# Patient Record
Sex: FEMALE | Race: WHITE | Employment: UNEMPLOYED | URBAN - METROPOLITAN AREA
[De-identification: names, ages, dates, MRNs, and addresses within clinical notes are randomized per-mention and may not be internally consistent; named-entity substitution may affect disease eponyms.]

---

## 2017-01-16 RX ORDER — LISINOPRIL 5 MG/1
5 TABLET ORAL DAILY
Qty: 30 TABLET | Refills: 5 | Status: SHIPPED | OUTPATIENT
Start: 2017-01-16 | End: 2017-09-07

## 2017-01-16 NOTE — TELEPHONE ENCOUNTER
Current Outpatient Prescriptions:  lisinopril 5 MG Oral Tab Take 1 tablet (5 mg total) by mouth daily.  Disp: 30 tablet Rfl: 5     Refill

## 2017-01-25 NOTE — TELEPHONE ENCOUNTER
Patient contacted and Dr. Bart Ovalle message relayed to do lab work. Instructed to fast 6 hours for lab work.

## 2017-09-07 ENCOUNTER — HOSPITAL ENCOUNTER (OUTPATIENT)
Age: 32
Discharge: HOME | End: 2017-09-07
Attending: FAMILY MEDICINE
Payer: COMMERCIAL

## 2017-09-07 VITALS
HEIGHT: 66 IN | RESPIRATION RATE: 16 BRPM | BODY MASS INDEX: 24.91 KG/M2 | WEIGHT: 155 LBS | TEMPERATURE: 98 F | OXYGEN SATURATION: 100 % | HEART RATE: 88 BPM | DIASTOLIC BLOOD PRESSURE: 90 MMHG | SYSTOLIC BLOOD PRESSURE: 130 MMHG

## 2017-09-07 DIAGNOSIS — S16.1XXA STRAIN OF MUSCLE, FASCIA AND TENDON AT NECK LEVEL, INITIAL ENCOUNTER: Primary | ICD-10-CM

## 2017-09-07 PROCEDURE — 99214 OFFICE O/P EST MOD 30 MIN: CPT

## 2017-09-07 PROCEDURE — 99213 OFFICE O/P EST LOW 20 MIN: CPT

## 2017-09-07 RX ORDER — CYCLOBENZAPRINE HCL 10 MG
10 TABLET ORAL 3 TIMES DAILY PRN
Qty: 20 TABLET | Refills: 0 | Status: SHIPPED | OUTPATIENT
Start: 2017-09-07 | End: 2017-09-28

## 2017-09-07 NOTE — ED INITIAL ASSESSMENT (HPI)
3 days of left sided neck pain radiating down left arm. Worse pain with movement of head or raising arm. + distal CMS. No trauma.

## 2017-09-07 NOTE — ED PROVIDER NOTES
Patient Seen in: [address]    History   Patient presents with:  Neck Pain    Stated Complaint: Lt neck/shoulder pain    HPI    Patient here with a 3 day history of left neck upper back pain  Moderate pain  Radiates to le Conjunctivae are normal. Pupils are equal, round, and reactive to light. Neck: Normal range of motion. Neck supple. No JVD present. No tracheal deviation present. No thyromegaly present.    Left upper trapezius and sternocleidomastoid spasm and tenderness DO  [facility]  [phone]    In 1 week  If symptoms are not better      Medications Prescribed:  Current Discharge Medication List    START taking these medications    Cyclobenzaprine HCl 10 MG Oral Tab  Take 1 tablet (10 mg total)

## 2018-03-22 RX ORDER — LISINOPRIL 5 MG/1
TABLET ORAL
Qty: 30 TABLET | Refills: 0 | OUTPATIENT
Start: 2018-03-22

## 2018-04-30 DIAGNOSIS — Q60.0 SOLITARY KIDNEY, CONGENITAL: Primary | ICD-10-CM

## 2018-04-30 NOTE — TELEPHONE ENCOUNTER
Contacted pt because lisinopril is no longer on her med list and her LOV with [facility] was 7/12/16. She states that to be honest, she hasn't been consistent with taking lisinopril, has had periods that she's stopped it and then started again.  Advised her to kirill

## 2018-05-01 RX ORDER — LISINOPRIL 5 MG/1
5 TABLET ORAL DAILY
Qty: 30 TABLET | Refills: 0 | Status: SHIPPED | OUTPATIENT
Start: 2018-05-01 | End: 2018-05-25

## 2018-05-02 NOTE — TELEPHONE ENCOUNTER
Lab orders entered. She has appt with [facility] on 5/4/18. Contacted her about labs to do as ordered by [facility]. She's not sure if she'll have time to do them prior to appt but she will try to do them on Friday, if not will do after her appt.

## 2018-05-04 ENCOUNTER — OFFICE VISIT (OUTPATIENT)
Dept: NEPHROLOGY | Facility: CLINIC | Age: 33
End: 2018-05-04

## 2018-05-04 VITALS
SYSTOLIC BLOOD PRESSURE: 102 MMHG | BODY MASS INDEX: 25.9 KG/M2 | HEIGHT: 66 IN | WEIGHT: 161.19 LBS | HEART RATE: 90 BPM | DIASTOLIC BLOOD PRESSURE: 69 MMHG

## 2018-05-04 DIAGNOSIS — Q60.0 SOLITARY KIDNEY, CONGENITAL: Primary | ICD-10-CM

## 2018-05-04 PROCEDURE — 99214 OFFICE O/P EST MOD 30 MIN: CPT | Performed by: INTERNAL MEDICINE

## 2018-05-04 PROCEDURE — 99212 OFFICE O/P EST SF 10 MIN: CPT | Performed by: INTERNAL MEDICINE

## 2018-05-04 NOTE — PROGRESS NOTES
05/04/18        Patient: Asael Tse   YOB: 1985   Date of Visit: 5/4/2018       Dear  Dr. Alia Echavarria, DO,      Thank you for referring Asael Tse to my practice. Please find my assessment and plan below.       As you know she is a

## 2018-05-25 ENCOUNTER — LAB ENCOUNTER (OUTPATIENT)
Dept: LAB | Facility: HOSPITAL | Age: 33
End: 2018-05-25
Attending: INTERNAL MEDICINE
Payer: COMMERCIAL

## 2018-05-25 DIAGNOSIS — Q60.0 SOLITARY KIDNEY, CONGENITAL: ICD-10-CM

## 2018-05-25 PROCEDURE — 81001 URINALYSIS AUTO W/SCOPE: CPT

## 2018-05-25 PROCEDURE — 82043 UR ALBUMIN QUANTITATIVE: CPT

## 2018-05-25 PROCEDURE — 36415 COLL VENOUS BLD VENIPUNCTURE: CPT

## 2018-05-25 PROCEDURE — 80069 RENAL FUNCTION PANEL: CPT

## 2018-05-25 PROCEDURE — 82570 ASSAY OF URINE CREATININE: CPT

## 2018-05-25 PROCEDURE — 85025 COMPLETE CBC W/AUTO DIFF WBC: CPT

## 2018-05-25 RX ORDER — LISINOPRIL 5 MG/1
TABLET ORAL
Qty: 30 TABLET | Refills: 5 | Status: SHIPPED | OUTPATIENT
Start: 2018-05-25 | End: 2018-11-15

## 2018-08-01 ENCOUNTER — CHARTING TRANS (OUTPATIENT)
Dept: OTHER | Age: 33
End: 2018-08-01

## 2018-08-04 ENCOUNTER — CHARTING TRANS (OUTPATIENT)
Dept: OTHER | Age: 33
End: 2018-08-04

## 2018-11-15 RX ORDER — LISINOPRIL 5 MG/1
TABLET ORAL
Qty: 90 TABLET | Refills: 1 | Status: SHIPPED | OUTPATIENT
Start: 2018-11-15 | End: 2019-09-09

## 2018-12-31 ENCOUNTER — HOSPITAL ENCOUNTER (OUTPATIENT)
Age: 33
Discharge: HOME | End: 2018-12-31
Attending: EMERGENCY MEDICINE
Payer: COMMERCIAL

## 2018-12-31 VITALS
RESPIRATION RATE: 18 BRPM | BODY MASS INDEX: 25.71 KG/M2 | WEIGHT: 160 LBS | HEIGHT: 66 IN | DIASTOLIC BLOOD PRESSURE: 92 MMHG | OXYGEN SATURATION: 98 % | TEMPERATURE: 98 F | SYSTOLIC BLOOD PRESSURE: 134 MMHG | HEART RATE: 112 BPM

## 2018-12-31 DIAGNOSIS — J02.9 VIRAL PHARYNGITIS: Primary | ICD-10-CM

## 2018-12-31 LAB — S PYO AG THROAT QL: NEGATIVE

## 2018-12-31 PROCEDURE — 87430 STREP A AG IA: CPT

## 2018-12-31 PROCEDURE — 99213 OFFICE O/P EST LOW 20 MIN: CPT

## 2018-12-31 PROCEDURE — 99212 OFFICE O/P EST SF 10 MIN: CPT

## 2018-12-31 NOTE — ED PROVIDER NOTES
Patient Seen in: [facility] [place]    History     Stated Complaint: SORE THROAT    HPI    Patient states for the past 2 days she has been having sore throat associated with chills diarrhea and generalized weakness.   The patient extremities without impairment            icc  Course     Labs Reviewed   EM POCT RAPID STREP - Normal                MDM   Discussed with patient symptoms likely viral in etiology and antibiotics not beneficial.  Did not think further testing was indicat

## 2019-05-07 ENCOUNTER — HOSPITAL ENCOUNTER (OUTPATIENT)
Age: 34
Discharge: HOME | End: 2019-05-07
Payer: COMMERCIAL

## 2019-05-07 VITALS
OXYGEN SATURATION: 99 % | DIASTOLIC BLOOD PRESSURE: 75 MMHG | BODY MASS INDEX: 25.39 KG/M2 | HEART RATE: 104 BPM | RESPIRATION RATE: 18 BRPM | TEMPERATURE: 98 F | SYSTOLIC BLOOD PRESSURE: 117 MMHG | WEIGHT: 158 LBS | HEIGHT: 66 IN

## 2019-05-07 DIAGNOSIS — R19.7 DIARRHEA, UNSPECIFIED TYPE: Primary | ICD-10-CM

## 2019-05-07 PROCEDURE — 99214 OFFICE O/P EST MOD 30 MIN: CPT

## 2019-05-07 PROCEDURE — 96360 HYDRATION IV INFUSION INIT: CPT

## 2019-05-07 PROCEDURE — 85025 COMPLETE CBC W/AUTO DIFF WBC: CPT | Performed by: NURSE PRACTITIONER

## 2019-05-07 PROCEDURE — 87507 IADNA-DNA/RNA PROBE TQ 12-25: CPT | Performed by: NURSE PRACTITIONER

## 2019-05-07 PROCEDURE — 80047 BASIC METABLC PNL IONIZED CA: CPT

## 2019-05-07 RX ORDER — SODIUM CHLORIDE 9 MG/ML
INJECTION, SOLUTION INTRAVENOUS ONCE
Status: COMPLETED | OUTPATIENT
Start: 2019-05-07 | End: 2019-05-07

## 2019-05-07 NOTE — ED PROVIDER NOTES
Patient presents with:  Diarrhea      HPI:     Juan Cabrales is a 26year old female with a history of solitary kidney and appendectomy presents with a chief complaint of diarrhea X3 days. Other than an appendectomy no other history of GI diagnoses or surgeries. any other concerns she should go to the ER. Patient verbalized plan of care and states understanding.        Orders Placed This Encounter      POCT CBC Once      Place PIV Once      iStat (Chem 8)      0.9%  NaCl infusion      Labs performed this visit:  N

## 2019-05-07 NOTE — ED INITIAL ASSESSMENT (HPI)
REPORTS LOOSE STOOL X 3 DAYS. STATES SHE IS HAVING A BOWEL MOVEMENT TWICE PER HOUR. DENIES EMESIS BUT REPORTS INTERMITTENT NAUSEA. DENIES BLOOD IN STOOL. DENIES ANY RECENT ANTIBIOTIC USE. DENIES GI HISTORY. PROVIDER AT BEDSIDE.

## 2019-05-23 ENCOUNTER — HOSPITAL ENCOUNTER (OUTPATIENT)
Dept: ULTRASOUND IMAGING | Facility: HOSPITAL | Age: 34
Discharge: HOME | End: 2019-05-23
Attending: FAMILY MEDICINE
Payer: COMMERCIAL

## 2019-05-23 ENCOUNTER — HOSPITAL ENCOUNTER (OUTPATIENT)
Dept: MAMMOGRAPHY | Facility: HOSPITAL | Age: 34
Discharge: HOME | End: 2019-05-23
Attending: FAMILY MEDICINE
Payer: COMMERCIAL

## 2019-05-23 DIAGNOSIS — N63.10 BILATERAL BREAST LUMP: ICD-10-CM

## 2019-05-23 DIAGNOSIS — N63.20 BILATERAL BREAST LUMP: ICD-10-CM

## 2019-05-23 PROCEDURE — 77066 DX MAMMO INCL CAD BI: CPT | Performed by: FAMILY MEDICINE

## 2019-05-23 PROCEDURE — 76642 ULTRASOUND BREAST LIMITED: CPT | Performed by: FAMILY MEDICINE

## 2019-05-23 PROCEDURE — 77062 BREAST TOMOSYNTHESIS BI: CPT | Performed by: FAMILY MEDICINE

## 2019-09-09 DIAGNOSIS — Q60.0 SOLITARY KIDNEY, CONGENITAL: Primary | ICD-10-CM

## 2019-09-09 RX ORDER — LISINOPRIL 5 MG/1
5 TABLET ORAL DAILY
Qty: 90 TABLET | Refills: 0 | Status: SHIPPED | OUTPATIENT
Start: 2019-09-09 | End: 2020-04-02

## 2019-09-09 NOTE — TELEPHONE ENCOUNTER
LOV 5/4/18. RTC in 1 yr (5/2019) No f/u scheduled. Refill pended and routed to Dr. Silvio Madison to approve.

## 2019-09-09 NOTE — TELEPHONE ENCOUNTER
Do CBC, renal panel, urinalysis, urine for microalbumin and see Take medications as directed  Follow up with Primary Physician  Increase water

## 2019-09-11 NOTE — TELEPHONE ENCOUNTER
Patient contacted. Advised of lab orders entered in [address] [place] [address] Rd. Instructed to fast 6 hours for lab work.  Follow up appointment booked for Tuesday 9/24/19 at 4:40 pm.

## 2019-10-14 ENCOUNTER — LAB ENCOUNTER (OUTPATIENT)
Dept: LAB | Facility: HOSPITAL | Age: 34
End: 2019-10-14
Attending: INTERNAL MEDICINE
Payer: COMMERCIAL

## 2019-10-14 DIAGNOSIS — Q60.0 SOLITARY KIDNEY, CONGENITAL: ICD-10-CM

## 2019-10-14 PROCEDURE — 36415 COLL VENOUS BLD VENIPUNCTURE: CPT

## 2019-10-14 PROCEDURE — 85025 COMPLETE CBC W/AUTO DIFF WBC: CPT

## 2019-10-14 PROCEDURE — 80069 RENAL FUNCTION PANEL: CPT

## 2019-10-14 PROCEDURE — 82570 ASSAY OF URINE CREATININE: CPT

## 2019-10-14 PROCEDURE — 81001 URINALYSIS AUTO W/SCOPE: CPT

## 2019-10-14 PROCEDURE — 82043 UR ALBUMIN QUANTITATIVE: CPT

## 2019-10-21 ENCOUNTER — OFFICE VISIT (OUTPATIENT)
Dept: NEPHROLOGY | Facility: CLINIC | Age: 34
End: 2019-10-21
Payer: COMMERCIAL

## 2019-10-21 VITALS
SYSTOLIC BLOOD PRESSURE: 95 MMHG | HEIGHT: 66 IN | DIASTOLIC BLOOD PRESSURE: 53 MMHG | BODY MASS INDEX: 25.07 KG/M2 | WEIGHT: 156 LBS | HEART RATE: 89 BPM

## 2019-10-21 DIAGNOSIS — Q60.0 SOLITARY KIDNEY, CONGENITAL: Primary | ICD-10-CM

## 2019-10-21 PROCEDURE — 99214 OFFICE O/P EST MOD 30 MIN: CPT | Performed by: INTERNAL MEDICINE

## 2019-10-22 NOTE — PROGRESS NOTES
10/21/19        Patient: Trini Tanner   YOB: 1985   Date of Visit: 10/21/2019       Dear  Dr. Alvarez Herrmann, DO,      Thank you for referring Trini Tanner to my practice. Please find my assessment and plan below.       As you know she is Adelaide Burch

## 2020-02-01 ENCOUNTER — HOSPITAL ENCOUNTER (OUTPATIENT)
Age: 35
Discharge: HOME | End: 2020-02-01
Payer: COMMERCIAL

## 2020-02-01 VITALS
BODY MASS INDEX: 25 KG/M2 | HEART RATE: 92 BPM | OXYGEN SATURATION: 99 % | WEIGHT: 156 LBS | DIASTOLIC BLOOD PRESSURE: 82 MMHG | RESPIRATION RATE: 18 BRPM | TEMPERATURE: 98 F | SYSTOLIC BLOOD PRESSURE: 124 MMHG

## 2020-02-01 DIAGNOSIS — R05.9 COUGH: Primary | ICD-10-CM

## 2020-02-01 PROCEDURE — 99214 OFFICE O/P EST MOD 30 MIN: CPT

## 2020-02-01 PROCEDURE — 99213 OFFICE O/P EST LOW 20 MIN: CPT

## 2020-02-01 RX ORDER — METHYLPREDNISOLONE 4 MG/1
TABLET ORAL
Qty: 1 PACKAGE | Refills: 0 | Status: SHIPPED | OUTPATIENT
Start: 2020-02-01 | End: 2020-09-14

## 2020-02-01 RX ORDER — BENZONATATE 100 MG/1
100 CAPSULE ORAL 3 TIMES DAILY PRN
Qty: 30 CAPSULE | Refills: 0 | Status: SHIPPED | OUTPATIENT
Start: 2020-02-01 | End: 2020-02-10

## 2020-02-01 RX ORDER — ALBUTEROL SULFATE 90 UG/1
2 AEROSOL, METERED RESPIRATORY (INHALATION) EVERY 4 HOURS PRN
Qty: 1 INHALER | Refills: 0 | Status: SHIPPED | OUTPATIENT
Start: 2020-02-01 | End: 2020-03-02

## 2020-02-01 NOTE — ED INITIAL ASSESSMENT (HPI)
REPORTS 2 WEEK HX OF URI SYMPTOMS. STATES SHE COMPLETED A COURSE OF AMOXICILLIN BUT THE COUGH CONTINUES TO LINGER. STATES SHE DOES FEEL SOMEWHAT BETTER THAN SHE DID INITIALLY. PROVIDER AT BEDSIDE.

## 2020-02-01 NOTE — ED PROVIDER NOTES
Patient presents with:  Cough/URI      HPI:     Iliana Levin is a 37year old female with with a history of solitary kidney presents with a chief complaint of cough. Pt reports that she was treated for strep throat this ago.   States she completed marco at this time. This is most likely secondary to viral etiology. Will place on Medrol Dosepak, Tessalon Perles as well as a butyryl inhaler. She states if she does not have improvement she will return.   Patient verbalized plan of care and states [place]

## 2020-02-10 ENCOUNTER — OFFICE VISIT (OUTPATIENT)
Dept: FAMILY MEDICINE CLINIC | Facility: CLINIC | Age: 35
End: 2020-02-10
Payer: COMMERCIAL

## 2020-02-10 VITALS
WEIGHT: 155 LBS | DIASTOLIC BLOOD PRESSURE: 80 MMHG | BODY MASS INDEX: 24.91 KG/M2 | TEMPERATURE: 98 F | HEART RATE: 88 BPM | OXYGEN SATURATION: 99 % | SYSTOLIC BLOOD PRESSURE: 120 MMHG | HEIGHT: 66 IN

## 2020-02-10 DIAGNOSIS — R05.3 PERSISTENT COUGH: Primary | ICD-10-CM

## 2020-02-10 PROCEDURE — 99202 OFFICE O/P NEW SF 15 MIN: CPT | Performed by: FAMILY MEDICINE

## 2020-02-10 NOTE — PROGRESS NOTES
Patient presents with:  Cough: 3weeks      HPI:   Pablito Thornton is a 37year old female who presents for a cough. Initially had sore throat and fever about 3 weeks ago while at [facility] in [place]. Tmax 102 at the time.    Saw a doc in [address] Dosepack: take as directed (Patient not taking: Reported on 2/10/2020 ) 1 Package 0   • Multiple Vitamins-Minerals (MULTIVITAMIN ADULT OR) Take 1 tablet by mouth.      • Acetaminophen (TYLENOL OR)         Past Medical History:   Diagnosis Date   • Solitary URI.   -Pt is on lisinopril, but does have other allergy sx. Will continue for now. -Will start daily flonase & zyretec/claritin/allegra.    -May continue albuterol prn for bronchospasms as well.   -Pt to update in 2 weeks if no improvement, or sooner prn

## 2020-04-02 RX ORDER — LISINOPRIL 5 MG/1
TABLET ORAL
Qty: 90 TABLET | Refills: 1 | Status: SHIPPED | OUTPATIENT
Start: 2020-04-02 | End: 2020-11-27

## 2020-08-19 ENCOUNTER — HOSPITAL ENCOUNTER (OUTPATIENT)
Age: 35
Discharge: HOME | End: 2020-08-19
Payer: COMMERCIAL

## 2020-08-19 VITALS
SYSTOLIC BLOOD PRESSURE: 124 MMHG | RESPIRATION RATE: 18 BRPM | DIASTOLIC BLOOD PRESSURE: 86 MMHG | HEART RATE: 89 BPM | TEMPERATURE: 98 F | OXYGEN SATURATION: 99 %

## 2020-08-19 DIAGNOSIS — M79.601 MUSCULOSKELETAL ARM PAIN, RIGHT: Primary | ICD-10-CM

## 2020-08-19 PROCEDURE — 99214 OFFICE O/P EST MOD 30 MIN: CPT

## 2020-08-19 PROCEDURE — 99213 OFFICE O/P EST LOW 20 MIN: CPT

## 2020-08-19 RX ORDER — METHYLPREDNISOLONE 4 MG/1
TABLET ORAL
Qty: 1 PACKAGE | Refills: 0 | Status: SHIPPED | OUTPATIENT
Start: 2020-08-19 | End: 2020-09-14

## 2020-08-19 NOTE — ED PROVIDER NOTES
[facility] [address]      History   Patient presents with:  Arm Pain    Stated Complaint: right arm pain    HPI    This is a 33-year-old female presenting with right arm pain.   Patient states for about 3 weeks she Vitals signs and nursing note reviewed. Constitutional:       Appearance: Normal appearance. HENT:      Head: Normocephalic.       Right Ear: Tympanic membrane normal.      Left Ear: Tympanic membrane normal.      Nose: Nose normal.      Mouth/Throat: 53-year-old female nontoxic-appearing presenting with intermittent 3-week pain in the right arm. There is tenderness to palpation over the biceps region and near the upper forearm near the elbow.   There is no trauma noted there is no swelling no erythema

## 2020-08-19 NOTE — ED INITIAL ASSESSMENT (HPI)
REPORTS 3 WEEK HX OF RIGHT ARM PAIN. STATES PAIN STARTS IN THE ELBOW AND RADIATES TO HER RIGHT UPPER ARM. ALSO REPORTS FEELING OF NUMBNESS TO THE AREA STARTING TODAY. DENIES INJURY/TRAUMA. STATES PAIN IS NORMALLY WORSE AT NIGHT WHEN SHE TRIES TO SLEEP.

## 2020-08-28 ENCOUNTER — HOSPITAL ENCOUNTER (OUTPATIENT)
Age: 35
Discharge: HOME | End: 2020-08-28
Attending: EMERGENCY MEDICINE
Payer: COMMERCIAL

## 2020-08-28 VITALS
DIASTOLIC BLOOD PRESSURE: 83 MMHG | TEMPERATURE: 97 F | RESPIRATION RATE: 20 BRPM | OXYGEN SATURATION: 99 % | HEART RATE: 97 BPM | SYSTOLIC BLOOD PRESSURE: 130 MMHG

## 2020-08-28 DIAGNOSIS — R19.7 DIARRHEA, UNSPECIFIED TYPE: ICD-10-CM

## 2020-08-28 DIAGNOSIS — R11.0 NAUSEA: Primary | ICD-10-CM

## 2020-08-28 LAB
#MXD IC: 0.9 X10ˆ3/UL (ref 0.1–1)
B-HCG UR QL: NEGATIVE
BILIRUB UR QL STRIP: NEGATIVE
CLARITY UR: CLEAR
COLOR UR: YELLOW
CREAT BLD-MCNC: 0.8 MG/DL (ref 0.55–1.02)
GLUCOSE BLD-MCNC: 98 MG/DL (ref 70–99)
GLUCOSE UR STRIP-MCNC: NEGATIVE MG/DL
HCT VFR BLD AUTO: 36.7 % (ref 35–48)
HGB BLD-MCNC: 12.4 G/DL (ref 12–16)
ISTAT BUN: 10 MG/DL (ref 7–18)
ISTAT CHLORIDE: 107 MMOL/L (ref 98–112)
ISTAT HEMATOCRIT: 41 %
ISTAT IONIZED CALCIUM FOR CHEM 8: 1.19 MMOL/L (ref 1.12–1.32)
ISTAT POTASSIUM: 3.6 MMOL/L (ref 3.6–5.1)
ISTAT SODIUM: 141 MMOL/L (ref 136–145)
ISTAT TCO2: 21 MMOL/L (ref 21–32)
KETONES UR STRIP-MCNC: NEGATIVE MG/DL
LEUKOCYTE ESTERASE UR QL STRIP: NEGATIVE
LYMPHOCYTES # BLD AUTO: 1.2 X10ˆ3/UL (ref 1–4)
LYMPHOCYTES NFR BLD AUTO: 17.3 %
MCH RBC QN AUTO: 28.8 PG (ref 26–34)
MCHC RBC AUTO-ENTMCNC: 33.8 G/DL (ref 31–37)
MCV RBC AUTO: 85.2 FL (ref 80–100)
MIXED CELL %: 13.6 %
NEUTROPHILS # BLD AUTO: 4.6 X10ˆ3/UL (ref 1.5–7.7)
NEUTROPHILS NFR BLD AUTO: 69.1 %
NITRITE UR QL STRIP: NEGATIVE
PH UR STRIP: 5 [PH]
PLATELET # BLD AUTO: 280 X10ˆ3/UL (ref 150–450)
PROT UR STRIP-MCNC: 30 MG/DL
RBC # BLD AUTO: 4.31 X10ˆ6/UL (ref 3.8–5.3)
SP GR UR STRIP: >=1.03
UROBILINOGEN UR STRIP-ACNC: <2 MG/DL
WBC # BLD AUTO: 6.7 X10ˆ3/UL (ref 4–11)

## 2020-08-28 PROCEDURE — 99214 OFFICE O/P EST MOD 30 MIN: CPT

## 2020-08-28 PROCEDURE — 81002 URINALYSIS NONAUTO W/O SCOPE: CPT

## 2020-08-28 PROCEDURE — 96361 HYDRATE IV INFUSION ADD-ON: CPT

## 2020-08-28 PROCEDURE — 85025 COMPLETE CBC W/AUTO DIFF WBC: CPT | Performed by: EMERGENCY MEDICINE

## 2020-08-28 PROCEDURE — 96374 THER/PROPH/DIAG INJ IV PUSH: CPT

## 2020-08-28 PROCEDURE — 80047 BASIC METABLC PNL IONIZED CA: CPT

## 2020-08-28 PROCEDURE — 81025 URINE PREGNANCY TEST: CPT

## 2020-08-28 RX ORDER — SODIUM CHLORIDE 9 MG/ML
1000 INJECTION, SOLUTION INTRAVENOUS ONCE
Status: COMPLETED | OUTPATIENT
Start: 2020-08-28 | End: 2020-08-28

## 2020-08-28 RX ORDER — ONDANSETRON 4 MG/1
4 TABLET, ORALLY DISINTEGRATING ORAL EVERY 6 HOURS PRN
Qty: 10 TABLET | Refills: 0 | Status: SHIPPED | OUTPATIENT
Start: 2020-08-28 | End: 2020-09-04

## 2020-08-28 RX ORDER — ONDANSETRON 2 MG/ML
4 INJECTION INTRAMUSCULAR; INTRAVENOUS ONCE
Status: COMPLETED | OUTPATIENT
Start: 2020-08-28 | End: 2020-08-28

## 2020-08-28 NOTE — ED PROVIDER NOTES
Patient Seen in: [facility]      History   Patient presents with:  Nausea/Vomiting/Diarrhea    Stated Complaint: nauseous    HPI    One week of symptoms. Started with mild nausea and then diarrhea, about two episodes/day. Pulmonary effort is normal. No respiratory distress. Abdominal:      General: Abdomen is flat. There is no distension. Palpations: Abdomen is soft. Tenderness: There is tenderness (mild across the lower abdomen).    Skin:     General: Skin is wa

## 2020-08-29 LAB — SARS-COV-2 RNA RESP QL NAA+PROBE: NOT DETECTED

## 2020-09-04 LAB — CYTOLOGY CVX/VAG DOC THIN PREP: NORMAL

## 2020-09-14 ENCOUNTER — OFFICE VISIT (OUTPATIENT)
Dept: FAMILY MEDICINE CLINIC | Facility: CLINIC | Age: 35
End: 2020-09-14
Payer: COMMERCIAL

## 2020-09-14 ENCOUNTER — HOSPITAL ENCOUNTER (OUTPATIENT)
Dept: GENERAL RADIOLOGY | Age: 35
Discharge: HOME | End: 2020-09-14
Attending: FAMILY MEDICINE
Payer: COMMERCIAL

## 2020-09-14 VITALS
OXYGEN SATURATION: 99 % | HEART RATE: 94 BPM | HEIGHT: 66 IN | TEMPERATURE: 98 F | BODY MASS INDEX: 25.71 KG/M2 | DIASTOLIC BLOOD PRESSURE: 74 MMHG | WEIGHT: 160 LBS | SYSTOLIC BLOOD PRESSURE: 116 MMHG

## 2020-09-14 DIAGNOSIS — R10.32 LEFT LOWER QUADRANT ABDOMINAL PAIN: ICD-10-CM

## 2020-09-14 DIAGNOSIS — M77.11 LATERAL EPICONDYLITIS OF RIGHT ELBOW: ICD-10-CM

## 2020-09-14 DIAGNOSIS — N39.0 URINARY TRACT INFECTION WITHOUT HEMATURIA, SITE UNSPECIFIED: Primary | ICD-10-CM

## 2020-09-14 DIAGNOSIS — A09 DIARRHEA OF INFECTIOUS ORIGIN: ICD-10-CM

## 2020-09-14 PROBLEM — I10 ESSENTIAL HYPERTENSION: Status: ACTIVE | Noted: 2018-05-29

## 2020-09-14 LAB
BILIRUB UR QL: NEGATIVE
CLARITY UR: CLEAR
COLOR UR: YELLOW
CONTROL LINE PRESENT WITH A CLEAR BACKGROUND (YES/NO): YES YES/NO
GLUCOSE UR-MCNC: NEGATIVE MG/DL
HGB UR QL STRIP.AUTO: NEGATIVE
KETONES UR-MCNC: NEGATIVE MG/DL
LEUKOCYTE ESTERASE UR QL STRIP.AUTO: NEGATIVE
NITRITE UR QL STRIP.AUTO: NEGATIVE
PH UR: 5 [PH] (ref 5–8)
PREGNANCY TEST, URINE: NEGATIVE
PROT UR-MCNC: NEGATIVE MG/DL
SP GR UR STRIP: 1.02 (ref 1–1.03)
UROBILINOGEN UR STRIP-ACNC: <2

## 2020-09-14 PROCEDURE — 3078F DIAST BP <80 MM HG: CPT | Performed by: FAMILY MEDICINE

## 2020-09-14 PROCEDURE — 99214 OFFICE O/P EST MOD 30 MIN: CPT | Performed by: FAMILY MEDICINE

## 2020-09-14 PROCEDURE — 3074F SYST BP LT 130 MM HG: CPT | Performed by: FAMILY MEDICINE

## 2020-09-14 PROCEDURE — 81003 URINALYSIS AUTO W/O SCOPE: CPT | Performed by: FAMILY MEDICINE

## 2020-09-14 PROCEDURE — 3008F BODY MASS INDEX DOCD: CPT | Performed by: FAMILY MEDICINE

## 2020-09-14 PROCEDURE — 81025 URINE PREGNANCY TEST: CPT | Performed by: FAMILY MEDICINE

## 2020-09-14 PROCEDURE — 73080 X-RAY EXAM OF ELBOW: CPT | Performed by: FAMILY MEDICINE

## 2020-09-14 RX ORDER — AMOXICILLIN AND CLAVULANATE POTASSIUM 562.5; 437.5; 62.5 MG/1; MG/1; MG/1
1 TABLET, FILM COATED, EXTENDED RELEASE ORAL 2 TIMES DAILY
Qty: 20 TABLET | Refills: 0 | Status: SHIPPED | OUTPATIENT
Start: 2020-09-14 | End: 2021-07-27 | Stop reason: ALTCHOICE

## 2020-09-14 NOTE — PROGRESS NOTES
HPI:    Patient ID: Steven Ibanez is a 38year old female who presents for Right elbow pain for past few months. She is a mom of 2 children 3 yo and 2 yo. She had no known injury. It hurts more with lifting and turning knobs.   No hx of similar pa on file    Lifestyle      Physical activity:        Days per week: Not on file        Minutes per session: Not on file      Stress: Not on file    Relationships      Social connections:        Talks on phone: Not on file        Gets together: Not on file polyuria, polydypsia  Skin: Negative for: rash, blister,          /74   Pulse 94   Temp 98 °F (36.7 °C) (Oral)   Ht 66\"   Wt 160 lb (72.6 kg)   LMP 08/17/2020   SpO2 99%   BMI 25.82 kg/m²     PHYSICAL EXAM:   Physical Exam  Vital signs stable, afebr identified, these errors have been corrected.  While every attempt is made to correct errors during dictation, discrepancies may still exist.

## 2020-09-16 ENCOUNTER — TELEPHONE (OUTPATIENT)
Dept: FAMILY MEDICINE CLINIC | Facility: CLINIC | Age: 35
End: 2020-09-16

## 2020-09-16 NOTE — TELEPHONE ENCOUNTER
Xray of elbow normal  Because of chronicityty of pain will send to PT  Pt requests [facility] in [address] would not pull up [place] location, so will fax to [address] [place] [address] location.

## 2020-10-07 ENCOUNTER — TELEPHONE (OUTPATIENT)
Dept: FAMILY MEDICINE CLINIC | Facility: CLINIC | Age: 35
End: 2020-10-07

## 2020-10-07 NOTE — TELEPHONE ENCOUNTER
Called pt and stated that mom just found MMR vaccine information/adminstration date. Informed pt to ask new job if blood test can be done to verify immunity and if needed. Pt verbalized understanding.

## 2020-10-07 NOTE — TELEPHONE ENCOUNTER
Patient is starting a new job and is needing to know if she has had a mmr vaccine she states she has seen Dr Sonia Roberts but not when she was little and doen't know what to do  Please call back

## 2020-11-23 DIAGNOSIS — Q60.0 SOLITARY KIDNEY, CONGENITAL: Primary | ICD-10-CM

## 2020-11-23 NOTE — TELEPHONE ENCOUNTER
Lisinopril 5 mg rx request. Please review and sign off if appropriate. Thank you. Last office visit: 10/21/2019  Return to clinic: 1 year. No future appt scheduled.   Last refill: 4/2/2020

## 2020-11-27 RX ORDER — LISINOPRIL 5 MG/1
5 TABLET ORAL DAILY
Qty: 90 TABLET | Refills: 0 | Status: SHIPPED | OUTPATIENT
Start: 2020-11-27 | End: 2021-06-15

## 2021-06-15 ENCOUNTER — PATIENT MESSAGE (OUTPATIENT)
Dept: NEPHROLOGY | Facility: CLINIC | Age: 36
End: 2021-06-15

## 2021-06-15 RX ORDER — LISINOPRIL 5 MG/1
5 TABLET ORAL DAILY
Qty: 30 TABLET | Refills: 0 | Status: SHIPPED | OUTPATIENT
Start: 2021-06-15 | End: 2021-07-27

## 2021-06-15 NOTE — TELEPHONE ENCOUNTER
Last seen 10/21/19. Return to clinic in 1 year No follow up scheduled. Refill(s) pended and routed to Dr. Maryann Turpin.

## 2021-06-15 NOTE — TELEPHONE ENCOUNTER
From: Sandi Tariq  To: Panfilo Anguiano MD  Sent: 6/15/2021 2:03 PM CDT  Subject: Visit Follow-up Question    Hello! I scheduled my routine appointment as I am seen every 2 years for my 1 kidney.  Am I required to get blood drawn for my labs before m

## 2021-06-15 NOTE — TELEPHONE ENCOUNTER
•  lisinopril 5 MG Oral Tab, Take 1 tablet (5 mg total) by mouth daily. , Disp: 90 tablet, Rfl: 0  •

## 2021-07-14 ENCOUNTER — LAB ENCOUNTER (OUTPATIENT)
Dept: LAB | Facility: HOSPITAL | Age: 36
End: 2021-07-14
Attending: INTERNAL MEDICINE
Payer: COMMERCIAL

## 2021-07-14 DIAGNOSIS — Q60.0 SOLITARY KIDNEY, CONGENITAL: ICD-10-CM

## 2021-07-14 LAB
ALBUMIN SERPL-MCNC: 3.8 G/DL (ref 3.4–5)
ANION GAP SERPL CALC-SCNC: 5 MMOL/L (ref 0–18)
BASOPHILS # BLD AUTO: 0.06 X10(3) UL (ref 0–0.2)
BASOPHILS NFR BLD AUTO: 0.8 %
BILIRUB UR QL: NEGATIVE
BUN BLD-MCNC: 14 MG/DL (ref 7–18)
BUN/CREAT SERPL: 16.9 (ref 10–20)
CALCIUM BLD-MCNC: 9 MG/DL (ref 8.5–10.1)
CHLORIDE SERPL-SCNC: 109 MMOL/L (ref 98–112)
CLARITY UR: CLEAR
CO2 SERPL-SCNC: 24 MMOL/L (ref 21–32)
COLOR UR: YELLOW
CREAT BLD-MCNC: 0.83 MG/DL
CREAT UR-SCNC: 166 MG/DL
DEPRECATED RDW RBC AUTO: 39.3 FL (ref 35.1–46.3)
EOSINOPHIL # BLD AUTO: 0.21 X10(3) UL (ref 0–0.7)
EOSINOPHIL NFR BLD AUTO: 2.7 %
ERYTHROCYTE [DISTWIDTH] IN BLOOD BY AUTOMATED COUNT: 12 % (ref 11–15)
GLUCOSE BLD-MCNC: 92 MG/DL (ref 70–99)
GLUCOSE UR-MCNC: NEGATIVE MG/DL
HCT VFR BLD AUTO: 36 %
HGB BLD-MCNC: 11.7 G/DL
HGB UR QL STRIP.AUTO: NEGATIVE
IMM GRANULOCYTES # BLD AUTO: 0.03 X10(3) UL (ref 0–1)
IMM GRANULOCYTES NFR BLD: 0.4 %
KETONES UR-MCNC: NEGATIVE MG/DL
LEUKOCYTE ESTERASE UR QL STRIP.AUTO: NEGATIVE
LYMPHOCYTES # BLD AUTO: 1.8 X10(3) UL (ref 1–4)
LYMPHOCYTES NFR BLD AUTO: 22.9 %
MCH RBC QN AUTO: 28.8 PG (ref 26–34)
MCHC RBC AUTO-ENTMCNC: 32.5 G/DL (ref 31–37)
MCV RBC AUTO: 88.7 FL
MICROALBUMIN UR-MCNC: 6.17 MG/DL
MICROALBUMIN/CREAT 24H UR-RTO: 37.2 UG/MG (ref ?–30)
MONOCYTES # BLD AUTO: 0.66 X10(3) UL (ref 0.1–1)
MONOCYTES NFR BLD AUTO: 8.4 %
NEUTROPHILS # BLD AUTO: 5.11 X10 (3) UL (ref 1.5–7.7)
NEUTROPHILS # BLD AUTO: 5.11 X10(3) UL (ref 1.5–7.7)
NEUTROPHILS NFR BLD AUTO: 64.8 %
NITRITE UR QL STRIP.AUTO: NEGATIVE
OSMOLALITY SERPL CALC.SUM OF ELEC: 286 MOSM/KG (ref 275–295)
PH UR: 5 [PH] (ref 5–8)
PHOSPHATE SERPL-MCNC: 3 MG/DL (ref 2.5–4.9)
PLATELET # BLD AUTO: 305 10(3)UL (ref 150–450)
POTASSIUM SERPL-SCNC: 3.8 MMOL/L (ref 3.5–5.1)
PROT UR-MCNC: NEGATIVE MG/DL
RBC # BLD AUTO: 4.06 X10(6)UL
SODIUM SERPL-SCNC: 138 MMOL/L (ref 136–145)
SP GR UR STRIP: 1.02 (ref 1–1.03)
UROBILINOGEN UR STRIP-ACNC: <2
WBC # BLD AUTO: 7.9 X10(3) UL (ref 4–11)

## 2021-07-14 PROCEDURE — 80069 RENAL FUNCTION PANEL: CPT

## 2021-07-14 PROCEDURE — 82043 UR ALBUMIN QUANTITATIVE: CPT

## 2021-07-14 PROCEDURE — 81003 URINALYSIS AUTO W/O SCOPE: CPT

## 2021-07-14 PROCEDURE — 36415 COLL VENOUS BLD VENIPUNCTURE: CPT

## 2021-07-14 PROCEDURE — 85025 COMPLETE CBC W/AUTO DIFF WBC: CPT

## 2021-07-14 PROCEDURE — 82570 ASSAY OF URINE CREATININE: CPT

## 2021-07-27 ENCOUNTER — OFFICE VISIT (OUTPATIENT)
Dept: NEPHROLOGY | Facility: CLINIC | Age: 36
End: 2021-07-27
Payer: COMMERCIAL

## 2021-07-27 VITALS
WEIGHT: 164 LBS | HEIGHT: 66 IN | DIASTOLIC BLOOD PRESSURE: 69 MMHG | SYSTOLIC BLOOD PRESSURE: 106 MMHG | HEART RATE: 80 BPM | BODY MASS INDEX: 26.36 KG/M2

## 2021-07-27 DIAGNOSIS — Q60.0 SOLITARY KIDNEY, CONGENITAL: Primary | ICD-10-CM

## 2021-07-27 PROCEDURE — 99214 OFFICE O/P EST MOD 30 MIN: CPT | Performed by: INTERNAL MEDICINE

## 2021-07-27 PROCEDURE — 3074F SYST BP LT 130 MM HG: CPT | Performed by: INTERNAL MEDICINE

## 2021-07-27 PROCEDURE — 3078F DIAST BP <80 MM HG: CPT | Performed by: INTERNAL MEDICINE

## 2021-07-27 PROCEDURE — 3008F BODY MASS INDEX DOCD: CPT | Performed by: INTERNAL MEDICINE

## 2021-07-27 RX ORDER — LISINOPRIL 5 MG/1
5 TABLET ORAL DAILY
Qty: 90 TABLET | Refills: 3 | Status: SHIPPED | OUTPATIENT
Start: 2021-07-27

## 2021-07-27 NOTE — PROGRESS NOTES
07/27/21        Patient: Inez Bang   YOB: 1985   Date of Visit: 7/27/2021       Dear  Dr. Homer Beverly,      Thank you for referring Inez Bang to my practice. Please find my assessment and plan below.       As you know she is [facility] [place] [id number]    Document electronically generated by:  Jarad Akbar

## 2021-08-29 ENCOUNTER — HOSPITAL ENCOUNTER (OUTPATIENT)
Age: 36
Discharge: HOME | End: 2021-08-29
Payer: COMMERCIAL

## 2021-08-29 VITALS
HEIGHT: 66 IN | SYSTOLIC BLOOD PRESSURE: 124 MMHG | BODY MASS INDEX: 25.71 KG/M2 | RESPIRATION RATE: 18 BRPM | TEMPERATURE: 98 F | OXYGEN SATURATION: 100 % | WEIGHT: 160 LBS | DIASTOLIC BLOOD PRESSURE: 72 MMHG | HEART RATE: 90 BPM

## 2021-08-29 DIAGNOSIS — J02.9 SORE THROAT: Primary | ICD-10-CM

## 2021-08-29 DIAGNOSIS — Z20.822 ENCOUNTER FOR SCREENING LABORATORY TESTING FOR COVID-19 VIRUS: ICD-10-CM

## 2021-08-29 DIAGNOSIS — R09.89 RUNNY NOSE: ICD-10-CM

## 2021-08-29 LAB — S PYO AG THROAT QL: NEGATIVE

## 2021-08-29 PROCEDURE — 87880 STREP A ASSAY W/OPTIC: CPT | Performed by: NURSE PRACTITIONER

## 2021-08-29 PROCEDURE — 99213 OFFICE O/P EST LOW 20 MIN: CPT | Performed by: NURSE PRACTITIONER

## 2021-08-29 PROCEDURE — U0002 COVID-19 LAB TEST NON-CDC: HCPCS | Performed by: NURSE PRACTITIONER

## 2021-08-29 NOTE — ED INITIAL ASSESSMENT (HPI)
Pt here requesting covid test reports sore throat since last night. Denies any fevers or other complains.

## 2021-08-29 NOTE — ED PROVIDER NOTES
Patient Seen in: [facility]      History   Patient presents with:  Covid-19 Test: Entered by patient    Stated Complaint: Covid-19 Test    HPI/Subjective:   HPI    This is a 67-year-old female presenting for runny nose and sore throat.   Kimberly Conjunctiva/sclera: Conjunctivae normal.   Cardiovascular:      Rate and Rhythm: Normal rate. Heart sounds: Normal heart sounds. Pulmonary:      Effort: Pulmonary effort is normal.      Breath sounds: Normal breath sounds.    Musculoskeletal: List

## 2021-08-30 LAB — SARS-COV-2 RNA RESP QL NAA+PROBE: NOT DETECTED

## 2021-09-03 ENCOUNTER — HOSPITAL ENCOUNTER (OUTPATIENT)
Age: 36
Discharge: HOME | End: 2021-09-03
Payer: COMMERCIAL

## 2021-09-03 ENCOUNTER — APPOINTMENT (OUTPATIENT)
Dept: GENERAL RADIOLOGY | Age: 36
End: 2021-09-03
Attending: NURSE PRACTITIONER
Payer: COMMERCIAL

## 2021-09-03 VITALS
SYSTOLIC BLOOD PRESSURE: 132 MMHG | TEMPERATURE: 97 F | HEART RATE: 88 BPM | DIASTOLIC BLOOD PRESSURE: 89 MMHG | OXYGEN SATURATION: 98 % | RESPIRATION RATE: 18 BRPM | BODY MASS INDEX: 25.71 KG/M2 | HEIGHT: 66 IN | WEIGHT: 160 LBS

## 2021-09-03 DIAGNOSIS — R05.9 COUGH: Primary | ICD-10-CM

## 2021-09-03 PROCEDURE — 94640 AIRWAY INHALATION TREATMENT: CPT | Performed by: NURSE PRACTITIONER

## 2021-09-03 PROCEDURE — 71046 X-RAY EXAM CHEST 2 VIEWS: CPT | Performed by: NURSE PRACTITIONER

## 2021-09-03 PROCEDURE — 99214 OFFICE O/P EST MOD 30 MIN: CPT | Performed by: NURSE PRACTITIONER

## 2021-09-03 RX ORDER — ALBUTEROL SULFATE 90 UG/1
8 AEROSOL, METERED RESPIRATORY (INHALATION) ONCE
Status: COMPLETED | OUTPATIENT
Start: 2021-09-03 | End: 2021-09-03

## 2021-09-03 RX ORDER — CODEINE PHOSPHATE AND GUAIFENESIN 10; 100 MG/5ML; MG/5ML
5 SOLUTION ORAL EVERY 6 HOURS PRN
Qty: 118 ML | Refills: 0 | Status: SHIPPED | OUTPATIENT
Start: 2021-09-03 | End: 2021-10-13

## 2021-09-03 RX ORDER — METHYLPREDNISOLONE 4 MG/1
TABLET ORAL
Qty: 1 EACH | Refills: 0 | Status: SHIPPED | OUTPATIENT
Start: 2021-09-03 | End: 2021-10-13

## 2021-09-03 RX ORDER — BENZONATATE 100 MG/1
100 CAPSULE ORAL 3 TIMES DAILY PRN
Qty: 30 CAPSULE | Refills: 0 | Status: SHIPPED | OUTPATIENT
Start: 2021-09-03 | End: 2021-10-03

## 2021-09-03 RX ORDER — AZITHROMYCIN 250 MG/1
TABLET, FILM COATED ORAL
Qty: 5 TABLET | Refills: 0 | Status: SHIPPED | OUTPATIENT
Start: 2021-09-03 | End: 2021-09-08

## 2021-09-03 NOTE — ED PROVIDER NOTES
Patient Seen in: [facility]      History   Patient presents with:  Testing    Stated Complaint: Chest pressure     HPI/Subjective:   HPI    This is a well-appearing 18-year-old female who presents with cough and congestion.   Patient was seen awake. She is not in acute distress. Appearance: Normal appearance. She is not ill-appearing, toxic-appearing or diaphoretic. HENT:      Head: Normocephalic and atraumatic.       Right Ear: Tympanic membrane, ear canal and external ear normal.      Patricia Overall coughing. XR CHEST PA + LAT CHEST (CPT=71046)    Result Date: 9/3/2021  CONCLUSION: Normal examination.      Dictated by (CST): Jag Grimm MD on 9/03/2021 at 9:26 AM     Finalized by (CST): Jag Grimm MD on 9/03/2021 at 9:27 AM              MDM questions answered. No acute distress and cleared for home.     Discussed Case/results with other healthcare provider:   Reason:   Disposition and Plan     Clinical Impression:  Cough  (primary encounter diagnosis)     Disposition:  Discharge  9/3/2021  9:3

## 2021-09-03 NOTE — ED INITIAL ASSESSMENT (HPI)
Per pt was seen here on Sunday for cough and congestion, symptoms have no improved. Pt denies any fevers or chills.

## 2021-10-13 ENCOUNTER — OFFICE VISIT (OUTPATIENT)
Dept: FAMILY MEDICINE CLINIC | Facility: CLINIC | Age: 36
End: 2021-10-13
Payer: COMMERCIAL

## 2021-10-13 VITALS
HEART RATE: 94 BPM | BODY MASS INDEX: 26.52 KG/M2 | SYSTOLIC BLOOD PRESSURE: 112 MMHG | OXYGEN SATURATION: 98 % | DIASTOLIC BLOOD PRESSURE: 72 MMHG | HEIGHT: 66 IN | WEIGHT: 165 LBS

## 2021-10-13 DIAGNOSIS — Z13.220 SCREENING FOR HYPERLIPIDEMIA: ICD-10-CM

## 2021-10-13 DIAGNOSIS — R19.7 DIARRHEA, UNSPECIFIED TYPE: ICD-10-CM

## 2021-10-13 DIAGNOSIS — Z13.1 DIABETES MELLITUS SCREENING: ICD-10-CM

## 2021-10-13 DIAGNOSIS — Z00.00 PHYSICAL EXAM, ANNUAL: Primary | ICD-10-CM

## 2021-10-13 PROCEDURE — 99395 PREV VISIT EST AGE 18-39: CPT | Performed by: FAMILY MEDICINE

## 2021-10-13 PROCEDURE — 3078F DIAST BP <80 MM HG: CPT | Performed by: FAMILY MEDICINE

## 2021-10-13 PROCEDURE — 3008F BODY MASS INDEX DOCD: CPT | Performed by: FAMILY MEDICINE

## 2021-10-13 PROCEDURE — 3074F SYST BP LT 130 MM HG: CPT | Performed by: FAMILY MEDICINE

## 2021-10-13 NOTE — PROGRESS NOTES
HPI:   Asael Tse is a 43year old female who presents for a complete physical exam.     Needs insurance physical paperwork completed. Has been on lisinopril 5mg for a few years. Sees nephrology Dr. Royce Escudero. Sees Dr. Marylou Burgos.      Has bee Past Surgical History:   Procedure Laterality Date   • APPENDECTOMY  2007   • TREAT ECTOPIC PREG,RMV TUBE/OVARY Left 2014   • TREAT ECTOPIC PREG,RMV TUBE/OVARY Left 2015      Family History   Problem Relation Age of Onset   • Cancer Paternal Grandmother type  Orders Placed This Encounter      Comp Metabolic Panel (14)      TSH W Reflex To Free T4      Lipid Panel    -Labs ordered per insurance physical form, and will add TSH for diarrhea. -Diarrhea may be related to menstrual periods. F/u labs.  [facility]

## 2021-10-15 ENCOUNTER — LABORATORY ENCOUNTER (OUTPATIENT)
Dept: LAB | Facility: REFERENCE LAB | Age: 36
End: 2021-10-15
Attending: FAMILY MEDICINE
Payer: COMMERCIAL

## 2021-10-15 PROCEDURE — 80053 COMPREHEN METABOLIC PANEL: CPT | Performed by: FAMILY MEDICINE

## 2021-10-15 PROCEDURE — 80061 LIPID PANEL: CPT | Performed by: FAMILY MEDICINE

## 2021-10-15 PROCEDURE — 36415 COLL VENOUS BLD VENIPUNCTURE: CPT | Performed by: FAMILY MEDICINE

## 2021-10-15 PROCEDURE — 84443 ASSAY THYROID STIM HORMONE: CPT | Performed by: FAMILY MEDICINE

## 2022-07-26 ENCOUNTER — OFFICE VISIT (OUTPATIENT)
Dept: FAMILY MEDICINE CLINIC | Facility: CLINIC | Age: 37
End: 2022-07-26
Payer: COMMERCIAL

## 2022-07-26 VITALS
SYSTOLIC BLOOD PRESSURE: 112 MMHG | BODY MASS INDEX: 25.78 KG/M2 | WEIGHT: 160.38 LBS | HEART RATE: 71 BPM | OXYGEN SATURATION: 98 % | HEIGHT: 66 IN | DIASTOLIC BLOOD PRESSURE: 70 MMHG

## 2022-07-26 DIAGNOSIS — Z00.00 PHYSICAL EXAM, ANNUAL: Primary | ICD-10-CM

## 2022-07-26 DIAGNOSIS — M79.662 PAIN IN LEFT LOWER LEG: ICD-10-CM

## 2022-07-26 PROCEDURE — 3078F DIAST BP <80 MM HG: CPT | Performed by: FAMILY MEDICINE

## 2022-07-26 PROCEDURE — 3074F SYST BP LT 130 MM HG: CPT | Performed by: FAMILY MEDICINE

## 2022-07-26 PROCEDURE — 99395 PREV VISIT EST AGE 18-39: CPT | Performed by: FAMILY MEDICINE

## 2022-07-26 PROCEDURE — 3008F BODY MASS INDEX DOCD: CPT | Performed by: FAMILY MEDICINE

## 2022-08-03 ENCOUNTER — LAB ENCOUNTER (OUTPATIENT)
Dept: LAB | Age: 37
End: 2022-08-03
Attending: FAMILY MEDICINE
Payer: COMMERCIAL

## 2022-08-03 LAB
ANION GAP SERPL CALC-SCNC: 6 MMOL/L (ref 0–18)
BUN BLD-MCNC: 11 MG/DL (ref 7–18)
BUN/CREAT SERPL: 11.8 (ref 10–20)
CALCIUM BLD-MCNC: 8.8 MG/DL (ref 8.5–10.1)
CHLORIDE SERPL-SCNC: 110 MMOL/L (ref 98–112)
CHOLEST SERPL-MCNC: 155 MG/DL (ref ?–200)
CO2 SERPL-SCNC: 24 MMOL/L (ref 21–32)
CREAT BLD-MCNC: 0.93 MG/DL
FASTING PATIENT LIPID ANSWER: YES
FASTING STATUS PATIENT QL REPORTED: YES
GFR SERPLBLD BASED ON 1.73 SQ M-ARVRAT: 81 ML/MIN/1.73M2 (ref 60–?)
GLUCOSE BLD-MCNC: 96 MG/DL (ref 70–99)
HDLC SERPL-MCNC: 61 MG/DL (ref 40–59)
LDLC SERPL CALC-MCNC: 83 MG/DL (ref ?–100)
NONHDLC SERPL-MCNC: 94 MG/DL (ref ?–130)
OSMOLALITY SERPL CALC.SUM OF ELEC: 289 MOSM/KG (ref 275–295)
POTASSIUM SERPL-SCNC: 4.1 MMOL/L (ref 3.5–5.1)
SODIUM SERPL-SCNC: 140 MMOL/L (ref 136–145)
TRIGL SERPL-MCNC: 55 MG/DL (ref 30–149)
VLDLC SERPL CALC-MCNC: 9 MG/DL (ref 0–30)

## 2022-08-03 PROCEDURE — 36415 COLL VENOUS BLD VENIPUNCTURE: CPT | Performed by: FAMILY MEDICINE

## 2022-08-03 PROCEDURE — 80061 LIPID PANEL: CPT | Performed by: FAMILY MEDICINE

## 2022-08-03 PROCEDURE — 80048 BASIC METABOLIC PNL TOTAL CA: CPT | Performed by: FAMILY MEDICINE

## 2022-10-01 DIAGNOSIS — Q60.0 SOLITARY KIDNEY, CONGENITAL: Primary | ICD-10-CM

## 2022-10-04 RX ORDER — LISINOPRIL 5 MG/1
5 TABLET ORAL DAILY
Qty: 30 TABLET | Refills: 0 | Status: SHIPPED | OUTPATIENT
Start: 2022-10-04

## 2022-10-29 ENCOUNTER — HOSPITAL ENCOUNTER (OUTPATIENT)
Age: 37
Discharge: HOME | End: 2022-10-29
Payer: COMMERCIAL

## 2022-11-02 RX ORDER — LISINOPRIL 5 MG/1
5 TABLET ORAL DAILY
COMMUNITY

## 2022-11-11 ENCOUNTER — OFFICE VISIT (OUTPATIENT)
Dept: OBGYN | Age: 37
End: 2022-11-11

## 2022-11-11 VITALS
HEIGHT: 66 IN | SYSTOLIC BLOOD PRESSURE: 126 MMHG | WEIGHT: 156 LBS | DIASTOLIC BLOOD PRESSURE: 90 MMHG | HEART RATE: 80 BPM | TEMPERATURE: 98.4 F | BODY MASS INDEX: 25.07 KG/M2

## 2022-11-11 DIAGNOSIS — N64.4 BREAST PAIN: Primary | ICD-10-CM

## 2022-11-11 PROCEDURE — 99213 OFFICE O/P EST LOW 20 MIN: CPT | Performed by: OBSTETRICS & GYNECOLOGY

## 2022-11-11 ASSESSMENT — PATIENT HEALTH QUESTIONNAIRE - PHQ9
CLINICAL INTERPRETATION OF PHQ2 SCORE: NO FURTHER SCREENING NEEDED
1. LITTLE INTEREST OR PLEASURE IN DOING THINGS: NOT AT ALL
CLINICAL INTERPRETATION OF PHQ2 SCORE: NO FURTHER SCREENING NEEDED
SUM OF ALL RESPONSES TO PHQ9 QUESTIONS 1 AND 2: 0
2. FEELING DOWN, DEPRESSED OR HOPELESS: NOT AT ALL
SUM OF ALL RESPONSES TO PHQ9 QUESTIONS 1 AND 2: 0
SUM OF ALL RESPONSES TO PHQ9 QUESTIONS 1 AND 2: 0
1. LITTLE INTEREST OR PLEASURE IN DOING THINGS: NOT AT ALL
SUM OF ALL RESPONSES TO PHQ9 QUESTIONS 1 AND 2: 0
2. FEELING DOWN, DEPRESSED OR HOPELESS: NOT AT ALL

## 2022-11-23 ENCOUNTER — LAB ENCOUNTER (OUTPATIENT)
Dept: LAB | Age: 37
End: 2022-11-23
Attending: FAMILY MEDICINE
Payer: COMMERCIAL

## 2022-11-23 DIAGNOSIS — Q60.0 SOLITARY KIDNEY, CONGENITAL: ICD-10-CM

## 2022-11-23 LAB
ALBUMIN SERPL-MCNC: 4 G/DL (ref 3.4–5)
ANION GAP SERPL CALC-SCNC: 8 MMOL/L (ref 0–18)
BASOPHILS # BLD AUTO: 0.06 X10(3) UL (ref 0–0.2)
BASOPHILS NFR BLD AUTO: 0.8 %
BILIRUB UR QL: NEGATIVE
BUN BLD-MCNC: 21 MG/DL (ref 7–18)
BUN/CREAT SERPL: 20.4 (ref 10–20)
CALCIUM BLD-MCNC: 9.4 MG/DL (ref 8.5–10.1)
CHLORIDE SERPL-SCNC: 109 MMOL/L (ref 98–112)
CLARITY UR: CLEAR
CO2 SERPL-SCNC: 23 MMOL/L (ref 21–32)
COLOR UR: YELLOW
CREAT BLD-MCNC: 1.03 MG/DL
DEPRECATED RDW RBC AUTO: 39.9 FL (ref 35.1–46.3)
EOSINOPHIL # BLD AUTO: 0.29 X10(3) UL (ref 0–0.7)
EOSINOPHIL NFR BLD AUTO: 4 %
ERYTHROCYTE [DISTWIDTH] IN BLOOD BY AUTOMATED COUNT: 11.9 % (ref 11–15)
GFR SERPLBLD BASED ON 1.73 SQ M-ARVRAT: 71 ML/MIN/1.73M2 (ref 60–?)
GLUCOSE BLD-MCNC: 88 MG/DL (ref 70–99)
GLUCOSE UR-MCNC: NEGATIVE MG/DL
HCT VFR BLD AUTO: 40 %
HGB BLD-MCNC: 12.7 G/DL
HGB UR QL STRIP.AUTO: NEGATIVE
IMM GRANULOCYTES # BLD AUTO: 0.02 X10(3) UL (ref 0–1)
IMM GRANULOCYTES NFR BLD: 0.3 %
KETONES UR-MCNC: NEGATIVE MG/DL
LEUKOCYTE ESTERASE UR QL STRIP.AUTO: NEGATIVE
LYMPHOCYTES # BLD AUTO: 1.57 X10(3) UL (ref 1–4)
LYMPHOCYTES NFR BLD AUTO: 21.8 %
MCH RBC QN AUTO: 28.9 PG (ref 26–34)
MCHC RBC AUTO-ENTMCNC: 31.8 G/DL (ref 31–37)
MCV RBC AUTO: 91.1 FL
MONOCYTES # BLD AUTO: 0.55 X10(3) UL (ref 0.1–1)
MONOCYTES NFR BLD AUTO: 7.6 %
NEUTROPHILS # BLD AUTO: 4.71 X10 (3) UL (ref 1.5–7.7)
NEUTROPHILS # BLD AUTO: 4.71 X10(3) UL (ref 1.5–7.7)
NEUTROPHILS NFR BLD AUTO: 65.5 %
NITRITE UR QL STRIP.AUTO: NEGATIVE
OSMOLALITY SERPL CALC.SUM OF ELEC: 292 MOSM/KG (ref 275–295)
PH UR: 5.5 [PH] (ref 5–8)
PHOSPHATE SERPL-MCNC: 3.1 MG/DL (ref 2.5–4.9)
PLATELET # BLD AUTO: 330 10(3)UL (ref 150–450)
POTASSIUM SERPL-SCNC: 4 MMOL/L (ref 3.5–5.1)
RBC # BLD AUTO: 4.39 X10(6)UL
SODIUM SERPL-SCNC: 140 MMOL/L (ref 136–145)
SP GR UR STRIP: 1.02 (ref 1–1.03)
UROBILINOGEN UR STRIP-ACNC: 0.2
WBC # BLD AUTO: 7.2 X10(3) UL (ref 4–11)

## 2022-11-23 PROCEDURE — 85025 COMPLETE CBC W/AUTO DIFF WBC: CPT

## 2022-11-23 PROCEDURE — 36415 COLL VENOUS BLD VENIPUNCTURE: CPT

## 2022-11-23 PROCEDURE — 81001 URINALYSIS AUTO W/SCOPE: CPT

## 2022-11-23 PROCEDURE — 81015 MICROSCOPIC EXAM OF URINE: CPT

## 2022-11-23 PROCEDURE — 80069 RENAL FUNCTION PANEL: CPT

## 2022-11-25 RX ORDER — LISINOPRIL 5 MG/1
5 TABLET ORAL DAILY
Qty: 30 TABLET | Refills: 0 | Status: SHIPPED | OUTPATIENT
Start: 2022-11-25

## 2022-12-14 ENCOUNTER — HOSPITAL ENCOUNTER (OUTPATIENT)
Age: 37
Discharge: HOME | End: 2022-12-14
Payer: COMMERCIAL

## 2022-12-14 VITALS
RESPIRATION RATE: 18 BRPM | HEART RATE: 77 BPM | SYSTOLIC BLOOD PRESSURE: 111 MMHG | TEMPERATURE: 98 F | OXYGEN SATURATION: 99 % | DIASTOLIC BLOOD PRESSURE: 79 MMHG

## 2022-12-14 DIAGNOSIS — J06.9 VIRAL URI WITH COUGH: Primary | ICD-10-CM

## 2022-12-14 LAB
POCT INFLUENZA A: NEGATIVE
POCT INFLUENZA B: NEGATIVE
S PYO AG THROAT QL: NEGATIVE
SARS-COV-2 RNA RESP QL NAA+PROBE: NOT DETECTED

## 2022-12-14 PROCEDURE — 87880 STREP A ASSAY W/OPTIC: CPT | Performed by: NURSE PRACTITIONER

## 2022-12-14 PROCEDURE — U0002 COVID-19 LAB TEST NON-CDC: HCPCS | Performed by: NURSE PRACTITIONER

## 2022-12-14 PROCEDURE — 87502 INFLUENZA DNA AMP PROBE: CPT | Performed by: NURSE PRACTITIONER

## 2022-12-14 PROCEDURE — 99213 OFFICE O/P EST LOW 20 MIN: CPT | Performed by: NURSE PRACTITIONER

## 2022-12-14 NOTE — ED INITIAL ASSESSMENT (HPI)
Pt came in due to cough, congestion, sinus presre and runny nose for the past week. Pt has easy non labored respirations.

## 2022-12-20 ENCOUNTER — OFFICE VISIT (OUTPATIENT)
Dept: NEPHROLOGY | Facility: CLINIC | Age: 37
End: 2022-12-20
Payer: COMMERCIAL

## 2022-12-20 VITALS — WEIGHT: 157 LBS | BODY MASS INDEX: 25 KG/M2 | DIASTOLIC BLOOD PRESSURE: 74 MMHG | SYSTOLIC BLOOD PRESSURE: 122 MMHG

## 2022-12-20 DIAGNOSIS — Q60.0 SOLITARY KIDNEY, CONGENITAL: Primary | ICD-10-CM

## 2022-12-20 PROCEDURE — 3078F DIAST BP <80 MM HG: CPT | Performed by: INTERNAL MEDICINE

## 2022-12-20 PROCEDURE — 99214 OFFICE O/P EST MOD 30 MIN: CPT | Performed by: INTERNAL MEDICINE

## 2022-12-20 PROCEDURE — 3074F SYST BP LT 130 MM HG: CPT | Performed by: INTERNAL MEDICINE

## 2022-12-20 RX ORDER — LISINOPRIL 5 MG/1
5 TABLET ORAL DAILY
Qty: 30 TABLET | Refills: 5 | Status: SHIPPED | OUTPATIENT
Start: 2022-12-20

## 2022-12-21 NOTE — PATIENT INSTRUCTIONS
Make sure you are drinking plenty of fluids. Repeat your kidney blood and urine test in a month or 2 to make sure things remain stable. Continue lisinopril.

## 2022-12-21 NOTE — PROGRESS NOTES
12/20/22        Patient: Sara Mccarty   YOB: 1985   Date of Visit: 12/20/2022       Dear  Dr. Kianna Lloyd,      Thank you for referring Sara Mccarty to my practice. Please find my assessment and plan below. 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 Last seen in July 2021. Overall the patient states has been doing well without any chest pain, shortness of breath, GI or urinary tract symptoms. No edema. On physical exam her blood pressure is 122/74 and she weighed 157 pounds. Her neck was supple without JVD. Lungs were clear. Heart revealed a regular rate and rhythm without gallops, murmurs or rubs. Abdomen was soft, flat, nontender without organomegaly, masses or bruits. Extremities revealed no edema. I reviewed her most recent labs done on November 23, 2022. Her creatinines which have always been less than 1 was now borderline high at 1.03 with an estimated GFR of 71 cc/min. Urinalysis showed 30 mg/dL protein but otherwise was unremarkable. I therefore informed the patient that there has been a slight bump in her creatinine. Reinforced importance of maintaining adequate hydration. Avoid nonsteroidals. Continue low-dose lisinopril for its renal protective effects. We will have her repeat a renal panel and urine for microalbumin in 1 to 2 months to make sure that these numbers remain stable. Otherwise we will see yearly or as needed. Thank you again for allowing me to participate in the care of your patient. If you have any questions please feel free to call.         Sincerely,   Hilary Traylor MD   [address]  [address] [facility] [address]  [address] [facility] [address] [address]    Document electronically generated by:  Hilary Traylor MD

## 2023-01-17 ENCOUNTER — OFFICE VISIT (OUTPATIENT)
Facility: CLINIC | Age: 38
End: 2023-01-17
Payer: COMMERCIAL

## 2023-01-17 VITALS
SYSTOLIC BLOOD PRESSURE: 112 MMHG | BODY MASS INDEX: 25.39 KG/M2 | HEART RATE: 86 BPM | DIASTOLIC BLOOD PRESSURE: 64 MMHG | OXYGEN SATURATION: 100 % | WEIGHT: 158 LBS | HEIGHT: 66 IN

## 2023-01-17 DIAGNOSIS — M25.552 LEFT HIP PAIN: ICD-10-CM

## 2023-01-17 DIAGNOSIS — K21.9 GASTROESOPHAGEAL REFLUX DISEASE, UNSPECIFIED WHETHER ESOPHAGITIS PRESENT: Primary | ICD-10-CM

## 2023-01-17 PROCEDURE — 3008F BODY MASS INDEX DOCD: CPT | Performed by: FAMILY MEDICINE

## 2023-01-17 PROCEDURE — 3074F SYST BP LT 130 MM HG: CPT | Performed by: FAMILY MEDICINE

## 2023-01-17 PROCEDURE — 3078F DIAST BP <80 MM HG: CPT | Performed by: FAMILY MEDICINE

## 2023-01-17 PROCEDURE — 99214 OFFICE O/P EST MOD 30 MIN: CPT | Performed by: FAMILY MEDICINE

## 2023-01-23 PROBLEM — Q60.0 UNILATERAL AGENESIS OF KIDNEY: Status: ACTIVE | Noted: 2023-01-23

## 2023-01-23 PROBLEM — Q50.01: Status: ACTIVE | Noted: 2023-01-23

## 2023-01-23 PROBLEM — Z87.59 HISTORY OF ECTOPIC PREGNANCY: Status: ACTIVE | Noted: 2023-01-23

## 2023-01-26 ENCOUNTER — APPOINTMENT (OUTPATIENT)
Dept: OBGYN | Age: 38
End: 2023-01-26

## 2023-03-10 ENCOUNTER — OFFICE VISIT (OUTPATIENT)
Dept: OBGYN | Age: 38
End: 2023-03-10

## 2023-03-10 VITALS
HEART RATE: 85 BPM | DIASTOLIC BLOOD PRESSURE: 87 MMHG | BODY MASS INDEX: 24.09 KG/M2 | HEIGHT: 66 IN | WEIGHT: 149.91 LBS | SYSTOLIC BLOOD PRESSURE: 126 MMHG | TEMPERATURE: 98.4 F

## 2023-03-10 DIAGNOSIS — Z11.51 SCREENING FOR HPV (HUMAN PAPILLOMAVIRUS): ICD-10-CM

## 2023-03-10 DIAGNOSIS — Z01.419 ROUTINE GYNECOLOGICAL EXAMINATION: Primary | ICD-10-CM

## 2023-03-10 PROCEDURE — 88175 CYTOPATH C/V AUTO FLUID REDO: CPT | Performed by: INTERNAL MEDICINE

## 2023-03-10 PROCEDURE — 87624 HPV HI-RISK TYP POOLED RSLT: CPT | Performed by: INTERNAL MEDICINE

## 2023-03-10 PROCEDURE — 99395 PREV VISIT EST AGE 18-39: CPT | Performed by: OBSTETRICS & GYNECOLOGY

## 2023-03-10 ASSESSMENT — PATIENT HEALTH QUESTIONNAIRE - PHQ9
SUM OF ALL RESPONSES TO PHQ9 QUESTIONS 1 AND 2: 0
2. FEELING DOWN, DEPRESSED OR HOPELESS: NOT AT ALL
1. LITTLE INTEREST OR PLEASURE IN DOING THINGS: NOT AT ALL
CLINICAL INTERPRETATION OF PHQ2 SCORE: NO FURTHER SCREENING NEEDED
SUM OF ALL RESPONSES TO PHQ9 QUESTIONS 1 AND 2: 0

## 2023-03-10 ASSESSMENT — ENCOUNTER SYMPTOMS
GASTROINTESTINAL NEGATIVE: 1
ALLERGIC/IMMUNOLOGIC NEGATIVE: 1
CONSTITUTIONAL NEGATIVE: 1

## 2023-03-16 LAB
CASE RPRT: NORMAL
CLINICAL INFO: NORMAL
CYTOLOGY CVX/VAG STUDY: NORMAL
HPV16+18+45 E6+E7MRNA CVX NAA+PROBE: NEGATIVE
Lab: NORMAL
PAP EDUCATIONAL NOTE: NORMAL
SPECIMEN ADEQUACY: NORMAL

## 2023-03-24 ENCOUNTER — LAB ENCOUNTER (OUTPATIENT)
Dept: LAB | Age: 38
End: 2023-03-24
Attending: FAMILY MEDICINE
Payer: COMMERCIAL

## 2023-03-24 DIAGNOSIS — Q60.0 SOLITARY KIDNEY, CONGENITAL: ICD-10-CM

## 2023-03-24 LAB
ALBUMIN SERPL-MCNC: 3.9 G/DL (ref 3.4–5)
ANION GAP SERPL CALC-SCNC: 7 MMOL/L (ref 0–18)
BUN BLD-MCNC: 17 MG/DL (ref 7–18)
BUN/CREAT SERPL: 16.8 (ref 10–20)
CALCIUM BLD-MCNC: 9.1 MG/DL (ref 8.5–10.1)
CHLORIDE SERPL-SCNC: 107 MMOL/L (ref 98–112)
CO2 SERPL-SCNC: 23 MMOL/L (ref 21–32)
CREAT BLD-MCNC: 1.01 MG/DL
CREAT UR-SCNC: 196 MG/DL
GFR SERPLBLD BASED ON 1.73 SQ M-ARVRAT: 73 ML/MIN/1.73M2 (ref 60–?)
GLUCOSE BLD-MCNC: 101 MG/DL (ref 70–99)
MICROALBUMIN UR-MCNC: 3.86 MG/DL
MICROALBUMIN/CREAT 24H UR-RTO: 19.7 UG/MG (ref ?–30)
OSMOLALITY SERPL CALC.SUM OF ELEC: 286 MOSM/KG (ref 275–295)
PHOSPHATE SERPL-MCNC: 3.4 MG/DL (ref 2.5–4.9)
POTASSIUM SERPL-SCNC: 4.2 MMOL/L (ref 3.5–5.1)
SODIUM SERPL-SCNC: 137 MMOL/L (ref 136–145)

## 2023-03-24 PROCEDURE — 82570 ASSAY OF URINE CREATININE: CPT

## 2023-03-24 PROCEDURE — 80069 RENAL FUNCTION PANEL: CPT

## 2023-03-24 PROCEDURE — 36415 COLL VENOUS BLD VENIPUNCTURE: CPT

## 2023-03-24 PROCEDURE — 82043 UR ALBUMIN QUANTITATIVE: CPT

## 2023-06-06 ENCOUNTER — TELEPHONE (OUTPATIENT)
Dept: NEPHROLOGY | Facility: CLINIC | Age: 38
End: 2023-06-06

## 2023-06-06 RX ORDER — LISINOPRIL 5 MG/1
5 TABLET ORAL DAILY
Qty: 30 TABLET | Refills: 5 | Status: SHIPPED | OUTPATIENT
Start: 2023-06-06

## 2023-10-02 ENCOUNTER — NURSE TRIAGE (OUTPATIENT)
Facility: CLINIC | Age: 38
End: 2023-10-02

## 2023-10-02 DIAGNOSIS — N63.10 MASS OF RIGHT BREAST, UNSPECIFIED QUADRANT: Primary | ICD-10-CM

## 2023-10-02 NOTE — TELEPHONE ENCOUNTER
Patient notified, verbalized understanding.      Future Appointments   Date Time Provider Kostas Gonzales   10/4/2023  3:30 PM [facility] Brito   11/9/2023  2:30 PM DO JOELLE Fish [address] [facility]

## 2023-10-02 NOTE — TELEPHONE ENCOUNTER
Action Requested: Summary for Provider     []  Critical Lab, Recommendations Needed  [x] Need Additional Advice  []   FYI    [x]   Need Orders  [] Need Medications Sent to Pharmacy  []  Other     SUMMARY: For the last 2 weeks patient stated that her right breast feels like there is a mass there. Denies it being a lump since states it feels almost like the whole breast. Tender to the touch. No redness. No fevers. States that does have dense breast tissue. No other symptoms. No appointments available today and declined appointment offered tomorrow. Wanted to get an order for a diagnostic mammogram. Please advise. Order pended.     Reason for call: Breast Problem (Right breast mass )  Onset: Sep 18, 2023    Reason for Disposition   Breast lump    Protocols used: Breast Symptoms-A-[place]

## 2023-10-02 NOTE — TELEPHONE ENCOUNTER
I recommend evaluation to determine if mammogram and/or ultrasound is preferred. She may schedule with myself, Dr. Saranya Hernandez, or any of the [facility] providers. Okay to use SDA if scheduling with me. Thank you.

## 2023-10-04 ENCOUNTER — OFFICE VISIT (OUTPATIENT)
Dept: FAMILY MEDICINE CLINIC | Facility: CLINIC | Age: 38
End: 2023-10-04

## 2023-10-04 ENCOUNTER — TELEPHONE (OUTPATIENT)
Dept: FAMILY MEDICINE CLINIC | Facility: CLINIC | Age: 38
End: 2023-10-04

## 2023-10-04 VITALS
WEIGHT: 156 LBS | HEART RATE: 100 BPM | HEIGHT: 66 IN | DIASTOLIC BLOOD PRESSURE: 66 MMHG | TEMPERATURE: 98 F | OXYGEN SATURATION: 98 % | SYSTOLIC BLOOD PRESSURE: 116 MMHG | BODY MASS INDEX: 25.07 KG/M2

## 2023-10-04 DIAGNOSIS — Z12.31 ENCOUNTER FOR SCREENING MAMMOGRAM FOR MALIGNANT NEOPLASM OF BREAST: ICD-10-CM

## 2023-10-04 DIAGNOSIS — N63.10 MASS OF RIGHT BREAST, UNSPECIFIED QUADRANT: Primary | ICD-10-CM

## 2023-10-04 PROCEDURE — 3078F DIAST BP <80 MM HG: CPT

## 2023-10-04 PROCEDURE — 99214 OFFICE O/P EST MOD 30 MIN: CPT

## 2023-10-04 PROCEDURE — 3008F BODY MASS INDEX DOCD: CPT

## 2023-10-04 PROCEDURE — 3074F SYST BP LT 130 MM HG: CPT

## 2023-10-04 NOTE — TELEPHONE ENCOUNTER
Can we please call Dr. Jalen Son (surgeon) office to get patient scheduled as soon as possible for drainage of abscess to the right breast. Referral has been placed.  Thank you - AJ Winters

## 2023-10-05 ENCOUNTER — OFFICE VISIT (OUTPATIENT)
Dept: SURGERY | Facility: CLINIC | Age: 38
End: 2023-10-05

## 2023-10-05 VITALS — HEIGHT: 66 IN | WEIGHT: 156 LBS | BODY MASS INDEX: 25.07 KG/M2

## 2023-10-05 DIAGNOSIS — N63.10 MASS OF RIGHT BREAST, UNSPECIFIED QUADRANT: Primary | ICD-10-CM

## 2023-10-05 DIAGNOSIS — R92.8 ABNORMAL MAMMOGRAM OF BOTH BREASTS: ICD-10-CM

## 2023-10-05 PROCEDURE — 99244 OFF/OP CNSLTJ NEW/EST MOD 40: CPT | Performed by: SURGERY

## 2023-10-05 PROCEDURE — 3008F BODY MASS INDEX DOCD: CPT | Performed by: SURGERY

## 2023-10-05 NOTE — TELEPHONE ENCOUNTER
Upon chart review it appears that patient called Dr. Joshua Parisi office back to confirm appointment for today. Left message for patient to call back if she needs any further assistance.

## 2023-10-05 NOTE — H&P
Chief complaint: Patient presents with:  Breast Lump: Referred by CR[facility] Gomez for  painful, large R BR mass      HPI: Loni Arnold   presents for consultation related to a lateral right breast mass which she first noticed approximately 2 weeks ago. She saw her primary provider who referred her to the surgical clinic. She has not had any fevers or chills, there is no redness overlying the mass, there is no skin tenderness, she has not been treated with antibiotics. She has a previous mammogram performed in 2019 which demonstrated multiple breast cysts but no suspicious findings. At that time she had a complaint of a right lateral breast mass. Previous History of Breast Biopsy: No  Personal History of Breast Cancer: No  FH Breast, Ovarian, Colon Cancer: No history of breast cancer in the family, there is a history of gastric cancer and pancreatic cancer in an uncle. Past medical history:   Past Medical History:   Diagnosis Date    Hypertension     Solitary kidney, congenital        Past surgical history:   Past Surgical History:   Procedure Laterality Date    APPENDECTOMY  2007    TREAT ECTOPIC PREG,RMV TUBE/OVARY Left 2014    TREAT ECTOPIC PREG,RMV TUBE/OVARY Left 2015       Allergies: No Known Allergies    Medications:   Current Outpatient Medications   Medication Sig Dispense Refill    lisinopril 5 MG Oral Tab Take 1 tablet (5 mg total) by mouth daily.  30 tablet 5       Social history:   Social History    Socioeconomic History      Marital status:     Tobacco Use      Smoking status: Never      Smokeless tobacco: Never    Vaping Use      Vaping Use: Never used    Substance and Sexual Activity      Alcohol use: Yes        Comment: 2 -3 drinks per week      Drug use: No       Family history:  Family History   Problem Relation Age of Onset    Cancer Paternal Grandmother         Pancreatic    Cancer Other         Pancreatic    Other (Parkinson's Disease) Father     Cancer Maternal Grandfather Stomach vs Colon        Review of Systems:   GENERAL: feels generally well  SKIN: no ulcerated or worrisome skin lesions  EYES:denies blurred vision or double vision  HEENT: denies new nasal congestion, sinus pain or ST  LUNGS: denies shortness of breath with exertion  CARDIOVASCULAR: denies chest pain on exertion  GI: no hematemesis, no BRBPR, no worsening heartburn  : no dysuria, no blood in urine, no difficulty urinating  MUSCULOSKELETAL: no new musculoskeletal complaints  NEURO: no persistent, recurrent  headaches  PSYCHE:no depression or anxiety  HEMATOLOGIC: no hx of blood dyscrasia  ENDOCRINE: no new endocrine problems  ALL/ASTHMA: no new hx of severe allergy or asthma  BACK: normal, no spinal deformity, no CVA tenderness    Physical examination:  Constitutional: appears well hydrated alert and responsive no acute distress noted  Head/Face: normocephalic, atraumatic. Eyes: Anicteric, PERRL  Nose/Mouth/Throat: nose and throat are clear,  no discharge, mucous membranes are moist, no oral lesions are noted  Neck/Thyroid: neck is supple without adenopathy, no thyromegaly, no JVD, no carotid bruits. Respiratory: normal to inspection, lungs are clear to auscultation bilaterally, normal respiratory effort, no wheezing. Breast: Normal appearance, no contour changes, no worrisome asymmetry, no skin changes. Nipple normal appearance bilaterally, no inversion, no retraction, no discharge. Palpation normal, right breast mass, lateral slightly tender, no other densities, no other tenderness. Lymph node exam normal in the bilateral axillary, cervical, and supraclavicular regions. Cardiovascular: regular rate. Abdomen: soft, non-tender, non-distended, no organomegaly noted, no masses, no hernias, no ascites. Extremities: no edema, cyanosis, or clubbing. No deformity. Musculoskeletal: normal appearance, no deformities, normal function. Back wnl, no CVA tenderness.   Neurological: exam appropriate for age reflexes and motor skills appropriate for age      Assessment and plan:  Diagnoses and all orders for this visit:    Mass of right breast, unspecified quadrant    Abnormal mammogram of both breasts       Obtain bilateral diagnostic mammogram and ultrasound, then return to clinic.   The patient will notify me if she develops fever or chills or any redness of the breast, or any other changes in her clinical exam.    Lee Ann Jin MD  10/5/2023  1:58 PM

## 2023-10-06 ENCOUNTER — HOSPITAL ENCOUNTER (OUTPATIENT)
Dept: ULTRASOUND IMAGING | Facility: HOSPITAL | Age: 38
Discharge: HOME | End: 2023-10-06
Attending: SURGERY
Payer: COMMERCIAL

## 2023-10-06 ENCOUNTER — HOSPITAL ENCOUNTER (OUTPATIENT)
Dept: MAMMOGRAPHY | Facility: HOSPITAL | Age: 38
Discharge: HOME | End: 2023-10-06
Attending: SURGERY
Payer: COMMERCIAL

## 2023-10-06 DIAGNOSIS — R92.8 ABNORMAL MAMMOGRAM OF BOTH BREASTS: ICD-10-CM

## 2023-10-06 DIAGNOSIS — N63.10 MASS OF RIGHT BREAST, UNSPECIFIED QUADRANT: ICD-10-CM

## 2023-10-06 PROCEDURE — 77062 BREAST TOMOSYNTHESIS BI: CPT | Performed by: SURGERY

## 2023-10-06 PROCEDURE — 77066 DX MAMMO INCL CAD BI: CPT | Performed by: SURGERY

## 2023-10-06 PROCEDURE — 76642 ULTRASOUND BREAST LIMITED: CPT | Performed by: SURGERY

## 2023-10-06 NOTE — DISCHARGE INSTRUCTIONS
The Doctor (Radiologist) who performed your procedure was: Meryl Tello MD    Place an ice pack over the biopsy site on top of your bra or on top of the ACE wrap (never apply ice directly over skin) for 10-15 minutes of every hour until bedtime for your comfort and to decrease bleeding. Keep your sports bra or the ACE wrap (stereotactic and MRI biopsy) in place for 24 hours after your biopsy. This compression decreases bleeding and breast movement for your comfort. Wear a supportive bra for the next couple of days for comfort (sports bra for sleep). Continue to wear, preferably, a sports bra or good supportive bra for 1 week and take off only to shower. No baths or showers during the first 24 hours after biopsy. After this time you may take a shower. It's okay if the strips get wet but do not soak them. NO saunas, hot tubs or swimming until steri-strips fall off (approx. 5 days). This prevents infection and allows time for them to completely close and heal.  DO NOT remove the steri-strips. They will fall off in 5 days. If any type of irritation (redness, itching or blisters) develops in the area around the steri-strips, remove them gently. If the steri-strips do not fall off after 5 days, gently remove them. Keep the area clean and dry. It is normal to have mild discomfort and bruising at the biopsy site. You may take Tylenol as needed for discomfort, as long as you have no allergies to Tylenol. Do not take aspirin, motrin, ibuprofen or any medication containing NSAID (non-steroidal anti-inflammatory drug) product for 48 hours. DO NOT participate in strenuous activity (aerobics, heavy lifting, housework, gardening, etc.) 48 hours after your biopsy to prevent bleeding. You will receive results in 2-3 business days. If you are having an MRI breast biopsy or an Ultrasound guided breast biopsy, you will be billed for the biopsy and unilateral mammogram separately.   If you have any questions about the procedure or your results, please contact the Breast Care Coordinator Nurse at [phone]. Notify your ordering physician or primary physician for increased bleeding, pain or fever over 100. Or contact a Radiology Nurse at [phone] between 8am-4pm (after 4pm, your call will be directed to the [facility] Emergency Room).

## 2023-10-09 ENCOUNTER — HOSPITAL ENCOUNTER (OUTPATIENT)
Dept: ULTRASOUND IMAGING | Facility: HOSPITAL | Age: 38
Discharge: HOME | End: 2023-10-09
Attending: SURGERY
Payer: COMMERCIAL

## 2023-10-09 ENCOUNTER — HOSPITAL ENCOUNTER (OUTPATIENT)
Dept: MAMMOGRAPHY | Facility: HOSPITAL | Age: 38
Discharge: HOME | End: 2023-10-09
Attending: SURGERY
Payer: COMMERCIAL

## 2023-10-09 DIAGNOSIS — R59.9 ENLARGED LYMPH NODE: ICD-10-CM

## 2023-10-09 DIAGNOSIS — N63.10 BREAST MASS, RIGHT: ICD-10-CM

## 2023-10-09 PROCEDURE — 76942 ECHO GUIDE FOR BIOPSY: CPT | Performed by: SURGERY

## 2023-10-09 PROCEDURE — 19083 BX BREAST 1ST LESION US IMAG: CPT | Performed by: SURGERY

## 2023-10-09 PROCEDURE — 38505 NEEDLE BIOPSY LYMPH NODES: CPT | Performed by: SURGERY

## 2023-10-09 PROCEDURE — 88360 TUMOR IMMUNOHISTOCHEM/MANUAL: CPT | Performed by: SURGERY

## 2023-10-09 PROCEDURE — 88305 TISSUE EXAM BY PATHOLOGIST: CPT | Performed by: SURGERY

## 2023-10-09 PROCEDURE — 88342 IMHCHEM/IMCYTCHM 1ST ANTB: CPT | Performed by: SURGERY

## 2023-10-09 PROCEDURE — 77065 DX MAMMO INCL CAD UNI: CPT | Performed by: SURGERY

## 2023-10-10 ENCOUNTER — TELEPHONE (OUTPATIENT)
Facility: CLINIC | Age: 38
End: 2023-10-10

## 2023-10-10 DIAGNOSIS — C50.911 BREAST CARCINOMA, FEMALE, RIGHT (HCC): Primary | ICD-10-CM

## 2023-10-10 DIAGNOSIS — C50.919 INVASIVE LOBULAR CARCINOMA OF BREAST IN FEMALE (HCC): Primary | ICD-10-CM

## 2023-10-10 NOTE — TELEPHONE ENCOUNTER
Patient calling, confirmed name and . She is crying because the results of her breast biopsy have posted to [employer] prior to being contacted by a provider. She asks that Dr. Rogelio Skelton be made aware of the results and that she would like his advice.

## 2023-10-10 NOTE — TELEPHONE ENCOUNTER
Please let her know I am seeing patients until 7pm but will call her as soon as I finish in clinic. Thank you.

## 2023-10-11 ENCOUNTER — TELEPHONE (OUTPATIENT)
Dept: HEMATOLOGY/ONCOLOGY | Facility: HOSPITAL | Age: 38
End: 2023-10-11

## 2023-10-11 NOTE — TELEPHONE ENCOUNTER
Patient is s/p right breast biopsy, performed 10/9/23 at [facility].  Pathology results are as follows:    A. Right breast, 10 o'clock, 9 cm from nipple; ultrasound-guided biopsy:   Invasive lobular carcinoma, intermediate grade (SBR score 6/9: tubular differentiation 3/3, nuclear pleomorphism 2/3, mitotic activity 1/3). Longest continuous segment of tumor measures 0.9 cm in greatest dimension. B. Right axillary lymph node; ultrasound-guided biopsy:   Lymph node tissue with metastatic carcinoma. Largest metastatic deposit measures 0.4 cm in greatest dimension. Tumor Markers by Immunostaining (Polymer based detection method) using the ASCO/CAP scoring criteria, performed at [facility] on formalin-fixed, paraffin-embedded tissue (Block A1):      Estrogen receptor (Leica, monoclonal, clone 6 F11) status:  70% (Positive, intermediate intensity)  Progesterone receptor (Leica, monoclonal, clone 16) status: 95% (Positive, strong intensity)  HER-2/bell (Leica, monoclonal, clone c-erbB) status: 1+ (Negative)  Ki-67 (Leica, monoclonal, clone MM1) status:  7% (Favorable)    Patient has spoken with Dr. Suze Potter and Dr. Humera Oliveros regarding the above results. She has been referred by her providers to Dr. Srinivasa Lancaster, genetic counseling, as well as breast MRI. Phoned patient and introduced myself as Breast RN Navigator. Shared with patient my role to provide support and education, assist with care coordination as well connect her to resources as she may need them. Patient is currently at her work place. She is a . Patient shares she is in a positive work environment and her supervisor is aware of need to attend appointments. Offered patient appointment with Dr. Srinivasa Lancaster for tomorrow, 10/12/23 at 7:30am.  Patient is agreeable. Discussed scheduling with genetic counselor as well as breast MRI. Shared with patient that I will be facilitating those appointments as well.   Provided patient with my contact information. Patient aware of my follow up.

## 2023-10-12 ENCOUNTER — OFFICE VISIT (OUTPATIENT)
Dept: HEMATOLOGY/ONCOLOGY | Facility: HOSPITAL | Age: 38
End: 2023-10-12
Attending: INTERNAL MEDICINE
Payer: COMMERCIAL

## 2023-10-12 ENCOUNTER — NURSE NAVIGATOR ENCOUNTER (OUTPATIENT)
Dept: HEMATOLOGY/ONCOLOGY | Facility: HOSPITAL | Age: 38
End: 2023-10-12

## 2023-10-12 VITALS
SYSTOLIC BLOOD PRESSURE: 117 MMHG | HEART RATE: 91 BPM | HEIGHT: 66 IN | DIASTOLIC BLOOD PRESSURE: 67 MMHG | WEIGHT: 155 LBS | RESPIRATION RATE: 16 BRPM | BODY MASS INDEX: 24.91 KG/M2 | TEMPERATURE: 98 F | OXYGEN SATURATION: 100 %

## 2023-10-12 DIAGNOSIS — C50.911: ICD-10-CM

## 2023-10-12 DIAGNOSIS — Z17.0 MALIGNANT NEOPLASM OF UPPER-OUTER QUADRANT OF RIGHT BREAST IN FEMALE, ESTROGEN RECEPTOR POSITIVE: Primary | ICD-10-CM

## 2023-10-12 DIAGNOSIS — C50.411 MALIGNANT NEOPLASM OF UPPER-OUTER QUADRANT OF RIGHT BREAST IN FEMALE, ESTROGEN RECEPTOR POSITIVE: Primary | ICD-10-CM

## 2023-10-12 DIAGNOSIS — C77.3 BREAST CANCER METASTASIZED TO AXILLARY LYMPH NODE, RIGHT (HCC): ICD-10-CM

## 2023-10-12 DIAGNOSIS — C50.911 BREAST CANCER METASTASIZED TO AXILLARY LYMPH NODE, RIGHT (HCC): ICD-10-CM

## 2023-10-12 DIAGNOSIS — R53.83 OTHER FATIGUE: ICD-10-CM

## 2023-10-12 PROCEDURE — 99245 OFF/OP CONSLTJ NEW/EST HI 55: CPT | Performed by: INTERNAL MEDICINE

## 2023-10-12 NOTE — PROGRESS NOTES
Patient presents to the [facility] for consultation with Dr. Zan Snellen. Patient is accompanied by her spouse for support. Introduced myself as Breast RN Navigator. Shared my role to provide support and education, assist with care coordination, as well as connect her to supportive resources as she may need them. Dr. Zan Snellen has placed orders for CT and Bone Scan to complete staging. Educated patient as to CT scan, and what to expect. Discussed PO and IV contrast.  Encouraged hydration prior to exam.  Educated patient as to Bone Scan. Discussed isotope injection. Shared with patient need for prior authorization. Will initiate this today and follow up with the patient for exam scheduling. Educated patient as to breast MRI, and what to expect. This included IV contrast, magnet safety, positioning, as well as enclosed space. Exam is currently scheduled for Monday 10/16/23. Authorization is currently pending. Patient aware of my follow up with updates as they are received. Patient scheduled for genetic counseling and testing on Monday 10/16/23. Will have labs ordered by Dr. Zan Snellen drawn at that time as well. Patient works full time as a . Answered questions concerning infection prevention. Encouraged hand hygiene. Patient has not yet had flu or COVID vaccine for this fall. Encouraged patient to have those completed. Provided patient with Breast Cancer Treatment Handbook and educated as to how to use this resource. Provided patient with [address] and educated as to anticipated clinical course of care. Discussed supportive resources with [facility]. Patient and spouse have 2 children age 3 and 10, and have interest in resources for communicating information to them. Discussed [facility], and services offered. Flyer provided. Patient aware of my follow up to discuss appointment scheduling and prior authorization status.

## 2023-10-16 ENCOUNTER — GENETICS ENCOUNTER (OUTPATIENT)
Dept: GENETICS | Facility: HOSPITAL | Age: 38
End: 2023-10-16
Attending: INTERNAL MEDICINE
Payer: COMMERCIAL

## 2023-10-16 ENCOUNTER — HOSPITAL ENCOUNTER (OUTPATIENT)
Dept: MRI IMAGING | Facility: HOSPITAL | Age: 38
Discharge: HOME | End: 2023-10-16
Attending: SURGERY
Payer: COMMERCIAL

## 2023-10-16 ENCOUNTER — NURSE ONLY (OUTPATIENT)
Dept: HEMATOLOGY/ONCOLOGY | Facility: HOSPITAL | Age: 38
End: 2023-10-16
Attending: INTERNAL MEDICINE
Payer: COMMERCIAL

## 2023-10-16 DIAGNOSIS — Z17.0 MALIGNANT NEOPLASM OF UPPER-OUTER QUADRANT OF RIGHT BREAST IN FEMALE, ESTROGEN RECEPTOR POSITIVE: ICD-10-CM

## 2023-10-16 DIAGNOSIS — C50.911 BREAST CANCER METASTASIZED TO AXILLARY LYMPH NODE, RIGHT (HCC): ICD-10-CM

## 2023-10-16 DIAGNOSIS — C50.411 MALIGNANT NEOPLASM OF UPPER-OUTER QUADRANT OF RIGHT BREAST IN FEMALE, ESTROGEN RECEPTOR POSITIVE: ICD-10-CM

## 2023-10-16 DIAGNOSIS — C50.911: ICD-10-CM

## 2023-10-16 DIAGNOSIS — C77.3 BREAST CANCER METASTASIZED TO AXILLARY LYMPH NODE, RIGHT (HCC): ICD-10-CM

## 2023-10-16 DIAGNOSIS — R16.0 LIVER MASS: Primary | ICD-10-CM

## 2023-10-16 DIAGNOSIS — Z80.0 FAMILY HISTORY OF PANCREATIC CANCER: ICD-10-CM

## 2023-10-16 DIAGNOSIS — R53.83 OTHER FATIGUE: ICD-10-CM

## 2023-10-16 DIAGNOSIS — C50.411 MALIGNANT NEOPLASM OF UPPER-OUTER QUADRANT OF RIGHT BREAST IN FEMALE, ESTROGEN RECEPTOR POSITIVE: Primary | ICD-10-CM

## 2023-10-16 DIAGNOSIS — Z17.0 MALIGNANT NEOPLASM OF UPPER-OUTER QUADRANT OF RIGHT BREAST IN FEMALE, ESTROGEN RECEPTOR POSITIVE: Primary | ICD-10-CM

## 2023-10-16 DIAGNOSIS — Z80.42 FAMILY HISTORY OF MALIGNANT NEOPLASM OF PROSTATE: ICD-10-CM

## 2023-10-16 LAB
BASOPHILS # BLD AUTO: 0.03 X10(3) UL (ref 0–0.2)
BASOPHILS NFR BLD AUTO: 0.3 %
DEPRECATED HBV CORE AB SER IA-ACNC: 76.5 NG/ML
DEPRECATED RDW RBC AUTO: 38.2 FL (ref 35.1–46.3)
EOSINOPHIL # BLD AUTO: 0.04 X10(3) UL (ref 0–0.7)
EOSINOPHIL NFR BLD AUTO: 0.3 %
ERYTHROCYTE [DISTWIDTH] IN BLOOD BY AUTOMATED COUNT: 11.9 % (ref 11–15)
HCT VFR BLD AUTO: 36.5 %
HGB BLD-MCNC: 12.1 G/DL
IMM GRANULOCYTES # BLD AUTO: 0.03 X10(3) UL (ref 0–1)
IMM GRANULOCYTES NFR BLD: 0.3 %
IRON SATN MFR SERPL: 25 %
IRON SERPL-MCNC: 86 UG/DL
LYMPHOCYTES # BLD AUTO: 1.58 X10(3) UL (ref 1–4)
LYMPHOCYTES NFR BLD AUTO: 13.4 %
MCH RBC QN AUTO: 29 PG (ref 26–34)
MCHC RBC AUTO-ENTMCNC: 33.2 G/DL (ref 31–37)
MCV RBC AUTO: 87.5 FL
MONOCYTES # BLD AUTO: 0.58 X10(3) UL (ref 0.1–1)
MONOCYTES NFR BLD AUTO: 4.9 %
NEUTROPHILS # BLD AUTO: 9.54 X10 (3) UL (ref 1.5–7.7)
NEUTROPHILS # BLD AUTO: 9.54 X10(3) UL (ref 1.5–7.7)
NEUTROPHILS NFR BLD AUTO: 80.8 %
PLATELET # BLD AUTO: 356 10(3)UL (ref 150–450)
RBC # BLD AUTO: 4.17 X10(6)UL
TIBC SERPL-MCNC: 340 UG/DL (ref 240–450)
TRANSFERRIN SERPL-MCNC: 228 MG/DL (ref 200–360)
WBC # BLD AUTO: 11.8 X10(3) UL (ref 4–11)

## 2023-10-16 PROCEDURE — 82728 ASSAY OF FERRITIN: CPT

## 2023-10-16 PROCEDURE — 85025 COMPLETE CBC W/AUTO DIFF WBC: CPT

## 2023-10-16 PROCEDURE — 36415 COLL VENOUS BLD VENIPUNCTURE: CPT

## 2023-10-16 PROCEDURE — A9575 INJ GADOTERATE MEGLUMI 0.1ML: HCPCS | Performed by: SURGERY

## 2023-10-16 PROCEDURE — 84466 ASSAY OF TRANSFERRIN: CPT

## 2023-10-16 PROCEDURE — 83540 ASSAY OF IRON: CPT

## 2023-10-16 PROCEDURE — 77049 MRI BREAST C-+ W/CAD BI: CPT | Performed by: INTERNAL MEDICINE

## 2023-10-16 PROCEDURE — 96040 HC GENETIC COUNSELING EA 30 MIN: CPT | Performed by: GENETIC COUNSELOR, MS

## 2023-10-16 RX ORDER — GADOTERATE MEGLUMINE 376.9 MG/ML
15 INJECTION INTRAVENOUS
Status: COMPLETED | OUTPATIENT
Start: 2023-10-16 | End: 2023-10-16

## 2023-10-16 RX ADMIN — GADOTERATE MEGLUMINE 15 ML: 376.9 INJECTION INTRAVENOUS at 12:16:00

## 2023-10-17 ENCOUNTER — HOSPITAL ENCOUNTER (OUTPATIENT)
Dept: NUCLEAR MEDICINE | Facility: HOSPITAL | Age: 38
Discharge: HOME | End: 2023-10-17
Attending: INTERNAL MEDICINE
Payer: COMMERCIAL

## 2023-10-17 ENCOUNTER — HOSPITAL ENCOUNTER (OUTPATIENT)
Dept: CT IMAGING | Facility: HOSPITAL | Age: 38
Discharge: HOME | End: 2023-10-17
Attending: INTERNAL MEDICINE
Payer: COMMERCIAL

## 2023-10-17 ENCOUNTER — TELEPHONE (OUTPATIENT)
Dept: HEMATOLOGY/ONCOLOGY | Facility: HOSPITAL | Age: 38
End: 2023-10-17

## 2023-10-17 DIAGNOSIS — C50.411 MALIGNANT NEOPLASM OF UPPER-OUTER QUADRANT OF RIGHT BREAST IN FEMALE, ESTROGEN RECEPTOR POSITIVE: ICD-10-CM

## 2023-10-17 DIAGNOSIS — Z17.0 MALIGNANT NEOPLASM OF UPPER-OUTER QUADRANT OF RIGHT BREAST IN FEMALE, ESTROGEN RECEPTOR POSITIVE: ICD-10-CM

## 2023-10-17 LAB
CREAT BLD-MCNC: 1 MG/DL
EGFRCR SERPLBLD CKD-EPI 2021: 73 ML/MIN/1.73M2 (ref 60–?)

## 2023-10-17 PROCEDURE — 78306 BONE IMAGING WHOLE BODY: CPT | Performed by: INTERNAL MEDICINE

## 2023-10-17 PROCEDURE — 71260 CT THORAX DX C+: CPT | Performed by: INTERNAL MEDICINE

## 2023-10-17 PROCEDURE — 74177 CT ABD & PELVIS W/CONTRAST: CPT | Performed by: INTERNAL MEDICINE

## 2023-10-17 PROCEDURE — 82565 ASSAY OF CREATININE: CPT

## 2023-10-17 RX ORDER — IOHEXOL 350 MG/ML
80 INJECTION, SOLUTION INTRAVENOUS
Status: COMPLETED | OUTPATIENT
Start: 2023-10-17 | End: 2023-10-17

## 2023-10-17 RX ADMIN — IOHEXOL 80 ML: 350 INJECTION, SOLUTION INTRAVENOUS at 08:45:00

## 2023-10-17 NOTE — TELEPHONE ENCOUNTER
Phoned patient to discuss care coordination. Patient has read MyChart message from Dr. Ruma Woods regarding breast MRI findings. Reinforced to patient recommendations to proceed with US of the liver. Shared with patient feedback from Dr. Ariella Raymundo regarding the results of the CT scan. Plan to proceed with US liver for further characterization of findings in the liver on CT and MRI. US scheduled for 10/19/23 at 10am.  Instructed patient to remain NPO for 8 hours prior. Discussed with patient office follow up with Dr. Ariella Raymundo to review all results and discuss her recommendations regarding plan of care. Shared with patient need to schedule office follow up with Dr. Ruma Woods.   Follow up with Dr. Ariella Raymundo scheduled for Friday 10/20/23 at 12pm.

## 2023-10-19 ENCOUNTER — PATIENT MESSAGE (OUTPATIENT)
Facility: CLINIC | Age: 38
End: 2023-10-19

## 2023-10-19 ENCOUNTER — HOSPITAL ENCOUNTER (OUTPATIENT)
Dept: ULTRASOUND IMAGING | Facility: HOSPITAL | Age: 38
Discharge: HOME | End: 2023-10-19
Attending: SURGERY
Payer: COMMERCIAL

## 2023-10-19 DIAGNOSIS — Z17.0 MALIGNANT NEOPLASM OF UPPER-OUTER QUADRANT OF RIGHT BREAST IN FEMALE, ESTROGEN RECEPTOR POSITIVE: Primary | ICD-10-CM

## 2023-10-19 DIAGNOSIS — C50.411 MALIGNANT NEOPLASM OF UPPER-OUTER QUADRANT OF RIGHT BREAST IN FEMALE, ESTROGEN RECEPTOR POSITIVE: Primary | ICD-10-CM

## 2023-10-19 DIAGNOSIS — R93.5 ABNORMAL US (ULTRASOUND) OF ABDOMEN: ICD-10-CM

## 2023-10-19 DIAGNOSIS — R93.5 ABNORMAL CT OF THE ABDOMEN: ICD-10-CM

## 2023-10-19 DIAGNOSIS — R16.0 LIVER MASS: ICD-10-CM

## 2023-10-19 DIAGNOSIS — Z00.00 WELL ADULT EXAM: Primary | ICD-10-CM

## 2023-10-19 PROCEDURE — 76705 ECHO EXAM OF ABDOMEN: CPT | Performed by: SURGERY

## 2023-10-19 NOTE — TELEPHONE ENCOUNTER
From: Naomie Grullon  To: Darin Jennifer  Sent: 10/19/2023 8:54 AM CDT  Subject: Blood Work    Hello,  I was wondering if Dr. Rogelio Skelton could fill an order for my bloodwork? I was unable to get in until 11/9 for a physical and full disclosure this is for insurance purposes that I do every year at my physical. I attached the form below if this is possible. Thanks so much!   Mar Valdes  [phone]

## 2023-10-20 ENCOUNTER — OFFICE VISIT (OUTPATIENT)
Dept: HEMATOLOGY/ONCOLOGY | Facility: HOSPITAL | Age: 38
End: 2023-10-20
Attending: INTERNAL MEDICINE
Payer: COMMERCIAL

## 2023-10-20 VITALS
DIASTOLIC BLOOD PRESSURE: 81 MMHG | SYSTOLIC BLOOD PRESSURE: 115 MMHG | TEMPERATURE: 99 F | WEIGHT: 154.19 LBS | OXYGEN SATURATION: 100 % | RESPIRATION RATE: 16 BRPM | HEIGHT: 66 IN | BODY MASS INDEX: 24.78 KG/M2 | HEART RATE: 78 BPM

## 2023-10-20 DIAGNOSIS — T45.1X5A ALOPECIA DUE TO CYTOTOXIC DRUG: ICD-10-CM

## 2023-10-20 DIAGNOSIS — C50.411 MALIGNANT NEOPLASM OF UPPER-OUTER QUADRANT OF RIGHT BREAST IN FEMALE, ESTROGEN RECEPTOR POSITIVE: Primary | ICD-10-CM

## 2023-10-20 DIAGNOSIS — L65.8 ALOPECIA DUE TO CYTOTOXIC DRUG: ICD-10-CM

## 2023-10-20 DIAGNOSIS — Z17.0 MALIGNANT NEOPLASM OF UPPER-OUTER QUADRANT OF RIGHT BREAST IN FEMALE, ESTROGEN RECEPTOR POSITIVE: Primary | ICD-10-CM

## 2023-10-20 PROCEDURE — 99215 OFFICE O/P EST HI 40 MIN: CPT | Performed by: INTERNAL MEDICINE

## 2023-10-20 NOTE — TELEPHONE ENCOUNTER
Clinical staff: please print out form in media for patient's physical on 11/9/2023. Please advise on lab orders.

## 2023-10-23 ENCOUNTER — LAB ENCOUNTER (OUTPATIENT)
Dept: LAB | Age: 38
End: 2023-10-23
Attending: FAMILY MEDICINE

## 2023-10-23 ENCOUNTER — TELEPHONE (OUTPATIENT)
Dept: GENETICS | Facility: HOSPITAL | Age: 38
End: 2023-10-23

## 2023-10-23 ENCOUNTER — TELEPHONE (OUTPATIENT)
Dept: HEMATOLOGY/ONCOLOGY | Facility: HOSPITAL | Age: 38
End: 2023-10-23

## 2023-10-23 DIAGNOSIS — Z79.899 ENCOUNTER FOR MONITORING CARDIOTOXIC DRUG THERAPY: ICD-10-CM

## 2023-10-23 DIAGNOSIS — Z00.00 WELL ADULT EXAM: ICD-10-CM

## 2023-10-23 DIAGNOSIS — Z17.0 MALIGNANT NEOPLASM OF UPPER-OUTER QUADRANT OF RIGHT BREAST IN FEMALE, ESTROGEN RECEPTOR POSITIVE: Primary | ICD-10-CM

## 2023-10-23 DIAGNOSIS — Z51.81 ENCOUNTER FOR MONITORING CARDIOTOXIC DRUG THERAPY: ICD-10-CM

## 2023-10-23 DIAGNOSIS — C50.411 MALIGNANT NEOPLASM OF UPPER-OUTER QUADRANT OF RIGHT BREAST IN FEMALE, ESTROGEN RECEPTOR POSITIVE: Primary | ICD-10-CM

## 2023-10-23 PROBLEM — Z45.2 ENCOUNTER FOR CENTRAL LINE CARE: Status: ACTIVE | Noted: 2023-10-23

## 2023-10-23 LAB
ANION GAP SERPL CALC-SCNC: 4 MMOL/L (ref 0–18)
BUN BLD-MCNC: 13 MG/DL (ref 7–18)
BUN/CREAT SERPL: 12.9 (ref 10–20)
CALCIUM BLD-MCNC: 8.8 MG/DL (ref 8.5–10.1)
CHLORIDE SERPL-SCNC: 111 MMOL/L (ref 98–112)
CHOLEST SERPL-MCNC: 140 MG/DL (ref ?–200)
CO2 SERPL-SCNC: 24 MMOL/L (ref 21–32)
CREAT BLD-MCNC: 1.01 MG/DL
EGFRCR SERPLBLD CKD-EPI 2021: 72 ML/MIN/1.73M2 (ref 60–?)
FASTING PATIENT LIPID ANSWER: YES
FASTING STATUS PATIENT QL REPORTED: YES
GLUCOSE BLD-MCNC: 94 MG/DL (ref 70–99)
HDLC SERPL-MCNC: 61 MG/DL (ref 40–59)
LDLC SERPL CALC-MCNC: 68 MG/DL (ref ?–100)
NONHDLC SERPL-MCNC: 79 MG/DL (ref ?–130)
OSMOLALITY SERPL CALC.SUM OF ELEC: 288 MOSM/KG (ref 275–295)
POTASSIUM SERPL-SCNC: 4.4 MMOL/L (ref 3.5–5.1)
SODIUM SERPL-SCNC: 139 MMOL/L (ref 136–145)
TRIGL SERPL-MCNC: 50 MG/DL (ref 30–149)
VLDLC SERPL CALC-MCNC: 8 MG/DL (ref 0–30)

## 2023-10-23 PROCEDURE — 80048 BASIC METABOLIC PNL TOTAL CA: CPT | Performed by: FAMILY MEDICINE

## 2023-10-23 PROCEDURE — 80061 LIPID PANEL: CPT | Performed by: FAMILY MEDICINE

## 2023-10-23 RX ORDER — HEPARIN SODIUM (PORCINE) LOCK FLUSH IV SOLN 100 UNIT/ML 100 UNIT/ML
5 SOLUTION INTRAVENOUS ONCE
OUTPATIENT
Start: 2023-10-23

## 2023-10-23 RX ORDER — SODIUM CHLORIDE 9 MG/ML
10 INJECTION INTRAVENOUS ONCE
OUTPATIENT
Start: 2023-10-23

## 2023-10-23 NOTE — TELEPHONE ENCOUNTER
Shared genetic testing results for 9 gene breast-focused STAT panel with [facility] ([facility] Breast Cancer STAT Panel with HELLEN and CHEK2). 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 Reminded her that no clinical change in management with a VUS. Released results to her through lab's portal and will mail her a copy once reflex results are available. All her questions were answered to the best of my ability and she was appreciative of the call.

## 2023-10-23 NOTE — TELEPHONE ENCOUNTER
Phoned patient for continued care coordination. Patient is schedule for PET tomorrow. She denies questions concerning exam.  Shared with patient she will be contacted by IR for scheduling of port placement. Shared with patient she will need a pre chemo MUGA as well as education prior to starting treatment. Patient aware of my continued follow up for coordination of these appointments.

## 2023-10-24 ENCOUNTER — HOSPITAL ENCOUNTER (OUTPATIENT)
Dept: NUCLEAR MEDICINE | Facility: HOSPITAL | Age: 38
Discharge: HOME | End: 2023-10-24
Attending: INTERNAL MEDICINE

## 2023-10-24 DIAGNOSIS — R93.5 ABNORMAL CT OF THE ABDOMEN: ICD-10-CM

## 2023-10-24 DIAGNOSIS — C50.411 MALIGNANT NEOPLASM OF UPPER-OUTER QUADRANT OF RIGHT BREAST IN FEMALE, ESTROGEN RECEPTOR POSITIVE: ICD-10-CM

## 2023-10-24 DIAGNOSIS — R93.5 ABNORMAL US (ULTRASOUND) OF ABDOMEN: ICD-10-CM

## 2023-10-24 DIAGNOSIS — Z17.0 MALIGNANT NEOPLASM OF UPPER-OUTER QUADRANT OF RIGHT BREAST IN FEMALE, ESTROGEN RECEPTOR POSITIVE: ICD-10-CM

## 2023-10-24 LAB — GLUCOSE BLDC GLUCOMTR-MCNC: 90 MG/DL (ref 70–99)

## 2023-10-24 PROCEDURE — 78815 PET IMAGE W/CT SKULL-THIGH: CPT | Performed by: INTERNAL MEDICINE

## 2023-10-24 PROCEDURE — 82962 GLUCOSE BLOOD TEST: CPT

## 2023-10-26 ENCOUNTER — OFFICE VISIT (OUTPATIENT)
Dept: HEMATOLOGY/ONCOLOGY | Facility: HOSPITAL | Age: 38
End: 2023-10-26
Attending: INTERNAL MEDICINE

## 2023-10-26 ENCOUNTER — HOSPITAL ENCOUNTER (OUTPATIENT)
Dept: NUCLEAR MEDICINE | Facility: HOSPITAL | Age: 38
Discharge: HOME | End: 2023-10-26
Attending: INTERNAL MEDICINE

## 2023-10-26 DIAGNOSIS — Z51.81 ENCOUNTER FOR MONITORING CARDIOTOXIC DRUG THERAPY: ICD-10-CM

## 2023-10-26 DIAGNOSIS — Z79.899 ENCOUNTER FOR MONITORING CARDIOTOXIC DRUG THERAPY: ICD-10-CM

## 2023-10-26 DIAGNOSIS — Z17.0 MALIGNANT NEOPLASM OF UPPER-OUTER QUADRANT OF RIGHT BREAST IN FEMALE, ESTROGEN RECEPTOR POSITIVE: ICD-10-CM

## 2023-10-26 DIAGNOSIS — Z17.0 MALIGNANT NEOPLASM OF UPPER-OUTER QUADRANT OF RIGHT BREAST IN FEMALE, ESTROGEN RECEPTOR POSITIVE: Primary | ICD-10-CM

## 2023-10-26 DIAGNOSIS — C50.411 MALIGNANT NEOPLASM OF UPPER-OUTER QUADRANT OF RIGHT BREAST IN FEMALE, ESTROGEN RECEPTOR POSITIVE: Primary | ICD-10-CM

## 2023-10-26 DIAGNOSIS — C50.411 MALIGNANT NEOPLASM OF UPPER-OUTER QUADRANT OF RIGHT BREAST IN FEMALE, ESTROGEN RECEPTOR POSITIVE: ICD-10-CM

## 2023-10-26 PROCEDURE — 78472 GATED HEART PLANAR SINGLE: CPT | Performed by: INTERNAL MEDICINE

## 2023-10-26 PROCEDURE — 99215 OFFICE O/P EST HI 40 MIN: CPT | Performed by: NURSE PRACTITIONER

## 2023-10-26 PROCEDURE — 99417 PROLNG OP E/M EACH 15 MIN: CPT | Performed by: NURSE PRACTITIONER

## 2023-10-29 ENCOUNTER — PATIENT MESSAGE (OUTPATIENT)
Dept: NEPHROLOGY | Facility: CLINIC | Age: 38
End: 2023-10-29

## 2023-10-30 NOTE — TELEPHONE ENCOUNTER
From: Vernell Bridges  To: Jeromemarkel Sagrario  Sent: 10/29/2023 3:47 PM CDT  Subject: Recent Breast Cancer Diagnosis /Solitary Kidney    Hello,  I wanted to check in with Dr. Radha Gay as I was recently diagnosed with breast cancer. I will be receiving my treatments at [facility], all my information is in [address] [place] [address] [address].  But I know some of the chemo drugs will be tough on my solitary kidney and wanted to see if there was anything else I needed to do to protect my kidney, etc.    Thank you so much,  Lissy Warner  [phone]

## 2023-10-30 NOTE — TELEPHONE ENCOUNTER
Stay well-hydrated. Avoid nonsteroidals. The big thing is to see whether or not she has any significant side effects such as nausea, vomiting or diarrhea. If she does she needs to let her oncologist know.

## 2023-10-31 ENCOUNTER — TELEPHONE (OUTPATIENT)
Dept: HEMATOLOGY/ONCOLOGY | Facility: HOSPITAL | Age: 38
End: 2023-10-31

## 2023-10-31 NOTE — TELEPHONE ENCOUNTER
Phoned patient to provide updates regarding start date for her chemotherapy. Shared with patient the authorization for her treatment plan is still pending. Let patient know as soon as there are updates the office will contact her. Patient acknowledged.

## 2023-11-01 ENCOUNTER — HOSPITAL ENCOUNTER (OUTPATIENT)
Dept: INTERVENTIONAL RADIOLOGY/VASCULAR | Facility: HOSPITAL | Age: 38
Discharge: HOME | End: 2023-11-01
Attending: INTERNAL MEDICINE | Admitting: RADIOLOGY
Payer: COMMERCIAL

## 2023-11-01 VITALS
DIASTOLIC BLOOD PRESSURE: 66 MMHG | WEIGHT: 154 LBS | HEIGHT: 66 IN | RESPIRATION RATE: 18 BRPM | HEART RATE: 77 BPM | BODY MASS INDEX: 24.75 KG/M2 | OXYGEN SATURATION: 91 % | TEMPERATURE: 97 F | SYSTOLIC BLOOD PRESSURE: 101 MMHG

## 2023-11-01 DIAGNOSIS — C50.411 MALIGNANT NEOPLASM OF UPPER-OUTER QUADRANT OF RIGHT BREAST IN FEMALE, ESTROGEN RECEPTOR POSITIVE: ICD-10-CM

## 2023-11-01 DIAGNOSIS — Z17.0 MALIGNANT NEOPLASM OF UPPER-OUTER QUADRANT OF RIGHT BREAST IN FEMALE, ESTROGEN RECEPTOR POSITIVE: ICD-10-CM

## 2023-11-01 LAB — B-HCG UR QL: NEGATIVE

## 2023-11-01 PROCEDURE — 77001 FLUOROGUIDE FOR VEIN DEVICE: CPT

## 2023-11-01 PROCEDURE — 76937 US GUIDE VASCULAR ACCESS: CPT

## 2023-11-01 PROCEDURE — 36561 INSERT TUNNELED CV CATH: CPT

## 2023-11-01 PROCEDURE — 81025 URINE PREGNANCY TEST: CPT

## 2023-11-01 PROCEDURE — 0JH60WZ INSERTION OF TOTALLY IMPLANTABLE VASCULAR ACCESS DEVICE INTO CHEST SUBCUTANEOUS TISSUE AND FASCIA, OPEN APPROACH: ICD-10-PCS | Performed by: STUDENT IN AN ORGANIZED HEALTH CARE EDUCATION/TRAINING PROGRAM

## 2023-11-01 PROCEDURE — 99153 MOD SED SAME PHYS/QHP EA: CPT

## 2023-11-01 PROCEDURE — 02HV33Z INSERTION OF INFUSION DEVICE INTO SUPERIOR VENA CAVA, PERCUTANEOUS APPROACH: ICD-10-PCS | Performed by: STUDENT IN AN ORGANIZED HEALTH CARE EDUCATION/TRAINING PROGRAM

## 2023-11-01 PROCEDURE — 99152 MOD SED SAME PHYS/QHP 5/>YRS: CPT

## 2023-11-01 RX ORDER — HEPARIN SODIUM (PORCINE) LOCK FLUSH IV SOLN 100 UNIT/ML 100 UNIT/ML
1.5 SOLUTION INTRAVENOUS ONCE
Status: CANCELLED | OUTPATIENT
Start: 2023-11-01 | End: 2023-11-01

## 2023-11-01 RX ORDER — HEPARIN SODIUM (PORCINE) LOCK FLUSH IV SOLN 100 UNIT/ML 100 UNIT/ML
SOLUTION INTRAVENOUS
Status: COMPLETED
Start: 2023-11-01 | End: 2023-11-01

## 2023-11-01 RX ORDER — CEFAZOLIN SODIUM/WATER 2 G/20 ML
SYRINGE (ML) INTRAVENOUS
Status: COMPLETED
Start: 2023-11-01 | End: 2023-11-01

## 2023-11-01 RX ORDER — LIDOCAINE HYDROCHLORIDE 20 MG/ML
INJECTION, SOLUTION INFILTRATION; PERINEURAL
Status: COMPLETED
Start: 2023-11-01 | End: 2023-11-01

## 2023-11-01 RX ORDER — SODIUM CHLORIDE 9 MG/ML
INJECTION, SOLUTION INTRAVENOUS CONTINUOUS
Status: DISCONTINUED | OUTPATIENT
Start: 2023-11-01 | End: 2023-11-01

## 2023-11-01 RX ORDER — MIDAZOLAM HYDROCHLORIDE 1 MG/ML
INJECTION INTRAMUSCULAR; INTRAVENOUS
Status: COMPLETED
Start: 2023-11-01 | End: 2023-11-01

## 2023-11-01 NOTE — IVS NOTE
Tolerated procedure well. Tolerating PO juice. DermaBond dressing intact leftneck and chest area. Discharge and follow-upinstructions given. Discharged home per W/C VSS  Co's pain at incision area left neck. Will take Tylenol on way home. Crackers given.

## 2023-11-01 NOTE — PROCEDURES
Interventional Radiology  Brief Post-Procedure Note    Procedure(s): port placement    Indication: right breast cancer, need for chemotherapy    (s): Tram    Anesthesia: Sedation    Findings:  6 Uzbek port placed via LEFT internal jugular vein, terminating at SVC/RA junction    Blood loss: <5 mL      Complications: None    Plan: ready for use    Please refer to full dictation under the \"Imaging\" tab in Epic.     Braden Villarreal MD  11/1/2023  Interventional Radiology  [facility]

## 2023-11-01 NOTE — INTERVAL H&P NOTE
The above referenced H&P was reviewed by Kenia Yoo MD on 11/1/2023, the patient was examined and no significant changes have occurred in the patient's condition since the H&P was performed. Risks, benefits, alternative treatments and consequences of no treatment were discussed. We will proceed with procedure as planned.       Kenia Yoo MD  11/1/2023  8:00 AM

## 2023-11-01 NOTE — DISCHARGE INSTRUCTIONS
Pr-14  4.2  [phone]      Patient Name: Gagan Bai     Procedure:  Chest Port Insertion     Site Care: Dermabond (skin glue) has been applied to your incision. Do not scrub the area. Allow the Dermabond to flake off on its own. Activity/Diet  No heavy lifting or strenuous activity for 1 week  Drink plenty of fluids, unless you have otherwise been told to restrict your fluid intake. Do not drink alcohol for 24 hours. Do not drive,  operate heavy machinery, make important decisions or sign legal documents today. Do not submerge in bath tubs or pools in 1 week. Medications: Take acetaminophen if needed for pain. Do not exceed 4000mg of acetaminophen in a 24-hour period. , Do not take blood thinners, aspirin, or Plavix for 24 hours. and Make no changes to your existing medications. Contact Interventional Radiology at [phone] if you have severe/unrelieved pain, fever, chills, dizziness/lightheadedness, or drainage/bleeding from your incision site.

## 2023-11-02 DIAGNOSIS — Z45.2 ENCOUNTER FOR CENTRAL LINE CARE: ICD-10-CM

## 2023-11-02 DIAGNOSIS — C50.411 MALIGNANT NEOPLASM OF UPPER-OUTER QUADRANT OF RIGHT BREAST IN FEMALE, ESTROGEN RECEPTOR POSITIVE: Primary | ICD-10-CM

## 2023-11-02 DIAGNOSIS — Z17.0 MALIGNANT NEOPLASM OF UPPER-OUTER QUADRANT OF RIGHT BREAST IN FEMALE, ESTROGEN RECEPTOR POSITIVE: Primary | ICD-10-CM

## 2023-11-02 RX ORDER — DOXORUBICIN HYDROCHLORIDE 2 MG/ML
60 INJECTION, SOLUTION INTRAVENOUS ONCE
Status: CANCELLED | OUTPATIENT
Start: 2023-11-03

## 2023-11-02 RX ORDER — PROCHLORPERAZINE MALEATE 10 MG
10 TABLET ORAL EVERY 8 HOURS PRN
Qty: 30 TABLET | Refills: 3 | Status: SHIPPED | OUTPATIENT
Start: 2023-11-02

## 2023-11-02 RX ORDER — ONDANSETRON HYDROCHLORIDE 8 MG/1
8 TABLET, FILM COATED ORAL EVERY 8 HOURS PRN
Qty: 30 TABLET | Refills: 3 | Status: SHIPPED | OUTPATIENT
Start: 2023-11-02

## 2023-11-03 ENCOUNTER — NURSE ONLY (OUTPATIENT)
Dept: HEMATOLOGY/ONCOLOGY | Facility: HOSPITAL | Age: 38
End: 2023-11-03
Attending: INTERNAL MEDICINE
Payer: COMMERCIAL

## 2023-11-03 VITALS
DIASTOLIC BLOOD PRESSURE: 77 MMHG | TEMPERATURE: 98 F | RESPIRATION RATE: 16 BRPM | WEIGHT: 154.19 LBS | BODY MASS INDEX: 25 KG/M2 | HEART RATE: 91 BPM | OXYGEN SATURATION: 98 % | SYSTOLIC BLOOD PRESSURE: 136 MMHG

## 2023-11-03 DIAGNOSIS — C50.411 MALIGNANT NEOPLASM OF UPPER-OUTER QUADRANT OF RIGHT BREAST IN FEMALE, ESTROGEN RECEPTOR POSITIVE: Primary | ICD-10-CM

## 2023-11-03 DIAGNOSIS — Z45.2 ENCOUNTER FOR CENTRAL LINE CARE: ICD-10-CM

## 2023-11-03 DIAGNOSIS — Z17.0 MALIGNANT NEOPLASM OF UPPER-OUTER QUADRANT OF RIGHT BREAST IN FEMALE, ESTROGEN RECEPTOR POSITIVE: Primary | ICD-10-CM

## 2023-11-03 PROCEDURE — 96411 CHEMO IV PUSH ADDL DRUG: CPT

## 2023-11-03 PROCEDURE — 96372 THER/PROPH/DIAG INJ SC/IM: CPT

## 2023-11-03 PROCEDURE — 96375 TX/PRO/DX INJ NEW DRUG ADDON: CPT

## 2023-11-03 PROCEDURE — 96413 CHEMO IV INFUSION 1 HR: CPT

## 2023-11-03 PROCEDURE — 96367 TX/PROPH/DG ADDL SEQ IV INF: CPT

## 2023-11-03 RX ORDER — HEPARIN SODIUM (PORCINE) LOCK FLUSH IV SOLN 100 UNIT/ML 100 UNIT/ML
5 SOLUTION INTRAVENOUS ONCE
OUTPATIENT
Start: 2023-11-03

## 2023-11-03 RX ORDER — DOXORUBICIN HYDROCHLORIDE 2 MG/ML
60 INJECTION, SOLUTION INTRAVENOUS ONCE
Status: COMPLETED | OUTPATIENT
Start: 2023-11-03 | End: 2023-11-03

## 2023-11-03 RX ORDER — HEPARIN SODIUM (PORCINE) LOCK FLUSH IV SOLN 100 UNIT/ML 100 UNIT/ML
5 SOLUTION INTRAVENOUS ONCE
Status: COMPLETED | OUTPATIENT
Start: 2023-11-03 | End: 2023-11-03

## 2023-11-03 RX ORDER — SODIUM CHLORIDE 9 MG/ML
10 INJECTION INTRAVENOUS ONCE
OUTPATIENT
Start: 2023-11-03

## 2023-11-03 RX ORDER — HEPARIN SODIUM (PORCINE) LOCK FLUSH IV SOLN 100 UNIT/ML 100 UNIT/ML
SOLUTION INTRAVENOUS
Status: COMPLETED
Start: 2023-11-03 | End: 2023-11-03

## 2023-11-03 RX ADMIN — DOXORUBICIN HYDROCHLORIDE 108 MG: 2 INJECTION, SOLUTION INTRAVENOUS at 10:45:00

## 2023-11-03 RX ADMIN — HEPARIN SODIUM (PORCINE) LOCK FLUSH IV SOLN 100 UNIT/ML 500 UNITS: 100 SOLUTION INTRAVENOUS at 11:53:00

## 2023-11-03 NOTE — PROGRESS NOTES
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 Arrives Ambulating independently, accompanied by Self and Spouse     Patient was evaluated today by [facility] on 10/26 with labs    Oral medications included in this regimen:  no    Patient confirms comprehension of cancer treatment schedule:  yes    Pregnancy screening:  Denies possibility of pregnancy    Modifications in dose or schedule:  No    Medications appearance and physical integrity checked by RN: yes. Chemotherapy IV pump settings verified by 2 RNs:  Yes.   Frequency of blood return and site check throughout administration: Prior to administration, Prior to each drug, Every 2-3 ml IVP, and At completion of therapy     Infusion/treatment outcome:  patient tolerated treatment without incident    Education Record    Learner:  Patient and Spouse  Barriers / Limitations:  None  Method:  Discussion and Printed material  Education / instructions given:  plan of care, treatment schedule, antiemetic regimen, home chemotherapy safety, possible side effects with chemotherapy   Outcome:  Shows understanding    Discharged Home, Ambulating independently, accompanied by:Self and Spouse    Patient/family verbalized understanding of future appointments: by printed AVS

## 2023-11-06 ENCOUNTER — TELEPHONE (OUTPATIENT)
Dept: HEMATOLOGY/ONCOLOGY | Facility: HOSPITAL | Age: 38
End: 2023-11-06

## 2023-11-06 NOTE — TELEPHONE ENCOUNTER
Date of Treatment: 11/3/23                                Type of Chemo: AC    Comments: Pt reports feeling well on Saturday and most of Sunday with minimal nausea. Sunday night experienced \"flu-like symptoms\" with nausea and hot flashes. Took oral antiemetics and it helped a little. Reports doing ok with symptoms today/    Recommendations: Take claritin or a similar allergy med the day before chemo, day of, and day after to help with flu-like symptoms most likely from pegfilgrastim injection. Alternate antiemetics every 4 hours until nausea under control. Call office with further questions or any issues. Pt verbalized understanding.

## 2023-11-08 ENCOUNTER — HOSPITAL ENCOUNTER (INPATIENT)
Facility: HOSPITAL | Age: 38
LOS: 3 days | Discharge: HOME | End: 2023-11-12
Attending: EMERGENCY MEDICINE | Admitting: INTERNAL MEDICINE
Payer: COMMERCIAL

## 2023-11-08 ENCOUNTER — TELEPHONE (OUTPATIENT)
Dept: GENETICS | Facility: HOSPITAL | Age: 38
End: 2023-11-08

## 2023-11-08 DIAGNOSIS — R50.81 NEUTROPENIC FEVER: Primary | ICD-10-CM

## 2023-11-08 DIAGNOSIS — D70.9 NEUTROPENIC FEVER: Primary | ICD-10-CM

## 2023-11-08 NOTE — TELEPHONE ENCOUNTER
SUNITA Banks with final genetic testing results. 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 Released results to her through lab's portal and will mail her a copy of results for her records as well. Encouraged her to contact me with any questions.

## 2023-11-09 ENCOUNTER — TELEPHONE (OUTPATIENT)
Dept: HEMATOLOGY/ONCOLOGY | Facility: HOSPITAL | Age: 38
End: 2023-11-09

## 2023-11-09 ENCOUNTER — APPOINTMENT (OUTPATIENT)
Dept: GENERAL RADIOLOGY | Facility: HOSPITAL | Age: 38
End: 2023-11-09
Attending: INTERNAL MEDICINE
Payer: COMMERCIAL

## 2023-11-09 PROBLEM — R50.81 NEUTROPENIC FEVER: Status: ACTIVE | Noted: 2023-11-09

## 2023-11-09 PROBLEM — D64.81 ANEMIA ASSOCIATED WITH CHEMOTHERAPY: Status: ACTIVE | Noted: 2023-11-09

## 2023-11-09 PROBLEM — T45.1X5A ANEMIA ASSOCIATED WITH CHEMOTHERAPY: Status: ACTIVE | Noted: 2023-11-09

## 2023-11-09 PROBLEM — D70.9 NEUTROPENIC FEVER: Status: ACTIVE | Noted: 2023-11-09

## 2023-11-09 PROBLEM — D70.9 NEUTROPENIC FEVER  (HCC): Status: ACTIVE | Noted: 2023-11-09

## 2023-11-09 PROBLEM — D69.6 THROMBOCYTOPENIA: Status: ACTIVE | Noted: 2023-11-09

## 2023-11-09 PROBLEM — D70.9 NEUTROPENIC FEVER (HCC): Status: ACTIVE | Noted: 2023-11-09

## 2023-11-09 PROBLEM — R50.81 NEUTROPENIC FEVER (HCC): Status: ACTIVE | Noted: 2023-11-09

## 2023-11-09 PROBLEM — D69.6 THROMBOCYTOPENIA (HCC): Status: ACTIVE | Noted: 2023-11-09

## 2023-11-09 PROBLEM — R50.81 NEUTROPENIC FEVER  (HCC): Status: ACTIVE | Noted: 2023-11-09

## 2023-11-09 LAB
ANION GAP SERPL CALC-SCNC: 5 MMOL/L (ref 0–18)
BASOPHILS # BLD AUTO: 0.03 X10(3) UL (ref 0–0.2)
BASOPHILS NFR BLD AUTO: 2.1 %
BILIRUB UR QL: NEGATIVE
BUN BLD-MCNC: 18 MG/DL (ref 9–23)
BUN/CREAT SERPL: 20.9 (ref 10–20)
C DIFF TOX B STL QL: NEGATIVE
CALCIUM BLD-MCNC: 9.4 MG/DL (ref 8.7–10.4)
CHLORIDE SERPL-SCNC: 106 MMOL/L (ref 98–112)
CLARITY UR: CLEAR
CO2 SERPL-SCNC: 26 MMOL/L (ref 21–32)
COLOR UR: COLORLESS
CREAT BLD-MCNC: 0.86 MG/DL
DEPRECATED RDW RBC AUTO: 38.1 FL (ref 35.1–46.3)
EGFRCR SERPLBLD CKD-EPI 2021: 88 ML/MIN/1.73M2 (ref 60–?)
EOSINOPHIL # BLD AUTO: 0.29 X10(3) UL (ref 0–0.7)
EOSINOPHIL NFR BLD AUTO: 20.7 %
ERYTHROCYTE [DISTWIDTH] IN BLOOD BY AUTOMATED COUNT: 11.8 % (ref 11–15)
FLUAV + FLUBV RNA SPEC NAA+PROBE: NEGATIVE
FLUAV + FLUBV RNA SPEC NAA+PROBE: NEGATIVE
GLUCOSE BLD-MCNC: 102 MG/DL (ref 70–99)
GLUCOSE UR-MCNC: NORMAL MG/DL
HCT VFR BLD AUTO: 31.7 %
HGB BLD-MCNC: 10.3 G/DL
HGB UR QL STRIP.AUTO: NEGATIVE
IMM GRANULOCYTES # BLD AUTO: 0.01 X10(3) UL (ref 0–1)
IMM GRANULOCYTES NFR BLD: 0.7 %
KETONES UR-MCNC: NEGATIVE MG/DL
LACTATE SERPL-SCNC: 0.9 MMOL/L (ref 0.5–2)
LEUKOCYTE ESTERASE UR QL STRIP.AUTO: NEGATIVE
LYMPHOCYTES # BLD AUTO: 0.83 X10(3) UL (ref 1–4)
LYMPHOCYTES NFR BLD AUTO: 59.3 %
MCH RBC QN AUTO: 28.9 PG (ref 26–34)
MCHC RBC AUTO-ENTMCNC: 32.5 G/DL (ref 31–37)
MCV RBC AUTO: 88.8 FL
MONOCYTES # BLD AUTO: 0.06 X10(3) UL (ref 0.1–1)
MONOCYTES NFR BLD AUTO: 4.3 %
MRSA DNA SPEC QL NAA+PROBE: NEGATIVE
NEUTROPHILS # BLD AUTO: 0.18 X10 (3) UL (ref 1.5–7.7)
NEUTROPHILS # BLD AUTO: 0.18 X10(3) UL (ref 1.5–7.7)
NEUTROPHILS NFR BLD AUTO: 12.9 %
NITRITE UR QL STRIP.AUTO: NEGATIVE
OSMOLALITY SERPL CALC.SUM OF ELEC: 286 MOSM/KG (ref 275–295)
PH UR: 6 [PH] (ref 5–8)
PLATELET # BLD AUTO: 141 10(3)UL (ref 150–450)
POTASSIUM SERPL-SCNC: 4.3 MMOL/L (ref 3.5–5.1)
PROT UR-MCNC: NEGATIVE MG/DL
RBC # BLD AUTO: 3.57 X10(6)UL
RSV RNA SPEC NAA+PROBE: NEGATIVE
SARS-COV-2 RNA RESP QL NAA+PROBE: NOT DETECTED
SODIUM SERPL-SCNC: 137 MMOL/L (ref 136–145)
SP GR UR STRIP: 1.01 (ref 1–1.03)
UROBILINOGEN UR STRIP-ACNC: NORMAL
WBC # BLD AUTO: 1.4 X10(3) UL (ref 4–11)

## 2023-11-09 PROCEDURE — 71045 X-RAY EXAM CHEST 1 VIEW: CPT | Performed by: INTERNAL MEDICINE

## 2023-11-09 PROCEDURE — 99223 1ST HOSP IP/OBS HIGH 75: CPT | Performed by: INTERNAL MEDICINE

## 2023-11-09 RX ORDER — PROCHLORPERAZINE EDISYLATE 5 MG/ML
5 INJECTION INTRAMUSCULAR; INTRAVENOUS EVERY 8 HOURS PRN
Status: DISCONTINUED | OUTPATIENT
Start: 2023-11-09 | End: 2023-11-12

## 2023-11-09 RX ORDER — SENNOSIDES 8.6 MG
17.2 TABLET ORAL NIGHTLY PRN
Status: DISCONTINUED | OUTPATIENT
Start: 2023-11-09 | End: 2023-11-12

## 2023-11-09 RX ORDER — XYLITOL/YERBA SANTA
AEROSOL, SPRAY WITH PUMP (ML) MUCOUS MEMBRANE AS NEEDED
Status: DISCONTINUED | OUTPATIENT
Start: 2023-11-09 | End: 2023-11-12

## 2023-11-09 RX ORDER — ONDANSETRON 2 MG/ML
4 INJECTION INTRAMUSCULAR; INTRAVENOUS EVERY 6 HOURS PRN
Status: DISCONTINUED | OUTPATIENT
Start: 2023-11-09 | End: 2023-11-12

## 2023-11-09 RX ORDER — BISACODYL 10 MG
10 SUPPOSITORY, RECTAL RECTAL
Status: DISCONTINUED | OUTPATIENT
Start: 2023-11-09 | End: 2023-11-12

## 2023-11-09 RX ORDER — ENOXAPARIN SODIUM 100 MG/ML
40 INJECTION SUBCUTANEOUS DAILY
Status: DISCONTINUED | OUTPATIENT
Start: 2023-11-09 | End: 2023-11-12

## 2023-11-09 RX ORDER — SODIUM CHLORIDE 9 MG/ML
INJECTION, SOLUTION INTRAVENOUS CONTINUOUS
Status: DISCONTINUED | OUTPATIENT
Start: 2023-11-09 | End: 2023-11-12

## 2023-11-09 RX ORDER — LISINOPRIL 5 MG/1
5 TABLET ORAL DAILY
Status: DISCONTINUED | OUTPATIENT
Start: 2023-11-09 | End: 2023-11-12

## 2023-11-09 RX ORDER — POLYETHYLENE GLYCOL 3350 17 G/17G
17 POWDER, FOR SOLUTION ORAL DAILY PRN
Status: DISCONTINUED | OUTPATIENT
Start: 2023-11-09 | End: 2023-11-12

## 2023-11-09 RX ORDER — ENEMA 19; 7 G/133ML; G/133ML
1 ENEMA RECTAL ONCE AS NEEDED
Status: DISCONTINUED | OUTPATIENT
Start: 2023-11-09 | End: 2023-11-12

## 2023-11-09 RX ORDER — ACETAMINOPHEN 500 MG
500 TABLET ORAL EVERY 4 HOURS PRN
Status: DISCONTINUED | OUTPATIENT
Start: 2023-11-09 | End: 2023-11-12

## 2023-11-09 NOTE — ED INITIAL ASSESSMENT (HPI)
Pt with recent Hx of  breast CA  and  Port a cath placement to ED with onset of chills and pain to post op site. Pt sent to ED to r/o post op infection.

## 2023-11-09 NOTE — ED QUICK NOTES
Orders for admission, patient is aware of plan and ready to go upstairs. Any questions, please call ED CHILANGO Banks  at extension [phone]. Type of COVID test sent:covid/flu/rsv  COVID Suspicion level: Low    Titratable drug(s) infusing:  Rate:    LOC at time of transport:x4    Other pertinent information: Breast CA on chemo left chest port a cath inserted one week ago with increased pain and tenderness to insertion site. Pt reports chills/shaking today. No documented fever in ED. Last chemo 5 days ago neutropenic on labs today. Port accessed briefly only to obtain one set of blood cultures. Second set of cultures obtained from PIV. Pt very pleasant, awake, and alert.

## 2023-11-10 LAB
ALBUMIN SERPL-MCNC: 3.7 G/DL (ref 3.2–4.8)
ALBUMIN/GLOB SERPL: 1.6 {RATIO} (ref 1–2)
ALP LIVER SERPL-CCNC: 50 U/L
ALT SERPL-CCNC: <7 U/L
ANION GAP SERPL CALC-SCNC: 4 MMOL/L (ref 0–18)
AST SERPL-CCNC: <8 U/L (ref ?–34)
BASOPHILS # BLD AUTO: 0.03 X10(3) UL (ref 0–0.2)
BASOPHILS NFR BLD AUTO: 2.9 %
BILIRUB SERPL-MCNC: 0.5 MG/DL (ref 0.3–1.2)
BUN BLD-MCNC: 9 MG/DL (ref 9–23)
BUN/CREAT SERPL: 11.4 (ref 10–20)
CALCIUM BLD-MCNC: 9.1 MG/DL (ref 8.7–10.4)
CHLORIDE SERPL-SCNC: 108 MMOL/L (ref 98–112)
CO2 SERPL-SCNC: 26 MMOL/L (ref 21–32)
CREAT BLD-MCNC: 0.79 MG/DL
DEPRECATED RDW RBC AUTO: 38.5 FL (ref 35.1–46.3)
EGFRCR SERPLBLD CKD-EPI 2021: 97 ML/MIN/1.73M2 (ref 60–?)
EOSINOPHIL # BLD AUTO: 0.23 X10(3) UL (ref 0–0.7)
EOSINOPHIL NFR BLD AUTO: 21.9 %
ERYTHROCYTE [DISTWIDTH] IN BLOOD BY AUTOMATED COUNT: 11.5 % (ref 11–15)
GLOBULIN PLAS-MCNC: 2.3 G/DL (ref 2.8–4.4)
GLUCOSE BLD-MCNC: 96 MG/DL (ref 70–99)
HCT VFR BLD AUTO: 29.9 %
HGB BLD-MCNC: 9.5 G/DL
IMM GRANULOCYTES # BLD AUTO: 0 X10(3) UL (ref 0–1)
IMM GRANULOCYTES NFR BLD: 0 %
LYMPHOCYTES # BLD AUTO: 0.63 X10(3) UL (ref 1–4)
LYMPHOCYTES NFR BLD AUTO: 60 %
MAGNESIUM SERPL-MCNC: 1.7 MG/DL (ref 1.6–2.6)
MCH RBC QN AUTO: 29.1 PG (ref 26–34)
MCHC RBC AUTO-ENTMCNC: 31.8 G/DL (ref 31–37)
MCV RBC AUTO: 91.4 FL
MONOCYTES # BLD AUTO: 0.08 X10(3) UL (ref 0.1–1)
MONOCYTES NFR BLD AUTO: 7.6 %
NEUTROPHILS # BLD AUTO: 0.08 X10 (3) UL (ref 1.5–7.7)
NEUTROPHILS # BLD AUTO: 0.08 X10(3) UL (ref 1.5–7.7)
NEUTROPHILS NFR BLD AUTO: 7.6 %
OSMOLALITY SERPL CALC.SUM OF ELEC: 285 MOSM/KG (ref 275–295)
PLATELET # BLD AUTO: 120 10(3)UL (ref 150–450)
POTASSIUM SERPL-SCNC: 5.1 MMOL/L (ref 3.5–5.1)
PROCALCITONIN SERPL-MCNC: 0.12 NG/ML (ref ?–0.16)
PROT SERPL-MCNC: 6 G/DL (ref 5.7–8.2)
RBC # BLD AUTO: 3.27 X10(6)UL
SODIUM SERPL-SCNC: 138 MMOL/L (ref 136–145)
WBC # BLD AUTO: 1.1 X10(3) UL (ref 4–11)

## 2023-11-10 PROCEDURE — 99233 SBSQ HOSP IP/OBS HIGH 50: CPT | Performed by: INTERNAL MEDICINE

## 2023-11-10 PROCEDURE — 99233 SBSQ HOSP IP/OBS HIGH 50: CPT | Performed by: HOSPITALIST

## 2023-11-10 NOTE — CDS QUERY
Clinical Significance - Hematology Results  CLINICAL DOCUMENTATION CLARIFICATION FORM  Dear Doctor Kristal Belle:  Clinical information (provided below) includes documentation of hematology results that are less than the normal range. PLEASE (X) ALL DIAGNOSES THAT APPLY. SELECTION BY PROVIDER ONLY  ( x ) Antineoplastic Chemotherapy Induced Pancytopenia  (  ) Other Drug-Induced Pancytopenia  (  ) Other Pancytopenia  (  ) Other (please specify):     Clinical Indicators:     Decreased WBC:     11/8 WBC 1.4  Decreased RBC/ Decreased H/H   11/8 RBC 3.57/ Hct 31.7/ Hgb 10.3  Decreased PLT   11/8   H&P- Pancytopenia, likely chemo induced    Risk Factors:   Right breast cancer on chemo    Treatments:  Oncology consulted  Monitor labs- daily labs              If you have any questions, please contact Clinical :Luz Maria Briggs RN CDS  at [email or web address]  Thank You!   THIS FORM IS A PERMANENT PART OF THE MEDICAL RECORD

## 2023-11-10 NOTE — PROGRESS NOTES
Spiritual Care Visit Note    Visited With: (P) Patient and family together    Spiritual Care Taxonomy:    Intended Effects: (P) Demonstrate caring and concern    Methods: (P) Offer support    Interventions: (P) Active listening; Ask guided questions; Acknowledge current situation    Narrative  Writer visited with patient and  bedside. Patient and  shared about recent diagnosis and how they're coping. [facility]     [facility] [phone]

## 2023-11-10 NOTE — PLAN OF CARE
Patient alert and oriented x 4. Patient denied the need for pain medication. Dressing is gauze and tape over port. Up with SBA. Voiding freely. Patients diet is general. Lovenox for VTE prophylaxis. Refusing SCDs and Teds. Vital signs monitored. Cough and deep breathe. Bed in lowest position, call light within reach, and non skid socks in place for fall precautions. All needs within reach.  at bedside. Rounding done by nursing staff. Plan for discharge is pending clearance. Problem: Patient Centered Care  Goal: Patient preferences are identified and integrated in the patient's plan of care  Description: Interventions:  - What would you like us to know as we care for you?  From home with    - Provide timely, complete, and accurate information to patient/family  - Incorporate patient and family knowledge, values, beliefs, and cultural backgrounds into the planning and delivery of care  - Encourage patient/family to participate in care and decision-making at the level they choose  - Honor patient and family perspectives and choices  Outcome: Progressing     Problem: Patient/Family Goals  Goal: Patient/Family Long Term Goal  Description: Patient's Long Term Goal: To be discharged    Interventions:  - Get medical clearance   - See additional Care Plan goals for specific interventions  Outcome: Progressing  Goal: Patient/Family Short Term Goal  Description: Patient's Short Term Goal: Manage pain    Interventions:   - Monitor and assess pain   - See additional Care Plan goals for specific interventions  Outcome: Progressing     Problem: PAIN - ADULT  Goal: Verbalizes/displays adequate comfort level or patient's stated pain goal  Description: INTERVENTIONS:  - Encourage pt to monitor pain and request assistance  - Assess pain using appropriate pain scale  - Administer analgesics based on type and severity of pain and evaluate response  - Implement non-pharmacological measures as appropriate and evaluate response  - Consider cultural and social influences on pain and pain management  - Manage/alleviate anxiety  - Utilize distraction and/or relaxation techniques  - Monitor for opioid side effects  - Notify MD/LIP if interventions unsuccessful or patient reports new pain  - Anticipate increased pain with activity and pre-medicate as appropriate  Outcome: Progressing     Problem: RISK FOR INFECTION - ADULT  Goal: Absence of fever/infection during anticipated neutropenic period  Description: INTERVENTIONS  - Monitor WBC  - Administer growth factors as ordered  - Implement neutropenic guidelines  Outcome: Progressing     Problem: SAFETY ADULT - FALL  Goal: Free from fall injury  Description: INTERVENTIONS:  - Assess pt frequently for physical needs  - Identify cognitive and physical deficits and behaviors that affect risk of falls.   - [place] fall precautions as indicated by assessment.  - Educate pt/family on patient safety including physical limitations  - Instruct pt to call for assistance with activity based on assessment  - Modify environment to reduce risk of injury  - Provide assistive devices as appropriate  - Consider OT/PT consult to assist with strengthening/mobility  - Encourage toileting schedule  Outcome: Progressing     Problem: DISCHARGE PLANNING  Goal: Discharge to home or other facility with appropriate resources  Description: INTERVENTIONS:  - Identify barriers to discharge w/pt and caregiver  - Include patient/family/discharge partner in discharge planning  - Arrange for needed discharge resources and transportation as appropriate  - Identify discharge learning needs (meds, wound care, etc)  - Arrange for interpreters to assist at discharge as needed  - Consider post-discharge preferences of patient/family/discharge partner  - Complete POLST form as appropriate  - Assess patient's ability to be responsible for managing their own health  - Refer to Case Management Department for coordinating discharge planning if the patient needs post-hospital services based on physician/LIP order or complex needs related to functional status, cognitive ability or social support system  Outcome: Progressing

## 2023-11-10 NOTE — PLAN OF CARE
Problem: Patient Centered Care  Goal: Patient preferences are identified and integrated in the patient's plan of care  Description: Interventions:  - What would you like us to know as we care for you?  From home with    - Provide timely, complete, and accurate information to patient/family  - Incorporate patient and family knowledge, values, beliefs, and cultural backgrounds into the planning and delivery of care  - Encourage patient/family to participate in care and decision-making at the level they choose  - Honor patient and family perspectives and choices  Outcome: Progressing

## 2023-11-10 NOTE — PLAN OF CARE
Patient alert and oriented x 4. Patient denied pain. Up independently. Voiding freely. Patients diet is general. SCDs and lovenox for VTE prophylaxis. Vital signs monitored. Cough and deep breathe. Bed in lowest position, call light within reach, and non skid socks in place for fall precautions. All needs within reach. [place] at bedside. Rounding done by nursing staff. Plan for discharge is pending medical clearance. Problem: Patient Centered Care  Goal: Patient preferences are identified and integrated in the patient's plan of care  Description: Interventions:  - What would you like us to know as we care for you?  From home with    - Provide timely, complete, and accurate information to patient/family  - Incorporate patient and family knowledge, values, beliefs, and cultural backgrounds into the planning and delivery of care  - Encourage patient/family to participate in care and decision-making at the level they choose  - Honor patient and family perspectives and choices  Outcome: Progressing     Problem: Patient/Family Goals  Goal: Patient/Family Long Term Goal  Description: Patient's Long Term Goal: To be discharged    Interventions:  - Get medical clearance   - See additional Care Plan goals for specific interventions  Outcome: Progressing  Goal: Patient/Family Short Term Goal  Description: Patient's Short Term Goal: Manage pain    Interventions:   - Monitor and assess pain   - See additional Care Plan goals for specific interventions  Outcome: Progressing     Problem: PAIN - ADULT  Goal: Verbalizes/displays adequate comfort level or patient's stated pain goal  Description: INTERVENTIONS:  - Encourage pt to monitor pain and request assistance  - Assess pain using appropriate pain scale  - Administer analgesics based on type and severity of pain and evaluate response  - Implement non-pharmacological measures as appropriate and evaluate response  - Consider cultural and social influences on pain and pain management  - Manage/alleviate anxiety  - Utilize distraction and/or relaxation techniques  - Monitor for opioid side effects  - Notify MD/LIP if interventions unsuccessful or patient reports new pain  - Anticipate increased pain with activity and pre-medicate as appropriate  Outcome: Progressing     Problem: RISK FOR INFECTION - ADULT  Goal: Absence of fever/infection during anticipated neutropenic period  Description: INTERVENTIONS  - Monitor WBC  - Administer growth factors as ordered  - Implement neutropenic guidelines  Outcome: Progressing     Problem: SAFETY ADULT - FALL  Goal: Free from fall injury  Description: INTERVENTIONS:  - Assess pt frequently for physical needs  - Identify cognitive and physical deficits and behaviors that affect risk of falls.   - [place] fall precautions as indicated by assessment.  - Educate pt/family on patient safety including physical limitations  - Instruct pt to call for assistance with activity based on assessment  - Modify environment to reduce risk of injury  - Provide assistive devices as appropriate  - Consider OT/PT consult to assist with strengthening/mobility  - Encourage toileting schedule  Outcome: Progressing     Problem: DISCHARGE PLANNING  Goal: Discharge to home or other facility with appropriate resources  Description: INTERVENTIONS:  - Identify barriers to discharge w/pt and caregiver  - Include patient/family/discharge partner in discharge planning  - Arrange for needed discharge resources and transportation as appropriate  - Identify discharge learning needs (meds, wound care, etc)  - Arrange for interpreters to assist at discharge as needed  - Consider post-discharge preferences of patient/family/discharge partner  - Complete POLST form as appropriate  - Assess patient's ability to be responsible for managing their own health  - Refer to Case Management Department for coordinating discharge planning if the patient needs post-hospital services based on physician/LIP order or complex needs related to functional status, cognitive ability or social support system  Outcome: Progressing

## 2023-11-11 LAB
ANION GAP SERPL CALC-SCNC: 6 MMOL/L (ref 0–18)
BASOPHILS # BLD AUTO: 0.04 X10(3) UL (ref 0–0.2)
BASOPHILS NFR BLD AUTO: 2 %
BUN BLD-MCNC: 10 MG/DL (ref 9–23)
BUN/CREAT SERPL: 12.3 (ref 10–20)
CALCIUM BLD-MCNC: 8.8 MG/DL (ref 8.7–10.4)
CHLORIDE SERPL-SCNC: 107 MMOL/L (ref 98–112)
CO2 SERPL-SCNC: 25 MMOL/L (ref 21–32)
CREAT BLD-MCNC: 0.81 MG/DL
DEPRECATED RDW RBC AUTO: 36.7 FL (ref 35.1–46.3)
EGFRCR SERPLBLD CKD-EPI 2021: 94 ML/MIN/1.73M2 (ref 60–?)
EOSINOPHIL # BLD AUTO: 0.21 X10(3) UL (ref 0–0.7)
EOSINOPHIL NFR BLD AUTO: 10.6 %
ERYTHROCYTE [DISTWIDTH] IN BLOOD BY AUTOMATED COUNT: 11.4 % (ref 11–15)
GLUCOSE BLD-MCNC: 100 MG/DL (ref 70–99)
HCT VFR BLD AUTO: 28 %
HGB BLD-MCNC: 9 G/DL
IMM GRANULOCYTES # BLD AUTO: 0.01 X10(3) UL (ref 0–1)
IMM GRANULOCYTES NFR BLD: 0.5 %
LYMPHOCYTES # BLD AUTO: 0.92 X10(3) UL (ref 1–4)
LYMPHOCYTES NFR BLD AUTO: 46.2 %
MAGNESIUM SERPL-MCNC: 1.7 MG/DL (ref 1.6–2.6)
MCH RBC QN AUTO: 28.6 PG (ref 26–34)
MCHC RBC AUTO-ENTMCNC: 32.1 G/DL (ref 31–37)
MCV RBC AUTO: 88.9 FL
MONOCYTES # BLD AUTO: 0.22 X10(3) UL (ref 0.1–1)
MONOCYTES NFR BLD AUTO: 11.1 %
NEUTROPHILS # BLD AUTO: 0.59 X10 (3) UL (ref 1.5–7.7)
NEUTROPHILS # BLD AUTO: 0.59 X10(3) UL (ref 1.5–7.7)
NEUTROPHILS NFR BLD AUTO: 29.6 %
OSMOLALITY SERPL CALC.SUM OF ELEC: 285 MOSM/KG (ref 275–295)
PHOSPHATE SERPL-MCNC: 3.4 MG/DL (ref 2.4–5.1)
PLATELET # BLD AUTO: 131 10(3)UL (ref 150–450)
POTASSIUM SERPL-SCNC: 4.5 MMOL/L (ref 3.5–5.1)
RBC # BLD AUTO: 3.15 X10(6)UL
SODIUM SERPL-SCNC: 138 MMOL/L (ref 136–145)
VANCOMYCIN TROUGH SERPL-MCNC: 11.5 UG/ML (ref 10–20)
WBC # BLD AUTO: 2 X10(3) UL (ref 4–11)

## 2023-11-11 PROCEDURE — 99239 HOSP IP/OBS DSCHRG MGMT >30: CPT | Performed by: HOSPITALIST

## 2023-11-11 PROCEDURE — 99233 SBSQ HOSP IP/OBS HIGH 50: CPT | Performed by: STUDENT IN AN ORGANIZED HEALTH CARE EDUCATION/TRAINING PROGRAM

## 2023-11-11 RX ORDER — CEFUROXIME AXETIL 500 MG/1
500 TABLET ORAL 2 TIMES DAILY
Qty: 10 TABLET | Refills: 0 | Status: SHIPPED | OUTPATIENT
Start: 2023-11-11 | End: 2023-11-12

## 2023-11-11 RX ORDER — DOXYCYCLINE HYCLATE 100 MG/1
100 CAPSULE ORAL 2 TIMES DAILY
Qty: 10 CAPSULE | Refills: 0 | Status: SHIPPED | OUTPATIENT
Start: 2023-11-11 | End: 2023-11-12

## 2023-11-11 NOTE — DISCHARGE SUMMARY
[facility] - [facility]     Discharge Summary    Lior Varna Patient Status:  Inpatient    1985 MRN [id number]   Location [facility] 4W/SW/SE Attending Asha Martinez MD   [facility] Day # 2 PCP Lorenza Blakely DO     Date of Admission: 2023    Date of Discharge: 2023    Admitting Diagnosis: Neutropenic fever  [D70.9, R50.81]    Discharge Diagnosis:   Patient Active Problem List   Diagnosis    Solitary kidney, congenital    Congenital absence of ovary    Ectopic pregnancy    Essential hypertension    History of ectopic pregnancy    Malignant neoplasm of upper-outer quadrant of right breast in female, estrogen receptor positive     Family history of pancreatic cancer    Family history of malignant neoplasm of prostate    Encounter for central line care    Thrombocytopenia ([address]    Neutropenic fever     Anemia associated with chemotherapy       Reason for Admission:     Neutropenic Fever. Physical Exam:     General: No acute distress. HEENT: Normocephalic, atraumatic. Neck: Supple. Respiratory: Lungs clear to auscultation bilaterally. Normal effort  Cardiovascular: S1, S2 regular. Abdomen: Soft, nontender, nondistended. Neurologic: Alert, oriented. No gross focal deficits. Musculoskeletal: No joints swellings or effusions. Extremities: No edema  Integument: No rashes. Port site without overt erythema. Psychiatric: Cooperative, appropriate    Hospital Course:     Chills/Neutropenia  - Observation  - Cefepime. Vanc until MRSA ruled out  - follow up blood culture, UA, CXR  - Oncology to follow  - Mild tachycardia with neutropenia but patient does not appear septic. Check lactic acid     PORT site tenderness  -Antibiotics as above     Pancytopenia, likely chemo induced  - S/p Pelfigrastim     Diarrhea: Intermittent.   May be chemo related  -Monitor for now     Right breast Ca on chemo  - Oncology following     Hypertension   - Resume home meds    History of Present Illness:     Disposition: Home or Self Care    Discharge Condition: stable        Discharge Medications:   Current Discharge Medication List        START taking these medications    Details   cefuroxime 500 MG Oral Tab Take 1 tablet (500 mg total) by mouth 2 (two) times daily. Qty: 10 tablet, Refills: 0      doxycycline 100 MG Oral Cap Take 1 capsule (100 mg total) by mouth 2 (two) times daily for 5 days. Qty: 10 capsule, Refills: 0           CONTINUE these medications which have NOT CHANGED    Details   prochlorperazine (COMPAZINE) 10 mg tablet Take 1 tablet (10 mg total) by mouth every 8 (eight) hours as needed for Nausea. Qty: 30 tablet, Refills: 3    Associated Diagnoses: Malignant neoplasm of upper-outer quadrant of right breast in female, estrogen receptor positive ; Encounter for central line care      ondansetron (ZOFRAN) 8 MG tablet Take 1 tablet (8 mg total) by mouth every 8 (eight) hours as needed for Nausea. Qty: 30 tablet, Refills: 3    Associated Diagnoses: Malignant neoplasm of upper-outer quadrant of right breast in female, estrogen receptor positive ; Encounter for central line care      lisinopril 5 MG Oral Tab Take 1 tablet (5 mg total) by mouth daily. Qty: 30 tablet, Refills: 5             Total dc time > 30 min    Indiana Hernandez MD      Hospital Discharge Diagnoses:  neutropenic fever    Lace+ Score: 54  59-90 High Risk  29-58 Medium Risk  0-28   Low Risk. TCM Follow-Up Recommendation:  LACE 29-58:  Moderate Risk of readmission after discharge from the hospital. Detail Level: Zone Continue Regimen: BID x 2 more weeks

## 2023-11-11 NOTE — PLAN OF CARE
Patient alert and oriented x 4. Patient denied pain and nausea. Up independently. Voiding freely. PRN sitz bath. Patients diet is general. SCDs for VTE prophylaxis. Pt refusing lovenox shot. Vital signs monitored. Cough and deep breathe. Bed in lowest position, call light within reach, and non skid socks in place for fall precautions. All needs within reach.  at bedside. Rounding done by nursing staff. Plan is to continue with abx. Problem: Patient Centered Care  Goal: Patient preferences are identified and integrated in the patient's plan of care  Description: Interventions:  - What would you like us to know as we care for you?  From home with    - Provide timely, complete, and accurate information to patient/family  - Incorporate patient and family knowledge, values, beliefs, and cultural backgrounds into the planning and delivery of care  - Encourage patient/family to participate in care and decision-making at the level they choose  - Honor patient and family perspectives and choices  Outcome: Progressing     Problem: Patient/Family Goals  Goal: Patient/Family Long Term Goal  Description: Patient's Long Term Goal: To be discharged    Interventions:  - Get medical clearance   - See additional Care Plan goals for specific interventions  Outcome: Progressing  Goal: Patient/Family Short Term Goal  Description: Patient's Short Term Goal: Manage pain    Interventions:   - Monitor and assess pain   - See additional Care Plan goals for specific interventions  Outcome: Progressing     Problem: PAIN - ADULT  Goal: Verbalizes/displays adequate comfort level or patient's stated pain goal  Description: INTERVENTIONS:  - Encourage pt to monitor pain and request assistance  - Assess pain using appropriate pain scale  - Administer analgesics based on type and severity of pain and evaluate response  - Implement non-pharmacological measures as appropriate and evaluate response  - Consider cultural and social influences on pain and pain management  - Manage/alleviate anxiety  - Utilize distraction and/or relaxation techniques  - Monitor for opioid side effects  - Notify MD/LIP if interventions unsuccessful or patient reports new pain  - Anticipate increased pain with activity and pre-medicate as appropriate  Outcome: Progressing     Problem: RISK FOR INFECTION - ADULT  Goal: Absence of fever/infection during anticipated neutropenic period  Description: INTERVENTIONS  - Monitor WBC  - Administer growth factors as ordered  - Implement neutropenic guidelines  Outcome: Progressing     Problem: SAFETY ADULT - FALL  Goal: Free from fall injury  Description: INTERVENTIONS:  - Assess pt frequently for physical needs  - Identify cognitive and physical deficits and behaviors that affect risk of falls.   - [place] fall precautions as indicated by assessment.  - Educate pt/family on patient safety including physical limitations  - Instruct pt to call for assistance with activity based on assessment  - Modify environment to reduce risk of injury  - Provide assistive devices as appropriate  - Consider OT/PT consult to assist with strengthening/mobility  - Encourage toileting schedule  Outcome: Progressing     Problem: DISCHARGE PLANNING  Goal: Discharge to home or other facility with appropriate resources  Description: INTERVENTIONS:  - Identify barriers to discharge w/pt and caregiver  - Include patient/family/discharge partner in discharge planning  - Arrange for needed discharge resources and transportation as appropriate  - Identify discharge learning needs (meds, wound care, etc)  - Arrange for interpreters to assist at discharge as needed  - Consider post-discharge preferences of patient/family/discharge partner  - Complete POLST form as appropriate  - Assess patient's ability to be responsible for managing their own health  - Refer to Case Management Department for coordinating discharge planning if the patient needs post-hospital services based on physician/LIP order or complex needs related to functional status, cognitive ability or social support system  Outcome: Progressing

## 2023-11-11 NOTE — PROGRESS NOTES
Assumed care of patient at 1500. Patient resting comfortably in chair, denies any pain. IV fluids and antibiotics infusing per MD order. Continuing plan of care and frequent rounds in place. Call light within reach.

## 2023-11-12 VITALS
DIASTOLIC BLOOD PRESSURE: 66 MMHG | OXYGEN SATURATION: 100 % | SYSTOLIC BLOOD PRESSURE: 111 MMHG | TEMPERATURE: 98 F | HEART RATE: 86 BPM | RESPIRATION RATE: 18 BRPM | WEIGHT: 153.5 LBS | BODY MASS INDEX: 24.67 KG/M2 | HEIGHT: 66 IN

## 2023-11-12 LAB
ANION GAP SERPL CALC-SCNC: 6 MMOL/L (ref 0–18)
BASOPHILS # BLD AUTO: 0.04 X10(3) UL (ref 0–0.2)
BASOPHILS NFR BLD AUTO: 1.2 %
BUN BLD-MCNC: 9 MG/DL (ref 9–23)
BUN/CREAT SERPL: 11.8 (ref 10–20)
CALCIUM BLD-MCNC: 9.1 MG/DL (ref 8.7–10.4)
CHLORIDE SERPL-SCNC: 110 MMOL/L (ref 98–112)
CO2 SERPL-SCNC: 26 MMOL/L (ref 21–32)
CREAT BLD-MCNC: 0.76 MG/DL
DEPRECATED RDW RBC AUTO: 36.8 FL (ref 35.1–46.3)
EGFRCR SERPLBLD CKD-EPI 2021: 102 ML/MIN/1.73M2 (ref 60–?)
EOSINOPHIL # BLD AUTO: 0.13 X10(3) UL (ref 0–0.7)
EOSINOPHIL NFR BLD AUTO: 4 %
ERYTHROCYTE [DISTWIDTH] IN BLOOD BY AUTOMATED COUNT: 11.3 % (ref 11–15)
GLUCOSE BLD-MCNC: 96 MG/DL (ref 70–99)
HCT VFR BLD AUTO: 28.4 %
HGB BLD-MCNC: 9.3 G/DL
IMM GRANULOCYTES # BLD AUTO: 0.05 X10(3) UL (ref 0–1)
IMM GRANULOCYTES NFR BLD: 1.5 %
LYMPHOCYTES # BLD AUTO: 1 X10(3) UL (ref 1–4)
LYMPHOCYTES NFR BLD AUTO: 30.9 %
MAGNESIUM SERPL-MCNC: 1.6 MG/DL (ref 1.6–2.6)
MCH RBC QN AUTO: 29.2 PG (ref 26–34)
MCHC RBC AUTO-ENTMCNC: 32.7 G/DL (ref 31–37)
MCV RBC AUTO: 89 FL
MONOCYTES # BLD AUTO: 0.34 X10(3) UL (ref 0.1–1)
MONOCYTES NFR BLD AUTO: 10.5 %
NEUTROPHILS # BLD AUTO: 1.68 X10 (3) UL (ref 1.5–7.7)
NEUTROPHILS # BLD AUTO: 1.68 X10(3) UL (ref 1.5–7.7)
NEUTROPHILS NFR BLD AUTO: 51.9 %
OSMOLALITY SERPL CALC.SUM OF ELEC: 293 MOSM/KG (ref 275–295)
PHOSPHATE SERPL-MCNC: 3.3 MG/DL (ref 2.4–5.1)
PLATELET # BLD AUTO: 167 10(3)UL (ref 150–450)
POTASSIUM SERPL-SCNC: 4.4 MMOL/L (ref 3.5–5.1)
RBC # BLD AUTO: 3.19 X10(6)UL
SODIUM SERPL-SCNC: 142 MMOL/L (ref 136–145)
WBC # BLD AUTO: 3.2 X10(3) UL (ref 4–11)

## 2023-11-12 PROCEDURE — 99232 SBSQ HOSP IP/OBS MODERATE 35: CPT | Performed by: STUDENT IN AN ORGANIZED HEALTH CARE EDUCATION/TRAINING PROGRAM

## 2023-11-12 RX ORDER — MAGNESIUM OXIDE 400 MG/1
400 TABLET ORAL ONCE
Status: COMPLETED | OUTPATIENT
Start: 2023-11-12 | End: 2023-11-12

## 2023-11-12 RX ORDER — CEFUROXIME AXETIL 500 MG/1
500 TABLET ORAL 2 TIMES DAILY
Status: SHIPPED | COMMUNITY
Start: 2023-11-12

## 2023-11-12 NOTE — PLAN OF CARE
Pt is A&Ox4. RA. Up independently. Neutropenic precautions. Denies pain. Voiding freely. SCDs for DVT prophylaxis. IV ABX. Port A cath in place, infusing fluids. Labs drawn. Safety plan in place with frequent rounding. Plan for home when medically cleared. Problem: Patient Centered Care  Goal: Patient preferences are identified and integrated in the patient's plan of care  Description: Interventions:  - What would you like us to know as we care for you?  From home with    - Provide timely, complete, and accurate information to patient/family  - Incorporate patient and family knowledge, values, beliefs, and cultural backgrounds into the planning and delivery of care  - Encourage patient/family to participate in care and decision-making at the level they choose  - Honor patient and family perspectives and choices  Outcome: Progressing     Problem: Patient/Family Goals  Goal: Patient/Family Short Term Goal  Description: Patient's Short Term Goal: Manage pain    Interventions:   - Monitor and assess pain   - See additional Care Plan goals for specific interventions  Outcome: Progressing     Problem: PAIN - ADULT  Goal: Verbalizes/displays adequate comfort level or patient's stated pain goal  Description: INTERVENTIONS:  - Encourage pt to monitor pain and request assistance  - Assess pain using appropriate pain scale  - Administer analgesics based on type and severity of pain and evaluate response  - Implement non-pharmacological measures as appropriate and evaluate response  - Consider cultural and social influences on pain and pain management  - Manage/alleviate anxiety  - Utilize distraction and/or relaxation techniques  - Monitor for opioid side effects  - Notify MD/LIP if interventions unsuccessful or patient reports new pain  - Anticipate increased pain with activity and pre-medicate as appropriate  Outcome: Progressing     Problem: RISK FOR INFECTION - ADULT  Goal: Absence of fever/infection during anticipated neutropenic period  Description: INTERVENTIONS  - Monitor WBC  - Administer growth factors as ordered  - Implement neutropenic guidelines  Outcome: Progressing     Problem: SAFETY ADULT - FALL  Goal: Free from fall injury  Description: INTERVENTIONS:  - Assess pt frequently for physical needs  - Identify cognitive and physical deficits and behaviors that affect risk of falls.   - [place] fall precautions as indicated by assessment.  - Educate pt/family on patient safety including physical limitations  - Instruct pt to call for assistance with activity based on assessment  - Modify environment to reduce risk of injury  - Provide assistive devices as appropriate  - Consider OT/PT consult to assist with strengthening/mobility  - Encourage toileting schedule  Outcome: Progressing     Problem: DISCHARGE PLANNING  Goal: Discharge to home or other facility with appropriate resources  Description: INTERVENTIONS:  - Identify barriers to discharge w/pt and caregiver  - Include patient/family/discharge partner in discharge planning  - Arrange for needed discharge resources and transportation as appropriate  - Identify discharge learning needs (meds, wound care, etc)  - Arrange for interpreters to assist at discharge as needed  - Consider post-discharge preferences of patient/family/discharge partner  - Complete POLST form as appropriate  - Assess patient's ability to be responsible for managing their own health  - Refer to Case Management Department for coordinating discharge planning if the patient needs post-hospital services based on physician/LIP order or complex needs related to functional status, cognitive ability or social support system  Outcome: Progressing

## 2023-11-12 NOTE — PLAN OF CARE
Pt is alert and oriented x4. On room air. Vital signs stable. Ambulating independently. Up in the chair. On general diet, tolerating well. Denies pain. WBC improving. Continent of both bowel and bladder. Cleared for a discharge. AVS explained. Pt states understanding. Port deaccessed. No sign and symptoms of an infection. Heparin flush given. Pt to be discharged by oncoming nurse     Problem: Patient Centered Care  Goal: Patient preferences are identified and integrated in the patient's plan of care  Description: Interventions:  - What would you like us to know as we care for you?  From home with    - Provide timely, complete, and accurate information to patient/family  - Incorporate patient and family knowledge, values, beliefs, and cultural backgrounds into the planning and delivery of care  - Encourage patient/family to participate in care and decision-making at the level they choose  - Honor patient and family perspectives and choices  Outcome: Progressing     Problem: Patient/Family Goals  Goal: Patient/Family Long Term Goal  Description: Patient's Long Term Goal: To be discharged    Interventions:  - Get medical clearance   - See additional Care Plan goals for specific interventions  Outcome: Progressing  Goal: Patient/Family Short Term Goal  Description: Patient's Short Term Goal: Manage pain    Interventions:   - Monitor and assess pain   - See additional Care Plan goals for specific interventions  Outcome: Progressing     Problem: PAIN - ADULT  Goal: Verbalizes/displays adequate comfort level or patient's stated pain goal  Description: INTERVENTIONS:  - Encourage pt to monitor pain and request assistance  - Assess pain using appropriate pain scale  - Administer analgesics based on type and severity of pain and evaluate response  - Implement non-pharmacological measures as appropriate and evaluate response  - Consider cultural and social influences on pain and pain management  - Manage/alleviate anxiety  - Utilize distraction and/or relaxation techniques  - Monitor for opioid side effects  - Notify MD/LIP if interventions unsuccessful or patient reports new pain  - Anticipate increased pain with activity and pre-medicate as appropriate  Outcome: Progressing     Problem: RISK FOR INFECTION - ADULT  Goal: Absence of fever/infection during anticipated neutropenic period  Description: INTERVENTIONS  - Monitor WBC  - Administer growth factors as ordered  - Implement neutropenic guidelines  Outcome: Progressing     Problem: SAFETY ADULT - FALL  Goal: Free from fall injury  Description: INTERVENTIONS:  - Assess pt frequently for physical needs  - Identify cognitive and physical deficits and behaviors that affect risk of falls.   - [place] fall precautions as indicated by assessment.  - Educate pt/family on patient safety including physical limitations  - Instruct pt to call for assistance with activity based on assessment  - Modify environment to reduce risk of injury  - Provide assistive devices as appropriate  - Consider OT/PT consult to assist with strengthening/mobility  - Encourage toileting schedule  Outcome: Progressing     Problem: DISCHARGE PLANNING  Goal: Discharge to home or other facility with appropriate resources  Description: INTERVENTIONS:  - Identify barriers to discharge w/pt and caregiver  - Include patient/family/discharge partner in discharge planning  - Arrange for needed discharge resources and transportation as appropriate  - Identify discharge learning needs (meds, wound care, etc)  - Arrange for interpreters to assist at discharge as needed  - Consider post-discharge preferences of patient/family/discharge partner  - Complete POLST form as appropriate  - Assess patient's ability to be responsible for managing their own health  - Refer to Case Management Department for coordinating discharge planning if the patient needs post-hospital services based on physician/LIP order or complex needs related to functional status, cognitive ability or social support system  Outcome: Progressing

## 2023-11-13 ENCOUNTER — PATIENT OUTREACH (OUTPATIENT)
Dept: CASE MANAGEMENT | Age: 38
End: 2023-11-13

## 2023-11-16 ENCOUNTER — OFFICE VISIT (OUTPATIENT)
Dept: HEMATOLOGY/ONCOLOGY | Facility: HOSPITAL | Age: 38
End: 2023-11-16
Attending: INTERNAL MEDICINE
Payer: COMMERCIAL

## 2023-11-16 VITALS
DIASTOLIC BLOOD PRESSURE: 68 MMHG | OXYGEN SATURATION: 100 % | HEART RATE: 79 BPM | HEIGHT: 66 IN | RESPIRATION RATE: 16 BRPM | WEIGHT: 155 LBS | BODY MASS INDEX: 24.91 KG/M2 | TEMPERATURE: 98 F | SYSTOLIC BLOOD PRESSURE: 114 MMHG

## 2023-11-16 DIAGNOSIS — C50.411 MALIGNANT NEOPLASM OF UPPER-OUTER QUADRANT OF RIGHT BREAST IN FEMALE, ESTROGEN RECEPTOR POSITIVE: Primary | ICD-10-CM

## 2023-11-16 DIAGNOSIS — Z09 CHEMOTHERAPY FOLLOW-UP EXAMINATION: ICD-10-CM

## 2023-11-16 DIAGNOSIS — Z17.0 MALIGNANT NEOPLASM OF UPPER-OUTER QUADRANT OF RIGHT BREAST IN FEMALE, ESTROGEN RECEPTOR POSITIVE: Primary | ICD-10-CM

## 2023-11-16 LAB
ALBUMIN SERPL-MCNC: 4.4 G/DL (ref 3.2–4.8)
ALBUMIN/GLOB SERPL: 1.6 {RATIO} (ref 1–2)
ALP LIVER SERPL-CCNC: 66 U/L
ALT SERPL-CCNC: 10 U/L
ANION GAP SERPL CALC-SCNC: 7 MMOL/L (ref 0–18)
AST SERPL-CCNC: 15 U/L (ref ?–34)
BASOPHILS # BLD AUTO: 0.09 X10(3) UL (ref 0–0.2)
BASOPHILS NFR BLD AUTO: 0.8 %
BILIRUB SERPL-MCNC: 0.2 MG/DL (ref 0.3–1.2)
BUN BLD-MCNC: 11 MG/DL (ref 9–23)
BUN/CREAT SERPL: 12.9 (ref 10–20)
CALCIUM BLD-MCNC: 9.6 MG/DL (ref 8.7–10.4)
CHLORIDE SERPL-SCNC: 104 MMOL/L (ref 98–112)
CO2 SERPL-SCNC: 24 MMOL/L (ref 21–32)
CREAT BLD-MCNC: 0.85 MG/DL
DEPRECATED RDW RBC AUTO: 37.6 FL (ref 35.1–46.3)
EGFRCR SERPLBLD CKD-EPI 2021: 89 ML/MIN/1.73M2 (ref 60–?)
EOSINOPHIL # BLD AUTO: 0.14 X10(3) UL (ref 0–0.7)
EOSINOPHIL NFR BLD AUTO: 1.3 %
ERYTHROCYTE [DISTWIDTH] IN BLOOD BY AUTOMATED COUNT: 12 % (ref 11–15)
GLOBULIN PLAS-MCNC: 2.7 G/DL (ref 2.8–4.4)
GLUCOSE BLD-MCNC: 91 MG/DL (ref 70–99)
HCT VFR BLD AUTO: 32.8 %
HGB BLD-MCNC: 10.7 G/DL
IMM GRANULOCYTES # BLD AUTO: 0.32 X10(3) UL (ref 0–1)
IMM GRANULOCYTES NFR BLD: 2.9 %
LYMPHOCYTES # BLD AUTO: 1.47 X10(3) UL (ref 1–4)
LYMPHOCYTES NFR BLD AUTO: 13.4 %
MCH RBC QN AUTO: 29.3 PG (ref 26–34)
MCHC RBC AUTO-ENTMCNC: 32.6 G/DL (ref 31–37)
MCV RBC AUTO: 89.9 FL
MONOCYTES # BLD AUTO: 0.92 X10(3) UL (ref 0.1–1)
MONOCYTES NFR BLD AUTO: 8.4 %
NEUTROPHILS # BLD AUTO: 8.02 X10 (3) UL (ref 1.5–7.7)
NEUTROPHILS # BLD AUTO: 8.02 X10(3) UL (ref 1.5–7.7)
NEUTROPHILS NFR BLD AUTO: 73.2 %
OSMOLALITY SERPL CALC.SUM OF ELEC: 279 MOSM/KG (ref 275–295)
PLATELET # BLD AUTO: 341 10(3)UL (ref 150–450)
POTASSIUM SERPL-SCNC: 4 MMOL/L (ref 3.5–5.1)
PROT SERPL-MCNC: 7.1 G/DL (ref 5.7–8.2)
RBC # BLD AUTO: 3.65 X10(6)UL
SODIUM SERPL-SCNC: 135 MMOL/L (ref 136–145)
WBC # BLD AUTO: 11 X10(3) UL (ref 4–11)

## 2023-11-16 PROCEDURE — 99215 OFFICE O/P EST HI 40 MIN: CPT | Performed by: NURSE PRACTITIONER

## 2023-11-16 PROCEDURE — 36415 COLL VENOUS BLD VENIPUNCTURE: CPT

## 2023-11-16 PROCEDURE — 80053 COMPREHEN METABOLIC PANEL: CPT

## 2023-11-16 PROCEDURE — 85025 COMPLETE CBC W/AUTO DIFF WBC: CPT

## 2023-11-16 RX ORDER — DOXORUBICIN HYDROCHLORIDE 2 MG/ML
60 INJECTION, SOLUTION INTRAVENOUS ONCE
Status: CANCELLED | OUTPATIENT
Start: 2023-11-17

## 2023-11-17 ENCOUNTER — OFFICE VISIT (OUTPATIENT)
Dept: HEMATOLOGY/ONCOLOGY | Facility: HOSPITAL | Age: 38
End: 2023-11-17
Attending: INTERNAL MEDICINE
Payer: COMMERCIAL

## 2023-11-17 VITALS
OXYGEN SATURATION: 100 % | HEART RATE: 96 BPM | WEIGHT: 155.81 LBS | RESPIRATION RATE: 16 BRPM | BODY MASS INDEX: 25 KG/M2 | SYSTOLIC BLOOD PRESSURE: 131 MMHG | TEMPERATURE: 99 F | DIASTOLIC BLOOD PRESSURE: 70 MMHG

## 2023-11-17 DIAGNOSIS — Z45.2 ENCOUNTER FOR CENTRAL LINE CARE: ICD-10-CM

## 2023-11-17 DIAGNOSIS — C50.411 MALIGNANT NEOPLASM OF UPPER-OUTER QUADRANT OF RIGHT BREAST IN FEMALE, ESTROGEN RECEPTOR POSITIVE: Primary | ICD-10-CM

## 2023-11-17 DIAGNOSIS — Z17.0 MALIGNANT NEOPLASM OF UPPER-OUTER QUADRANT OF RIGHT BREAST IN FEMALE, ESTROGEN RECEPTOR POSITIVE: Primary | ICD-10-CM

## 2023-11-17 PROCEDURE — 96411 CHEMO IV PUSH ADDL DRUG: CPT

## 2023-11-17 PROCEDURE — 96367 TX/PROPH/DG ADDL SEQ IV INF: CPT

## 2023-11-17 PROCEDURE — 96375 TX/PRO/DX INJ NEW DRUG ADDON: CPT

## 2023-11-17 PROCEDURE — 96413 CHEMO IV INFUSION 1 HR: CPT

## 2023-11-17 PROCEDURE — 96372 THER/PROPH/DIAG INJ SC/IM: CPT

## 2023-11-17 RX ORDER — DOXORUBICIN HYDROCHLORIDE 2 MG/ML
60 INJECTION, SOLUTION INTRAVENOUS ONCE
Status: COMPLETED | OUTPATIENT
Start: 2023-11-17 | End: 2023-11-17

## 2023-11-17 RX ORDER — HEPARIN SODIUM (PORCINE) LOCK FLUSH IV SOLN 100 UNIT/ML 100 UNIT/ML
SOLUTION INTRAVENOUS
Status: DISPENSED
Start: 2023-11-17 | End: 2023-11-17

## 2023-11-17 RX ADMIN — DOXORUBICIN HYDROCHLORIDE 108 MG: 2 INJECTION, SOLUTION INTRAVENOUS at 10:20:00

## 2023-11-17 NOTE — PROGRESS NOTES
Pt here for C2D1 Drug(s)AC - Doxorubicin/Cytoxan Fulphila. Arrives Ambulating independently, accompanied by Self and Spouse     Patient was evaluated today by  MD 11/16  Oral medications included in this regimen:  no    Patient confirms comprehension of cancer treatment schedule:  yes    Pregnancy screening:  Denies possibility of pregnancy    Modifications in dose or schedule:  No    Medications appearance and physical integrity checked by RN: yes. Chemotherapy IV pump settings verified by 2 RNs:  Yes.   Frequency of blood return and site check throughout administration: Prior to administration, Prior to each drug, Every 2-3 ml IVP, and At completion of therapy     Infusion/treatment outcome:  patient tolerated treatment without incident    Education Record    Learner:  Patient and Spouse  Barriers / Limitations:  None  Method:  Discussion and Printed material  Education / instructions given:  plan of care, treatment schedule, antiemetic regimen, home chemotherapy safety, possible side effects with chemotherapy   Outcome:  Shows understanding    Discharged Home, Ambulating independently, accompanied by:Self and Spouse    Patient/family verbalized understanding of future appointments: by printed AVS

## 2023-11-21 RX ORDER — LISINOPRIL 5 MG/1
5 TABLET ORAL DAILY
Qty: 30 TABLET | Refills: 0 | Status: SHIPPED | OUTPATIENT
Start: 2023-11-21

## 2023-11-21 NOTE — TELEPHONE ENCOUNTER
LOV-  12/20/22    RTC- 1 year    F/U- 12/19/23    Rx sent per protocol when MD is out of the office.

## 2023-11-28 ENCOUNTER — OFFICE VISIT (OUTPATIENT)
Facility: CLINIC | Age: 38
End: 2023-11-28
Payer: COMMERCIAL

## 2023-11-28 VITALS
BODY MASS INDEX: 25.07 KG/M2 | HEIGHT: 66 IN | HEART RATE: 104 BPM | WEIGHT: 156 LBS | OXYGEN SATURATION: 98 % | SYSTOLIC BLOOD PRESSURE: 112 MMHG | DIASTOLIC BLOOD PRESSURE: 70 MMHG

## 2023-11-28 DIAGNOSIS — Z00.00 PHYSICAL EXAM, ANNUAL: Primary | ICD-10-CM

## 2023-11-28 DIAGNOSIS — Z17.0 MALIGNANT NEOPLASM OF UPPER-OUTER QUADRANT OF RIGHT BREAST IN FEMALE, ESTROGEN RECEPTOR POSITIVE: ICD-10-CM

## 2023-11-28 DIAGNOSIS — J02.9 SORE THROAT: ICD-10-CM

## 2023-11-28 DIAGNOSIS — C50.411 MALIGNANT NEOPLASM OF UPPER-OUTER QUADRANT OF RIGHT BREAST IN FEMALE, ESTROGEN RECEPTOR POSITIVE: ICD-10-CM

## 2023-11-28 DIAGNOSIS — D84.9 IMMUNOCOMPROMISED (HCC): ICD-10-CM

## 2023-11-28 PROCEDURE — 99396 PREV VISIT EST AGE 40-64: CPT | Performed by: FAMILY MEDICINE

## 2023-11-28 PROCEDURE — 3078F DIAST BP <80 MM HG: CPT | Performed by: FAMILY MEDICINE

## 2023-11-28 PROCEDURE — 3074F SYST BP LT 130 MM HG: CPT | Performed by: FAMILY MEDICINE

## 2023-11-28 PROCEDURE — 87637 SARSCOV2&INF A&B&RSV AMP PRB: CPT | Performed by: FAMILY MEDICINE

## 2023-11-28 PROCEDURE — 90686 IIV4 VACC NO PRSV 0.5 ML IM: CPT | Performed by: FAMILY MEDICINE

## 2023-11-28 PROCEDURE — 90471 IMMUNIZATION ADMIN: CPT | Performed by: FAMILY MEDICINE

## 2023-11-28 PROCEDURE — 3008F BODY MASS INDEX DOCD: CPT | Performed by: FAMILY MEDICINE

## 2023-11-30 ENCOUNTER — TELEPHONE (OUTPATIENT)
Dept: HEMATOLOGY/ONCOLOGY | Facility: HOSPITAL | Age: 38
End: 2023-11-30

## 2023-11-30 LAB
FLUAV + FLUBV RNA SPEC NAA+PROBE: NOT DETECTED
FLUAV + FLUBV RNA SPEC NAA+PROBE: NOT DETECTED
RSV RNA SPEC NAA+PROBE: DETECTED
SARS-COV-2 RNA RESP QL NAA+PROBE: NOT DETECTED

## 2023-11-30 NOTE — TELEPHONE ENCOUNTER
Patient tested today positive for RSV. Per patient went to PCP office for runny nose and sore throat. Starting to feel better. Instructed will push back chemotherapy appointments x1 week. Instructed to push fluids and call if running fever and or symptoms not improving.

## 2023-12-01 ENCOUNTER — APPOINTMENT (OUTPATIENT)
Dept: HEMATOLOGY/ONCOLOGY | Facility: HOSPITAL | Age: 38
End: 2023-12-01
Attending: INTERNAL MEDICINE
Payer: COMMERCIAL

## 2023-12-04 ENCOUNTER — TELEPHONE (OUTPATIENT)
Dept: HEMATOLOGY/ONCOLOGY | Facility: HOSPITAL | Age: 38
End: 2023-12-04

## 2023-12-04 NOTE — TELEPHONE ENCOUNTER
Patient started bleeding again ,but she already had a period November 21, 2023 true cycle, it's heavy enough to be a 2nd cycle. She have a little discoloration on her head, Dr. Mary Wray pt.

## 2023-12-04 NOTE — TELEPHONE ENCOUNTER
Called patient, as we had discussed in previous call, chemo will alter hormones so to monitor and call if any issues. She verbalizes understanding.

## 2023-12-04 NOTE — TELEPHONE ENCOUNTER
11/17/23 - C2D1 - Cytoxan/Jeancarlos/Fulphila    Vaginal bleeding-last period was just 11/21/23 - no cramping or clots, enough to need pad. Noted some dark spots to top of head, not itchy or painful. Denies fevers, no sob or chest pain, denies n/v/d. Appetite fair and eating and drinking. Denies lightheadedness or  dizziness. No Urinary or bowel complaints. Please advise.

## 2023-12-05 ENCOUNTER — TELEPHONE (OUTPATIENT)
Dept: HEMATOLOGY/ONCOLOGY | Facility: HOSPITAL | Age: 38
End: 2023-12-05

## 2023-12-05 NOTE — TELEPHONE ENCOUNTER
Please follow-up with your regular doctor early next week.  Return if you get worse or if new symptoms develop such as severe weakness, shaking chills/fever, black or bloody stools, intractable vomiting, or severe worsening of pain not responding to medications.  High fiber diet.  Continue ciprofloxacin and metronidazole to completion.  Due to cysts noted incidentally on kidneys as well as blood in urine on urinalysis, please follow-up with your primary care physician regarding further evaluation and management of this.   Returned phone call and discussed to cancel appointment and can have deep cleaning when treatment complete. Patient verbalizes understanding.

## 2023-12-05 NOTE — TELEPHONE ENCOUNTER
Patient called because she is supposed to have her teeth deep cleaned tomorrow. She is not sure if she should have this done. Please call back. Thank you.

## 2023-12-08 ENCOUNTER — NURSE ONLY (OUTPATIENT)
Dept: HEMATOLOGY/ONCOLOGY | Facility: HOSPITAL | Age: 38
End: 2023-12-08
Attending: INTERNAL MEDICINE
Payer: COMMERCIAL

## 2023-12-08 VITALS
WEIGHT: 153.31 LBS | TEMPERATURE: 98 F | RESPIRATION RATE: 16 BRPM | DIASTOLIC BLOOD PRESSURE: 84 MMHG | OXYGEN SATURATION: 100 % | HEART RATE: 83 BPM | SYSTOLIC BLOOD PRESSURE: 129 MMHG | HEIGHT: 66 IN | BODY MASS INDEX: 24.64 KG/M2

## 2023-12-08 DIAGNOSIS — C50.411 MALIGNANT NEOPLASM OF UPPER-OUTER QUADRANT OF RIGHT BREAST IN FEMALE, ESTROGEN RECEPTOR POSITIVE: Primary | ICD-10-CM

## 2023-12-08 DIAGNOSIS — Z17.0 MALIGNANT NEOPLASM OF UPPER-OUTER QUADRANT OF RIGHT BREAST IN FEMALE, ESTROGEN RECEPTOR POSITIVE: Primary | ICD-10-CM

## 2023-12-08 DIAGNOSIS — Z45.2 ENCOUNTER FOR CENTRAL LINE CARE: ICD-10-CM

## 2023-12-08 DIAGNOSIS — Z09 CHEMOTHERAPY FOLLOW-UP EXAMINATION: ICD-10-CM

## 2023-12-08 LAB
ALBUMIN SERPL-MCNC: 4.5 G/DL (ref 3.2–4.8)
ALBUMIN/GLOB SERPL: 1.7 {RATIO} (ref 1–2)
ALP LIVER SERPL-CCNC: 52 U/L
ALT SERPL-CCNC: 11 U/L
ANION GAP SERPL CALC-SCNC: 7 MMOL/L (ref 0–18)
AST SERPL-CCNC: 18 U/L (ref ?–34)
BASOPHILS # BLD AUTO: 0.07 X10(3) UL (ref 0–0.2)
BASOPHILS NFR BLD AUTO: 1 %
BILIRUB SERPL-MCNC: 0.5 MG/DL (ref 0.3–1.2)
BUN BLD-MCNC: 12 MG/DL (ref 9–23)
BUN/CREAT SERPL: 13 (ref 10–20)
CALCIUM BLD-MCNC: 9.9 MG/DL (ref 8.7–10.4)
CHLORIDE SERPL-SCNC: 107 MMOL/L (ref 98–112)
CO2 SERPL-SCNC: 24 MMOL/L (ref 21–32)
CREAT BLD-MCNC: 0.92 MG/DL
DEPRECATED RDW RBC AUTO: 43.7 FL (ref 35.1–46.3)
EGFRCR SERPLBLD CKD-EPI 2021: 81 ML/MIN/1.73M2 (ref 60–?)
EOSINOPHIL # BLD AUTO: 0.02 X10(3) UL (ref 0–0.7)
EOSINOPHIL NFR BLD AUTO: 0.3 %
ERYTHROCYTE [DISTWIDTH] IN BLOOD BY AUTOMATED COUNT: 14 % (ref 11–15)
FASTING STATUS PATIENT QL REPORTED: NO
GLOBULIN PLAS-MCNC: 2.7 G/DL (ref 2.8–4.4)
GLUCOSE BLD-MCNC: 96 MG/DL (ref 70–99)
HCT VFR BLD AUTO: 31 %
HGB BLD-MCNC: 10.3 G/DL
IMM GRANULOCYTES # BLD AUTO: 0.02 X10(3) UL (ref 0–1)
IMM GRANULOCYTES NFR BLD: 0.3 %
LYMPHOCYTES # BLD AUTO: 0.86 X10(3) UL (ref 1–4)
LYMPHOCYTES NFR BLD AUTO: 12 %
MCH RBC QN AUTO: 29.3 PG (ref 26–34)
MCHC RBC AUTO-ENTMCNC: 33.2 G/DL (ref 31–37)
MCV RBC AUTO: 88.1 FL
MONOCYTES # BLD AUTO: 0.86 X10(3) UL (ref 0.1–1)
MONOCYTES NFR BLD AUTO: 12 %
NEUTROPHILS # BLD AUTO: 5.34 X10 (3) UL (ref 1.5–7.7)
NEUTROPHILS # BLD AUTO: 5.34 X10(3) UL (ref 1.5–7.7)
NEUTROPHILS NFR BLD AUTO: 74.4 %
OSMOLALITY SERPL CALC.SUM OF ELEC: 286 MOSM/KG (ref 275–295)
PLATELET # BLD AUTO: 395 10(3)UL (ref 150–450)
POTASSIUM SERPL-SCNC: 4.2 MMOL/L (ref 3.5–5.1)
PROT SERPL-MCNC: 7.2 G/DL (ref 5.7–8.2)
RBC # BLD AUTO: 3.52 X10(6)UL
SODIUM SERPL-SCNC: 138 MMOL/L (ref 136–145)
WBC # BLD AUTO: 7.2 X10(3) UL (ref 4–11)

## 2023-12-08 PROCEDURE — 96413 CHEMO IV INFUSION 1 HR: CPT

## 2023-12-08 PROCEDURE — 96367 TX/PROPH/DG ADDL SEQ IV INF: CPT

## 2023-12-08 PROCEDURE — 80053 COMPREHEN METABOLIC PANEL: CPT

## 2023-12-08 PROCEDURE — 96372 THER/PROPH/DIAG INJ SC/IM: CPT

## 2023-12-08 PROCEDURE — 85025 COMPLETE CBC W/AUTO DIFF WBC: CPT

## 2023-12-08 PROCEDURE — 96375 TX/PRO/DX INJ NEW DRUG ADDON: CPT

## 2023-12-08 PROCEDURE — 96411 CHEMO IV PUSH ADDL DRUG: CPT

## 2023-12-08 PROCEDURE — 99215 OFFICE O/P EST HI 40 MIN: CPT | Performed by: NURSE PRACTITIONER

## 2023-12-08 RX ORDER — HEPARIN SODIUM (PORCINE) LOCK FLUSH IV SOLN 100 UNIT/ML 100 UNIT/ML
5 SOLUTION INTRAVENOUS ONCE
Status: COMPLETED | OUTPATIENT
Start: 2023-12-08 | End: 2023-12-08

## 2023-12-08 RX ORDER — HEPARIN SODIUM (PORCINE) LOCK FLUSH IV SOLN 100 UNIT/ML 100 UNIT/ML
5 SOLUTION INTRAVENOUS ONCE
OUTPATIENT
Start: 2023-12-08

## 2023-12-08 RX ORDER — DOXORUBICIN HYDROCHLORIDE 2 MG/ML
60 INJECTION, SOLUTION INTRAVENOUS ONCE
Status: COMPLETED | OUTPATIENT
Start: 2023-12-08 | End: 2023-12-08

## 2023-12-08 RX ORDER — HEPARIN SODIUM (PORCINE) LOCK FLUSH IV SOLN 100 UNIT/ML 100 UNIT/ML
SOLUTION INTRAVENOUS
Status: COMPLETED
Start: 2023-12-08 | End: 2023-12-08

## 2023-12-08 RX ORDER — DOXORUBICIN HYDROCHLORIDE 2 MG/ML
60 INJECTION, SOLUTION INTRAVENOUS ONCE
Status: CANCELLED | OUTPATIENT
Start: 2023-12-08

## 2023-12-08 RX ORDER — SODIUM CHLORIDE 9 MG/ML
10 INJECTION INTRAVENOUS ONCE
OUTPATIENT
Start: 2023-12-08

## 2023-12-08 RX ADMIN — HEPARIN SODIUM (PORCINE) LOCK FLUSH IV SOLN 100 UNIT/ML 500 UNITS: 100 SOLUTION INTRAVENOUS at 11:55:00

## 2023-12-08 RX ADMIN — DOXORUBICIN HYDROCHLORIDE 108 MG: 2 INJECTION, SOLUTION INTRAVENOUS at 11:01:00

## 2023-12-08 NOTE — PROGRESS NOTES
Pt here for C3D1 Drug(s) Doxorubicin / Cytoxan  Arrives Ambulating independently, accompanied by Self   Patient feeling well overall today. Minimal symptoms from chemotherapy - slight nausea at home but relieved with PRN medication. Patient was evaluated today by [facility]. Oral medications included in this regimen:  no    Patient confirms comprehension of cancer treatment schedule:  yes    Pregnancy screening:  Denies possibility of pregnancy    Modifications in dose or schedule:  Yes - delayed 1 week d/t recent RSV     Medications appearance and physical integrity checked by RN: yes. Chemotherapy IV pump settings verified by 2 RNs:  Yes.   Frequency of blood return and site check throughout administration: Prior to administration, Prior to each drug, Every 2-3 ml IVP, and At completion of therapy     Infusion/treatment outcome:  patient tolerated treatment without incident    Education Record    Learner:  Patient and Spouse  Barriers / Limitations:  None  Method:  Discussion  Education / instructions given:  plan of care, medication side effects, treatment schedule    Outcome:  Shows understanding    Discharged Home, Ambulating independently, accompanied by:Self and Spouse    Patient/family verbalized understanding of future appointments: by printed AVS

## 2023-12-15 ENCOUNTER — APPOINTMENT (OUTPATIENT)
Dept: HEMATOLOGY/ONCOLOGY | Facility: HOSPITAL | Age: 38
End: 2023-12-15
Attending: INTERNAL MEDICINE
Payer: COMMERCIAL

## 2023-12-19 ENCOUNTER — OFFICE VISIT (OUTPATIENT)
Dept: NEPHROLOGY | Facility: CLINIC | Age: 38
End: 2023-12-19
Payer: COMMERCIAL

## 2023-12-19 VITALS
HEIGHT: 66 IN | DIASTOLIC BLOOD PRESSURE: 62 MMHG | WEIGHT: 155 LBS | SYSTOLIC BLOOD PRESSURE: 110 MMHG | BODY MASS INDEX: 24.91 KG/M2 | HEART RATE: 86 BPM

## 2023-12-19 DIAGNOSIS — Q60.0 SOLITARY KIDNEY, CONGENITAL: Primary | ICD-10-CM

## 2023-12-19 PROCEDURE — 3008F BODY MASS INDEX DOCD: CPT | Performed by: INTERNAL MEDICINE

## 2023-12-19 PROCEDURE — 3074F SYST BP LT 130 MM HG: CPT | Performed by: INTERNAL MEDICINE

## 2023-12-19 PROCEDURE — 3078F DIAST BP <80 MM HG: CPT | Performed by: INTERNAL MEDICINE

## 2023-12-19 PROCEDURE — 99214 OFFICE O/P EST MOD 30 MIN: CPT | Performed by: INTERNAL MEDICINE

## 2023-12-20 NOTE — PROGRESS NOTES
12/20/23        Patient: Francisca Burris   YOB: 1985   Date of Visit: 12/19/2023       Dear  Dr. Marybeth Tariq,      Thank you for referring Francisca Burris to my practice. Please find my assessment and plan below. As you know she is a 56-year-old female with a history of congenital absence of her right kidney with mild proteinuria who I now the pleasure of seeing for follow-up. Last seen in December 2022. Patient informs me that she was just recently diagnosed with right breast cancer. Currently undergoing neoadjuvant chemotherapy. This will be followed by a right mastectomy and then postoperative radiation. Although she does get some nausea during the first week after chemotherapy her weight has remained stable and she feels she is staying well-hydrated. On physical exam her blood pressure 110/62 with a pulse of 86 and she weighed 155 pounds. Her neck was supple without JVD. Lungs are clear. Heart revealed a regular rate and rhythm without gallops or murmurs. Abdomen was soft, flat, nontender without organomegaly, masses or bruits. Extremities revealed no edema. I reviewed her most recent labs done December 8, 2023. Creatinine remains good at 0.92. Electrolytes were good. Hemoglobin 10.3. Recent urinalysis in November 2023 was within normal limits. She did have a CT scan of the abdomen done October 17, 2023. Right kidney was congenitally absent but there was mild compensatory hypertrophy of the left kidney. I therefore reassured the patient that her renal function remains normal.  No significant proteinuria. Hopefully will do well with her breast cancer treatment. She is currently on lisinopril for its renal protective effects. She will let me know though if her blood pressure should drop too low. Recommended that she see me in 1 year for follow-up or sooner if clinically indicated. Thank you again for allowing me to participate in the care of your patient. If you have any questions please feel free to call.            Sincerely,   Earline Ramos MD   [facility], [address] [place] [address]  [place] [address] [place] [address]  [id number] [facility] [id number]    Document electronically generated by:  Earline Ramos MD

## 2023-12-22 ENCOUNTER — NURSE ONLY (OUTPATIENT)
Dept: HEMATOLOGY/ONCOLOGY | Facility: HOSPITAL | Age: 38
End: 2023-12-22
Attending: INTERNAL MEDICINE
Payer: COMMERCIAL

## 2023-12-22 ENCOUNTER — HOSPITAL ENCOUNTER (OUTPATIENT)
Dept: ULTRASOUND IMAGING | Facility: HOSPITAL | Age: 38
Discharge: HOME | End: 2023-12-22
Attending: INTERNAL MEDICINE
Payer: COMMERCIAL

## 2023-12-22 VITALS
SYSTOLIC BLOOD PRESSURE: 120 MMHG | OXYGEN SATURATION: 100 % | WEIGHT: 155.81 LBS | HEART RATE: 92 BPM | BODY MASS INDEX: 25.04 KG/M2 | TEMPERATURE: 98 F | DIASTOLIC BLOOD PRESSURE: 76 MMHG | RESPIRATION RATE: 16 BRPM | HEIGHT: 66 IN

## 2023-12-22 DIAGNOSIS — Z09 CHEMOTHERAPY FOLLOW-UP EXAMINATION: ICD-10-CM

## 2023-12-22 DIAGNOSIS — Z95.828 PORT-A-CATH IN PLACE: ICD-10-CM

## 2023-12-22 DIAGNOSIS — Z17.0 MALIGNANT NEOPLASM OF UPPER-OUTER QUADRANT OF RIGHT BREAST IN FEMALE, ESTROGEN RECEPTOR POSITIVE  (HCC): Primary | ICD-10-CM

## 2023-12-22 DIAGNOSIS — C50.411 MALIGNANT NEOPLASM OF UPPER-OUTER QUADRANT OF RIGHT BREAST IN FEMALE, ESTROGEN RECEPTOR POSITIVE  (HCC): Primary | ICD-10-CM

## 2023-12-22 DIAGNOSIS — Z45.2 ENCOUNTER FOR CENTRAL LINE CARE: ICD-10-CM

## 2023-12-22 LAB
ALBUMIN SERPL-MCNC: 4.6 G/DL (ref 3.2–4.8)
ALBUMIN/GLOB SERPL: 1.8 {RATIO} (ref 1–2)
ALP LIVER SERPL-CCNC: 70 U/L
ALT SERPL-CCNC: 12 U/L
ANION GAP SERPL CALC-SCNC: 13 MMOL/L (ref 0–18)
AST SERPL-CCNC: 14 U/L (ref ?–34)
BASOPHILS # BLD AUTO: 0.08 X10(3) UL (ref 0–0.2)
BASOPHILS NFR BLD AUTO: 0.8 %
BILIRUB SERPL-MCNC: 0.2 MG/DL (ref 0.3–1.2)
BUN BLD-MCNC: 13 MG/DL (ref 9–23)
BUN/CREAT SERPL: 14.4 (ref 10–20)
CALCIUM BLD-MCNC: 9.9 MG/DL (ref 8.7–10.4)
CHLORIDE SERPL-SCNC: 103 MMOL/L (ref 98–112)
CO2 SERPL-SCNC: 26 MMOL/L (ref 21–32)
CREAT BLD-MCNC: 0.9 MG/DL
DEPRECATED RDW RBC AUTO: 42.6 FL (ref 35.1–46.3)
EGFRCR SERPLBLD CKD-EPI 2021: 83 ML/MIN/1.73M2 (ref 60–?)
EOSINOPHIL # BLD AUTO: 0.24 X10(3) UL (ref 0–0.7)
EOSINOPHIL NFR BLD AUTO: 2.5 %
ERYTHROCYTE [DISTWIDTH] IN BLOOD BY AUTOMATED COUNT: 13.7 % (ref 11–15)
FASTING STATUS PATIENT QL REPORTED: NO
GLOBULIN PLAS-MCNC: 2.6 G/DL (ref 2.8–4.4)
GLUCOSE BLD-MCNC: 100 MG/DL (ref 70–99)
HCT VFR BLD AUTO: 30 %
HGB BLD-MCNC: 10.1 G/DL
IMM GRANULOCYTES # BLD AUTO: 0.17 X10(3) UL (ref 0–1)
IMM GRANULOCYTES NFR BLD: 1.7 %
LYMPHOCYTES # BLD AUTO: 1.13 X10(3) UL (ref 1–4)
LYMPHOCYTES NFR BLD AUTO: 11.6 %
MCH RBC QN AUTO: 29.7 PG (ref 26–34)
MCHC RBC AUTO-ENTMCNC: 33.7 G/DL (ref 31–37)
MCV RBC AUTO: 88.2 FL
MONOCYTES # BLD AUTO: 0.68 X10(3) UL (ref 0.1–1)
MONOCYTES NFR BLD AUTO: 7 %
NEUTROPHILS # BLD AUTO: 7.48 X10 (3) UL (ref 1.5–7.7)
NEUTROPHILS # BLD AUTO: 7.48 X10(3) UL (ref 1.5–7.7)
NEUTROPHILS NFR BLD AUTO: 76.4 %
OSMOLALITY SERPL CALC.SUM OF ELEC: 294 MOSM/KG (ref 275–295)
PLATELET # BLD AUTO: 305 10(3)UL (ref 150–450)
POTASSIUM SERPL-SCNC: 4.1 MMOL/L (ref 3.5–5.1)
PROT SERPL-MCNC: 7.2 G/DL (ref 5.7–8.2)
RBC # BLD AUTO: 3.4 X10(6)UL
SODIUM SERPL-SCNC: 142 MMOL/L (ref 136–145)
WBC # BLD AUTO: 9.8 X10(3) UL (ref 4–11)

## 2023-12-22 PROCEDURE — 80053 COMPREHEN METABOLIC PANEL: CPT

## 2023-12-22 PROCEDURE — 85025 COMPLETE CBC W/AUTO DIFF WBC: CPT

## 2023-12-22 PROCEDURE — 96413 CHEMO IV INFUSION 1 HR: CPT

## 2023-12-22 PROCEDURE — 96372 THER/PROPH/DIAG INJ SC/IM: CPT

## 2023-12-22 PROCEDURE — 99215 OFFICE O/P EST HI 40 MIN: CPT | Performed by: INTERNAL MEDICINE

## 2023-12-22 PROCEDURE — 96367 TX/PROPH/DG ADDL SEQ IV INF: CPT

## 2023-12-22 PROCEDURE — 96411 CHEMO IV PUSH ADDL DRUG: CPT

## 2023-12-22 PROCEDURE — 93971 EXTREMITY STUDY: CPT | Performed by: INTERNAL MEDICINE

## 2023-12-22 RX ORDER — SODIUM CHLORIDE 9 MG/ML
10 INJECTION INTRAVENOUS ONCE
OUTPATIENT
Start: 2023-12-22

## 2023-12-22 RX ORDER — HEPARIN SODIUM (PORCINE) LOCK FLUSH IV SOLN 100 UNIT/ML 100 UNIT/ML
5 SOLUTION INTRAVENOUS ONCE
OUTPATIENT
Start: 2023-12-22

## 2023-12-22 RX ORDER — HEPARIN SODIUM (PORCINE) LOCK FLUSH IV SOLN 100 UNIT/ML 100 UNIT/ML
5 SOLUTION INTRAVENOUS ONCE
Status: COMPLETED | OUTPATIENT
Start: 2023-12-22 | End: 2023-12-22

## 2023-12-22 RX ORDER — HEPARIN SODIUM (PORCINE) LOCK FLUSH IV SOLN 100 UNIT/ML 100 UNIT/ML
SOLUTION INTRAVENOUS
Status: COMPLETED
Start: 2023-12-22 | End: 2023-12-22

## 2023-12-22 RX ORDER — DOXORUBICIN HYDROCHLORIDE 2 MG/ML
60 INJECTION, SOLUTION INTRAVENOUS ONCE
Status: CANCELLED | OUTPATIENT
Start: 2023-12-22

## 2023-12-22 RX ORDER — DOXORUBICIN HYDROCHLORIDE 2 MG/ML
60 INJECTION, SOLUTION INTRAVENOUS ONCE
Status: COMPLETED | OUTPATIENT
Start: 2023-12-22 | End: 2023-12-22

## 2023-12-22 RX ADMIN — HEPARIN SODIUM (PORCINE) LOCK FLUSH IV SOLN 100 UNIT/ML 500 UNITS: 100 SOLUTION INTRAVENOUS at 15:16:00

## 2023-12-22 RX ADMIN — DOXORUBICIN HYDROCHLORIDE 108 MG: 2 INJECTION, SOLUTION INTRAVENOUS at 14:26:00

## 2023-12-22 NOTE — PROGRESS NOTES
Pt here for C4D1 Drug(s)AC and Peg-F. Arrives Ambulating independently, accompanied by Self and Spouse from [facility]. PORT previously accessed in the lab- good blood return noted. Patient was evaluated today by MD.    Oral medications included in this regimen:  no    Patient confirms comprehension of cancer treatment schedule:  yes    Pregnancy screening:  Denies possibility of pregnancy    Modifications in dose or schedule:  No    Medications appearance and physical integrity checked by RN: yes. Chemotherapy IV pump settings verified by 2 RNs:  Yes. Frequency of blood return and site check throughout administration: Prior to administration, Prior to each drug, Every 2-3 ml IVP, and At completion of therapy     Infusion/treatment outcome:  patient tolerated treatment without incident. PORT flushed and Heparin locked before de-accessing. Gauze/tape applied to site. Education Record    Learner:  Patient  Barriers / Limitations:  None  Method:  Discussion  Education / instructions given:  Plan of care and future appointment's. Outcome:  Shows understanding    Discharged Home, Ambulating independently, accompanied by:Self and Spouse to US Appt.      Patient/family verbalized understanding of future appointments: by [facility]

## 2023-12-29 ENCOUNTER — APPOINTMENT (OUTPATIENT)
Dept: HEMATOLOGY/ONCOLOGY | Facility: HOSPITAL | Age: 38
End: 2023-12-29
Attending: INTERNAL MEDICINE
Payer: COMMERCIAL

## 2023-12-29 ENCOUNTER — APPOINTMENT (OUTPATIENT)
Dept: HEMATOLOGY/ONCOLOGY | Facility: HOSPITAL | Age: 38
End: 2023-12-29
Attending: NURSE PRACTITIONER
Payer: COMMERCIAL

## 2024-01-05 ENCOUNTER — APPOINTMENT (OUTPATIENT)
Dept: HEMATOLOGY/ONCOLOGY | Facility: HOSPITAL | Age: 39
End: 2024-01-05
Attending: INTERNAL MEDICINE
Payer: COMMERCIAL

## 2024-01-05 ENCOUNTER — OFFICE VISIT (OUTPATIENT)
Dept: HEMATOLOGY/ONCOLOGY | Facility: HOSPITAL | Age: 39
End: 2024-01-05
Attending: PHYSICIAN ASSISTANT
Payer: COMMERCIAL

## 2024-01-05 VITALS
WEIGHT: 156 LBS | OXYGEN SATURATION: 100 % | HEIGHT: 66 IN | TEMPERATURE: 98 F | SYSTOLIC BLOOD PRESSURE: 120 MMHG | BODY MASS INDEX: 25.07 KG/M2 | HEART RATE: 91 BPM | RESPIRATION RATE: 16 BRPM | DIASTOLIC BLOOD PRESSURE: 72 MMHG

## 2024-01-05 VITALS
DIASTOLIC BLOOD PRESSURE: 74 MMHG | OXYGEN SATURATION: 99 % | SYSTOLIC BLOOD PRESSURE: 116 MMHG | HEART RATE: 84 BPM | RESPIRATION RATE: 16 BRPM

## 2024-01-05 DIAGNOSIS — C50.411 MALIGNANT NEOPLASM OF UPPER-OUTER QUADRANT OF RIGHT BREAST IN FEMALE, ESTROGEN RECEPTOR POSITIVE  (HCC): Primary | ICD-10-CM

## 2024-01-05 DIAGNOSIS — Z45.2 ENCOUNTER FOR CENTRAL LINE CARE: ICD-10-CM

## 2024-01-05 DIAGNOSIS — Z17.0 MALIGNANT NEOPLASM OF UPPER-OUTER QUADRANT OF RIGHT BREAST IN FEMALE, ESTROGEN RECEPTOR POSITIVE  (HCC): Primary | ICD-10-CM

## 2024-01-05 DIAGNOSIS — Z51.11 CHEMOTHERAPY MANAGEMENT, ENCOUNTER FOR: ICD-10-CM

## 2024-01-05 DIAGNOSIS — T45.1X5A ANEMIA ASSOCIATED WITH CHEMOTHERAPY: ICD-10-CM

## 2024-01-05 DIAGNOSIS — D64.81 ANEMIA ASSOCIATED WITH CHEMOTHERAPY: ICD-10-CM

## 2024-01-05 DIAGNOSIS — S46.912A LEFT SHOULDER STRAIN, INITIAL ENCOUNTER: ICD-10-CM

## 2024-01-05 LAB
ALBUMIN SERPL-MCNC: 4.5 G/DL (ref 3.2–4.8)
ALBUMIN/GLOB SERPL: 1.7 {RATIO} (ref 1–2)
ALP LIVER SERPL-CCNC: 76 U/L
ALT SERPL-CCNC: 8 U/L
ANION GAP SERPL CALC-SCNC: 8 MMOL/L (ref 0–18)
AST SERPL-CCNC: 14 U/L (ref ?–34)
BASOPHILS # BLD AUTO: 0.08 X10(3) UL (ref 0–0.2)
BASOPHILS NFR BLD AUTO: 0.7 %
BILIRUB SERPL-MCNC: 0.2 MG/DL (ref 0.3–1.2)
BUN BLD-MCNC: 17 MG/DL (ref 9–23)
BUN/CREAT SERPL: 18.1 (ref 10–20)
CALCIUM BLD-MCNC: 9.8 MG/DL (ref 8.7–10.4)
CHLORIDE SERPL-SCNC: 105 MMOL/L (ref 98–112)
CO2 SERPL-SCNC: 26 MMOL/L (ref 21–32)
CREAT BLD-MCNC: 0.94 MG/DL
DEPRECATED RDW RBC AUTO: 43.8 FL (ref 35.1–46.3)
EGFRCR SERPLBLD CKD-EPI 2021: 79 ML/MIN/1.73M2 (ref 60–?)
EOSINOPHIL # BLD AUTO: 0.09 X10(3) UL (ref 0–0.7)
EOSINOPHIL NFR BLD AUTO: 0.8 %
ERYTHROCYTE [DISTWIDTH] IN BLOOD BY AUTOMATED COUNT: 14 % (ref 11–15)
GLOBULIN PLAS-MCNC: 2.6 G/DL (ref 2.8–4.4)
GLUCOSE BLD-MCNC: 92 MG/DL (ref 70–99)
HCT VFR BLD AUTO: 29.1 %
HGB BLD-MCNC: 9.4 G/DL
IMM GRANULOCYTES # BLD AUTO: 0.28 X10(3) UL (ref 0–1)
IMM GRANULOCYTES NFR BLD: 2.5 %
LYMPHOCYTES # BLD AUTO: 1.08 X10(3) UL (ref 1–4)
LYMPHOCYTES NFR BLD AUTO: 9.5 %
MCH RBC QN AUTO: 29.1 PG (ref 26–34)
MCHC RBC AUTO-ENTMCNC: 32.3 G/DL (ref 31–37)
MCV RBC AUTO: 90.1 FL
MONOCYTES # BLD AUTO: 0.9 X10(3) UL (ref 0.1–1)
MONOCYTES NFR BLD AUTO: 7.9 %
NEUTROPHILS # BLD AUTO: 8.9 X10 (3) UL (ref 1.5–7.7)
NEUTROPHILS # BLD AUTO: 8.9 X10(3) UL (ref 1.5–7.7)
NEUTROPHILS NFR BLD AUTO: 78.6 %
OSMOLALITY SERPL CALC.SUM OF ELEC: 289 MOSM/KG (ref 275–295)
PLATELET # BLD AUTO: 331 10(3)UL (ref 150–450)
POTASSIUM SERPL-SCNC: 4.3 MMOL/L (ref 3.5–5.1)
PROT SERPL-MCNC: 7.1 G/DL (ref 5.7–8.2)
RBC # BLD AUTO: 3.23 X10(6)UL
SODIUM SERPL-SCNC: 139 MMOL/L (ref 136–145)
WBC # BLD AUTO: 11.3 X10(3) UL (ref 4–11)

## 2024-01-05 PROCEDURE — 96413 CHEMO IV INFUSION 1 HR: CPT

## 2024-01-05 PROCEDURE — 80053 COMPREHEN METABOLIC PANEL: CPT

## 2024-01-05 PROCEDURE — 96375 TX/PRO/DX INJ NEW DRUG ADDON: CPT

## 2024-01-05 PROCEDURE — 99215 OFFICE O/P EST HI 40 MIN: CPT | Performed by: PHYSICIAN ASSISTANT

## 2024-01-05 PROCEDURE — 96415 CHEMO IV INFUSION ADDL HR: CPT

## 2024-01-05 PROCEDURE — S0028 INJECTION, FAMOTIDINE, 20 MG: HCPCS

## 2024-01-05 PROCEDURE — 96367 TX/PROPH/DG ADDL SEQ IV INF: CPT

## 2024-01-05 PROCEDURE — 85025 COMPLETE CBC W/AUTO DIFF WBC: CPT

## 2024-01-05 RX ORDER — FAMOTIDINE 10 MG/ML
20 INJECTION, SOLUTION INTRAVENOUS ONCE
Status: COMPLETED | OUTPATIENT
Start: 2024-01-05 | End: 2024-01-05

## 2024-01-05 RX ORDER — HEPARIN SODIUM (PORCINE) LOCK FLUSH IV SOLN 100 UNIT/ML 100 UNIT/ML
5 SOLUTION INTRAVENOUS ONCE
Status: COMPLETED | OUTPATIENT
Start: 2024-01-05 | End: 2024-01-05

## 2024-01-05 RX ORDER — FAMOTIDINE 10 MG/ML
INJECTION, SOLUTION INTRAVENOUS
Status: COMPLETED
Start: 2024-01-05 | End: 2024-01-05

## 2024-01-05 RX ORDER — DIPHENHYDRAMINE HYDROCHLORIDE 50 MG/ML
25 INJECTION INTRAMUSCULAR; INTRAVENOUS ONCE
Status: COMPLETED | OUTPATIENT
Start: 2024-01-05 | End: 2024-01-05

## 2024-01-05 RX ORDER — DIPHENHYDRAMINE HYDROCHLORIDE 50 MG/ML
INJECTION INTRAMUSCULAR; INTRAVENOUS
Status: DISCONTINUED
Start: 2024-01-05 | End: 2024-01-05

## 2024-01-05 RX ORDER — HEPARIN SODIUM (PORCINE) LOCK FLUSH IV SOLN 100 UNIT/ML 100 UNIT/ML
SOLUTION INTRAVENOUS
Status: COMPLETED
Start: 2024-01-05 | End: 2024-01-05

## 2024-01-05 RX ORDER — DIPHENHYDRAMINE HYDROCHLORIDE 50 MG/ML
25 INJECTION INTRAMUSCULAR; INTRAVENOUS ONCE
Status: DISCONTINUED | OUTPATIENT
Start: 2024-01-05 | End: 2024-01-05

## 2024-01-05 RX ORDER — DIPHENHYDRAMINE HYDROCHLORIDE 50 MG/ML
INJECTION INTRAMUSCULAR; INTRAVENOUS
Status: COMPLETED
Start: 2024-01-05 | End: 2024-01-05

## 2024-01-05 RX ORDER — FAMOTIDINE 10 MG/ML
20 INJECTION, SOLUTION INTRAVENOUS ONCE
Status: CANCELLED | OUTPATIENT
Start: 2024-01-05

## 2024-01-05 RX ORDER — SODIUM CHLORIDE 9 MG/ML
10 INJECTION INTRAVENOUS ONCE
OUTPATIENT
Start: 2024-01-05

## 2024-01-05 RX ORDER — DIPHENHYDRAMINE HYDROCHLORIDE 50 MG/ML
25 INJECTION INTRAMUSCULAR; INTRAVENOUS ONCE
Status: CANCELLED | OUTPATIENT
Start: 2024-01-05

## 2024-01-05 RX ORDER — HEPARIN SODIUM (PORCINE) LOCK FLUSH IV SOLN 100 UNIT/ML 100 UNIT/ML
5 SOLUTION INTRAVENOUS ONCE
OUTPATIENT
Start: 2024-01-05

## 2024-01-05 RX ADMIN — DIPHENHYDRAMINE HYDROCHLORIDE 25 MG: 50 INJECTION INTRAMUSCULAR; INTRAVENOUS at 14:15:00

## 2024-01-05 RX ADMIN — HEPARIN SODIUM (PORCINE) LOCK FLUSH IV SOLN 100 UNIT/ML 500 UNITS: 100 SOLUTION INTRAVENOUS at 17:11:00

## 2024-01-05 RX ADMIN — FAMOTIDINE 20 MG: 10 INJECTION, SOLUTION INTRAVENOUS at 14:13:00

## 2024-01-05 NOTE — PATIENT INSTRUCTIONS
Proceed with cycle 5 Paclitaxel (week 1)  Anti-nausea medications as needed  Tylenol.  Heat twice daily  Call as needed  Consider cooling gloves/booties if notice neuropathy  Follow-up in 3 weeks

## 2024-01-05 NOTE — PROGRESS NOTES
Pt here for C5D1 Drug(s)Taxol.  Arrives Ambulating independently, accompanied by Spouse.  PAC previously accessed in lab with good blood return. Taxol started at half rate 130ml/hr for first 15 mins tolerated well, dose increased to full rate for 20 mins and pt with c/o severe gi cramping and hot flashes.  Dose stopped.   Reaction AJ Lundy notified.  25mg Benadryl IVP additionally given.  Dose restarted at half rat 130ml for remainder of infusion-pt tolerated well.  Dose completed.  Denies complaints.  PAC hep locked and decannulated.  Dc'd ambulating with  without complaints.     Patient was evaluated today by [facility] and Treatment Nurse.    Oral medications included in this regimen:  no    Patient confirms comprehension of cancer treatment schedule:  yes    Pregnancy screening:  Denies possibility of pregnancy    Modifications in dose or schedule:  Yes-see above comments.    Medications appearance and physical integrity checked by RN: yes.    Chemotherapy IV pump settings verified by 2 RNs:  Yes.  Frequency of blood return and site check throughout administration: Prior to administration, Prior to each drug, and At completion of therapy     Infusion/treatment outcome:  see above note.  Pt tolerated remaining dose after above interventions.  PAC hep locked and decannulated.  Dc'd ambulating without complaints.     Education Record    Learner:  Patient and Spouse  Barriers / Limitations:  None  Method:  Discussion  Education / instructions given:  reviewed plan of care, side effects and if s/s of allergic reaction to call or go to ed  Outcome:  Shows understanding    Discharged Home, Ambulating independently, accompanied by:Spouse    Patient/family verbalized understanding of future appointments: by [employer] messaging

## 2024-01-05 NOTE — PROGRESS NOTES
HPI   Jocelyn Garza is a 38 year old female for evaluation of   Encounter Diagnoses   Name Primary?    Malignant neoplasm of upper-outer quadrant of right breast in female, estrogen receptor positive  (HCC) Yes    Chemotherapy management, encounter for     Left shoulder strain, initial encounter     Anemia associated with chemotherapy        She is here today S/p cycle 4 DD AC.   Cycle 1 complicated with hospitalization for neutropenic fevers. Pt had chills, no source of infection identified. Children did have virus at the time she was admitted.   Had RSV Cycle #3 deferred x 1 week.    Reports left axilla pain that radiated to left chest wall late December 2023.  12/22/23 LUE v doppler negative for DVT.  No issues with port draws/blood return.  Patient does endorse that she favors her left arm every since the port was placed.  Due to pain in her trap m, she has difficulty with AROM of her LUE.      States that she feels mass feels looser on the R breast.      Fatigue:  Yes, improved after a week    Fevers:  No    Appetite/taste changes:  Yes, sometimes taste changes.    Mucositis:  No    Weight changes:  No    Nausea/vomiting:  Yes, only mild nausea, not needing zofran, only compazine sparingly and eating.     Diarrhea:  No    Constipation:  No - only noticed prior with zofran    Peripheral neuropathy:  No    Continues to report the right breast feels bigger and heavy.  Sometimes dimpling at lower portion.  No axillary changes.      ECOG PS  0    Review of Systems:   Review of Systems   Constitutional:  Positive for fatigue. Negative for appetite change, fever and unexpected weight change.   HENT:   Negative for mouth sores.    Eyes:  Negative for eye problems.   Respiratory:  Negative for cough and shortness of breath.    Cardiovascular:  Positive for palpitations (No concerns today, but per history, feels heart racing day of and day after ddAC). Negative for chest pain and leg swelling.   Gastrointestinal:   Positive for nausea. Negative for abdominal pain, constipation, diarrhea and vomiting.   Endocrine: Negative for hot flashes.   Musculoskeletal:  Positive for arthralgias (shoulder stiffness improved with claritin). Negative for back pain and myalgias.   Neurological:  Negative for dizziness, extremity weakness, headaches and numbness.   Hematological:  Negative for adenopathy.   Psychiatric/Behavioral:  Negative for sleep disturbance.          Current Outpatient Medications   Medication Sig Dispense Refill    lisinopril 5 MG Oral Tab TAKE 1 TABLET(5 MG) BY MOUTH DAILY 30 tablet 0    cefuroxime 500 MG Oral Tab Take 1 tablet (500 mg total) by mouth 2 (two) times daily. For 3 days (Patient not taking: Reported on 12/19/2023)      prochlorperazine (COMPAZINE) 10 mg tablet Take 1 tablet (10 mg total) by mouth every 8 (eight) hours as needed for Nausea. (Patient not taking: Reported on 1/5/2024) 30 tablet 3    ondansetron (ZOFRAN) 8 MG tablet Take 1 tablet (8 mg total) by mouth every 8 (eight) hours as needed for Nausea. (Patient not taking: Reported on 1/5/2024) 30 tablet 3     Allergies:   No Known Allergies    Past Medical History:   Diagnosis Date    Breast cancer in female (HCC) 10/18/2023    Right breast lymph node cancer    Hypertension     Solitary kidney, congenital      Past Surgical History:   Procedure Laterality Date    APPENDECTOMY  01/01/2007    NEEDLE BIOPSY RIGHT Right 10/09/2023    TREAT ECTOPIC PREG,RMV TUBE/OVARY Left 01/01/2014    TREAT ECTOPIC PREG,RMV TUBE/OVARY Left 01/01/2015     Social History     Socioeconomic History    Marital status:    Tobacco Use    Smoking status: Never    Smokeless tobacco: Never   Vaping Use    Vaping Use: Never used   Substance and Sexual Activity    Alcohol use: Yes     Comment: 2 -3 drinks per week    Drug use: No     Social Determinants of Health     Food Insecurity: No Food Insecurity (11/9/2023)    Food Insecurity     Food Insecurity: Never true    Transportation Needs: No Transportation Needs (11/9/2023)    Transportation Needs     Lack of Transportation: No   Housing Stability: Low Risk  (11/9/2023)    Housing Stability     Housing Instability: No       Family History   Problem Relation Age of Onset    Other (Parkinson's Disease) Father     Prostate Cancer Maternal Grandfather 70    Pancreatic Cancer Paternal Grandmother 80    Pancreatic Cancer Maternal Uncle 60    Cancer Maternal Great-Grandmother         gastric ca    Cancer Paternal Aunt 80        breast cancer    Pancreatic Cancer Paternal Great-Grandfather          PHYSICAL EXAM:    /72 (BP Location: Left arm, Patient Position: Sitting, Cuff Size: adult)   Pulse 91   Temp 98.3 °F (36.8 °C) (Oral)   Resp 16   Ht 1.676 m (5' 6\")   Wt 70.8 kg (156 lb)   LMP 11/21/2023 (Approximate)   SpO2 100%   BMI 25.18 kg/m²   Wt Readings from Last 6 Encounters:   01/05/24 70.8 kg (156 lb)   12/22/23 70.7 kg (155 lb 12.8 oz)   12/19/23 70.3 kg (155 lb)   12/08/23 69.5 kg (153 lb 4.8 oz)   11/28/23 70.8 kg (156 lb)   11/17/23 70.7 kg (155 lb 12.8 oz)     Physical Exam    General: Patient is alert, not in acute distress.  HEENT: EOMs intact.  Anicteric.  Oropharynx is clear.   Neck: No JVD. No palpable lymphadenopathy. Neck is supple.  Chest: Non labored breathing.  Clear to auscultation.  Breasts:  Right breast at the RUQ with a mobile 12 x 10 cm hard mass - tender.  R axilla with a 2 x 1 cm hard mobile LN - tender.  L breast with dense tissue at the UOQ but no masses.   Heart: Regular rate and rhythm.   Abdomen: Soft, non tender with good bowel sounds.  Extremities: No edema, tender left trap muscle with limited AROM left shoulder.  No edema  Neurological: Grossly intact.   Lymphatics: There is no palpable lymphadenopathy throughout in the cervical or supraclavicular egions.  Psych/Depression: nl          ASSESSMENT/PLAN:     1. Malignant neoplasm of upper-outer quadrant of right breast in female,  estrogen receptor positive  (HCC)    2. Chemotherapy management, encounter for    3. Left shoulder strain, initial encounter    4. Anemia associated with chemotherapy         Cancer Staging   Malignant neoplasm of upper-outer quadrant of right breast in female, estrogen receptor positive  (HCC)  Staging form: Breast, AJCC 8th Edition  - Clinical stage from 10/12/2023: Stage IIA (cT3, cN1(f), cM0, G2, ER+, NJ+, HER2-) - Signed by Halie Calle MD on 10/20/2023    Patient has completed staging work-up.  Thus far no evidence of metastatic disease.  Findings in the liver evaluated with ultrasound, and the one side is a hemangioma, the other likely a cyst, but a question of metastatic disease and PET scan has been ordered.  I discussed with the patient that most likely this is going to be a cyst.    Discussed that she still has early stage disease, and that her staging has not changed despite the size of the primary tumor.    Given extensive disease in the breast and komal involvement, she will benefit from neoadjuvant chemotherapy, which may help with surgical outcomes by shrinking some of the primary tumor and of the lymph nodes.  I discussed with the patient that the primary tumor will not likely decrease in size significantly to make her a candidate for breast conservation.  I did discuss with the patient that she will need a mastectomy and likely axillary lymph node dissection.  Given the size of the primary tumor, she is a candidate for postmastectomy radiation therapy plus minus radiation to the komal basins pending on surgical management of the axilla.  I discussed with the patient that once she completes treatment with radiation, she will then be initiated on adjuvant endocrine therapy, given the size of the tumor and number of positive lymph nodes, she also will be a candidate for 2 years of endovascular in the initial 2 years of adjuvant endocrine therapy.  Discussed that adjuvant endocrine therapy total  duration will be no less than 5 years but given her high risk features, likely up to 8 to 10 years.  We will also discuss options for ovarian function suppression after completion of chemotherapy.    Patient inquired regarding contralateral prophylactic mastectomy.  I discussed with the patient that we should await results of her genetic testing.  If the genetic testing is positive for a deleterious mutation which will increase her lifetime second primary breast cancer, then she should proceed with prophylactic contralateral mastectomy.  However, if this is negative, discussed with the patient that there is no benefit for proceeding with contralateral mastectomy, as it will not change her disease-free or overall survival.    Discussed with the patient that while she is receiving neoadjuvant treatment, she can have the evaluation by plastic surgery so that when she completes her neoadjuvant course of therapy, she will be able to have her surgery coordinated with Dr. Hutson and a plastic surgeon    Plan for DD AC x 4 cycles followed by weekly Taxol x12 weeks    S/p cycle 4 DD AC, complicated by RSV neutropenia.    Patient status post cycle 3 of dose dense AC.  Tumor size is stable.     Proceed with cycle 5 weekly Paclitaxel.    Consult with Dr. Mae on 1/12/24    Chemotherapy-induced anemia.  Monitor    NEY:  Used anti-emetics sparingly with ddAC.  Reassuring.  Continue to use antiemetics prn.     Left shoulder pain.  12/22/23 LUE v doppler negative.  Patient has been favoring her LUE.  Reassurance that she cannot harm the port.  Will need to see lymphedema therapist/PT prior to surgery for baseline assessment - BUE.  Apply heat.  Tylenol prn.  (Cannot take NSAIDs due to h/o nephrectomy)    Reviewed potential side effects of weekly Paclitaxel relative to ddAC.  Could consider cooling gloves/booties if CIPN develops.    All of the patient's questions were answered to the best of my abilities.     Call prn.  Follow-up  in 3 weeks    MDM high risk.    No orders of the defined types were placed in this encounter.      Results From Past 48 Hours:  Recent Results (from the past 48 hour(s))   Comp Metabolic Panel (14)    Collection Time: 01/05/24 11:09 AM   Result Value Ref Range    Glucose 92 70 - 99 mg/dL    Sodium 139 136 - 145 mmol/L    Potassium 4.3 3.5 - 5.1 mmol/L    Chloride 105 98 - 112 mmol/L    CO2 26.0 21.0 - 32.0 mmol/L    Anion Gap 8 0 - 18 mmol/L    BUN 17 9 - 23 mg/dL    Creatinine 0.94 0.55 - 1.02 mg/dL    BUN/CREA Ratio 18.1 10.0 - 20.0    Calcium, Total 9.8 8.7 - 10.4 mg/dL    Calculated Osmolality 289 275 - 295 mOsm/kg    eGFR-Cr 79 >=60 mL/min/1.73m2    ALT 8 (L) 10 - 49 U/L    AST 14 <=34 U/L    Alkaline Phosphatase 76 37 - 98 U/L    Bilirubin, Total 0.2 (L) 0.3 - 1.2 mg/dL    Total Protein 7.1 5.7 - 8.2 g/dL    Albumin 4.5 3.2 - 4.8 g/dL    Globulin  2.6 (L) 2.8 - 4.4 g/dL    A/G Ratio 1.7 1.0 - 2.0    Patient Fasting for CMP? Patient not present    CBC W/ DIFFERENTIAL    Collection Time: 01/05/24 11:09 AM   Result Value Ref Range    WBC 11.3 (H) 4.0 - 11.0 x10(3) uL    RBC 3.23 (L) 3.80 - 5.30 x10(6)uL    HGB 9.4 (L) 12.0 - 16.0 g/dL    HCT 29.1 (L) 35.0 - 48.0 %    MCV 90.1 80.0 - 100.0 fL    MCH 29.1 26.0 - 34.0 pg    MCHC 32.3 31.0 - 37.0 g/dL    RDW-SD 43.8 35.1 - 46.3 fL    RDW 14.0 11.0 - 15.0 %    .0 150.0 - 450.0 10(3)uL    Neutrophil Absolute Prelim 8.90 (H) 1.50 - 7.70 x10 (3) uL    Neutrophil Absolute 8.90 (H) 1.50 - 7.70 x10(3) uL    Lymphocyte Absolute 1.08 1.00 - 4.00 x10(3) uL    Monocyte Absolute 0.90 0.10 - 1.00 x10(3) uL    Eosinophil Absolute 0.09 0.00 - 0.70 x10(3) uL    Basophil Absolute 0.08 0.00 - 0.20 x10(3) uL    Immature Granulocyte Absolute 0.28 0.00 - 1.00 x10(3) uL    Neutrophil % 78.6 %    Lymphocyte % 9.5 %    Monocyte % 7.9 %    Eosinophil % 0.8 %    Basophil % 0.7 %    Immature Granulocyte % 2.5 %         Imaging & Referrals:  None

## 2024-01-08 ENCOUNTER — TELEPHONE (OUTPATIENT)
Dept: HEMATOLOGY/ONCOLOGY | Facility: HOSPITAL | Age: 39
End: 2024-01-08

## 2024-01-08 NOTE — PROGRESS NOTES
Per AJ Cruz:    she had bad abd cramping and flushing with taxol.  I gave addtl benadryl and no further events then switched benadryl dosing in plan.

## 2024-01-08 NOTE — TELEPHONE ENCOUNTER
Called patient post reaction from Friday.  She states no further issues or questions.  Doing well after second dose of Benadryl.  Will call if any issues.

## 2024-01-11 RX ORDER — FAMOTIDINE 10 MG/ML
20 INJECTION, SOLUTION INTRAVENOUS ONCE
Status: CANCELLED | OUTPATIENT
Start: 2024-01-12

## 2024-01-11 RX ORDER — FAMOTIDINE 10 MG/ML
20 INJECTION, SOLUTION INTRAVENOUS ONCE
OUTPATIENT
Start: 2024-01-19

## 2024-01-11 RX ORDER — DIPHENHYDRAMINE HYDROCHLORIDE 50 MG/ML
50 INJECTION INTRAMUSCULAR; INTRAVENOUS ONCE
Status: CANCELLED | OUTPATIENT
Start: 2024-01-12

## 2024-01-11 RX ORDER — DIPHENHYDRAMINE HYDROCHLORIDE 50 MG/ML
50 INJECTION INTRAMUSCULAR; INTRAVENOUS ONCE
OUTPATIENT
Start: 2024-01-19

## 2024-01-12 ENCOUNTER — APPOINTMENT (OUTPATIENT)
Dept: HEMATOLOGY/ONCOLOGY | Facility: HOSPITAL | Age: 39
End: 2024-01-12
Attending: INTERNAL MEDICINE
Payer: COMMERCIAL

## 2024-01-12 ENCOUNTER — OFFICE VISIT (OUTPATIENT)
Dept: SURGERY | Facility: CLINIC | Age: 39
End: 2024-01-12
Payer: COMMERCIAL

## 2024-01-12 ENCOUNTER — NURSE NAVIGATOR ENCOUNTER (OUTPATIENT)
Dept: HEMATOLOGY/ONCOLOGY | Facility: HOSPITAL | Age: 39
End: 2024-01-12

## 2024-01-12 VITALS
HEART RATE: 94 BPM | DIASTOLIC BLOOD PRESSURE: 66 MMHG | RESPIRATION RATE: 16 BRPM | SYSTOLIC BLOOD PRESSURE: 109 MMHG | TEMPERATURE: 98 F

## 2024-01-12 VITALS
SYSTOLIC BLOOD PRESSURE: 113 MMHG | WEIGHT: 155 LBS | TEMPERATURE: 98 F | HEART RATE: 87 BPM | BODY MASS INDEX: 24.91 KG/M2 | DIASTOLIC BLOOD PRESSURE: 80 MMHG | OXYGEN SATURATION: 100 % | RESPIRATION RATE: 14 BRPM | HEIGHT: 66 IN

## 2024-01-12 DIAGNOSIS — Z17.0 MALIGNANT NEOPLASM OF UPPER-OUTER QUADRANT OF RIGHT BREAST IN FEMALE, ESTROGEN RECEPTOR POSITIVE  (HCC): Primary | ICD-10-CM

## 2024-01-12 DIAGNOSIS — C50.411 MALIGNANT NEOPLASM OF UPPER-OUTER QUADRANT OF RIGHT BREAST IN FEMALE, ESTROGEN RECEPTOR POSITIVE  (HCC): Primary | ICD-10-CM

## 2024-01-12 DIAGNOSIS — Z45.2 ENCOUNTER FOR CENTRAL LINE CARE: ICD-10-CM

## 2024-01-12 LAB
BASOPHILS # BLD AUTO: 0.09 X10(3) UL (ref 0–0.2)
BASOPHILS NFR BLD AUTO: 1.1 %
DEPRECATED RDW RBC AUTO: 46.8 FL (ref 35.1–46.3)
EOSINOPHIL # BLD AUTO: 0.09 X10(3) UL (ref 0–0.7)
EOSINOPHIL NFR BLD AUTO: 1.1 %
ERYTHROCYTE [DISTWIDTH] IN BLOOD BY AUTOMATED COUNT: 14.5 % (ref 11–15)
HCT VFR BLD AUTO: 28.9 %
HGB BLD-MCNC: 9.4 G/DL
IMM GRANULOCYTES # BLD AUTO: 0.04 X10(3) UL (ref 0–1)
IMM GRANULOCYTES NFR BLD: 0.5 %
LYMPHOCYTES # BLD AUTO: 0.8 X10(3) UL (ref 1–4)
LYMPHOCYTES NFR BLD AUTO: 9.8 %
MCH RBC QN AUTO: 29.4 PG (ref 26–34)
MCHC RBC AUTO-ENTMCNC: 32.5 G/DL (ref 31–37)
MCV RBC AUTO: 90.3 FL
MONOCYTES # BLD AUTO: 0.77 X10(3) UL (ref 0.1–1)
MONOCYTES NFR BLD AUTO: 9.5 %
NEUTROPHILS # BLD AUTO: 6.34 X10 (3) UL (ref 1.5–7.7)
NEUTROPHILS # BLD AUTO: 6.34 X10(3) UL (ref 1.5–7.7)
NEUTROPHILS NFR BLD AUTO: 78 %
PLATELET # BLD AUTO: 438 10(3)UL (ref 150–450)
RBC # BLD AUTO: 3.2 X10(6)UL
WBC # BLD AUTO: 8.1 X10(3) UL (ref 4–11)

## 2024-01-12 PROCEDURE — 96375 TX/PRO/DX INJ NEW DRUG ADDON: CPT

## 2024-01-12 PROCEDURE — 99245 OFF/OP CONSLTJ NEW/EST HI 55: CPT | Performed by: SURGERY

## 2024-01-12 PROCEDURE — 96413 CHEMO IV INFUSION 1 HR: CPT

## 2024-01-12 PROCEDURE — 3008F BODY MASS INDEX DOCD: CPT | Performed by: SURGERY

## 2024-01-12 PROCEDURE — 3079F DIAST BP 80-89 MM HG: CPT | Performed by: SURGERY

## 2024-01-12 PROCEDURE — S0028 INJECTION, FAMOTIDINE, 20 MG: HCPCS

## 2024-01-12 PROCEDURE — 3074F SYST BP LT 130 MM HG: CPT | Performed by: SURGERY

## 2024-01-12 PROCEDURE — 85025 COMPLETE CBC W/AUTO DIFF WBC: CPT

## 2024-01-12 RX ORDER — HEPARIN SODIUM (PORCINE) LOCK FLUSH IV SOLN 100 UNIT/ML 100 UNIT/ML
SOLUTION INTRAVENOUS
Status: DISCONTINUED
Start: 2024-01-12 | End: 2024-01-12

## 2024-01-12 RX ORDER — DIPHENHYDRAMINE HYDROCHLORIDE 50 MG/ML
50 INJECTION INTRAMUSCULAR; INTRAVENOUS ONCE
Status: COMPLETED | OUTPATIENT
Start: 2024-01-12 | End: 2024-01-12

## 2024-01-12 RX ORDER — FAMOTIDINE 10 MG/ML
20 INJECTION, SOLUTION INTRAVENOUS ONCE
Status: COMPLETED | OUTPATIENT
Start: 2024-01-12 | End: 2024-01-12

## 2024-01-12 RX ORDER — FAMOTIDINE 10 MG/ML
INJECTION, SOLUTION INTRAVENOUS
Status: COMPLETED
Start: 2024-01-12 | End: 2024-01-12

## 2024-01-12 RX ORDER — DIPHENHYDRAMINE HYDROCHLORIDE 50 MG/ML
INJECTION INTRAMUSCULAR; INTRAVENOUS
Status: COMPLETED
Start: 2024-01-12 | End: 2024-01-12

## 2024-01-12 RX ADMIN — FAMOTIDINE 20 MG: 10 INJECTION, SOLUTION INTRAVENOUS at 11:41:00

## 2024-01-12 RX ADMIN — DIPHENHYDRAMINE HYDROCHLORIDE 50 MG: 50 INJECTION INTRAMUSCULAR; INTRAVENOUS at 11:44:00

## 2024-01-12 NOTE — PROGRESS NOTES
[facility] for a second opinion with Dr. Mae.  Patient is supported by her spouse.    Plan to proceed with axillary US and ELÍAS placement in previously biopsied right axillary lymph node.  Patient would like to establish care with Plastics to discuss options for breast reconstruction.  Patient aware she will be contacted for appointments by Radiology as well as Plastics Office.    Discussed repeat breast MRI upon completion of neoadjuvant chemotherapy.  Discussed scheduling pre operative PT assessment pending surgical plan of care.    Emotional support provided to patient.  Instructed patient to call with questions or concerns.

## 2024-01-12 NOTE — PROGRESS NOTES
Pt here for C5D8 Drug(s)Taxol.  Arrives Ambulating independently, accompanied by Self and Spouse     Patient was evaluated today by Treatment Nurse.    Oral medications included in this regimen:  no    Patient confirms comprehension of cancer treatment schedule:  yes    Pregnancy screening:  Denies possibility of pregnancy    Modifications in dose or schedule:  Yes, Benadryl premedication increased to 50mg due to abdominal cramping on previous infusion    Medications appearance and physical integrity checked by RN: yes.    Chemotherapy IV pump settings verified by 2 RNs:  Yes.  Frequency of blood return and site check throughout administration: Prior to administration and At completion of therapy     Infusion/treatment outcome:  patient tolerated treatment without incident  She had very mild abdominal cramping that subsided after a short period of time    Education Record    Learner:  Patient and Spouse  Barriers / Limitations:  None  Method:  Brief focused and Discussion  Education / instructions given:  PLan of care for treatment today.  Benadryl premedication increased to 50mg this infusion.  TAxol started at half rate for 15 minutes then increased to full rate explained to Jocelyn.  Outcome:  Shows understanding    Discharged Home, Ambulating independently, accompanied by:Self and Spouse    Patient/family verbalized understanding of future appointments: by [employer] messaging

## 2024-01-12 NOTE — PROGRESS NOTES
Breast Surgery New Patient Consultation    This is the first visit for this 38 year old woman, referred by Dr. Campbell, who presents for evaluation of right breast cancer.    History of Present Illness:   Ms. Jocelyn Garza is a 38 year old woman who presents with subjective mass of the right breast.  The patient first noticed this in 2023.  She was diagnosed with lobular carcinoma with spread to the right axillary lymph nodes.  She has started neoadjuvant chemotherapy under the direction of Dr. Cerrato.  She has a family history of breast cancer and tested negative for genetic mutation with a variant seen in 1 GILLES D gene.  She has no personal prior history of breast disease or biopsies.  She denies any nipple discharge, skin changes or other concerns.  Her MRI demonstrated the enhancement from the area to span more than 9 cm.   She is here today for evaluation and recommendations for further therapy.        Past Medical History:   Diagnosis Date    Breast cancer in female (HCC) 10/18/2023    Right breast lymph node cancer    Hypertension     Solitary kidney, congenital        Past Surgical History:   Procedure Laterality Date    APPENDECTOMY  2007    NEEDLE BIOPSY RIGHT Right 10/09/2023    TREAT ECTOPIC PREG,RMV TUBE/OVARY Left 2014    TREAT ECTOPIC PREG,RMV TUBE/OVARY Left 2015       Gynecological History:  Pt is a   She achieved menarche at age 10 and LMP 2023, currently undergoing chemotherapy  She denies any history of hormone replacement therapy   She has history of oral contraceptive use for 13 years, last in 18 years ago.  She has recieved infertility treatment to achieve pregnancy.    Medications:    No outpatient medications have been marked as taking for the 24 encounter (Appointment) with Janet Mae MD.       Allergies:    No Known Allergies    Family History:   Family History   Problem Relation Age of Onset    Other (Parkinson's Disease) Father      Prostate Cancer Maternal Grandfather 70    Pancreatic Cancer Paternal Grandmother 80    Pancreatic Cancer Maternal Uncle 60    Cancer Maternal Great-Grandmother         gastric ca    Cancer Paternal Aunt 80        breast cancer    Pancreatic Cancer Paternal Great-Grandfather        She does not know if she is of Ashkenazi Oriental orthodox ancestry.    Social History:  History   Alcohol Use    Yes     Comment: 2 -3 drinks per week       History   Smoking Status    Never   Smokeless Tobacco    Never     Ms. Jocelyn Garza is  with 2 adopted children. She has 1 siblings. She is currently On Medical Leave    Review of Systems:  General:   The patient denies, fever, chills, +night sweats, +fatigue, +generalized weakness, change in appetite or weight loss.    HEENT:     The patient denies eye irritation, cataracts, redness, glaucoma, yellowing of the eyes, change in vision, color blindness, or wearing contacts/glasses. The patient denies hearing loss, ringing in the ears, ear drainage, earaches, +nasal congestion, nose bleeds, snoring, pain in mouth/throat, hoarseness, change in voice, facial trauma.    Respiratory:  The patient denies chronic cough, +phlegm, hemoptysis, pleurisy/chest pain, pneumonia, asthma, wheezing, difficulty in breathing with exertion, emphysema, chronic bronchitis, +shortness of breath or abnormal sound when breathing.     Cardiovascular:  There is no history of chest pain, chest pressure/discomfort, +palpitations, irregular heartbeat, fainting or near-fainting, difficulty breathing when lying flat, SOB/Coughing at night, swelling of the legs or chest pain while walking.    Breasts:  See history of present illness    Gastrointestinal:     There is no history of difficulty or pain with swallowing, reflux symptoms, vomiting, dark or bloody stools, constipation, yellowing of the skin, indigestion, nausea, change in bowel habits, diarrhea, abdominal pain or vomiting blood.     Genitourinary:  The  patient denies frequent urination, needing to get up at night to urinate, urinary hesitancy or retaining urine, painful urination, urinary incontinence, decreased urine stream, blood in the urine or vaginal/penile discharge.    Skin:    The patient denies rash, itching, skin lesions, dry skin, change in skin color or change in moles.     Hematologic/Lymphatic:  The patient denies easily bruising or bleeding or persistent swollen glands or lymph nodes.     Musculoskeletal:  The patient denies muscle aches/pain, joint pain, +stiff joints, neck pain, back pain or bone pain.    Neuropsychiatric:  There is no history of migraines or severe headaches, seizure/epilepsy, speech problems, coordination problems, trembling/tremors, fainting/black outs, dizziness, memory problems, loss of sensation/numbness, problems walking, weakness, tingling or burning in hands/feet. There is no history of abusive relationship, bipolar disorder, sleep disturbance, anxiety, depression or feeling of despair.    Endocrine:    There is no history of poor/slow wound healing, weight loss/gain, +fertility or hormone problems, cold intolerance, thyroid disease.     Allergic/Immunologic:  There is no history of hives, hay fever, angioedema or anaphylaxis.    /80 (BP Location: Left arm, Patient Position: Sitting, Cuff Size: adult)   Pulse 87   Temp 97.8 °F (36.6 °C) (Temporal)   Resp 14   Ht 1.676 m (5' 6\")   Wt 70.3 kg (155 lb)   LMP 11/21/2023 (Approximate)   SpO2 100%   BMI 25.02 kg/m²     Physical Exam:  The patient is an alert, oriented, well-nourished and  well-developed woman who appears her stated age. Her speech patterns and movements are normal. Her affect is appropriate.    HEENT: The head is normocephalic. The neck is supple. The thyroid is not enlarged and is without palpable masses/nodules. There are no palpable masses. The trachea is in the midline. Conjunctiva are clear, non-icteric.    Chest: The chest expands  symmetrically. The lungs are clear to auscultation.    Heart: The rhythm is regular.  There are no murmurs, rubs, gallops or thrills.    Breasts:  Her breasts are symmetrical with a cup size 36D.  Right breast: The skin, nipple ,and areola appear normal. There is no skin dimpling with movement of the pectoralis. There is no nipple retraction. No nipple discharge can be elicited. The parenchyma is mildly nodular. There is a dominant 8 x 9 cm mass in tiredly involving the upper outer quadrant with palpable right axillary lymph node that is firm but mobile.  Left breast:   The skin, nipple, and areola appear normal. There is no skin dimpling with movement of the pectoralis. There is no nipple retraction. No nipple discharge can be elicited. The parenchyma is mildly nodular. There are no dominant masses in the breast. The axillary tail is normal.    Abdomen:  The abdomen is soft, flat and non tender. The liver is not enlarged. There are no palpable masses.    Lymph Nodes:  The supraclavicular, axillary and cervical regions are free of significant lymphadenopathy.    Back: There is no vertebral column tenderness.    Skin: The skin appears normal. There are no suspicious appearing rashes or lesions.    Extremities: The extremities are without deformity, cyanosis or edema.    Impression:   Ms. Jocelyn Garza is a 38 year old woman presents with newly diagnosed right  Cancer Staging   Malignant neoplasm of upper-outer quadrant of right breast in female, estrogen receptor positive  (HCC)  Staging form: Breast, AJCC 8th Edition  - Clinical stage from 10/12/2023: Stage IIA (cT3, cN1(f), cM0, G2, ER+, NJ+, HER2-) - Signed by Halie Calle MD on 10/20/2023    Discussion and Plan:  I had a discussion with the Patient regarding her breast exam. On exam today I found dominant mass involving the majority of the right breast as well as a palpable right axillary lymph node.  I personally reviewed the recent imaging and pathology  we discussed this at length.    The natural history and evolution of breast cancer were discussed with Ms. Jocelyn Garza and her  family, including the difference between in-situ and invasive carcinoma, and the distinction  between local and systemic disease and local and systemic therapy. For local treatment options,  I explained the risks and benefits of breast conservation and mastectomy (with or without  reconstruction), including the fact that survival rates are equal with these two approaches.  If breast conservation is elected, I explained the need for free margins, the possibility of re-  excision to achieve free margins, and the need for post-operative radiotherapy. The approach  to komal staging was also described, including the technique, risks and benefits of sentinel node  biopsy, the possible need for axillary dissection, and the long-term sequelae of this procedure.  With regard to systemic therapy, she will complete neoadjuvant chemotherapy under the direction of medical oncology.  I recommended she have a Allyson  placed in the previously positive right axillary lymph node.  We discussed she will need a mastectomy given extent of disease.  She will meet with plastic surgery to discuss reconstructive options.  I do anticipate the need for postmastectomy radiation therapy given initial extent of disease.  Though the genetic testing results were unremarkable she is considering a bilateral mastectomy.  I would like to see her following completion of chemotherapy with a repeat MRI due at that time.  The risks and possible complications of the procedure were explained to the patient and her family and she understood and agreed to the proposed plan. She was given ample opportunity for questions and those questions were answered to her satisfaction. She has been  encouraged to contact the office with any questions or concerns prior to her next appointment.

## 2024-01-19 ENCOUNTER — NURSE ONLY (OUTPATIENT)
Dept: HEMATOLOGY/ONCOLOGY | Facility: HOSPITAL | Age: 39
End: 2024-01-19
Attending: INTERNAL MEDICINE
Payer: COMMERCIAL

## 2024-01-19 ENCOUNTER — TELEPHONE (OUTPATIENT)
Dept: MAMMOGRAPHY | Facility: HOSPITAL | Age: 39
End: 2024-01-19

## 2024-01-19 VITALS
WEIGHT: 156.88 LBS | HEART RATE: 85 BPM | TEMPERATURE: 98 F | BODY MASS INDEX: 25 KG/M2 | OXYGEN SATURATION: 100 % | SYSTOLIC BLOOD PRESSURE: 125 MMHG | RESPIRATION RATE: 16 BRPM | DIASTOLIC BLOOD PRESSURE: 73 MMHG

## 2024-01-19 DIAGNOSIS — C50.411 MALIGNANT NEOPLASM OF UPPER-OUTER QUADRANT OF RIGHT BREAST IN FEMALE, ESTROGEN RECEPTOR POSITIVE  (HCC): Primary | ICD-10-CM

## 2024-01-19 DIAGNOSIS — Z17.0 MALIGNANT NEOPLASM OF UPPER-OUTER QUADRANT OF RIGHT BREAST IN FEMALE, ESTROGEN RECEPTOR POSITIVE  (HCC): Primary | ICD-10-CM

## 2024-01-19 DIAGNOSIS — Z01.818 PREPROCEDURAL EXAMINATION: Primary | ICD-10-CM

## 2024-01-19 DIAGNOSIS — Z45.2 ENCOUNTER FOR CENTRAL LINE CARE: ICD-10-CM

## 2024-01-19 LAB
BASOPHILS # BLD AUTO: 0.06 X10(3) UL (ref 0–0.2)
BASOPHILS NFR BLD AUTO: 0.9 %
DEPRECATED RDW RBC AUTO: 48.9 FL (ref 35.1–46.3)
EOSINOPHIL # BLD AUTO: 0.15 X10(3) UL (ref 0–0.7)
EOSINOPHIL NFR BLD AUTO: 2.2 %
ERYTHROCYTE [DISTWIDTH] IN BLOOD BY AUTOMATED COUNT: 14.8 % (ref 11–15)
HCT VFR BLD AUTO: 29.2 %
HGB BLD-MCNC: 9.4 G/DL
IMM GRANULOCYTES # BLD AUTO: 0.03 X10(3) UL (ref 0–1)
IMM GRANULOCYTES NFR BLD: 0.4 %
LYMPHOCYTES # BLD AUTO: 0.71 X10(3) UL (ref 1–4)
LYMPHOCYTES NFR BLD AUTO: 10.6 %
MCH RBC QN AUTO: 29.6 PG (ref 26–34)
MCHC RBC AUTO-ENTMCNC: 32.2 G/DL (ref 31–37)
MCV RBC AUTO: 91.8 FL
MONOCYTES # BLD AUTO: 0.77 X10(3) UL (ref 0.1–1)
MONOCYTES NFR BLD AUTO: 11.5 %
NEUTROPHILS # BLD AUTO: 4.99 X10 (3) UL (ref 1.5–7.7)
NEUTROPHILS # BLD AUTO: 4.99 X10(3) UL (ref 1.5–7.7)
NEUTROPHILS NFR BLD AUTO: 74.4 %
PLATELET # BLD AUTO: 366 10(3)UL (ref 150–450)
RBC # BLD AUTO: 3.18 X10(6)UL
WBC # BLD AUTO: 6.7 X10(3) UL (ref 4–11)

## 2024-01-19 PROCEDURE — S0028 INJECTION, FAMOTIDINE, 20 MG: HCPCS

## 2024-01-19 PROCEDURE — 96375 TX/PRO/DX INJ NEW DRUG ADDON: CPT

## 2024-01-19 PROCEDURE — 85025 COMPLETE CBC W/AUTO DIFF WBC: CPT

## 2024-01-19 PROCEDURE — 96413 CHEMO IV INFUSION 1 HR: CPT

## 2024-01-19 RX ORDER — FAMOTIDINE 10 MG/ML
INJECTION, SOLUTION INTRAVENOUS
Status: COMPLETED
Start: 2024-01-19 | End: 2024-01-19

## 2024-01-19 RX ORDER — DIPHENHYDRAMINE HYDROCHLORIDE 50 MG/ML
INJECTION INTRAMUSCULAR; INTRAVENOUS
Status: COMPLETED
Start: 2024-01-19 | End: 2024-01-19

## 2024-01-19 RX ORDER — SODIUM CHLORIDE 9 MG/ML
10 INJECTION INTRAVENOUS ONCE
OUTPATIENT
Start: 2024-01-19

## 2024-01-19 RX ORDER — HEPARIN SODIUM (PORCINE) LOCK FLUSH IV SOLN 100 UNIT/ML 100 UNIT/ML
SOLUTION INTRAVENOUS
Status: COMPLETED
Start: 2024-01-19 | End: 2024-01-19

## 2024-01-19 RX ORDER — HEPARIN SODIUM (PORCINE) LOCK FLUSH IV SOLN 100 UNIT/ML 100 UNIT/ML
5 SOLUTION INTRAVENOUS ONCE
OUTPATIENT
Start: 2024-01-19

## 2024-01-19 RX ORDER — HEPARIN SODIUM (PORCINE) LOCK FLUSH IV SOLN 100 UNIT/ML 100 UNIT/ML
5 SOLUTION INTRAVENOUS ONCE
Status: COMPLETED | OUTPATIENT
Start: 2024-01-19 | End: 2024-01-19

## 2024-01-19 RX ORDER — FAMOTIDINE 10 MG/ML
20 INJECTION, SOLUTION INTRAVENOUS ONCE
Status: COMPLETED | OUTPATIENT
Start: 2024-01-19 | End: 2024-01-19

## 2024-01-19 RX ORDER — DIPHENHYDRAMINE HYDROCHLORIDE 50 MG/ML
50 INJECTION INTRAMUSCULAR; INTRAVENOUS ONCE
Status: COMPLETED | OUTPATIENT
Start: 2024-01-19 | End: 2024-01-19

## 2024-01-19 RX ADMIN — HEPARIN SODIUM (PORCINE) LOCK FLUSH IV SOLN 100 UNIT/ML 500 UNITS: 100 SOLUTION INTRAVENOUS at 13:00:00

## 2024-01-19 RX ADMIN — FAMOTIDINE 20 MG: 10 INJECTION, SOLUTION INTRAVENOUS at 11:25:00

## 2024-01-19 RX ADMIN — DIPHENHYDRAMINE HYDROCHLORIDE 50 MG: 50 INJECTION INTRAMUSCULAR; INTRAVENOUS at 11:27:00

## 2024-01-19 NOTE — PROGRESS NOTES
Pt here for C5D15 Drug(s) Taxol.  Arrives Ambulating independently, accompanied by Self and Friend     Patient was evaluated today by Treatment Nurse.    Oral medications included in this regimen:  no    Patient confirms comprehension of cancer treatment schedule:  yes    Pregnancy screening:  Denies possibility of pregnancy    Modifications in dose or schedule:  No    Medications appearance and physical integrity checked by RN: yes.    Chemotherapy IV pump settings verified by 2 RNs:  Yes.  Frequency of blood return and site check throughout administration: Prior to administration, Prior to each drug, and At completion of therapy     Infusion/treatment outcome:  patient tolerated treatment without incident  Patient had mild abdominal cramping long-term through infusion but did not want to slow rate, cramping tolerable and resolved prior to the end of infusion. Sent message to clinical team as ORACIO.     Education Record    Learner:  Patient and Friend  Barriers / Limitations:  None  Method:  Discussion  Education / instructions given:  plan of care, treatment schedule, ultrasound date/time  Outcome:  Shows understanding    Discharged Home, Ambulating independently, accompanied by:Self and Friend    Patient/family verbalized understanding of future appointments: by printed AVS

## 2024-01-19 NOTE — TELEPHONE ENCOUNTER
Called pt to coordinate us axilla to be done prior to scheduling save loc. She was offered to come in today for axilla us after her chemo appt she verbalizes a friend is bringing her today and would like to come in On Tuesday for her us.  She was informed that we would scan the axilla on Tuesday to determine if we could still see the clip on imaging . If clip can been seen we would have to do stat labs day of allyson loc due to currently on chemo.. Allyson seed loc procedure explained .Denies asa nsaid uses will stay off until after allyson seed. Pt schedule to arrive Tuesday 8 a for 830 us.Jocelyn Garza verbalizes understanding and agreement

## 2024-01-23 ENCOUNTER — TELEPHONE (OUTPATIENT)
Dept: HEMATOLOGY/ONCOLOGY | Facility: HOSPITAL | Age: 39
End: 2024-01-23

## 2024-01-23 ENCOUNTER — LAB ENCOUNTER (OUTPATIENT)
Dept: LAB | Facility: HOSPITAL | Age: 39
End: 2024-01-23
Attending: SURGERY
Payer: COMMERCIAL

## 2024-01-23 ENCOUNTER — HOSPITAL ENCOUNTER (OUTPATIENT)
Dept: ULTRASOUND IMAGING | Facility: HOSPITAL | Age: 39
Discharge: HOME | End: 2024-01-23
Attending: SURGERY
Payer: COMMERCIAL

## 2024-01-23 ENCOUNTER — HOSPITAL ENCOUNTER (OUTPATIENT)
Dept: MAMMOGRAPHY | Facility: HOSPITAL | Age: 39
Discharge: HOME | End: 2024-01-23
Attending: SURGERY
Payer: COMMERCIAL

## 2024-01-23 DIAGNOSIS — R35.0 URINE FREQUENCY: Primary | ICD-10-CM

## 2024-01-23 DIAGNOSIS — R53.83 OTHER FATIGUE: ICD-10-CM

## 2024-01-23 DIAGNOSIS — Z17.0 MALIGNANT NEOPLASM OF UPPER-OUTER QUADRANT OF RIGHT BREAST IN FEMALE, ESTROGEN RECEPTOR POSITIVE  (HCC): ICD-10-CM

## 2024-01-23 DIAGNOSIS — R35.0 URINE FREQUENCY: ICD-10-CM

## 2024-01-23 DIAGNOSIS — C50.411 MALIGNANT NEOPLASM OF UPPER-OUTER QUADRANT OF RIGHT BREAST IN FEMALE, ESTROGEN RECEPTOR POSITIVE  (HCC): ICD-10-CM

## 2024-01-23 DIAGNOSIS — Z01.818 PREPROCEDURAL EXAMINATION: ICD-10-CM

## 2024-01-23 LAB
BASOPHILS # BLD AUTO: 0.04 X10(3) UL (ref 0–0.2)
BASOPHILS NFR BLD AUTO: 0.5 %
BILIRUB UR QL: NEGATIVE
CLARITY UR: CLEAR
DEPRECATED RDW RBC AUTO: 48.3 FL (ref 35.1–46.3)
EOSINOPHIL # BLD AUTO: 0.19 X10(3) UL (ref 0–0.7)
EOSINOPHIL NFR BLD AUTO: 2.4 %
ERYTHROCYTE [DISTWIDTH] IN BLOOD BY AUTOMATED COUNT: 14.9 % (ref 11–15)
GLUCOSE UR-MCNC: NORMAL MG/DL
HCT VFR BLD AUTO: 30.3 %
HGB BLD-MCNC: 10.3 G/DL
HGB UR QL STRIP.AUTO: NEGATIVE
IMM GRANULOCYTES # BLD AUTO: 0.03 X10(3) UL (ref 0–1)
IMM GRANULOCYTES NFR BLD: 0.4 %
KETONES UR-MCNC: NEGATIVE MG/DL
LEUKOCYTE ESTERASE UR QL STRIP.AUTO: NEGATIVE
LYMPHOCYTES # BLD AUTO: 0.75 X10(3) UL (ref 1–4)
LYMPHOCYTES NFR BLD AUTO: 9.4 %
MCH RBC QN AUTO: 30.3 PG (ref 26–34)
MCHC RBC AUTO-ENTMCNC: 34 G/DL (ref 31–37)
MCV RBC AUTO: 89.1 FL
MONOCYTES # BLD AUTO: 0.74 X10(3) UL (ref 0.1–1)
MONOCYTES NFR BLD AUTO: 9.3 %
NEUTROPHILS # BLD AUTO: 6.21 X10 (3) UL (ref 1.5–7.7)
NEUTROPHILS # BLD AUTO: 6.21 X10(3) UL (ref 1.5–7.7)
NEUTROPHILS NFR BLD AUTO: 78 %
NITRITE UR QL STRIP.AUTO: NEGATIVE
PH UR: 6 [PH] (ref 5–8)
PLATELET # BLD AUTO: 331 10(3)UL (ref 150–450)
PROT UR-MCNC: NEGATIVE MG/DL
RBC # BLD AUTO: 3.4 X10(6)UL
SP GR UR STRIP: 1.02 (ref 1–1.03)
UROBILINOGEN UR STRIP-ACNC: NORMAL
WBC # BLD AUTO: 8 X10(3) UL (ref 4–11)

## 2024-01-23 PROCEDURE — 10035 PLMT SFT TISS LOCLZJ DEV 1ST: CPT | Performed by: SURGERY

## 2024-01-23 PROCEDURE — 81003 URINALYSIS AUTO W/O SCOPE: CPT

## 2024-01-23 PROCEDURE — 85025 COMPLETE CBC W/AUTO DIFF WBC: CPT

## 2024-01-23 PROCEDURE — 76882 US LMTD JT/FCL EVL NVASC XTR: CPT | Performed by: SURGERY

## 2024-01-23 PROCEDURE — 87086 URINE CULTURE/COLONY COUNT: CPT

## 2024-01-23 PROCEDURE — 77065 DX MAMMO INCL CAD UNI: CPT | Performed by: SURGERY

## 2024-01-23 PROCEDURE — 36415 COLL VENOUS BLD VENIPUNCTURE: CPT

## 2024-01-23 NOTE — TELEPHONE ENCOUNTER
Patient called.  She is taking paxil weekly and is experiencing some side effects.  Left side between her back and under her breast. More o the side she has pain.  She only has 1 kidney and is concerned. Please call back.  Thank you..

## 2024-01-23 NOTE — IMAGING NOTE
Images completed clip was able to be seen on axilla scan .  I conferred with Dr Castellano lisa to be done to if pt wants procedure today. I informed Jocelyn Garza that Dr Castellano radiologist would add her on today we just need stat labs to be done now. Pt conferred with spouse she  was having flank pain and discussed with triage rn that she could have lisa today will just need a urine sample.Pt would like to have loc today.  Pt to have have urine sample now when getting stat labs drawn.    920 am pt to walk to lab with spouse then eat after labs.If labs approved per [facility] loc to be done between cases .  
Statement Selected

## 2024-01-23 NOTE — TELEPHONE ENCOUNTER
Left Side Tenderness    Jocelyn reports for the last day she has noticed some tenderness under her left breast that radiates around to her left back. She denies chills, shakes, fever, urinary urgency, frequency or burning. She is voiding pale yellow urine today. She denies falls or injury. She reports if she touches the area she has \"2/10\" aching pain. If she does not touch the area she has \"zero\" pain.     I told her to make sure she is drinking 64-80 oz of caffeine free fluids and water. I also let her know that [facility] put an order in for a UA & C&S. She should provide that urine sample today. She said she is on campus. She will provide it once she is done with her procedure in [facility] at the [facility].

## 2024-01-23 NOTE — IMAGING NOTE
Labs reviewed with Dr Castellano back from breakfast with spouses. Procedure to be done today per Dr Castellano  Jocelyn GarzaIdentified with spelling of name and date of birth.Medications and allergies reviewed.  Denies taking aspirin containing medications, NSAIDs, fish oil, vitamin E  5 days prior to biopsy. Denies prescription anti coagulating medications 5 days prior to biopsy.     Procedure explained and questions answered.     Pt consented  by this Rn  Post procedure instructions provided at this time, verbal and written.   verbalized understanding.  Report off to and transfer of care to [facility]  ultrasound  sonographer .  See hx  below

## 2024-01-23 NOTE — TELEPHONE ENCOUNTER
Toxicities: C5 D15 Paclitaxel on 1/19/2024    I attempted to reach Jocelyn to do a telephone assessment. Her  said he will have her call me once her ultra sound is done.

## 2024-01-24 RX ORDER — LISINOPRIL 5 MG/1
5 TABLET ORAL DAILY
Qty: 30 TABLET | Refills: 5 | Status: SHIPPED | OUTPATIENT
Start: 2024-01-24

## 2024-01-24 NOTE — PROCEDURES
[facility]  Procedure Note    Jocelyn Garza Patient Status:  Outpatient    1985 MRN [id number]   Location [facility] Attending Janet Mae MD   Hosp Day # 0 PCP Adolfo Campbell DO     Procedure: ultrasound ELÍAS placement    Pre-Procedure Diagnosis:  right axillary LN metastatic    Post-Procedure Diagnosis: right axillary LN metastatic    Anesthesia:  Local    Findings:  right axillary LN metastatic    Specimens: ELÍAS Castellano DO  2024

## 2024-01-26 ENCOUNTER — NURSE ONLY (OUTPATIENT)
Dept: HEMATOLOGY/ONCOLOGY | Facility: HOSPITAL | Age: 39
End: 2024-01-26
Attending: INTERNAL MEDICINE
Payer: COMMERCIAL

## 2024-01-26 VITALS
WEIGHT: 156.38 LBS | RESPIRATION RATE: 16 BRPM | TEMPERATURE: 98 F | OXYGEN SATURATION: 100 % | HEIGHT: 66 IN | HEART RATE: 77 BPM | DIASTOLIC BLOOD PRESSURE: 76 MMHG | BODY MASS INDEX: 25.13 KG/M2 | SYSTOLIC BLOOD PRESSURE: 118 MMHG

## 2024-01-26 DIAGNOSIS — D64.81 ANEMIA ASSOCIATED WITH CHEMOTHERAPY: ICD-10-CM

## 2024-01-26 DIAGNOSIS — C50.411 MALIGNANT NEOPLASM OF UPPER-OUTER QUADRANT OF RIGHT BREAST IN FEMALE, ESTROGEN RECEPTOR POSITIVE  (HCC): Primary | ICD-10-CM

## 2024-01-26 DIAGNOSIS — Z45.2 ENCOUNTER FOR CENTRAL LINE CARE: ICD-10-CM

## 2024-01-26 DIAGNOSIS — Z17.0 MALIGNANT NEOPLASM OF UPPER-OUTER QUADRANT OF RIGHT BREAST IN FEMALE, ESTROGEN RECEPTOR POSITIVE  (HCC): Primary | ICD-10-CM

## 2024-01-26 DIAGNOSIS — Z09 CHEMOTHERAPY FOLLOW-UP EXAMINATION: ICD-10-CM

## 2024-01-26 DIAGNOSIS — T45.1X5A ANEMIA ASSOCIATED WITH CHEMOTHERAPY: ICD-10-CM

## 2024-01-26 LAB
ALBUMIN SERPL-MCNC: 4.1 G/DL (ref 3.2–4.8)
ALBUMIN/GLOB SERPL: 1.6 {RATIO} (ref 1–2)
ALP LIVER SERPL-CCNC: 46 U/L
ALT SERPL-CCNC: 17 U/L
ANION GAP SERPL CALC-SCNC: 8 MMOL/L (ref 0–18)
AST SERPL-CCNC: 19 U/L (ref ?–34)
BASOPHILS # BLD AUTO: 0.04 X10(3) UL (ref 0–0.2)
BASOPHILS NFR BLD AUTO: 0.5 %
BILIRUB SERPL-MCNC: 0.4 MG/DL (ref 0.3–1.2)
BUN BLD-MCNC: 16 MG/DL (ref 9–23)
BUN/CREAT SERPL: 19 (ref 10–20)
CALCIUM BLD-MCNC: 9.4 MG/DL (ref 8.7–10.4)
CHLORIDE SERPL-SCNC: 106 MMOL/L (ref 98–112)
CO2 SERPL-SCNC: 26 MMOL/L (ref 21–32)
CREAT BLD-MCNC: 0.84 MG/DL
DEPRECATED RDW RBC AUTO: 48.4 FL (ref 35.1–46.3)
EGFRCR SERPLBLD CKD-EPI 2021: 90 ML/MIN/1.73M2 (ref 60–?)
EOSINOPHIL # BLD AUTO: 0.43 X10(3) UL (ref 0–0.7)
EOSINOPHIL NFR BLD AUTO: 5.8 %
ERYTHROCYTE [DISTWIDTH] IN BLOOD BY AUTOMATED COUNT: 14.6 % (ref 11–15)
GLOBULIN PLAS-MCNC: 2.5 G/DL (ref 2.8–4.4)
GLUCOSE BLD-MCNC: 101 MG/DL (ref 70–99)
HCT VFR BLD AUTO: 28.7 %
HGB BLD-MCNC: 9.4 G/DL
IMM GRANULOCYTES # BLD AUTO: 0.03 X10(3) UL (ref 0–1)
IMM GRANULOCYTES NFR BLD: 0.4 %
LYMPHOCYTES # BLD AUTO: 0.61 X10(3) UL (ref 1–4)
LYMPHOCYTES NFR BLD AUTO: 8.3 %
MCH RBC QN AUTO: 29.6 PG (ref 26–34)
MCHC RBC AUTO-ENTMCNC: 32.8 G/DL (ref 31–37)
MCV RBC AUTO: 90.3 FL
MONOCYTES # BLD AUTO: 0.74 X10(3) UL (ref 0.1–1)
MONOCYTES NFR BLD AUTO: 10 %
NEUTROPHILS # BLD AUTO: 5.52 X10 (3) UL (ref 1.5–7.7)
NEUTROPHILS # BLD AUTO: 5.52 X10(3) UL (ref 1.5–7.7)
NEUTROPHILS NFR BLD AUTO: 75 %
OSMOLALITY SERPL CALC.SUM OF ELEC: 291 MOSM/KG (ref 275–295)
PLATELET # BLD AUTO: 313 10(3)UL (ref 150–450)
POTASSIUM SERPL-SCNC: 4 MMOL/L (ref 3.5–5.1)
PROT SERPL-MCNC: 6.6 G/DL (ref 5.7–8.2)
RBC # BLD AUTO: 3.18 X10(6)UL
SODIUM SERPL-SCNC: 140 MMOL/L (ref 136–145)
WBC # BLD AUTO: 7.4 X10(3) UL (ref 4–11)

## 2024-01-26 PROCEDURE — 85025 COMPLETE CBC W/AUTO DIFF WBC: CPT

## 2024-01-26 PROCEDURE — 96413 CHEMO IV INFUSION 1 HR: CPT

## 2024-01-26 PROCEDURE — 99215 OFFICE O/P EST HI 40 MIN: CPT | Performed by: INTERNAL MEDICINE

## 2024-01-26 PROCEDURE — 96375 TX/PRO/DX INJ NEW DRUG ADDON: CPT

## 2024-01-26 PROCEDURE — 80053 COMPREHEN METABOLIC PANEL: CPT

## 2024-01-26 PROCEDURE — S0028 INJECTION, FAMOTIDINE, 20 MG: HCPCS

## 2024-01-26 RX ORDER — SODIUM CHLORIDE 9 MG/ML
10 INJECTION, SOLUTION INTRAMUSCULAR; INTRAVENOUS; SUBCUTANEOUS ONCE
OUTPATIENT
Start: 2024-01-26

## 2024-01-26 RX ORDER — DIPHENHYDRAMINE HYDROCHLORIDE 50 MG/ML
50 INJECTION INTRAMUSCULAR; INTRAVENOUS ONCE
Status: COMPLETED | OUTPATIENT
Start: 2024-01-26 | End: 2024-01-26

## 2024-01-26 RX ORDER — DIPHENHYDRAMINE HYDROCHLORIDE 50 MG/ML
INJECTION INTRAMUSCULAR; INTRAVENOUS
Status: COMPLETED
Start: 2024-01-26 | End: 2024-01-26

## 2024-01-26 RX ORDER — DIPHENHYDRAMINE HYDROCHLORIDE 50 MG/ML
50 INJECTION INTRAMUSCULAR; INTRAVENOUS ONCE
OUTPATIENT
Start: 2024-02-09

## 2024-01-26 RX ORDER — FAMOTIDINE 10 MG/ML
20 INJECTION, SOLUTION INTRAVENOUS ONCE
OUTPATIENT
Start: 2024-02-09

## 2024-01-26 RX ORDER — FAMOTIDINE 10 MG/ML
20 INJECTION, SOLUTION INTRAVENOUS ONCE
OUTPATIENT
Start: 2024-02-02

## 2024-01-26 RX ORDER — FAMOTIDINE 10 MG/ML
20 INJECTION, SOLUTION INTRAVENOUS ONCE
Status: CANCELLED | OUTPATIENT
Start: 2024-01-26

## 2024-01-26 RX ORDER — DIPHENHYDRAMINE HYDROCHLORIDE 50 MG/ML
50 INJECTION INTRAMUSCULAR; INTRAVENOUS ONCE
Status: CANCELLED | OUTPATIENT
Start: 2024-01-26

## 2024-01-26 RX ORDER — HEPARIN SODIUM (PORCINE) LOCK FLUSH IV SOLN 100 UNIT/ML 100 UNIT/ML
5 SOLUTION INTRAVENOUS ONCE
Status: COMPLETED | OUTPATIENT
Start: 2024-01-26 | End: 2024-01-26

## 2024-01-26 RX ORDER — DIPHENHYDRAMINE HYDROCHLORIDE 50 MG/ML
50 INJECTION INTRAMUSCULAR; INTRAVENOUS ONCE
OUTPATIENT
Start: 2024-02-02

## 2024-01-26 RX ORDER — HEPARIN SODIUM (PORCINE) LOCK FLUSH IV SOLN 100 UNIT/ML 100 UNIT/ML
5 SOLUTION INTRAVENOUS ONCE
OUTPATIENT
Start: 2024-01-26

## 2024-01-26 RX ORDER — FAMOTIDINE 10 MG/ML
20 INJECTION, SOLUTION INTRAVENOUS ONCE
Status: COMPLETED | OUTPATIENT
Start: 2024-01-26 | End: 2024-01-26

## 2024-01-26 RX ORDER — FAMOTIDINE 10 MG/ML
INJECTION, SOLUTION INTRAVENOUS
Status: COMPLETED
Start: 2024-01-26 | End: 2024-01-26

## 2024-01-26 RX ADMIN — FAMOTIDINE 20 MG: 10 INJECTION, SOLUTION INTRAVENOUS at 12:05:00

## 2024-01-26 RX ADMIN — HEPARIN SODIUM (PORCINE) LOCK FLUSH IV SOLN 100 UNIT/ML 500 UNITS: 100 SOLUTION INTRAVENOUS at 14:07:00

## 2024-01-26 RX ADMIN — DIPHENHYDRAMINE HYDROCHLORIDE 50 MG: 50 INJECTION INTRAMUSCULAR; INTRAVENOUS at 12:06:00

## 2024-01-26 NOTE — PROGRESS NOTES
HPI   Jocelyn Garza is a 38 year old female for evaluation of   Encounter Diagnoses   Name Primary?    Malignant neoplasm of upper-outer quadrant of right breast in female, estrogen receptor positive  (HCC) Yes    Chemotherapy follow-up examination     Anemia associated with chemotherapy     Other fatigue        She is here today S/p cycle 4 DD AC.   Cycle 1 complicated with hospitalization for neutropenic fevers. Pt had chills, no source of infection identified.     S/p cycle 1 of taxol, with reaction with flushing and abd cramping after cycle 1 D1 that resolved with exta benadryl.  States that for D8 was better with the benadryl.  Felt like period cramps.      States that she feels mass feels looser on the R breast.      Fatigue:  No    Fevers:  No    Appetite/taste changes:  Yes, sometimes taste changes.    Mucositis:  No    Weight changes:  No    Nausea/vomiting:  No    Diarrhea:  No    Constipation:  Yes, on/off  better.    Peripheral neuropathy:  Yes, very sporadic tips of index finger and thumb for 1-2 hrs.      ECOG PS  0    Review of Systems:   Review of Systems   HENT:   Positive for nosebleeds (on the tissue.).    Respiratory:  Negative for cough and shortness of breath.    Cardiovascular:  Negative for chest pain.   Gastrointestinal:  Negative for abdominal pain.   Endocrine: Positive for hot flashes.   Genitourinary:  Negative for dysuria and frequency.    Musculoskeletal:  Positive for arthralgias (at shoulders like the pain she had with GCSF, claritin helps). Negative for back pain and flank pain (L flank mild discomfort that has had in the past prior to diagnosis).   Skin:  Negative for rash.   Neurological:  Positive for dizziness (One episode). Negative for headaches.   Hematological:  Negative for adenopathy.   Psychiatric/Behavioral:  Negative for sleep disturbance.          Current Outpatient Medications   Medication Sig Dispense Refill    lisinopril 5 MG Oral Tab Take 1 tablet (5 mg  total) by mouth daily. 30 tablet 5    cefuroxime 500 MG Oral Tab Take 1 tablet (500 mg total) by mouth 2 (two) times daily. For 3 days (Patient not taking: Reported on 12/19/2023)      prochlorperazine (COMPAZINE) 10 mg tablet Take 1 tablet (10 mg total) by mouth every 8 (eight) hours as needed for Nausea. (Patient not taking: Reported on 1/5/2024) 30 tablet 3    ondansetron (ZOFRAN) 8 MG tablet Take 1 tablet (8 mg total) by mouth every 8 (eight) hours as needed for Nausea. (Patient not taking: Reported on 1/5/2024) 30 tablet 3     Allergies:   No Known Allergies    Past Medical History:   Diagnosis Date    Breast cancer in female (HCC) 10/18/2023    Right breast lymph node cancer    Hypertension     Solitary kidney, congenital      Past Surgical History:   Procedure Laterality Date    APPENDECTOMY  01/01/2007    NEEDLE BIOPSY RIGHT Right 10/09/2023    TREAT ECTOPIC PREG,RMV TUBE/OVARY Left 01/01/2014    TREAT ECTOPIC PREG,RMV TUBE/OVARY Left 01/01/2015     Social History     Socioeconomic History    Marital status:    Tobacco Use    Smoking status: Never    Smokeless tobacco: Never   Vaping Use    Vaping Use: Never used   Substance and Sexual Activity    Alcohol use: Yes     Comment: 2 -3 drinks per week    Drug use: No     Social Determinants of Health     Food Insecurity: No Food Insecurity (11/9/2023)    Food Insecurity     Food Insecurity: Never true   Transportation Needs: No Transportation Needs (11/9/2023)    Transportation Needs     Lack of Transportation: No   Housing Stability: Low Risk  (11/9/2023)    Housing Stability     Housing Instability: No       Family History   Problem Relation Age of Onset    Other (Parkinson's Disease) Father     Prostate Cancer Maternal Grandfather 70    Pancreatic Cancer Paternal Grandmother 80    Pancreatic Cancer Maternal Uncle 60    Cancer Maternal Great-Grandmother         gastric ca    Cancer Paternal Aunt 80        breast cancer    Pancreatic Cancer Paternal  Great-Grandfather          PHYSICAL EXAM:    /76 (BP Location: Left arm, Patient Position: Sitting, Cuff Size: adult)   Pulse 77   Temp 98 °F (36.7 °C) (Oral)   Resp 16   Ht 1.676 m (5' 6\")   Wt 70.9 kg (156 lb 6.4 oz)   LMP 11/21/2023 (Approximate)   SpO2 100%   BMI 25.24 kg/m²   Wt Readings from Last 6 Encounters:   01/26/24 70.9 kg (156 lb 6.4 oz)   01/19/24 71.2 kg (156 lb 14.4 oz)   01/12/24 70.3 kg (155 lb)   01/05/24 70.8 kg (156 lb)   12/22/23 70.7 kg (155 lb 12.8 oz)   12/19/23 70.3 kg (155 lb)     Physical Exam    General: Patient is alert, not in acute distress.  HEENT: EOMs intact.  Anicteric.  Oropharynx is clear.   Neck: No JVD. No palpable lymphadenopathy. Neck is supple.  Chest: Non labored breathing.  Clear to auscultation.  Breasts:  Right breast at the RUQ with a mobile 11 x 10 cm hard mass - tender.  R axilla no palpable LN - tender.    Heart: Regular rate and rhythm.   Abdomen: Soft, non tender with good bowel sounds.  Extremities: No edema.  No edema  Neurological: Grossly intact.   Lymphatics: There is no palpable lymphadenopathy throughout in the cervical or supraclavicular egions.  Psych/Depression: nl          ASSESSMENT/PLAN:     1. Malignant neoplasm of upper-outer quadrant of right breast in female, estrogen receptor positive  (HCC)    2. Chemotherapy follow-up examination    3. Anemia associated with chemotherapy    4. Other fatigue         Cancer Staging   Malignant neoplasm of upper-outer quadrant of right breast in female, estrogen receptor positive  (HCC)  Staging form: Breast, AJCC 8th Edition  - Clinical stage from 10/12/2023: Stage IIA (cT3, cN1(f), cM0, G2, ER+, NH+, HER2-) - Signed by Halie Calle MD on 10/20/2023    Patient has completed staging work-up.  Thus far no evidence of metastatic disease.  Findings in the liver evaluated with ultrasound, and the one side is a hemangioma, the other likely a cyst, but a question of metastatic disease and PET scan has  been ordered.  I discussed with the patient that most likely this is going to be a cyst.    Discussed that she still has early stage disease, and that her staging has not changed despite the size of the primary tumor.    Given extensive disease in the breast and komal involvement, she will benefit from neoadjuvant chemotherapy, which may help with surgical outcomes by shrinking some of the primary tumor and of the lymph nodes.  I discussed with the patient that the primary tumor will not likely decrease in size significantly to make her a candidate for breast conservation.  I did discuss with the patient that she will need a mastectomy and likely axillary lymph node dissection.  Given the size of the primary tumor, she is a candidate for postmastectomy radiation therapy plus minus radiation to the komal basins pending on surgical management of the axilla.  I discussed with the patient that once she completes treatment with radiation, she will then be initiated on adjuvant endocrine therapy, given the size of the tumor and number of positive lymph nodes, she also will be a candidate for 2 years of endovascular in the initial 2 years of adjuvant endocrine therapy.  Discussed that adjuvant endocrine therapy total duration will be no less than 5 years but given her high risk features, likely up to 8 to 10 years.  We will also discuss options for ovarian function suppression after completion of chemotherapy.    Patient inquired regarding contralateral prophylactic mastectomy.  I discussed with the patient that we should await results of her genetic testing.  If the genetic testing is positive for a deleterious mutation which will increase her lifetime second primary breast cancer, then she should proceed with prophylactic contralateral mastectomy.  However, if this is negative, discussed with the patient that there is no benefit for proceeding with contralateral mastectomy, as it will not change her disease-free or  overall survival.    Discussed with the patient that while she is receiving neoadjuvant treatment, she can have the evaluation by plastic surgery so that when she completes her neoadjuvant course of therapy, she will be able to have her surgery coordinated with Dr. Hutson and a plastic surgeon    Plan for DD AC x 4 cycles followed by weekly Taxol x12 weeks    S/p cycle 4 DD AC, complicated by RSV neutropenia.    S/p cycle 1 of weekly taxol.  Had infusion reaction after cycle 1 D1.      Proceed with cycle 2 weekly Paclitaxel will give additional benadryl.      Consult with Dr. Mae on 1/12/24, MRI after treatment and Allyson placement at site of LN, done on 1/23/24.    Chemotherapy-induced anemia.  Monitor    All of the patient's questions were answered to the best of my abilities.     Call prn.  Follow-up in 3 weeks    MDM high risk.    No orders of the defined types were placed in this encounter.      Results From Past 48 Hours:  Recent Results (from the past 48 hour(s))   Comp Metabolic Panel (14)    Collection Time: 01/26/24  9:37 AM   Result Value Ref Range    Glucose 101 (H) 70 - 99 mg/dL    Sodium 140 136 - 145 mmol/L    Potassium 4.0 3.5 - 5.1 mmol/L    Chloride 106 98 - 112 mmol/L    CO2 26.0 21.0 - 32.0 mmol/L    Anion Gap 8 0 - 18 mmol/L    BUN 16 9 - 23 mg/dL    Creatinine 0.84 0.55 - 1.02 mg/dL    BUN/CREA Ratio 19.0 10.0 - 20.0    Calcium, Total 9.4 8.7 - 10.4 mg/dL    Calculated Osmolality 291 275 - 295 mOsm/kg    eGFR-Cr 90 >=60 mL/min/1.73m2    ALT 17 10 - 49 U/L    AST 19 <=34 U/L    Alkaline Phosphatase 46 37 - 98 U/L    Bilirubin, Total 0.4 0.3 - 1.2 mg/dL    Total Protein 6.6 5.7 - 8.2 g/dL    Albumin 4.1 3.2 - 4.8 g/dL    Globulin  2.5 (L) 2.8 - 4.4 g/dL    A/G Ratio 1.6 1.0 - 2.0    Patient Fasting for CMP? Patient not present    CBC W/ DIFFERENTIAL    Collection Time: 01/26/24  9:37 AM   Result Value Ref Range    WBC 7.4 4.0 - 11.0 x10(3) uL    RBC 3.18 (L) 3.80 - 5.30 x10(6)uL    HGB 9.4 (L)  12.0 - 16.0 g/dL    HCT 28.7 (L) 35.0 - 48.0 %    MCV 90.3 80.0 - 100.0 fL    MCH 29.6 26.0 - 34.0 pg    MCHC 32.8 31.0 - 37.0 g/dL    RDW-SD 48.4 (H) 35.1 - 46.3 fL    RDW 14.6 11.0 - 15.0 %    .0 150.0 - 450.0 10(3)uL    Neutrophil Absolute Prelim 5.52 1.50 - 7.70 x10 (3) uL    Neutrophil Absolute 5.52 1.50 - 7.70 x10(3) uL    Lymphocyte Absolute 0.61 (L) 1.00 - 4.00 x10(3) uL    Monocyte Absolute 0.74 0.10 - 1.00 x10(3) uL    Eosinophil Absolute 0.43 0.00 - 0.70 x10(3) uL    Basophil Absolute 0.04 0.00 - 0.20 x10(3) uL    Immature Granulocyte Absolute 0.03 0.00 - 1.00 x10(3) uL    Neutrophil % 75.0 %    Lymphocyte % 8.3 %    Monocyte % 10.0 %    Eosinophil % 5.8 %    Basophil % 0.5 %    Immature Granulocyte % 0.4 %         Imaging & Referrals:  None

## 2024-01-26 NOTE — PROGRESS NOTES
Pt here for C6D1 Drug(s)TAXOL.  Arrives Ambulating independently, accompanied by Spouse     Patient was evaluated today by MD.    Patient arrived to unit with R chest port accessed from lab, good blood return present.    Oral medications included in this regimen:  no    Patient confirms comprehension of cancer treatment schedule:  yes    Pregnancy screening:  Denies possibility of pregnancy    Modifications in dose or schedule:  Yes, Taxol will be given over 90 min due to some mild abdominal cramping last time.    Medications appearance and physical integrity checked by RN: yes.    Chemotherapy IV pump settings verified by 2 RNs:  Yes.  Frequency of blood return and site check throughout administration: Prior to administration, Prior to each drug, and At completion of therapy     Infusion/treatment outcome:  patient tolerated treatment without incident    Port flushed, heparin locked and de-accessed.    Education Record    Learner:  Patient and Spouse  Barriers / Limitations:  None  Method:  Discussion  Education / instructions given:  Medications, schedule, plan of care  Outcome:  Shows understanding    Discharged Home, Ambulating independently, accompanied by:Spouse    Patient/family verbalized understanding of future appointments: by [employer] messaging

## 2024-01-30 ENCOUNTER — TELEPHONE (OUTPATIENT)
Dept: HEMATOLOGY/ONCOLOGY | Facility: HOSPITAL | Age: 39
End: 2024-01-30

## 2024-01-30 ENCOUNTER — HOSPITAL ENCOUNTER (OUTPATIENT)
Age: 39
Discharge: HOME | End: 2024-01-30
Payer: COMMERCIAL

## 2024-01-30 VITALS
RESPIRATION RATE: 18 BRPM | TEMPERATURE: 100 F | OXYGEN SATURATION: 100 % | SYSTOLIC BLOOD PRESSURE: 133 MMHG | HEART RATE: 110 BPM | DIASTOLIC BLOOD PRESSURE: 87 MMHG

## 2024-01-30 DIAGNOSIS — U07.1 COVID-19: Primary | ICD-10-CM

## 2024-01-30 LAB
POCT INFLUENZA A: NEGATIVE
POCT INFLUENZA B: NEGATIVE
S PYO AG THROAT QL: NEGATIVE
SARS-COV-2 RNA RESP QL NAA+PROBE: DETECTED

## 2024-01-30 PROCEDURE — 99214 OFFICE O/P EST MOD 30 MIN: CPT | Performed by: NURSE PRACTITIONER

## 2024-01-30 PROCEDURE — 87880 STREP A ASSAY W/OPTIC: CPT | Performed by: NURSE PRACTITIONER

## 2024-01-30 PROCEDURE — U0002 COVID-19 LAB TEST NON-CDC: HCPCS | Performed by: NURSE PRACTITIONER

## 2024-01-30 PROCEDURE — 87502 INFLUENZA DNA AMP PROBE: CPT | Performed by: NURSE PRACTITIONER

## 2024-01-30 NOTE — DISCHARGE INSTRUCTIONS
Call your oncologist today to inform them that you do have COVID and that Paxlovid was not prescribed as there is concern for major drug drug interaction with the chemo that you are receiving.  Social isolation for 5 days on day 6 may go out wearing a mask for additional 5 days as you could be shedding the virus as this is the recommendation of the CDC.  If you develop worsening symptoms such as chest pain shortness of breath nausea vomiting or diarrhea severe headache or high fever go to the ER.  For mild symptoms such as body aches chills slight headache low-grade temp take Tylenol.  Hydrate well but do not over hydrate and eat food as tolerated.  Recommend over-the-counter Flonase nasal spray and 24-hour Zyrtec to help with any runny nose congestion or postnasal drip.

## 2024-01-30 NOTE — TELEPHONE ENCOUNTER
Patient called back, tested positive for Covid at Urgent Care,  instructed her to reach out to PCP.  She has scheduled tx for Friday that will need to be rescheduled, please call to reschedule as appropriate.

## 2024-01-30 NOTE — TELEPHONE ENCOUNTER
Pt is calling back to speak to nurse, She tested positive for covid. She would like a call back-NL

## 2024-01-30 NOTE — TELEPHONE ENCOUNTER
1/26/24 - C6D1 - Taxol    Fever/body aches/chills/runny nose    Fever - Grade 1 - patient has temp from .2, chills    Denies sob or chest pain, does have runny nose, also body aches.  Denies sore throat, no cough.  Denies n/v/d.  Appetite decreased, slight lightheadedness, and fatigue.  Denies urinary complaints.  Some post tx constipation.    Instructed to push fluids, small frequent meals,  do at home Covid test if available otherwise Urgent Care for testing and call back with results.    She verbalizes understanding.

## 2024-01-30 NOTE — ED PROVIDER NOTES
Patient Seen in: [facility]      History     Chief Complaint   Patient presents with    Body ache and/or chills    Fever     Stated Complaint: Fever, body aches, chills    Subjective:   HPI    This is a 38-year-old female with history of breast cancer currently receiving chemo now once a week last chemo treatment was last Friday presenting with bodyaches chills runny nose fever that started last night.  Patient states her symptoms started last night because she is a cancer patient receiving chemo her oncologist recommended she come in and get tested for everything.  Denies any cough headache nausea vomiting diarrhea chest pain or shortness of breath.    Objective:   Past Medical History:   Diagnosis Date    Breast cancer in female (HCC) 10/18/2023    Right breast lymph node cancer    Hypertension     Solitary kidney, congenital               Past Surgical History:   Procedure Laterality Date    APPENDECTOMY  01/01/2007    NEEDLE BIOPSY RIGHT Right 10/09/2023    TREAT ECTOPIC PREG,RMV TUBE/OVARY Left 01/01/2014    TREAT ECTOPIC PREG,RMV TUBE/OVARY Left 01/01/2015                Social History     Socioeconomic History    Marital status:    Tobacco Use    Smoking status: Never    Smokeless tobacco: Never   Vaping Use    Vaping Use: Never used   Substance and Sexual Activity    Alcohol use: Yes     Comment: 2 -3 drinks per week    Drug use: No     Social Determinants of Health     Food Insecurity: No Food Insecurity (11/9/2023)    Food Insecurity     Food Insecurity: Never true   Transportation Needs: No Transportation Needs (11/9/2023)    Transportation Needs     Lack of Transportation: No   Housing Stability: Low Risk  (11/9/2023)    Housing Stability     Housing Instability: No              Review of Systems    Positive for stated complaint: Fever, body aches, chills  Other systems are as noted in HPI.  Constitutional and vital signs reviewed.      All other systems reviewed and negative except  as noted above.    Physical Exam     ED Triage Vitals [01/30/24 0842]   /87   Pulse 110   Resp 18   Temp 100 °F (37.8 °C)   Temp src Oral   SpO2 100 %   O2 Device None (Room air)       Current:/87   Pulse 110   Temp 100 °F (37.8 °C) (Oral)   Resp 18   LMP 11/21/2023 (Approximate)   SpO2 100%         Physical Exam  Vitals and nursing note reviewed.   Constitutional:       Appearance: Normal appearance.   HENT:      Right Ear: Tympanic membrane normal.      Left Ear: Tympanic membrane normal.      Nose: Congestion and rhinorrhea present.      Mouth/Throat:      Mouth: Mucous membranes are moist.      Pharynx: Oropharynx is clear. No oropharyngeal exudate or posterior oropharyngeal erythema.   Eyes:      Conjunctiva/sclera: Conjunctivae normal.   Cardiovascular:      Rate and Rhythm: Normal rate.   Pulmonary:      Effort: Pulmonary effort is normal. No respiratory distress.      Breath sounds: Normal breath sounds. No wheezing.   Musculoskeletal:         General: Normal range of motion.      Cervical back: Normal range of motion. No rigidity or tenderness.   Lymphadenopathy:      Cervical: No cervical adenopathy.   Neurological:      Mental Status: She is alert and oriented to person, place, and time.               ED Course     Labs Reviewed   RAPID SARS-COV-2 BY PCR - Abnormal; Notable for the following components:       Result Value    Rapid SARS-CoV-2 by PCR Detected (*)     All other components within normal limits   POCT RAPID STREP - Normal   POCT FLU TEST - Normal    Narrative:     This assay is a rapid molecular in vitro test utilizing nucleic acid amplification of influenza A and B viral RNA.                      MDM          Medical Decision Making  38-year-old female nontoxic-appearing no hypoxia or distress with bodyaches chills and fever.  Patient is a cancer patient so symptoms could be related to the fact that she does receive chemo but also concern for influenza or COVID low suspicion  for strep throat based off physical exam.  No clinical indication for labs or imaging at this time but she will be swabbed for COVID flu and strep.    Strep flu negative COVID-positive.  Discussed the results with the patient.  Discussed Paxlovid and that this medication would not be prescribed as there is possible major drug drug interaction with the email that she is receiving.  Discussed that she may contact her oncologist to inform that she does have COVID and they may prescribe this medication but there is a possibility of a drug drug interaction so will not be prescribed by this provider at this time.  Discussed supportive therapy over-the-counter medications to help with symptoms social isolation per CDC protocol and strict ER precautions with any new or worsening symptoms.  Patient feels comfortable with this plan of care and acknowledges understanding discharge instructions.    Problems Addressed:  COVID-19: acute illness or injury    Amount and/or Complexity of Data Reviewed  Labs:  Decision-making details documented in ED Course.    Risk  OTC drugs.        Disposition and Plan     Clinical Impression:  1. COVID-19         Disposition:  Discharge  1/30/2024  8:56 am    Follow-up:  Adolfo Campbell DO  [address] [address]  [phone]    In 2 days  video or phone visit          Medications Prescribed:  Discharge Medication List as of 1/30/2024  8:56 AM

## 2024-01-30 NOTE — ED INITIAL ASSESSMENT (HPI)
Pt undergoing chemo and started having body aches, chills, runny nose and fever (t-max 101.3) last night; told by oncologist to come and get tested for everything; denies cough, headache, or n/v/d

## 2024-02-01 ENCOUNTER — TELEPHONE (OUTPATIENT)
Dept: HEMATOLOGY/ONCOLOGY | Facility: HOSPITAL | Age: 39
End: 2024-02-01

## 2024-02-01 NOTE — TELEPHONE ENCOUNTER
Patient tested Positive for Covid 1/30/24. Patient called and notified going to cancel C6D8. Per patient feeling better today and no fever or chills today. Just having cold symptoms. Reinforced to push fluids and to call if symptoms worsen or SOB. Patient verbalizes understanding.

## 2024-02-02 ENCOUNTER — APPOINTMENT (OUTPATIENT)
Dept: HEMATOLOGY/ONCOLOGY | Facility: HOSPITAL | Age: 39
End: 2024-02-02
Attending: INTERNAL MEDICINE
Payer: COMMERCIAL

## 2024-02-09 ENCOUNTER — OFFICE VISIT (OUTPATIENT)
Dept: HEMATOLOGY/ONCOLOGY | Facility: HOSPITAL | Age: 39
End: 2024-02-09
Attending: INTERNAL MEDICINE
Payer: COMMERCIAL

## 2024-02-09 VITALS
SYSTOLIC BLOOD PRESSURE: 125 MMHG | OXYGEN SATURATION: 100 % | BODY MASS INDEX: 26 KG/M2 | TEMPERATURE: 98 F | HEART RATE: 87 BPM | DIASTOLIC BLOOD PRESSURE: 81 MMHG | WEIGHT: 158.38 LBS | RESPIRATION RATE: 16 BRPM

## 2024-02-09 DIAGNOSIS — Z17.0 MALIGNANT NEOPLASM OF UPPER-OUTER QUADRANT OF RIGHT BREAST IN FEMALE, ESTROGEN RECEPTOR POSITIVE  (HCC): Primary | ICD-10-CM

## 2024-02-09 DIAGNOSIS — Z45.2 ENCOUNTER FOR CENTRAL LINE CARE: Primary | ICD-10-CM

## 2024-02-09 DIAGNOSIS — C50.411 MALIGNANT NEOPLASM OF UPPER-OUTER QUADRANT OF RIGHT BREAST IN FEMALE, ESTROGEN RECEPTOR POSITIVE  (HCC): ICD-10-CM

## 2024-02-09 DIAGNOSIS — Z17.0 MALIGNANT NEOPLASM OF UPPER-OUTER QUADRANT OF RIGHT BREAST IN FEMALE, ESTROGEN RECEPTOR POSITIVE  (HCC): ICD-10-CM

## 2024-02-09 DIAGNOSIS — C50.411 MALIGNANT NEOPLASM OF UPPER-OUTER QUADRANT OF RIGHT BREAST IN FEMALE, ESTROGEN RECEPTOR POSITIVE  (HCC): Primary | ICD-10-CM

## 2024-02-09 LAB
BASOPHILS # BLD AUTO: 0.03 X10(3) UL (ref 0–0.2)
BASOPHILS NFR BLD AUTO: 0.4 %
DEPRECATED RDW RBC AUTO: 43.1 FL (ref 35.1–46.3)
EOSINOPHIL # BLD AUTO: 0.35 X10(3) UL (ref 0–0.7)
EOSINOPHIL NFR BLD AUTO: 5.2 %
ERYTHROCYTE [DISTWIDTH] IN BLOOD BY AUTOMATED COUNT: 13.1 % (ref 11–15)
HCT VFR BLD AUTO: 29.8 %
HGB BLD-MCNC: 10.5 G/DL
IMM GRANULOCYTES # BLD AUTO: 0.02 X10(3) UL (ref 0–1)
IMM GRANULOCYTES NFR BLD: 0.3 %
LYMPHOCYTES # BLD AUTO: 0.87 X10(3) UL (ref 1–4)
LYMPHOCYTES NFR BLD AUTO: 12.9 %
MCH RBC QN AUTO: 31.7 PG (ref 26–34)
MCHC RBC AUTO-ENTMCNC: 35.2 G/DL (ref 31–37)
MCV RBC AUTO: 90 FL
MONOCYTES # BLD AUTO: 0.77 X10(3) UL (ref 0.1–1)
MONOCYTES NFR BLD AUTO: 11.5 %
NEUTROPHILS # BLD AUTO: 4.68 X10 (3) UL (ref 1.5–7.7)
NEUTROPHILS # BLD AUTO: 4.68 X10(3) UL (ref 1.5–7.7)
NEUTROPHILS NFR BLD AUTO: 69.7 %
PLATELET # BLD AUTO: 337 10(3)UL (ref 150–450)
RBC # BLD AUTO: 3.31 X10(6)UL
WBC # BLD AUTO: 6.7 X10(3) UL (ref 4–11)

## 2024-02-09 PROCEDURE — S0028 INJECTION, FAMOTIDINE, 20 MG: HCPCS

## 2024-02-09 PROCEDURE — 96415 CHEMO IV INFUSION ADDL HR: CPT

## 2024-02-09 PROCEDURE — 85025 COMPLETE CBC W/AUTO DIFF WBC: CPT

## 2024-02-09 PROCEDURE — 96413 CHEMO IV INFUSION 1 HR: CPT

## 2024-02-09 PROCEDURE — 96375 TX/PRO/DX INJ NEW DRUG ADDON: CPT

## 2024-02-09 RX ORDER — DIPHENHYDRAMINE HYDROCHLORIDE 50 MG/ML
50 INJECTION INTRAMUSCULAR; INTRAVENOUS ONCE
Status: COMPLETED | OUTPATIENT
Start: 2024-02-09 | End: 2024-02-09

## 2024-02-09 RX ORDER — HEPARIN SODIUM (PORCINE) LOCK FLUSH IV SOLN 100 UNIT/ML 100 UNIT/ML
SOLUTION INTRAVENOUS
Status: COMPLETED
Start: 2024-02-09 | End: 2024-02-09

## 2024-02-09 RX ORDER — DIPHENHYDRAMINE HYDROCHLORIDE 50 MG/ML
INJECTION INTRAMUSCULAR; INTRAVENOUS
Status: COMPLETED
Start: 2024-02-09 | End: 2024-02-09

## 2024-02-09 RX ORDER — FAMOTIDINE 10 MG/ML
INJECTION, SOLUTION INTRAVENOUS
Status: COMPLETED
Start: 2024-02-09 | End: 2024-02-09

## 2024-02-09 RX ORDER — FAMOTIDINE 10 MG/ML
20 INJECTION, SOLUTION INTRAVENOUS ONCE
Status: COMPLETED | OUTPATIENT
Start: 2024-02-09 | End: 2024-02-09

## 2024-02-09 RX ORDER — HEPARIN SODIUM (PORCINE) LOCK FLUSH IV SOLN 100 UNIT/ML 100 UNIT/ML
5 SOLUTION INTRAVENOUS ONCE
OUTPATIENT
Start: 2024-02-09

## 2024-02-09 RX ORDER — HEPARIN SODIUM (PORCINE) LOCK FLUSH IV SOLN 100 UNIT/ML 100 UNIT/ML
5 SOLUTION INTRAVENOUS ONCE
Status: COMPLETED | OUTPATIENT
Start: 2024-02-09 | End: 2024-02-09

## 2024-02-09 RX ORDER — SODIUM CHLORIDE 9 MG/ML
10 INJECTION, SOLUTION INTRAMUSCULAR; INTRAVENOUS; SUBCUTANEOUS ONCE
OUTPATIENT
Start: 2024-02-09

## 2024-02-09 RX ADMIN — HEPARIN SODIUM (PORCINE) LOCK FLUSH IV SOLN 100 UNIT/ML 500 UNITS: 100 SOLUTION INTRAVENOUS at 13:40:00

## 2024-02-09 RX ADMIN — FAMOTIDINE 20 MG: 10 INJECTION, SOLUTION INTRAVENOUS at 11:18:00

## 2024-02-09 RX ADMIN — DIPHENHYDRAMINE HYDROCHLORIDE 50 MG: 50 INJECTION INTRAMUSCULAR; INTRAVENOUS at 11:18:00

## 2024-02-09 NOTE — PROGRESS NOTES
Pt here for C6D15 Taxol    Patient was evaluated today by Treatment Nurse.  CBC drawn today wnl  Oral medications included in this regimen:  no    Patient confirms comprehension of cancer treatment schedule:  yes    Pregnancy screening:  Denies possibility of pregnancy    Modifications in dose or schedule:  No    Medications appearance and physical integrity checked by RN: yes.    Chemotherapy IV pump settings verified by 2 RNs:  Yes.  Frequency of blood return and site check throughout administration: Prior to administration, Prior to each drug, and At completion of therapy     Infusion/treatment outcome:  patient tolerated treatment without incident    Education Record    Learner:  Patient  Barriers / Limitations:  None  Method:  Discussion  Education / instructions given:  reviewed lab results  Outcome:  Shows understanding    Discharged Other stable from infusion , Ambulating independently, accompanied by:Self    Patient/family verbalized understanding of future appointments: by [employer] messaging

## 2024-02-16 ENCOUNTER — NURSE ONLY (OUTPATIENT)
Dept: HEMATOLOGY/ONCOLOGY | Facility: HOSPITAL | Age: 39
End: 2024-02-16
Attending: INTERNAL MEDICINE
Payer: COMMERCIAL

## 2024-02-16 ENCOUNTER — OFFICE VISIT (OUTPATIENT)
Dept: HEMATOLOGY/ONCOLOGY | Facility: HOSPITAL | Age: 39
End: 2024-02-16
Attending: NURSE PRACTITIONER
Payer: COMMERCIAL

## 2024-02-16 VITALS
RESPIRATION RATE: 16 BRPM | HEART RATE: 100 BPM | BODY MASS INDEX: 25.63 KG/M2 | SYSTOLIC BLOOD PRESSURE: 142 MMHG | WEIGHT: 159.5 LBS | DIASTOLIC BLOOD PRESSURE: 82 MMHG | TEMPERATURE: 98 F | OXYGEN SATURATION: 100 % | HEIGHT: 66 IN

## 2024-02-16 DIAGNOSIS — Z17.0 MALIGNANT NEOPLASM OF UPPER-OUTER QUADRANT OF RIGHT BREAST IN FEMALE, ESTROGEN RECEPTOR POSITIVE  (HCC): Primary | ICD-10-CM

## 2024-02-16 DIAGNOSIS — C50.411 MALIGNANT NEOPLASM OF UPPER-OUTER QUADRANT OF RIGHT BREAST IN FEMALE, ESTROGEN RECEPTOR POSITIVE  (HCC): Primary | ICD-10-CM

## 2024-02-16 DIAGNOSIS — Z45.2 ENCOUNTER FOR CENTRAL LINE CARE: ICD-10-CM

## 2024-02-16 DIAGNOSIS — Z09 CHEMOTHERAPY FOLLOW-UP EXAMINATION: ICD-10-CM

## 2024-02-16 LAB
ALBUMIN SERPL-MCNC: 4.2 G/DL (ref 3.2–4.8)
ALBUMIN/GLOB SERPL: 1.8 {RATIO} (ref 1–2)
ALP LIVER SERPL-CCNC: 47 U/L
ALT SERPL-CCNC: 16 U/L
ANION GAP SERPL CALC-SCNC: 5 MMOL/L (ref 0–18)
AST SERPL-CCNC: 17 U/L (ref ?–34)
BASOPHILS # BLD AUTO: 0.04 X10(3) UL (ref 0–0.2)
BASOPHILS NFR BLD AUTO: 0.9 %
BILIRUB SERPL-MCNC: 0.6 MG/DL (ref 0.3–1.2)
BUN BLD-MCNC: 14 MG/DL (ref 9–23)
BUN/CREAT SERPL: 16.1 (ref 10–20)
CALCIUM BLD-MCNC: 9.6 MG/DL (ref 8.7–10.4)
CHLORIDE SERPL-SCNC: 109 MMOL/L (ref 98–112)
CO2 SERPL-SCNC: 26 MMOL/L (ref 21–32)
CREAT BLD-MCNC: 0.87 MG/DL
DEPRECATED HBV CORE AB SER IA-ACNC: 72.4 NG/ML
DEPRECATED RDW RBC AUTO: 40.1 FL (ref 35.1–46.3)
EGFRCR SERPLBLD CKD-EPI 2021: 86 ML/MIN/1.73M2 (ref 60–?)
EOSINOPHIL # BLD AUTO: 0.4 X10(3) UL (ref 0–0.7)
EOSINOPHIL NFR BLD AUTO: 9.3 %
ERYTHROCYTE [DISTWIDTH] IN BLOOD BY AUTOMATED COUNT: 12.2 % (ref 11–15)
GLOBULIN PLAS-MCNC: 2.4 G/DL (ref 2.8–4.4)
GLUCOSE BLD-MCNC: 96 MG/DL (ref 70–99)
HCT VFR BLD AUTO: 28.8 %
HGB BLD-MCNC: 9.9 G/DL
IMM GRANULOCYTES # BLD AUTO: 0.02 X10(3) UL (ref 0–1)
IMM GRANULOCYTES NFR BLD: 0.5 %
IRON SATN MFR SERPL: 22 %
IRON SERPL-MCNC: 65 UG/DL
LYMPHOCYTES # BLD AUTO: 0.77 X10(3) UL (ref 1–4)
LYMPHOCYTES NFR BLD AUTO: 17.9 %
MCH RBC QN AUTO: 31 PG (ref 26–34)
MCHC RBC AUTO-ENTMCNC: 34.4 G/DL (ref 31–37)
MCV RBC AUTO: 90.3 FL
MONOCYTES # BLD AUTO: 0.53 X10(3) UL (ref 0.1–1)
MONOCYTES NFR BLD AUTO: 12.3 %
NEUTROPHILS # BLD AUTO: 2.54 X10 (3) UL (ref 1.5–7.7)
NEUTROPHILS # BLD AUTO: 2.54 X10(3) UL (ref 1.5–7.7)
NEUTROPHILS NFR BLD AUTO: 59.1 %
OSMOLALITY SERPL CALC.SUM OF ELEC: 290 MOSM/KG (ref 275–295)
PLATELET # BLD AUTO: 329 10(3)UL (ref 150–450)
POTASSIUM SERPL-SCNC: 4.4 MMOL/L (ref 3.5–5.1)
PROT SERPL-MCNC: 6.6 G/DL (ref 5.7–8.2)
RBC # BLD AUTO: 3.19 X10(6)UL
SODIUM SERPL-SCNC: 140 MMOL/L (ref 136–145)
TIBC SERPL-MCNC: 295 UG/DL (ref 250–425)
TRANSFERRIN SERPL-MCNC: 198 MG/DL (ref 250–380)
WBC # BLD AUTO: 4.3 X10(3) UL (ref 4–11)

## 2024-02-16 PROCEDURE — 85025 COMPLETE CBC W/AUTO DIFF WBC: CPT

## 2024-02-16 PROCEDURE — 99215 OFFICE O/P EST HI 40 MIN: CPT | Performed by: NURSE PRACTITIONER

## 2024-02-16 PROCEDURE — S0028 INJECTION, FAMOTIDINE, 20 MG: HCPCS

## 2024-02-16 PROCEDURE — 84466 ASSAY OF TRANSFERRIN: CPT

## 2024-02-16 PROCEDURE — 96367 TX/PROPH/DG ADDL SEQ IV INF: CPT

## 2024-02-16 PROCEDURE — 96415 CHEMO IV INFUSION ADDL HR: CPT

## 2024-02-16 PROCEDURE — 82728 ASSAY OF FERRITIN: CPT

## 2024-02-16 PROCEDURE — 80053 COMPREHEN METABOLIC PANEL: CPT

## 2024-02-16 PROCEDURE — 83540 ASSAY OF IRON: CPT

## 2024-02-16 PROCEDURE — 96413 CHEMO IV INFUSION 1 HR: CPT

## 2024-02-16 PROCEDURE — 96375 TX/PRO/DX INJ NEW DRUG ADDON: CPT

## 2024-02-16 RX ORDER — FAMOTIDINE 10 MG/ML
20 INJECTION, SOLUTION INTRAVENOUS ONCE
OUTPATIENT
Start: 2024-02-23

## 2024-02-16 RX ORDER — FAMOTIDINE 10 MG/ML
20 INJECTION, SOLUTION INTRAVENOUS ONCE
Status: CANCELLED | OUTPATIENT
Start: 2024-02-16

## 2024-02-16 RX ORDER — DIPHENHYDRAMINE HYDROCHLORIDE 50 MG/ML
50 INJECTION INTRAMUSCULAR; INTRAVENOUS ONCE
OUTPATIENT
Start: 2024-02-23

## 2024-02-16 RX ORDER — FAMOTIDINE 10 MG/ML
INJECTION, SOLUTION INTRAVENOUS
Status: COMPLETED
Start: 2024-02-16 | End: 2024-02-16

## 2024-02-16 RX ORDER — SODIUM CHLORIDE 9 MG/ML
10 INJECTION, SOLUTION INTRAMUSCULAR; INTRAVENOUS; SUBCUTANEOUS ONCE
OUTPATIENT
Start: 2024-02-16

## 2024-02-16 RX ORDER — DIPHENHYDRAMINE HYDROCHLORIDE 50 MG/ML
50 INJECTION INTRAMUSCULAR; INTRAVENOUS ONCE
Status: COMPLETED | OUTPATIENT
Start: 2024-02-16 | End: 2024-02-16

## 2024-02-16 RX ORDER — DIPHENHYDRAMINE HYDROCHLORIDE 50 MG/ML
50 INJECTION INTRAMUSCULAR; INTRAVENOUS ONCE
Status: CANCELLED | OUTPATIENT
Start: 2024-02-16

## 2024-02-16 RX ORDER — FAMOTIDINE 10 MG/ML
20 INJECTION, SOLUTION INTRAVENOUS ONCE
Status: COMPLETED | OUTPATIENT
Start: 2024-02-16 | End: 2024-02-16

## 2024-02-16 RX ORDER — FAMOTIDINE 10 MG/ML
20 INJECTION, SOLUTION INTRAVENOUS ONCE
OUTPATIENT
Start: 2024-03-01

## 2024-02-16 RX ORDER — HEPARIN SODIUM (PORCINE) LOCK FLUSH IV SOLN 100 UNIT/ML 100 UNIT/ML
5 SOLUTION INTRAVENOUS ONCE
Status: COMPLETED | OUTPATIENT
Start: 2024-02-16 | End: 2024-02-16

## 2024-02-16 RX ORDER — DIPHENHYDRAMINE HYDROCHLORIDE 50 MG/ML
50 INJECTION INTRAMUSCULAR; INTRAVENOUS ONCE
OUTPATIENT
Start: 2024-03-01

## 2024-02-16 RX ORDER — DIPHENHYDRAMINE HYDROCHLORIDE 50 MG/ML
INJECTION INTRAMUSCULAR; INTRAVENOUS
Status: COMPLETED
Start: 2024-02-16 | End: 2024-02-16

## 2024-02-16 RX ORDER — HEPARIN SODIUM (PORCINE) LOCK FLUSH IV SOLN 100 UNIT/ML 100 UNIT/ML
5 SOLUTION INTRAVENOUS ONCE
OUTPATIENT
Start: 2024-02-16

## 2024-02-16 RX ADMIN — DIPHENHYDRAMINE HYDROCHLORIDE 50 MG: 50 INJECTION INTRAMUSCULAR; INTRAVENOUS at 10:08:00

## 2024-02-16 RX ADMIN — FAMOTIDINE 20 MG: 10 INJECTION, SOLUTION INTRAVENOUS at 10:10:00

## 2024-02-16 NOTE — PROGRESS NOTES
HPI   Jocelyn Garza is a 38 year old female for evaluation of   Encounter Diagnoses   Name Primary?    Malignant neoplasm of upper-outer quadrant of right breast in female, estrogen receptor positive  (HCC) Yes    Chemotherapy follow-up examination        She is here today for follow up s/p cycle 6 Taxol. Completed 4 cycles DD AC.     Recent diagnosis COVID 1/30. Denies fevers or cough.     S/p cycle 1 of taxol, with reaction with flushing and abd cramping after cycle 1 D1 that resolved with exta benadryl.  States that for D8 was better with the benadryl.  Felt like period cramps.      States that she feels mass feels looser on the R breast.      Fatigue:  No    Fevers:  No    Appetite/taste changes:  Yes, sometimes taste changes.    Mucositis:  No    Weight changes:  No    Nausea/vomiting:  No    Diarrhea:  No    Constipation:  Yes, on/off  better.    Peripheral neuropathy:  Yes, very sporadic tips of index finger and thumb for 1-2 hrs.    LMP Nov 2023, some hot flashes.     ECOG PS  0    Review of Systems:   Review of Systems   Constitutional:  Negative for fatigue and fever.   HENT:   Positive for nosebleeds (on the tissue.).    Respiratory:  Negative for cough (slight post chemo) and shortness of breath.    Cardiovascular:  Negative for chest pain.   Gastrointestinal:  Positive for constipation (after chemo). Negative for abdominal pain.   Endocrine: Positive for hot flashes.   Genitourinary:  Negative for dysuria and frequency.    Musculoskeletal:  Positive for arthralgias (at shoulders like the pain she had with GCSF, claritin helps). Negative for back pain and flank pain (L flank mild discomfort that has had in the past prior to diagnosis).   Skin:  Negative for rash.   Neurological:  Positive for dizziness (One episode). Negative for headaches.   Hematological:  Negative for adenopathy.   Psychiatric/Behavioral:  Negative for sleep disturbance.          Current Outpatient Medications   Medication Sig  Dispense Refill    lisinopril 5 MG Oral Tab Take 1 tablet (5 mg total) by mouth daily. 30 tablet 5    cefuroxime 500 MG Oral Tab Take 1 tablet (500 mg total) by mouth 2 (two) times daily. For 3 days (Patient not taking: Reported on 12/19/2023)      prochlorperazine (COMPAZINE) 10 mg tablet Take 1 tablet (10 mg total) by mouth every 8 (eight) hours as needed for Nausea. (Patient not taking: Reported on 1/5/2024) 30 tablet 3    ondansetron (ZOFRAN) 8 MG tablet Take 1 tablet (8 mg total) by mouth every 8 (eight) hours as needed for Nausea. (Patient not taking: Reported on 1/5/2024) 30 tablet 3     Allergies:   No Known Allergies    Past Medical History:   Diagnosis Date    Breast cancer in female (HCC) 10/18/2023    Right breast lymph node cancer    Hypertension     Solitary kidney, congenital      Past Surgical History:   Procedure Laterality Date    APPENDECTOMY  01/01/2007    NEEDLE BIOPSY RIGHT Right 10/09/2023    TREAT ECTOPIC PREG,RMV TUBE/OVARY Left 01/01/2014    TREAT ECTOPIC PREG,RMV TUBE/OVARY Left 01/01/2015     Social History     Socioeconomic History    Marital status:    Tobacco Use    Smoking status: Never    Smokeless tobacco: Never   Vaping Use    Vaping Use: Never used   Substance and Sexual Activity    Alcohol use: Yes     Comment: 2 -3 drinks per week    Drug use: No     Social Determinants of Health     Food Insecurity: No Food Insecurity (11/9/2023)    Food Insecurity     Food Insecurity: Never true   Transportation Needs: No Transportation Needs (11/9/2023)    Transportation Needs     Lack of Transportation: No   Housing Stability: Low Risk  (11/9/2023)    Housing Stability     Housing Instability: No       Family History   Problem Relation Age of Onset    Other (Parkinson's Disease) Father     Prostate Cancer Maternal Grandfather 70    Pancreatic Cancer Paternal Grandmother 80    Pancreatic Cancer Maternal Uncle 60    Cancer Maternal Great-Grandmother         gastric ca    Cancer Paternal  Aunt 80        breast cancer    Pancreatic Cancer Paternal Great-Grandfather          PHYSICAL EXAM:    /82 (BP Location: Left arm, Patient Position: Sitting, Cuff Size: adult)   Pulse 100   Temp 97.8 °F (36.6 °C) (Oral)   Resp 16   Ht 1.676 m (5' 6\")   Wt 72.3 kg (159 lb 8 oz)   LMP 11/21/2023 (Approximate)   SpO2 100%   BMI 25.74 kg/m²   Wt Readings from Last 6 Encounters:   02/09/24 71.8 kg (158 lb 6.4 oz)   01/26/24 70.9 kg (156 lb 6.4 oz)   01/19/24 71.2 kg (156 lb 14.4 oz)   01/12/24 70.3 kg (155 lb)   01/05/24 70.8 kg (156 lb)   12/22/23 70.7 kg (155 lb 12.8 oz)     Physical Exam    General: Patient is alert, not in acute distress.  HEENT: EOMs intact.  Anicteric.  Oropharynx is clear.   Neck: No JVD. No palpable lymphadenopathy. Neck is supple.  Chest: Non labored breathing.  Clear to auscultation.  Breasts:  Right breast at the RUQ with a mobile 11 x 9 cm hard mass - tender.  R axilla no palpable LN - tender.    Heart: Regular rate and rhythm.   Abdomen: Soft, non tender with good bowel sounds.  Extremities: No edema.  No edema  Neurological: Grossly intact.   Lymphatics: There is no palpable lymphadenopathy throughout in the cervical or supraclavicular egions.  Psych/Depression: nl          ASSESSMENT/PLAN:     1. Malignant neoplasm of upper-outer quadrant of right breast in female, estrogen receptor positive  (HCC)    2. Chemotherapy follow-up examination         Cancer Staging   Malignant neoplasm of upper-outer quadrant of right breast in female, estrogen receptor positive  (HCC)  Staging form: Breast, AJCC 8th Edition  - Clinical stage from 10/12/2023: Stage IIA (cT3, cN1(f), cM0, G2, ER+, DC+, HER2-) - Signed by Halie Calle MD on 10/20/2023    Patient has completed staging work-up.  Thus far no evidence of metastatic disease.  Findings in the liver evaluated with ultrasound, and the one side is a hemangioma, the other likely a cyst, but a question of metastatic disease and PET scan  has been ordered.  I discussed with the patient that most likely this is going to be a cyst.    Discussed that she still has early stage disease, and that her staging has not changed despite the size of the primary tumor.    Given extensive disease in the breast and komal involvement, she will benefit from neoadjuvant chemotherapy, which may help with surgical outcomes by shrinking some of the primary tumor and of the lymph nodes.  I discussed with the patient that the primary tumor will not likely decrease in size significantly to make her a candidate for breast conservation.  I did discuss with the patient that she will need a mastectomy and likely axillary lymph node dissection.  Given the size of the primary tumor, she is a candidate for postmastectomy radiation therapy plus minus radiation to the komal basins pending on surgical management of the axilla.  I discussed with the patient that once she completes treatment with radiation, she will then be initiated on adjuvant endocrine therapy, given the size of the tumor and number of positive lymph nodes, she also will be a candidate for 2 years of endovascular in the initial 2 years of adjuvant endocrine therapy.  Discussed that adjuvant endocrine therapy total duration will be no less than 5 years but given her high risk features, likely up to 8 to 10 years.  We will also discuss options for ovarian function suppression after completion of chemotherapy.    Patient inquired regarding contralateral prophylactic mastectomy.  I discussed with the patient that we should await results of her genetic testing.  If the genetic testing is positive for a deleterious mutation which will increase her lifetime second primary breast cancer, then she should proceed with prophylactic contralateral mastectomy.  However, if this is negative, discussed with the patient that there is no benefit for proceeding with contralateral mastectomy, as it will not change her disease-free or  overall survival.    Discussed with the patient that while she is receiving neoadjuvant treatment, she can have the evaluation by plastic surgery so that when she completes her neoadjuvant course of therapy, she will be able to have her surgery coordinated with Dr. Hutson and a plastic surgeon    Plan for DD AC x 4 cycles followed by weekly Taxol x12 weeks    S/p cycle 4 DD AC, complicated by RSV neutropenia.    S/p cycle 2 of weekly taxol.  Had infusion reaction after cycle 1 D1.      Proceed with cycle 3 weekly Paclitaxel will give additional benadryl.      Consult with Dr. Mae on 1/12/24, MRI after treatment and Allyson placement at site of LN, done on 1/23/24.    Chemotherapy-induced anemia.  Monitor    All of the patient's questions were answered to the best of my abilities.     Call prn.  Follow-up in 3 weeks    MDM high risk.    No orders of the defined types were placed in this encounter.      Results From Past 48 Hours:  Recent Results (from the past 48 hour(s))   Comp Metabolic Panel (14)    Collection Time: 02/16/24  8:09 AM   Result Value Ref Range    Glucose 96 70 - 99 mg/dL    Sodium 140 136 - 145 mmol/L    Potassium 4.4 3.5 - 5.1 mmol/L    Chloride 109 98 - 112 mmol/L    CO2 26.0 21.0 - 32.0 mmol/L    Anion Gap 5 0 - 18 mmol/L    BUN 14 9 - 23 mg/dL    Creatinine 0.87 0.55 - 1.02 mg/dL    BUN/CREA Ratio 16.1 10.0 - 20.0    Calcium, Total 9.6 8.7 - 10.4 mg/dL    Calculated Osmolality 290 275 - 295 mOsm/kg    eGFR-Cr 86 >=60 mL/min/1.73m2    ALT 16 10 - 49 U/L    AST 17 <=34 U/L    Alkaline Phosphatase 47 37 - 98 U/L    Bilirubin, Total 0.6 0.3 - 1.2 mg/dL    Total Protein 6.6 5.7 - 8.2 g/dL    Albumin 4.2 3.2 - 4.8 g/dL    Globulin  2.4 (L) 2.8 - 4.4 g/dL    A/G Ratio 1.8 1.0 - 2.0    Patient Fasting for CMP? Patient not present    CBC W/ DIFFERENTIAL    Collection Time: 02/16/24  8:09 AM   Result Value Ref Range    WBC 4.3 4.0 - 11.0 x10(3) uL    RBC 3.19 (L) 3.80 - 5.30 x10(6)uL    HGB 9.9 (L) 12.0 -  16.0 g/dL    HCT 28.8 (L) 35.0 - 48.0 %    MCV 90.3 80.0 - 100.0 fL    MCH 31.0 26.0 - 34.0 pg    MCHC 34.4 31.0 - 37.0 g/dL    RDW-SD 40.1 35.1 - 46.3 fL    RDW 12.2 11.0 - 15.0 %    .0 150.0 - 450.0 10(3)uL    Neutrophil Absolute Prelim 2.54 1.50 - 7.70 x10 (3) uL    Neutrophil Absolute 2.54 1.50 - 7.70 x10(3) uL    Lymphocyte Absolute 0.77 (L) 1.00 - 4.00 x10(3) uL    Monocyte Absolute 0.53 0.10 - 1.00 x10(3) uL    Eosinophil Absolute 0.40 0.00 - 0.70 x10(3) uL    Basophil Absolute 0.04 0.00 - 0.20 x10(3) uL    Immature Granulocyte Absolute 0.02 0.00 - 1.00 x10(3) uL    Neutrophil % 59.1 %    Lymphocyte % 17.9 %    Monocyte % 12.3 %    Eosinophil % 9.3 %    Basophil % 0.9 %    Immature Granulocyte % 0.5 %         Imaging & Referrals:  None

## 2024-02-16 NOTE — PROGRESS NOTES
Pt here for C7D1 Taxol.  Arrives Ambulating independently, accompanied by Family member. Labs reviewed by this RN.     Patient was evaluated today by [facility] and Treatment Nurse.    Oral medications included in this regimen:  no    Patient confirms comprehension of cancer treatment schedule:  yes    Pregnancy screening:  Denies possibility of pregnancy    Modifications in dose or schedule:  No    Medications appearance and physical integrity checked by RN: yes.    Chemotherapy IV pump settings verified by 2 RNs:  Yes.  Frequency of blood return and site check throughout administration: Prior to administration and At completion of therapy     Infusion/treatment outcome:  patient tolerated treatment without incident    Education Record    Learner:  Patient  Barriers / Limitations:  None  Method:  Reinforcement  Education / instructions given:  Plan of care and treatment regimen  Outcome:  Shows understanding    Discharged Home, Ambulating independently, accompanied by:Family member    Patient/family verbalized understanding of future appointments: by MyChart messaging

## 2024-02-20 ENCOUNTER — OFFICE VISIT (OUTPATIENT)
Dept: SURGERY | Facility: CLINIC | Age: 39
End: 2024-02-20
Payer: COMMERCIAL

## 2024-02-20 VITALS
DIASTOLIC BLOOD PRESSURE: 82 MMHG | HEART RATE: 110 BPM | BODY MASS INDEX: 26.22 KG/M2 | WEIGHT: 157.38 LBS | HEIGHT: 65 IN | SYSTOLIC BLOOD PRESSURE: 117 MMHG | TEMPERATURE: 97 F | RESPIRATION RATE: 16 BRPM | OXYGEN SATURATION: 100 %

## 2024-02-20 DIAGNOSIS — Z17.0 MALIGNANT NEOPLASM OF UPPER-OUTER QUADRANT OF RIGHT BREAST IN FEMALE, ESTROGEN RECEPTOR POSITIVE (HCC): Primary | ICD-10-CM

## 2024-02-20 DIAGNOSIS — C50.411 MALIGNANT NEOPLASM OF UPPER-OUTER QUADRANT OF RIGHT BREAST IN FEMALE, ESTROGEN RECEPTOR POSITIVE (HCC): Primary | ICD-10-CM

## 2024-02-20 PROCEDURE — 3008F BODY MASS INDEX DOCD: CPT | Performed by: SURGERY

## 2024-02-20 PROCEDURE — 3074F SYST BP LT 130 MM HG: CPT | Performed by: SURGERY

## 2024-02-20 PROCEDURE — 3079F DIAST BP 80-89 MM HG: CPT | Performed by: SURGERY

## 2024-02-20 PROCEDURE — 99243 OFF/OP CNSLTJ NEW/EST LOW 30: CPT | Performed by: SURGERY

## 2024-02-20 NOTE — CONSULTS
New Patient Consultation    This is the first visit for this 38 year old female who presents to discuss reconstructive options following surgery for breast cancer.    History of Present Illness:   The patient is a 38 year old female who presents with a right breast cancer found by palpation.  The patient ultimately underwent biopsy which revealed invasive lobular carcinoma with axillary lymph node metastases.  She does not have breast pain. She axillary lymph node metastasis.  The patient was started on chemotherapy.  The patient wears a 36 DD bra.  She is deciding between autologous reconstruction and going flat.  She was referred here today by Dr. Mae to discuss options for postmastectomy reconstruction.  She will require postmastectomy chest wall radiation.    Past Medical History:      Past Medical History:   Diagnosis Date    Breast cancer in female (HCC) 10/18/2023    Right breast lymph node cancer    Hypertension     Solitary kidney, congenital          Past Surgical History:  Past Surgical History:   Procedure Laterality Date    APPENDECTOMY  01/01/2007    NEEDLE BIOPSY RIGHT Right 10/09/2023    TREAT ECTOPIC PREG,RMV TUBE/OVARY Left 01/01/2014    TREAT ECTOPIC PREG,RMV TUBE/OVARY Left 01/01/2015         Medications:    No outpatient medications have been marked as taking for the 2/20/24 encounter (Appointment) with Pilo Wei MD.         Allergies:    No Known Allergies      Family History:   Family History   Problem Relation Age of Onset    Other (Parkinson's Disease) Father     Prostate Cancer Maternal Grandfather 70    Pancreatic Cancer Paternal Grandmother 80    Pancreatic Cancer Maternal Uncle 60    Cancer Maternal Great-Grandmother         gastric ca    Cancer Paternal Aunt 80        breast cancer    Pancreatic Cancer Paternal Great-Grandfather          Social History:  History   Alcohol Use    Yes     Comment: 2 -3 drinks per week       History   Smoking Status    Never   Smokeless Tobacco     Never       History   Drug Use No           Review of Systems:    General:   The patient denies, fever, chills, night sweats, fatigue, generalized weakness, change in appetite, weight loss, or weight gain.    Endocrine:  There is no history of poor/slow wound healing, weight loss/gain, fertility or hormone problems, cold intolerance, heat intolerance, excessive thirst, or thyroid disease.    Allergic/Immunologic:  There is no history of hives, hay fever, angioedema or anaphylaxis.    HEENT:  The patient denies ear pain, ear drainage, hearing loss, change in vision, double vision, cataracts, glaucoma, nasal congestion, nosebleed, hoarseness, sore throat, or swollen glands.    Respiratory:  The patient denies shortness of breath, cough, bloody cough, phlegm, asthma, or wheezing.    Cardiovascular:   The patient denies chest pain/pressure, palpitations, irregular heartbeat, high blood pressure, stroke, or leg swelling.    Breasts:  The patient denies breast masses, pain, change in the breast skin, skin dimpling, nipple discharge, or rash.    Gastrointestinal:  There is no history of difficulty or pain with swallowing, reflux symptoms, nausea, vomiting, dark/bloody stools, diarrhea, constipation, change in bowel habits, or abdominal pain.    Genitourinary:  The patient denies frequent urination, needing to get up at night to urinate, urinary hesitancy or retaining urine, painful urination, urinary incontinence, decreased urine stream, blood in urine, or vaginal/penile discharge.    Skin:  Then patient denies rash, itching, skin lesions, dry skin, change in skin color or change in moles, sunburn with blistering.    Hematologic/Lymphatic:  The patient denies easily bruising or bleeding, persistent swollen glands or lymph nodes, bleeding disorders, blood clots, or pulmonary embolism.    Gynecologic:  The patient denies irregular menses, pelvic pain, pain with intercourse, painful menses, or  pregnancy.    Musculoskeletal:  The patient denies muscle aches/pain, joint pain, stiff joints, neck pain, back pain or bone pain.    Neurologic:  There is no history of migraines or severe headaches, seizure/epilepsy, speech problems, coordination problems, trembling/tremors, fainting/black outs, dizziness, memory problems, loss of sensation/numbness, problems walking, weakness, tingling or burning in hands/feet.     Psychiatric:  There is no history of abusive relationship, bipolar disorder, sleep disturbance, anxiety, depression or feeling of despair.     Physical Exam:    Vitals:    02/20/24 1452   BP: 117/82   Pulse: 110   Resp: 16   Temp: 97.3 °F (36.3 °C)     Body mass index is 26.19 kg/m².      The patient is awake,  alert, and oriented.  She is well-nourished, well-developed woman who appears her stated age. Her speech patterns and movements are normal. Her affect is appropriate.    HEENT: The head is normocephalic. The neck is supple. The thyroid is not enlarged and is without palpable masses.  The trachea is in the midline. Conjunctiva are clear, non-icteric.    Chest: A left upper chest wall port is noted in place.    Right breast: Grade 2 ptosis is noted.  Striae are noted.  Measurements: sternal notch to nipple 27cm, nipple to inframammary fold 11cm, base diameter 14cm.  The skin, nipple, and areola appear normal.  There is no nipple retraction or discharge.      Left breast:  Grade 2 ptosis is noted.  Striae are noted.  Measurements:  sternal notch to nipple 28cm, nipple to inframammary fold 10cm, base diameter 14cm.  The skin, nipple, and areola appear normal.  There is no nipple retraction or discharge.  There are no dominant masses in the breast.      Abdomen:  A small to moderate abdominal pannus  is noted.  A well-healed transverse scar is noted.  No palpable hernia is detected.      Lymph Nodes:  There is no cervical, supraclavicular, or axillary lymphadenopathy appreciated.    Back: There is no  vertebral column tenderness.    Skin: The skin appears normal. There are no suspicious appearing rashes or lesions.    Extremities: The extremities are without deformity, cyanosis or edema.    Impression:   The patient is a 38 year old female who presents with right breast cancer awaiting bilateral mastectomy.     Discussion and Plan:  We reviewed the options for post-mastectomy reconstruction and discussed the risks/benefits of timing (immediate versus delayed) and technique (implant-based versus autologous).     Specifically, the nature and technique of tissue expander reconstruction was reviewed with the patient. We discussed the prepectoral versus partial submuscular placement of the tissue expander, use of acellular matrix, and placement of drains.  We discussed that use of acellular dermal matrix in  breast reconstruction is considered an \"off label\" use.  The expansion process, as well as the need for a secondary procedure for placement of a permanent implant, were reviewed.  Representative illustrations and photographs were demonstrated to the patient. The risks of surgery including but not limited to bleeding, infection, scarring, delayed wound healing, seroma, asymmetry, implant infection/extrusion requiring removal, injury to adjacent structures, capsular contracture, ALCL, breast implant associated illness, need for implant exchange, and need for further surgery were reviewed. The expected post-operative course was discussed including the need for activity limitation, drains, and suture removal.  The recommended FDA surveillance protocol for silicone implants was reviewed.  Finally, we reviewed the impact of post-mastectomy radiation therapy on implant-based reconstruction (should it be deemed necessary based on the final pathology results), including increased risk of reconstructive loss and capsular contracture.     In addition, the nature and technique of breast reconstruction with abdominal free flap  was reviewed with the patient. We reviewed the variations of abdominal free flap reconstruction including PREMA flap, SIEA flap, muscle-sparing free TRAM flap, and free TRAM flap. Representative illustrations and photographs were demonstrated to the patient. The risks of surgery including but not limited to bleeding, infection, scarring, seroma, delayed wound healing, partial/total flap loss, fat necrosis, asymmetry, injury to adjacent structures, abdominal hernia/weakness/bulge, need for further surgery, and venous thromboembolism were reviewed. The expected post-operative course was discussed including the need for activity limitation, drains, and suture removal.  Given the patient's need for postmastectomy radiation therapy, I recommended a delayed or delayed immediate approach to autologous reconstruction should she choose this option.    Finally, we discussed the possible need for revisions as well as the process of nipple areolar reconstruction.    Multiple questions were answered to the patient's satisfaction. No guarantees as to outcome were offered. The patient is considering her options and will return for preoperative visit if she wishes to proceed with immediate reconstruction. A total of 40 minutes was spent reviewing the patient's history and diagnostic testing, examining the patient, reviewing reconstructive options, and coordinating the patient's care.

## 2024-02-23 ENCOUNTER — NURSE ONLY (OUTPATIENT)
Dept: HEMATOLOGY/ONCOLOGY | Facility: HOSPITAL | Age: 39
End: 2024-02-23
Attending: INTERNAL MEDICINE
Payer: COMMERCIAL

## 2024-02-23 VITALS
TEMPERATURE: 99 F | OXYGEN SATURATION: 100 % | BODY MASS INDEX: 27 KG/M2 | HEART RATE: 100 BPM | WEIGHT: 159.5 LBS | DIASTOLIC BLOOD PRESSURE: 75 MMHG | RESPIRATION RATE: 18 BRPM | SYSTOLIC BLOOD PRESSURE: 122 MMHG

## 2024-02-23 DIAGNOSIS — Z17.0 MALIGNANT NEOPLASM OF UPPER-OUTER QUADRANT OF RIGHT BREAST IN FEMALE, ESTROGEN RECEPTOR POSITIVE (HCC): Primary | ICD-10-CM

## 2024-02-23 DIAGNOSIS — Z45.2 ENCOUNTER FOR CENTRAL LINE CARE: ICD-10-CM

## 2024-02-23 DIAGNOSIS — C50.411 MALIGNANT NEOPLASM OF UPPER-OUTER QUADRANT OF RIGHT BREAST IN FEMALE, ESTROGEN RECEPTOR POSITIVE (HCC): Primary | ICD-10-CM

## 2024-02-23 LAB
BASOPHILS # BLD AUTO: 0.02 X10(3) UL (ref 0–0.2)
BASOPHILS NFR BLD AUTO: 0.4 %
DEPRECATED RDW RBC AUTO: 40.5 FL (ref 35.1–46.3)
EOSINOPHIL # BLD AUTO: 0.32 X10(3) UL (ref 0–0.7)
EOSINOPHIL NFR BLD AUTO: 6 %
ERYTHROCYTE [DISTWIDTH] IN BLOOD BY AUTOMATED COUNT: 12.1 % (ref 11–15)
HCT VFR BLD AUTO: 30 %
HGB BLD-MCNC: 9.8 G/DL
IMM GRANULOCYTES # BLD AUTO: 0.01 X10(3) UL (ref 0–1)
IMM GRANULOCYTES NFR BLD: 0.2 %
LYMPHOCYTES # BLD AUTO: 0.74 X10(3) UL (ref 1–4)
LYMPHOCYTES NFR BLD AUTO: 14 %
MCH RBC QN AUTO: 30 PG (ref 26–34)
MCHC RBC AUTO-ENTMCNC: 32.7 G/DL (ref 31–37)
MCV RBC AUTO: 91.7 FL
MONOCYTES # BLD AUTO: 0.64 X10(3) UL (ref 0.1–1)
MONOCYTES NFR BLD AUTO: 12.1 %
NEUTROPHILS # BLD AUTO: 3.56 X10 (3) UL (ref 1.5–7.7)
NEUTROPHILS # BLD AUTO: 3.56 X10(3) UL (ref 1.5–7.7)
NEUTROPHILS NFR BLD AUTO: 67.3 %
PLATELET # BLD AUTO: 298 10(3)UL (ref 150–450)
RBC # BLD AUTO: 3.27 X10(6)UL
WBC # BLD AUTO: 5.3 X10(3) UL (ref 4–11)

## 2024-02-23 PROCEDURE — S0028 INJECTION, FAMOTIDINE, 20 MG: HCPCS

## 2024-02-23 PROCEDURE — 85025 COMPLETE CBC W/AUTO DIFF WBC: CPT

## 2024-02-23 PROCEDURE — 96375 TX/PRO/DX INJ NEW DRUG ADDON: CPT

## 2024-02-23 PROCEDURE — 96413 CHEMO IV INFUSION 1 HR: CPT

## 2024-02-23 RX ORDER — HEPARIN SODIUM (PORCINE) LOCK FLUSH IV SOLN 100 UNIT/ML 100 UNIT/ML
5 SOLUTION INTRAVENOUS ONCE
Status: DISCONTINUED | OUTPATIENT
Start: 2024-02-23 | End: 2024-02-23

## 2024-02-23 RX ORDER — SODIUM CHLORIDE 9 MG/ML
10 INJECTION, SOLUTION INTRAMUSCULAR; INTRAVENOUS; SUBCUTANEOUS ONCE
OUTPATIENT
Start: 2024-02-23

## 2024-02-23 RX ORDER — HEPARIN SODIUM (PORCINE) LOCK FLUSH IV SOLN 100 UNIT/ML 100 UNIT/ML
5 SOLUTION INTRAVENOUS ONCE
Status: COMPLETED | OUTPATIENT
Start: 2024-02-23 | End: 2024-02-23

## 2024-02-23 RX ORDER — HEPARIN SODIUM (PORCINE) LOCK FLUSH IV SOLN 100 UNIT/ML 100 UNIT/ML
5 SOLUTION INTRAVENOUS ONCE
OUTPATIENT
Start: 2024-02-23

## 2024-02-23 RX ORDER — FAMOTIDINE 10 MG/ML
INJECTION, SOLUTION INTRAVENOUS
Status: COMPLETED
Start: 2024-02-23 | End: 2024-02-23

## 2024-02-23 RX ORDER — HEPARIN SODIUM (PORCINE) LOCK FLUSH IV SOLN 100 UNIT/ML 100 UNIT/ML
SOLUTION INTRAVENOUS
Status: COMPLETED
Start: 2024-02-23 | End: 2024-02-23

## 2024-02-23 RX ORDER — DIPHENHYDRAMINE HYDROCHLORIDE 50 MG/ML
50 INJECTION INTRAMUSCULAR; INTRAVENOUS ONCE
Status: COMPLETED | OUTPATIENT
Start: 2024-02-23 | End: 2024-02-23

## 2024-02-23 RX ORDER — LIDOCAINE AND PRILOCAINE 25; 25 MG/G; MG/G
CREAM TOPICAL
Qty: 1 EACH | Refills: 1 | Status: SHIPPED | OUTPATIENT
Start: 2024-02-23

## 2024-02-23 RX ORDER — DIPHENHYDRAMINE HYDROCHLORIDE 50 MG/ML
INJECTION INTRAMUSCULAR; INTRAVENOUS
Status: COMPLETED
Start: 2024-02-23 | End: 2024-02-23

## 2024-02-23 RX ORDER — FAMOTIDINE 10 MG/ML
20 INJECTION, SOLUTION INTRAVENOUS ONCE
Status: COMPLETED | OUTPATIENT
Start: 2024-02-23 | End: 2024-02-23

## 2024-02-23 RX ADMIN — DIPHENHYDRAMINE HYDROCHLORIDE 50 MG: 50 INJECTION INTRAMUSCULAR; INTRAVENOUS at 10:07:00

## 2024-02-23 RX ADMIN — HEPARIN SODIUM (PORCINE) LOCK FLUSH IV SOLN 100 UNIT/ML 500 UNITS: 100 SOLUTION INTRAVENOUS at 12:40:00

## 2024-02-23 RX ADMIN — FAMOTIDINE 20 MG: 10 INJECTION, SOLUTION INTRAVENOUS at 10:06:00

## 2024-02-23 NOTE — PROGRESS NOTES
Pt here for C7D8 Drug(s)TAXOL.  Arrives Ambulating independently, accompanied by Spouse     Patient was evaluated today by Treatment Nurse.    Dary sent script for Emla cream to reduce pain with port access patient had reported. Discussed with patient to apply liberally the morning of appointments and cover with saran wrap. Patient verbalized understanding.    Port accessed in lab C/D/I, good blood return noted.    Oral medications included in this regimen:  no    Patient confirms comprehension of cancer treatment schedule:  yes    Pregnancy screening:  Denies possibility of pregnancy    Modifications in dose or schedule:  No    Medications appearance and physical integrity checked by RN: yes.    Chemotherapy IV pump settings verified by 2 RNs:  Yes.  Frequency of blood return and site check throughout administration: Prior to administration, Prior to each drug, and At completion of therapy     Infusion/treatment outcome:  patient tolerated treatment without incident    Port flushed, heparin locked, and de-accessed.     Education Record    Learner:  Patient and Spouse  Barriers / Limitations:  None  Method:  Discussion  Education / instructions given:  medication, schedule, plan of care  Outcome:  Shows understanding    Discharged Home, Ambulating independently, accompanied by:Spouse    Patient/family verbalized understanding of future appointments: by [employer] messaging

## 2024-03-01 ENCOUNTER — OFFICE VISIT (OUTPATIENT)
Dept: HEMATOLOGY/ONCOLOGY | Facility: HOSPITAL | Age: 39
End: 2024-03-01
Attending: INTERNAL MEDICINE
Payer: COMMERCIAL

## 2024-03-01 VITALS
BODY MASS INDEX: 27 KG/M2 | WEIGHT: 161.31 LBS | OXYGEN SATURATION: 100 % | RESPIRATION RATE: 16 BRPM | HEART RATE: 100 BPM | TEMPERATURE: 98 F | DIASTOLIC BLOOD PRESSURE: 79 MMHG | SYSTOLIC BLOOD PRESSURE: 128 MMHG

## 2024-03-01 DIAGNOSIS — Z45.2 ENCOUNTER FOR CENTRAL LINE CARE: ICD-10-CM

## 2024-03-01 DIAGNOSIS — C50.411 MALIGNANT NEOPLASM OF UPPER-OUTER QUADRANT OF RIGHT BREAST IN FEMALE, ESTROGEN RECEPTOR POSITIVE (HCC): Primary | ICD-10-CM

## 2024-03-01 DIAGNOSIS — Z17.0 MALIGNANT NEOPLASM OF UPPER-OUTER QUADRANT OF RIGHT BREAST IN FEMALE, ESTROGEN RECEPTOR POSITIVE (HCC): Primary | ICD-10-CM

## 2024-03-01 LAB
BASOPHILS # BLD AUTO: 0.03 X10(3) UL (ref 0–0.2)
BASOPHILS NFR BLD AUTO: 0.5 %
DEPRECATED RDW RBC AUTO: 38.8 FL (ref 35.1–46.3)
EOSINOPHIL # BLD AUTO: 0.25 X10(3) UL (ref 0–0.7)
EOSINOPHIL NFR BLD AUTO: 4.3 %
ERYTHROCYTE [DISTWIDTH] IN BLOOD BY AUTOMATED COUNT: 11.9 % (ref 11–15)
HCT VFR BLD AUTO: 29.7 %
HGB BLD-MCNC: 10.4 G/DL
IMM GRANULOCYTES # BLD AUTO: 0.02 X10(3) UL (ref 0–1)
IMM GRANULOCYTES NFR BLD: 0.3 %
LYMPHOCYTES # BLD AUTO: 0.83 X10(3) UL (ref 1–4)
LYMPHOCYTES NFR BLD AUTO: 14.1 %
MCH RBC QN AUTO: 31.4 PG (ref 26–34)
MCHC RBC AUTO-ENTMCNC: 35 G/DL (ref 31–37)
MCV RBC AUTO: 89.7 FL
MONOCYTES # BLD AUTO: 0.59 X10(3) UL (ref 0.1–1)
MONOCYTES NFR BLD AUTO: 10 %
NEUTROPHILS # BLD AUTO: 4.16 X10 (3) UL (ref 1.5–7.7)
NEUTROPHILS # BLD AUTO: 4.16 X10(3) UL (ref 1.5–7.7)
NEUTROPHILS NFR BLD AUTO: 70.8 %
PLATELET # BLD AUTO: 328 10(3)UL (ref 150–450)
RBC # BLD AUTO: 3.31 X10(6)UL
WBC # BLD AUTO: 5.9 X10(3) UL (ref 4–11)

## 2024-03-01 PROCEDURE — 85025 COMPLETE CBC W/AUTO DIFF WBC: CPT

## 2024-03-01 PROCEDURE — S0028 INJECTION, FAMOTIDINE, 20 MG: HCPCS

## 2024-03-01 PROCEDURE — 96413 CHEMO IV INFUSION 1 HR: CPT

## 2024-03-01 PROCEDURE — 96375 TX/PRO/DX INJ NEW DRUG ADDON: CPT

## 2024-03-01 RX ORDER — SODIUM CHLORIDE 9 MG/ML
10 INJECTION, SOLUTION INTRAMUSCULAR; INTRAVENOUS; SUBCUTANEOUS ONCE
OUTPATIENT
Start: 2024-03-01

## 2024-03-01 RX ORDER — FAMOTIDINE 10 MG/ML
20 INJECTION, SOLUTION INTRAVENOUS ONCE
Status: COMPLETED | OUTPATIENT
Start: 2024-03-01 | End: 2024-03-01

## 2024-03-01 RX ORDER — DIPHENHYDRAMINE HYDROCHLORIDE 50 MG/ML
INJECTION INTRAMUSCULAR; INTRAVENOUS
Status: COMPLETED
Start: 2024-03-01 | End: 2024-03-01

## 2024-03-01 RX ORDER — HEPARIN SODIUM (PORCINE) LOCK FLUSH IV SOLN 100 UNIT/ML 100 UNIT/ML
5 SOLUTION INTRAVENOUS ONCE
OUTPATIENT
Start: 2024-03-01

## 2024-03-01 RX ORDER — HEPARIN SODIUM (PORCINE) LOCK FLUSH IV SOLN 100 UNIT/ML 100 UNIT/ML
5 SOLUTION INTRAVENOUS ONCE
Status: COMPLETED | OUTPATIENT
Start: 2024-03-01 | End: 2024-03-01

## 2024-03-01 RX ORDER — FAMOTIDINE 10 MG/ML
INJECTION, SOLUTION INTRAVENOUS
Status: COMPLETED
Start: 2024-03-01 | End: 2024-03-01

## 2024-03-01 RX ORDER — HEPARIN SODIUM (PORCINE) LOCK FLUSH IV SOLN 100 UNIT/ML 100 UNIT/ML
SOLUTION INTRAVENOUS
Status: COMPLETED
Start: 2024-03-01 | End: 2024-03-01

## 2024-03-01 RX ORDER — DIPHENHYDRAMINE HYDROCHLORIDE 50 MG/ML
50 INJECTION INTRAMUSCULAR; INTRAVENOUS ONCE
Status: COMPLETED | OUTPATIENT
Start: 2024-03-01 | End: 2024-03-01

## 2024-03-01 RX ADMIN — FAMOTIDINE 20 MG: 10 INJECTION, SOLUTION INTRAVENOUS at 11:43:00

## 2024-03-01 RX ADMIN — DIPHENHYDRAMINE HYDROCHLORIDE 50 MG: 50 INJECTION INTRAMUSCULAR; INTRAVENOUS at 11:41:00

## 2024-03-01 RX ADMIN — HEPARIN SODIUM (PORCINE) LOCK FLUSH IV SOLN 100 UNIT/ML 500 UNITS: 100 SOLUTION INTRAVENOUS at 13:55:00

## 2024-03-01 NOTE — PROGRESS NOTES
Pt here for C7D15 Drug(s)Taxol.  Arrives Ambulating independently, accompanied by Spouse     Patient was evaluated today by Treatment Nurse.    Oral medications included in this regimen:  no  Patient confirms comprehension of cancer treatment schedule:  yes  Pregnancy screening:  Denies possibility of pregnancy  Modifications in dose or schedule:  No  Medications appearance and physical integrity checked by RN: yes.  Chemotherapy IV pump settings verified by 2 RNs:  Yes.  Frequency of blood return and site check throughout administration: Prior to administration, Prior to each drug, Every 2-3 ml IVP, and At completion of therapy   Infusion/treatment outcome:  patient tolerated treatment without incident    Education Record    Learner:  Patient  Barriers / Limitations:  None  Method:  Reinforcement  Education / instructions given:  Plan of care.  Outcome:  Shows understanding    Discharged Home, Ambulating independently, accompanied by:Spouse    Patient/family verbalized understanding of future appointments: by MyChart messaging

## 2024-03-08 ENCOUNTER — NURSE ONLY (OUTPATIENT)
Dept: HEMATOLOGY/ONCOLOGY | Facility: HOSPITAL | Age: 39
End: 2024-03-08
Attending: INTERNAL MEDICINE
Payer: COMMERCIAL

## 2024-03-08 VITALS
BODY MASS INDEX: 25.78 KG/M2 | TEMPERATURE: 98 F | HEIGHT: 65.98 IN | WEIGHT: 160.38 LBS | HEART RATE: 102 BPM | DIASTOLIC BLOOD PRESSURE: 64 MMHG | OXYGEN SATURATION: 100 % | RESPIRATION RATE: 16 BRPM | SYSTOLIC BLOOD PRESSURE: 107 MMHG

## 2024-03-08 DIAGNOSIS — Z17.0 MALIGNANT NEOPLASM OF UPPER-OUTER QUADRANT OF RIGHT BREAST IN FEMALE, ESTROGEN RECEPTOR POSITIVE (HCC): Primary | ICD-10-CM

## 2024-03-08 DIAGNOSIS — C50.411 MALIGNANT NEOPLASM OF UPPER-OUTER QUADRANT OF RIGHT BREAST IN FEMALE, ESTROGEN RECEPTOR POSITIVE (HCC): Primary | ICD-10-CM

## 2024-03-08 DIAGNOSIS — T45.1X5A ANEMIA ASSOCIATED WITH CHEMOTHERAPY: ICD-10-CM

## 2024-03-08 DIAGNOSIS — Z09 CHEMOTHERAPY FOLLOW-UP EXAMINATION: ICD-10-CM

## 2024-03-08 DIAGNOSIS — D64.81 ANEMIA ASSOCIATED WITH CHEMOTHERAPY: ICD-10-CM

## 2024-03-08 LAB
ALBUMIN SERPL-MCNC: 4.4 G/DL (ref 3.2–4.8)
ALBUMIN/GLOB SERPL: 1.8 {RATIO} (ref 1–2)
ALP LIVER SERPL-CCNC: 44 U/L
ALT SERPL-CCNC: 21 U/L
ANION GAP SERPL CALC-SCNC: 4 MMOL/L (ref 0–18)
AST SERPL-CCNC: 17 U/L (ref ?–34)
BASOPHILS # BLD AUTO: 0.02 X10(3) UL (ref 0–0.2)
BASOPHILS NFR BLD AUTO: 0.3 %
BILIRUB SERPL-MCNC: 0.7 MG/DL (ref 0.3–1.2)
BUN BLD-MCNC: 17 MG/DL (ref 9–23)
BUN/CREAT SERPL: 17.9 (ref 10–20)
CALCIUM BLD-MCNC: 10.1 MG/DL (ref 8.7–10.4)
CHLORIDE SERPL-SCNC: 109 MMOL/L (ref 98–112)
CO2 SERPL-SCNC: 25 MMOL/L (ref 21–32)
CREAT BLD-MCNC: 0.95 MG/DL
DEPRECATED RDW RBC AUTO: 39.6 FL (ref 35.1–46.3)
EGFRCR SERPLBLD CKD-EPI 2021: 78 ML/MIN/1.73M2 (ref 60–?)
EOSINOPHIL # BLD AUTO: 0.25 X10(3) UL (ref 0–0.7)
EOSINOPHIL NFR BLD AUTO: 4.2 %
ERYTHROCYTE [DISTWIDTH] IN BLOOD BY AUTOMATED COUNT: 11.9 % (ref 11–15)
GLOBULIN PLAS-MCNC: 2.4 G/DL (ref 2.8–4.4)
GLUCOSE BLD-MCNC: 96 MG/DL (ref 70–99)
HCT VFR BLD AUTO: 32.6 %
HGB BLD-MCNC: 10.7 G/DL
IMM GRANULOCYTES # BLD AUTO: 0.02 X10(3) UL (ref 0–1)
IMM GRANULOCYTES NFR BLD: 0.3 %
LYMPHOCYTES # BLD AUTO: 0.87 X10(3) UL (ref 1–4)
LYMPHOCYTES NFR BLD AUTO: 14.7 %
MCH RBC QN AUTO: 29.8 PG (ref 26–34)
MCHC RBC AUTO-ENTMCNC: 32.8 G/DL (ref 31–37)
MCV RBC AUTO: 90.8 FL
MONOCYTES # BLD AUTO: 0.53 X10(3) UL (ref 0.1–1)
MONOCYTES NFR BLD AUTO: 8.9 %
NEUTROPHILS # BLD AUTO: 4.24 X10 (3) UL (ref 1.5–7.7)
NEUTROPHILS # BLD AUTO: 4.24 X10(3) UL (ref 1.5–7.7)
NEUTROPHILS NFR BLD AUTO: 71.6 %
OSMOLALITY SERPL CALC.SUM OF ELEC: 287 MOSM/KG (ref 275–295)
PLATELET # BLD AUTO: 366 10(3)UL (ref 150–450)
POTASSIUM SERPL-SCNC: 4.2 MMOL/L (ref 3.5–5.1)
PROT SERPL-MCNC: 6.8 G/DL (ref 5.7–8.2)
RBC # BLD AUTO: 3.59 X10(6)UL
SODIUM SERPL-SCNC: 138 MMOL/L (ref 136–145)
WBC # BLD AUTO: 5.9 X10(3) UL (ref 4–11)

## 2024-03-08 PROCEDURE — 80053 COMPREHEN METABOLIC PANEL: CPT

## 2024-03-08 PROCEDURE — 96413 CHEMO IV INFUSION 1 HR: CPT

## 2024-03-08 PROCEDURE — S0028 INJECTION, FAMOTIDINE, 20 MG: HCPCS

## 2024-03-08 PROCEDURE — 99215 OFFICE O/P EST HI 40 MIN: CPT | Performed by: INTERNAL MEDICINE

## 2024-03-08 PROCEDURE — 96375 TX/PRO/DX INJ NEW DRUG ADDON: CPT

## 2024-03-08 PROCEDURE — 85025 COMPLETE CBC W/AUTO DIFF WBC: CPT

## 2024-03-08 RX ORDER — DIPHENHYDRAMINE HYDROCHLORIDE 50 MG/ML
50 INJECTION INTRAMUSCULAR; INTRAVENOUS ONCE
Status: COMPLETED | OUTPATIENT
Start: 2024-03-08 | End: 2024-03-08

## 2024-03-08 RX ORDER — DIPHENHYDRAMINE HYDROCHLORIDE 50 MG/ML
50 INJECTION INTRAMUSCULAR; INTRAVENOUS ONCE
OUTPATIENT
Start: 2024-03-15

## 2024-03-08 RX ORDER — HEPARIN SODIUM (PORCINE) LOCK FLUSH IV SOLN 100 UNIT/ML 100 UNIT/ML
SOLUTION INTRAVENOUS
Status: DISPENSED
Start: 2024-03-08 | End: 2024-03-08

## 2024-03-08 RX ORDER — FAMOTIDINE 10 MG/ML
20 INJECTION, SOLUTION INTRAVENOUS ONCE
OUTPATIENT
Start: 2024-03-22

## 2024-03-08 RX ORDER — DIPHENHYDRAMINE HYDROCHLORIDE 50 MG/ML
50 INJECTION INTRAMUSCULAR; INTRAVENOUS ONCE
OUTPATIENT
Start: 2024-03-22

## 2024-03-08 RX ORDER — FAMOTIDINE 10 MG/ML
20 INJECTION, SOLUTION INTRAVENOUS ONCE
Status: COMPLETED | OUTPATIENT
Start: 2024-03-08 | End: 2024-03-08

## 2024-03-08 RX ORDER — FAMOTIDINE 10 MG/ML
20 INJECTION, SOLUTION INTRAVENOUS ONCE
OUTPATIENT
Start: 2024-03-15

## 2024-03-08 RX ORDER — FAMOTIDINE 10 MG/ML
20 INJECTION, SOLUTION INTRAVENOUS ONCE
Status: CANCELLED | OUTPATIENT
Start: 2024-03-08

## 2024-03-08 RX ORDER — DIPHENHYDRAMINE HYDROCHLORIDE 50 MG/ML
INJECTION INTRAMUSCULAR; INTRAVENOUS
Status: DISPENSED
Start: 2024-03-08 | End: 2024-03-08

## 2024-03-08 RX ORDER — FAMOTIDINE 10 MG/ML
INJECTION, SOLUTION INTRAVENOUS
Status: DISPENSED
Start: 2024-03-08 | End: 2024-03-08

## 2024-03-08 RX ORDER — DIPHENHYDRAMINE HYDROCHLORIDE 50 MG/ML
50 INJECTION INTRAMUSCULAR; INTRAVENOUS ONCE
Status: CANCELLED | OUTPATIENT
Start: 2024-03-08

## 2024-03-08 RX ADMIN — DIPHENHYDRAMINE HYDROCHLORIDE 50 MG: 50 INJECTION INTRAMUSCULAR; INTRAVENOUS at 10:35:00

## 2024-03-08 RX ADMIN — FAMOTIDINE 20 MG: 10 INJECTION, SOLUTION INTRAVENOUS at 10:37:00

## 2024-03-08 NOTE — PROGRESS NOTES
HPI   Jocelyn Garza is a 38 year old female for evaluation of   Encounter Diagnoses   Name Primary?    Malignant neoplasm of upper-outer quadrant of right breast in female, estrogen receptor positive (HCC) Yes    Chemotherapy follow-up examination     Anemia associated with chemotherapy        She is here today for follow up s/p cycle 7 Taxol. Completed 4 cycles DD AC.     States that she feels mass feels looser on the R breast.      Fatigue:  Yes, this week tired.    Fevers:  No    Appetite/taste changes:  Yes, sometimes taste changes.    Mucositis:  No    Weight changes:  No    Nausea/vomiting:  No    Diarrhea:  Yes, 2 times in 1 day.    Constipation:  Yes, on/off  better.    Peripheral neuropathy:  Yes, very sporadic tips of index finger and thumb more this time and toes cold last night.  None of these persistent.    LMP Nov 2023, some hot flashes.     Some tenderness at the R axilla on/off.  States at times mass on R breast very loose, other times feels more, other times better.      ECOG PS  0    Review of Systems:   Review of Systems   HENT:   Positive for nosebleeds (on the tissue.).    Respiratory:  Negative for cough (had mild cough.  Not this week.) and shortness of breath.    Cardiovascular:  Negative for chest pain.   Gastrointestinal:  Negative for abdominal pain (prior to BM).        No GERD   Endocrine: Positive for hot flashes.   Genitourinary:  Negative for dysuria and frequency.    Musculoskeletal:  Positive for arthralgias (at shoulders like the pain she had with GCSF, claritin helps). Negative for back pain and flank pain (L flank mild discomfort that has had in the past prior to diagnosis.  A little on the R.  States not as much this week.).   Skin:  Negative for rash.   Neurological:  Negative for dizziness and headaches.   Hematological:  Negative for adenopathy.   Psychiatric/Behavioral:  Negative for sleep disturbance.          Current Outpatient Medications   Medication Sig Dispense  Refill    lidocaine-prilocaine 2.5-2.5 % External Cream Apply to site 1 hour prior to port a cath needle insertion 1 each 1    lisinopril 5 MG Oral Tab Take 1 tablet (5 mg total) by mouth daily. 30 tablet 5    cefuroxime 500 MG Oral Tab Take 1 tablet (500 mg total) by mouth 2 (two) times daily. For 3 days (Patient not taking: Reported on 12/19/2023)      prochlorperazine (COMPAZINE) 10 mg tablet Take 1 tablet (10 mg total) by mouth every 8 (eight) hours as needed for Nausea. (Patient not taking: Reported on 1/5/2024) 30 tablet 3    ondansetron (ZOFRAN) 8 MG tablet Take 1 tablet (8 mg total) by mouth every 8 (eight) hours as needed for Nausea. (Patient not taking: Reported on 1/5/2024) 30 tablet 3     Allergies:   No Known Allergies    Past Medical History:   Diagnosis Date    Breast cancer in female (HCC) 10/18/2023    Right breast lymph node cancer    Hypertension     Solitary kidney, congenital      Past Surgical History:   Procedure Laterality Date    APPENDECTOMY  01/01/2007    NEEDLE BIOPSY RIGHT Right 10/09/2023    TREAT ECTOPIC PREG,RMV TUBE/OVARY Left 01/01/2014    TREAT ECTOPIC PREG,RMV TUBE/OVARY Left 01/01/2015     Social History     Socioeconomic History    Marital status:    Tobacco Use    Smoking status: Never    Smokeless tobacco: Never   Vaping Use    Vaping Use: Never used   Substance and Sexual Activity    Alcohol use: Not Currently     Comment: 2 -3 drinks per week    Drug use: No     Social Determinants of Health     Food Insecurity: No Food Insecurity (11/9/2023)    Food Insecurity     Food Insecurity: Never true   Transportation Needs: No Transportation Needs (11/9/2023)    Transportation Needs     Lack of Transportation: No   Housing Stability: Low Risk  (11/9/2023)    Housing Stability     Housing Instability: No       Family History   Problem Relation Age of Onset    Other (Parkinson's Disease) Father     Prostate Cancer Maternal Grandfather 70    Pancreatic Cancer Paternal  Grandmother 80    Pancreatic Cancer Maternal Uncle 60    Cancer Maternal Great-Grandmother         gastric ca    Cancer Paternal Aunt 80        breast cancer    Pancreatic Cancer Paternal Great-Grandfather          PHYSICAL EXAM:    /64 (BP Location: Left arm, Patient Position: Sitting, Cuff Size: adult)   Pulse 102   Temp 98 °F (36.7 °C) (Oral)   Resp 16   Ht 1.676 m (5' 5.98\")   Wt 72.8 kg (160 lb 6.4 oz)   LMP 11/21/2023 (Approximate)   SpO2 100%   BMI 25.90 kg/m²   Wt Readings from Last 6 Encounters:   03/08/24 72.8 kg (160 lb 6.4 oz)   03/01/24 73.2 kg (161 lb 4.8 oz)   02/23/24 72.3 kg (159 lb 8 oz)   02/20/24 71.4 kg (157 lb 6.4 oz)   02/16/24 72.3 kg (159 lb 8 oz)   02/09/24 71.8 kg (158 lb 6.4 oz)     Physical Exam  General: Patient is alert, not in acute distress.  HEENT: EOMs intact.  Anicteric.  Oropharynx is clear.   Neck: No JVD. No palpable lymphadenopathy. Neck is supple.  Chest: Non labored breathing.  Clear to auscultation.  Breasts:  Right breast at the RUQ with a mobile 9 x 6.5 cm hard mass - tender.  R axilla no tender.    Heart: Regular rate and rhythm.   Abdomen: Soft, non tender with good bowel sounds.  Extremities: No edema.  No edema  Neurological: Grossly intact.   Lymphatics: There is no palpable lymphadenopathy throughout in the cervical or supraclavicular egions.  Psych/Depression: nl          ASSESSMENT/PLAN:     1. Malignant neoplasm of upper-outer quadrant of right breast in female, estrogen receptor positive (HCC)    2. Chemotherapy follow-up examination    3. Anemia associated with chemotherapy         Cancer Staging   Malignant neoplasm of upper-outer quadrant of right breast in female, estrogen receptor positive (HCC)  Staging form: Breast, AJCC 8th Edition  - Clinical stage from 10/12/2023: Stage IIA (cT3, cN1(f), cM0, G2, ER+, IA+, HER2-) - Signed by Halie Calle MD on 10/20/2023    Patient has completed staging work-up.  Thus far no evidence of metastatic  disease.  Findings in the liver evaluated with ultrasound, and the one side is a hemangioma, the other likely a cyst, but a question of metastatic disease and PET scan has been ordered.  I discussed with the patient that most likely this is going to be a cyst.    Discussed that she still has early stage disease, and that her staging has not changed despite the size of the primary tumor.    Given extensive disease in the breast and komal involvement, she will benefit from neoadjuvant chemotherapy, which may help with surgical outcomes by shrinking some of the primary tumor and of the lymph nodes.  I discussed with the patient that the primary tumor will not likely decrease in size significantly to make her a candidate for breast conservation.  I did discuss with the patient that she will need a mastectomy and likely axillary lymph node dissection.  Given the size of the primary tumor, she is a candidate for postmastectomy radiation therapy plus minus radiation to the komal basins pending on surgical management of the axilla.  I discussed with the patient that once she completes treatment with radiation, she will then be initiated on adjuvant endocrine therapy, given the size of the tumor and number of positive lymph nodes, she also will be a candidate for 2 years of endovascular in the initial 2 years of adjuvant endocrine therapy.  Discussed that adjuvant endocrine therapy total duration will be no less than 5 years but given her high risk features, likely up to 8 to 10 years.  We will also discuss options for ovarian function suppression after completion of chemotherapy.    Patient inquired regarding contralateral prophylactic mastectomy.  I discussed with the patient that we should await results of her genetic testing.  If the genetic testing is positive for a deleterious mutation which will increase her lifetime second primary breast cancer, then she should proceed with prophylactic contralateral mastectomy.   However, if this is negative, discussed with the patient that there is no benefit for proceeding with contralateral mastectomy, as it will not change her disease-free or overall survival.    Discussed with the patient that while she is receiving neoadjuvant treatment, she can have the evaluation by plastic surgery so that when she completes her neoadjuvant course of therapy, she will be able to have her surgery coordinated with Dr. Hutson and a plastic surgeon    Plan for DD AC x 4 cycles followed by weekly Taxol x12 weeks    S/p cycle 4 DD AC, complicated by RSV neutropenia.    S/p cycle 3 of weekly taxol.  Had infusion reaction after cycle 1 D1.      Proceed with cycle 4 weekly.  Paclitaxel given with additional benadryl.      Consult with Dr. Mae on 1/12/24, MRI after treatment and Allyson placement at site of LN, done on 1/23/24.    MRI ordered post treatment.     Chemotherapy-induced anemia.  Monitor    All of the patient's questions were answered to the best of my abilities.     Call prn.  Follow-up in 3 weeks    MDM high risk.    No orders of the defined types were placed in this encounter.      Results From Past 48 Hours:  Recent Results (from the past 48 hour(s))   Comp Metabolic Panel (14)    Collection Time: 03/08/24  8:05 AM   Result Value Ref Range    Glucose 96 70 - 99 mg/dL    Sodium 138 136 - 145 mmol/L    Potassium 4.2 3.5 - 5.1 mmol/L    Chloride 109 98 - 112 mmol/L    CO2 25.0 21.0 - 32.0 mmol/L    Anion Gap 4 0 - 18 mmol/L    BUN 17 9 - 23 mg/dL    Creatinine 0.95 0.55 - 1.02 mg/dL    BUN/CREA Ratio 17.9 10.0 - 20.0    Calcium, Total 10.1 8.7 - 10.4 mg/dL    Calculated Osmolality 287 275 - 295 mOsm/kg    eGFR-Cr 78 >=60 mL/min/1.73m2    ALT 21 10 - 49 U/L    AST 17 <=34 U/L    Alkaline Phosphatase 44 37 - 98 U/L    Bilirubin, Total 0.7 0.3 - 1.2 mg/dL    Total Protein 6.8 5.7 - 8.2 g/dL    Albumin 4.4 3.2 - 4.8 g/dL    Globulin  2.4 (L) 2.8 - 4.4 g/dL    A/G Ratio 1.8 1.0 - 2.0    Patient Fasting  for CMP? Patient not present    CBC W/ DIFFERENTIAL    Collection Time: 03/08/24  8:05 AM   Result Value Ref Range    WBC 5.9 4.0 - 11.0 x10(3) uL    RBC 3.59 (L) 3.80 - 5.30 x10(6)uL    HGB 10.7 (L) 12.0 - 16.0 g/dL    HCT 32.6 (L) 35.0 - 48.0 %    MCV 90.8 80.0 - 100.0 fL    MCH 29.8 26.0 - 34.0 pg    MCHC 32.8 31.0 - 37.0 g/dL    RDW-SD 39.6 35.1 - 46.3 fL    RDW 11.9 11.0 - 15.0 %    .0 150.0 - 450.0 10(3)uL    Neutrophil Absolute Prelim 4.24 1.50 - 7.70 x10 (3) uL    Neutrophil Absolute 4.24 1.50 - 7.70 x10(3) uL    Lymphocyte Absolute 0.87 (L) 1.00 - 4.00 x10(3) uL    Monocyte Absolute 0.53 0.10 - 1.00 x10(3) uL    Eosinophil Absolute 0.25 0.00 - 0.70 x10(3) uL    Basophil Absolute 0.02 0.00 - 0.20 x10(3) uL    Immature Granulocyte Absolute 0.02 0.00 - 1.00 x10(3) uL    Neutrophil % 71.6 %    Lymphocyte % 14.7 %    Monocyte % 8.9 %    Eosinophil % 4.2 %    Basophil % 0.3 %    Immature Granulocyte % 0.3 %         Imaging & Referrals:  None

## 2024-03-08 NOTE — PROGRESS NOTES
Pt here for C 8 D 1 Taxol.      Arrives Ambulating independently, accompanied by Family member. Labs reviewed by this RN.     Patient was evaluated today by D[facility] and Treatment Nurse.    Oral medications included in this regimen:  no    Patient confirms comprehension of cancer treatment schedule:  yes    Pregnancy screening:  Denies possibility of pregnancy    Modifications in dose or schedule:  No    Medications appearance and physical integrity checked by RN: yes.    Chemotherapy IV pump settings verified by 2 RNs:  Yes.  Frequency of blood return and site check throughout administration: Prior to administration and At completion of therapy     Infusion/treatment outcome:  patient tolerated treatment without incident    Education Record    Learner:  Patient  Barriers / Limitations:  None  Method:  Reinforcement  Education / instructions given:  Plan of care and treatment regimen  Outcome:  Shows understanding    Discharged Home, Ambulating independently, accompanied by:Family member    Patient/family verbalized understanding of future appointments: by MyChart messaging

## 2024-03-08 NOTE — PROGRESS NOTES
Pt here for C8D1 Taxol.        Arrives Ambulating independently, accompanied by Family member. Labs reviewed by this RN.     Patient was evaluated today by DAXA and Treatment Nurse.    Oral medications included in this regimen:  no    Patient confirms comprehension of cancer treatment schedule:  yes    Pregnancy screening:  Denies possibility of pregnancy    Modifications in dose or schedule:  No    Medications appearance and physical integrity checked by RN: yes.    Chemotherapy IV pump settings verified by 2 RNs:  Yes.  Frequency of blood return and site check throughout administration: Prior to administration and At completion of therapy     Infusion/treatment outcome:  patient tolerated treatment without incident    Education Record    Learner:  Patient  Barriers / Limitations:  None  Method:  Reinforcement  Education / instructions given:  Plan of care and treatment regimen  Outcome:  Shows understanding    Discharged Home, Ambulating independently, accompanied by:Family member    Patient/family verbalized understanding of future appointments: by MyChart messaging

## 2024-03-11 ENCOUNTER — TELEPHONE (OUTPATIENT)
Dept: HEMATOLOGY/ONCOLOGY | Facility: HOSPITAL | Age: 39
End: 2024-03-11

## 2024-03-11 NOTE — TELEPHONE ENCOUNTER
Call received from patient with new left breast complaint.  Patient shares this past Saturday 3/9/24, patient was experiencing left breast tenderness.  Patient reports performing BSE and palpated a hard firm \"walnut size\" mass at 6:00 subareolar.  Patient shares pain with palpation.  This morning patient shares left breast feels \"heavy\".  Unable to palpate discrete mass.  Patient denies redness, warmth, discharge, or fever.    Patient shares her concern.  Discussed with patient need for an acute care visit for clinical exam.  Anticipate need to perform diagnostic mammogram left.  Patient is due to complete neoadjuvant chemotherapy 3/22/24.      Acute care visit scheduled with Leonidas Osborn PA-C for 3/12/24 at 10am.

## 2024-03-12 ENCOUNTER — TELEPHONE (OUTPATIENT)
Dept: HEMATOLOGY/ONCOLOGY | Facility: HOSPITAL | Age: 39
End: 2024-03-12

## 2024-03-12 ENCOUNTER — OFFICE VISIT (OUTPATIENT)
Dept: HEMATOLOGY/ONCOLOGY | Facility: HOSPITAL | Age: 39
End: 2024-03-12
Attending: INTERNAL MEDICINE
Payer: COMMERCIAL

## 2024-03-12 VITALS
BODY MASS INDEX: 26.66 KG/M2 | RESPIRATION RATE: 16 BRPM | OXYGEN SATURATION: 100 % | WEIGHT: 160 LBS | HEART RATE: 100 BPM | HEIGHT: 65 IN | SYSTOLIC BLOOD PRESSURE: 131 MMHG | TEMPERATURE: 98 F | DIASTOLIC BLOOD PRESSURE: 74 MMHG

## 2024-03-12 DIAGNOSIS — N63.42 SUBAREOLAR MASS OF LEFT BREAST: Primary | ICD-10-CM

## 2024-03-12 DIAGNOSIS — C50.411 MALIGNANT NEOPLASM OF UPPER-OUTER QUADRANT OF RIGHT BREAST IN FEMALE, ESTROGEN RECEPTOR POSITIVE (HCC): ICD-10-CM

## 2024-03-12 DIAGNOSIS — Z17.0 MALIGNANT NEOPLASM OF UPPER-OUTER QUADRANT OF RIGHT BREAST IN FEMALE, ESTROGEN RECEPTOR POSITIVE (HCC): ICD-10-CM

## 2024-03-12 PROCEDURE — 99213 OFFICE O/P EST LOW 20 MIN: CPT | Performed by: PHYSICIAN ASSISTANT

## 2024-03-12 NOTE — TELEPHONE ENCOUNTER
Phoned patient for continued care coordination.  Diagnostic mammogram left scheduled for Monday 3/18/24 at 9am.  Patient acknowledged and denied questions.

## 2024-03-12 NOTE — PROGRESS NOTES
S:  38 year old female presents today, with her spouse, with complaint of palpating a left breast mass.  Four days ago, she experienced tenderness over the lateral left breast.  She performed a self exam and palpated a mass.    She is currently completing neoadjuvant chemotherapy for right invasive lobular cancer ER/IL+ Her2-, Ki67 = 7%.  She has 2 more treatments of weekly Paclitaxel remaining.    O:  WDWNWF, NAD, PS0  Breast:  1.5 x 1 cm subareolar mass palpable in the left breast, tenderness noted over cental and lateral breast, no nipple discharge, skin changes or lymphadenopathy.  9 x 6 cm mass RUQ, firm, tender in right breast (known malignancy), no lymphadenopathy or skin changes    A:  1. Stage IIA ILC right breast ER/IL+ Her2- Ki67 = 8%  2.  Left breast mass    P:  1.  Completing neoadjuvant ddAC followed by weekly T.  Two weekly dose of Paclitaxel remaining.  MRI breast planned post-chemotherapy  2.  Left breast diagnostic imaging - alonso and ultrasound  3.  Call prn  4.  Keep originally scheduled appointments.

## 2024-03-15 ENCOUNTER — OFFICE VISIT (OUTPATIENT)
Dept: HEMATOLOGY/ONCOLOGY | Facility: HOSPITAL | Age: 39
End: 2024-03-15
Attending: INTERNAL MEDICINE
Payer: COMMERCIAL

## 2024-03-15 VITALS
DIASTOLIC BLOOD PRESSURE: 88 MMHG | SYSTOLIC BLOOD PRESSURE: 122 MMHG | BODY MASS INDEX: 27 KG/M2 | TEMPERATURE: 99 F | RESPIRATION RATE: 18 BRPM | WEIGHT: 160.63 LBS | HEART RATE: 100 BPM | OXYGEN SATURATION: 100 %

## 2024-03-15 DIAGNOSIS — C50.411 MALIGNANT NEOPLASM OF UPPER-OUTER QUADRANT OF RIGHT BREAST IN FEMALE, ESTROGEN RECEPTOR POSITIVE (HCC): Primary | ICD-10-CM

## 2024-03-15 DIAGNOSIS — Z45.2 ENCOUNTER FOR CENTRAL LINE CARE: ICD-10-CM

## 2024-03-15 DIAGNOSIS — Z17.0 MALIGNANT NEOPLASM OF UPPER-OUTER QUADRANT OF RIGHT BREAST IN FEMALE, ESTROGEN RECEPTOR POSITIVE (HCC): Primary | ICD-10-CM

## 2024-03-15 LAB
BASOPHILS # BLD AUTO: 0.02 X10(3) UL (ref 0–0.2)
BASOPHILS NFR BLD AUTO: 0.4 %
DEPRECATED RDW RBC AUTO: 37.9 FL (ref 35.1–46.3)
EOSINOPHIL # BLD AUTO: 0.23 X10(3) UL (ref 0–0.7)
EOSINOPHIL NFR BLD AUTO: 4.3 %
ERYTHROCYTE [DISTWIDTH] IN BLOOD BY AUTOMATED COUNT: 11.9 % (ref 11–15)
HCT VFR BLD AUTO: 31.4 %
HGB BLD-MCNC: 10.5 G/DL
IMM GRANULOCYTES # BLD AUTO: 0.01 X10(3) UL (ref 0–1)
IMM GRANULOCYTES NFR BLD: 0.2 %
LYMPHOCYTES # BLD AUTO: 0.77 X10(3) UL (ref 1–4)
LYMPHOCYTES NFR BLD AUTO: 14.2 %
MCH RBC QN AUTO: 29.9 PG (ref 26–34)
MCHC RBC AUTO-ENTMCNC: 33.4 G/DL (ref 31–37)
MCV RBC AUTO: 89.5 FL
MONOCYTES # BLD AUTO: 0.49 X10(3) UL (ref 0.1–1)
MONOCYTES NFR BLD AUTO: 9.1 %
NEUTROPHILS # BLD AUTO: 3.89 X10 (3) UL (ref 1.5–7.7)
NEUTROPHILS # BLD AUTO: 3.89 X10(3) UL (ref 1.5–7.7)
NEUTROPHILS NFR BLD AUTO: 71.8 %
PLATELET # BLD AUTO: 338 10(3)UL (ref 150–450)
RBC # BLD AUTO: 3.51 X10(6)UL
WBC # BLD AUTO: 5.4 X10(3) UL (ref 4–11)

## 2024-03-15 PROCEDURE — S0028 INJECTION, FAMOTIDINE, 20 MG: HCPCS

## 2024-03-15 PROCEDURE — 96375 TX/PRO/DX INJ NEW DRUG ADDON: CPT

## 2024-03-15 PROCEDURE — 96415 CHEMO IV INFUSION ADDL HR: CPT

## 2024-03-15 PROCEDURE — 85025 COMPLETE CBC W/AUTO DIFF WBC: CPT

## 2024-03-15 PROCEDURE — 96413 CHEMO IV INFUSION 1 HR: CPT

## 2024-03-15 RX ORDER — FAMOTIDINE 10 MG/ML
20 INJECTION, SOLUTION INTRAVENOUS ONCE
Status: COMPLETED | OUTPATIENT
Start: 2024-03-15 | End: 2024-03-15

## 2024-03-15 RX ORDER — HEPARIN SODIUM (PORCINE) LOCK FLUSH IV SOLN 100 UNIT/ML 100 UNIT/ML
5 SOLUTION INTRAVENOUS ONCE
OUTPATIENT
Start: 2024-03-15

## 2024-03-15 RX ORDER — HEPARIN SODIUM (PORCINE) LOCK FLUSH IV SOLN 100 UNIT/ML 100 UNIT/ML
5 SOLUTION INTRAVENOUS ONCE
Status: COMPLETED | OUTPATIENT
Start: 2024-03-15 | End: 2024-03-15

## 2024-03-15 RX ORDER — DIPHENHYDRAMINE HYDROCHLORIDE 50 MG/ML
50 INJECTION INTRAMUSCULAR; INTRAVENOUS ONCE
Status: COMPLETED | OUTPATIENT
Start: 2024-03-15 | End: 2024-03-15

## 2024-03-15 RX ORDER — SODIUM CHLORIDE 9 MG/ML
10 INJECTION, SOLUTION INTRAMUSCULAR; INTRAVENOUS; SUBCUTANEOUS ONCE
OUTPATIENT
Start: 2024-03-15

## 2024-03-15 RX ORDER — FAMOTIDINE 10 MG/ML
INJECTION, SOLUTION INTRAVENOUS
Status: COMPLETED
Start: 2024-03-15 | End: 2024-03-15

## 2024-03-15 RX ORDER — DIPHENHYDRAMINE HYDROCHLORIDE 50 MG/ML
INJECTION INTRAMUSCULAR; INTRAVENOUS
Status: COMPLETED
Start: 2024-03-15 | End: 2024-03-15

## 2024-03-15 RX ADMIN — FAMOTIDINE 20 MG: 10 INJECTION, SOLUTION INTRAVENOUS at 11:15:00

## 2024-03-15 RX ADMIN — HEPARIN SODIUM (PORCINE) LOCK FLUSH IV SOLN 100 UNIT/ML 500 UNITS: 100 SOLUTION INTRAVENOUS at 13:14:00

## 2024-03-15 RX ADMIN — DIPHENHYDRAMINE HYDROCHLORIDE 50 MG: 50 INJECTION INTRAMUSCULAR; INTRAVENOUS at 11:11:00

## 2024-03-15 NOTE — PROGRESS NOTES
Pt here for C8D8 Drug(s)Taxol.  Arrives Ambulating independently, accompanied by Spouse PAC previously accessed in lab with good blood return.  Pt feels well no new complaints.      Patient was evaluated today by Treatment Nurse.    Oral medications included in this regimen:  no    Patient confirms comprehension of cancer treatment schedule:  yes    Pregnancy screening:  Denies possibility of pregnancy    Modifications in dose or schedule:  no    Medications appearance and physical integrity checked by RN: yes.    Chemotherapy IV pump settings verified by 2 RNs:  Yes.  Frequency of blood return and site check throughout administration: Prior to administration, Prior to each drug, and At completion of therapy     Infusion/treatment outcome:  patient tolerated treatment without incident    PAC Hep locked and decannulated.  Dc'd ambulating without complaints.     Education Record    Learner:  Patient and Spouse  Barriers / Limitations:  None  Method:  Discussion  Education / instructions given:  reviewed plan of care  Outcome:  Shows understanding    Discharged Home, Ambulating independently, accompanied by:Self    Patient/family verbalized understanding of future appointments: by [employer] messaging

## 2024-03-18 ENCOUNTER — HOSPITAL ENCOUNTER (OUTPATIENT)
Dept: ULTRASOUND IMAGING | Facility: HOSPITAL | Age: 39
Discharge: HOME | End: 2024-03-18
Attending: PHYSICIAN ASSISTANT
Payer: COMMERCIAL

## 2024-03-18 ENCOUNTER — HOSPITAL ENCOUNTER (OUTPATIENT)
Dept: MAMMOGRAPHY | Facility: HOSPITAL | Age: 39
Discharge: HOME | End: 2024-03-18
Attending: PHYSICIAN ASSISTANT
Payer: COMMERCIAL

## 2024-03-18 DIAGNOSIS — N63.42 SUBAREOLAR MASS OF LEFT BREAST: ICD-10-CM

## 2024-03-18 PROCEDURE — 77065 DX MAMMO INCL CAD UNI: CPT | Performed by: PHYSICIAN ASSISTANT

## 2024-03-18 PROCEDURE — 76642 ULTRASOUND BREAST LIMITED: CPT | Performed by: PHYSICIAN ASSISTANT

## 2024-03-18 PROCEDURE — 77061 BREAST TOMOSYNTHESIS UNI: CPT | Performed by: PHYSICIAN ASSISTANT

## 2024-03-22 ENCOUNTER — APPOINTMENT (OUTPATIENT)
Dept: HEMATOLOGY/ONCOLOGY | Facility: HOSPITAL | Age: 39
End: 2024-03-22
Attending: INTERNAL MEDICINE
Payer: COMMERCIAL

## 2024-03-22 VITALS
WEIGHT: 161.63 LBS | SYSTOLIC BLOOD PRESSURE: 131 MMHG | OXYGEN SATURATION: 100 % | DIASTOLIC BLOOD PRESSURE: 81 MMHG | RESPIRATION RATE: 18 BRPM | TEMPERATURE: 99 F | BODY MASS INDEX: 27 KG/M2

## 2024-03-22 DIAGNOSIS — Z17.0 MALIGNANT NEOPLASM OF UPPER-OUTER QUADRANT OF RIGHT BREAST IN FEMALE, ESTROGEN RECEPTOR POSITIVE (HCC): Primary | ICD-10-CM

## 2024-03-22 DIAGNOSIS — Z45.2 ENCOUNTER FOR CENTRAL LINE CARE: ICD-10-CM

## 2024-03-22 DIAGNOSIS — C50.411 MALIGNANT NEOPLASM OF UPPER-OUTER QUADRANT OF RIGHT BREAST IN FEMALE, ESTROGEN RECEPTOR POSITIVE (HCC): Primary | ICD-10-CM

## 2024-03-22 LAB
BASOPHILS # BLD AUTO: 0.03 X10(3) UL (ref 0–0.2)
BASOPHILS NFR BLD AUTO: 0.3 %
DEPRECATED RDW RBC AUTO: 38.8 FL (ref 35.1–46.3)
EOSINOPHIL # BLD AUTO: 0.21 X10(3) UL (ref 0–0.7)
EOSINOPHIL NFR BLD AUTO: 2.2 %
ERYTHROCYTE [DISTWIDTH] IN BLOOD BY AUTOMATED COUNT: 11.8 % (ref 11–15)
HCT VFR BLD AUTO: 32.3 %
HGB BLD-MCNC: 10.5 G/DL
IMM GRANULOCYTES # BLD AUTO: 0.05 X10(3) UL (ref 0–1)
IMM GRANULOCYTES NFR BLD: 0.5 %
LYMPHOCYTES # BLD AUTO: 0.94 X10(3) UL (ref 1–4)
LYMPHOCYTES NFR BLD AUTO: 10 %
MCH RBC QN AUTO: 29.6 PG (ref 26–34)
MCHC RBC AUTO-ENTMCNC: 32.5 G/DL (ref 31–37)
MCV RBC AUTO: 91 FL
MONOCYTES # BLD AUTO: 0.82 X10(3) UL (ref 0.1–1)
MONOCYTES NFR BLD AUTO: 8.7 %
NEUTROPHILS # BLD AUTO: 7.39 X10 (3) UL (ref 1.5–7.7)
NEUTROPHILS # BLD AUTO: 7.39 X10(3) UL (ref 1.5–7.7)
NEUTROPHILS NFR BLD AUTO: 78.3 %
PLATELET # BLD AUTO: 331 10(3)UL (ref 150–450)
RBC # BLD AUTO: 3.55 X10(6)UL
WBC # BLD AUTO: 9.4 X10(3) UL (ref 4–11)

## 2024-03-22 PROCEDURE — 96375 TX/PRO/DX INJ NEW DRUG ADDON: CPT

## 2024-03-22 PROCEDURE — 85025 COMPLETE CBC W/AUTO DIFF WBC: CPT

## 2024-03-22 PROCEDURE — S0028 INJECTION, FAMOTIDINE, 20 MG: HCPCS

## 2024-03-22 PROCEDURE — 96413 CHEMO IV INFUSION 1 HR: CPT

## 2024-03-22 RX ORDER — DIPHENHYDRAMINE HYDROCHLORIDE 50 MG/ML
50 INJECTION INTRAMUSCULAR; INTRAVENOUS ONCE
Status: COMPLETED | OUTPATIENT
Start: 2024-03-22 | End: 2024-03-22

## 2024-03-22 RX ORDER — FAMOTIDINE 10 MG/ML
INJECTION, SOLUTION INTRAVENOUS
Status: COMPLETED
Start: 2024-03-22 | End: 2024-03-22

## 2024-03-22 RX ORDER — DIPHENHYDRAMINE HYDROCHLORIDE 50 MG/ML
INJECTION INTRAMUSCULAR; INTRAVENOUS
Status: COMPLETED
Start: 2024-03-22 | End: 2024-03-22

## 2024-03-22 RX ORDER — HEPARIN SODIUM (PORCINE) LOCK FLUSH IV SOLN 100 UNIT/ML 100 UNIT/ML
5 SOLUTION INTRAVENOUS ONCE
Status: COMPLETED | OUTPATIENT
Start: 2024-03-22 | End: 2024-03-22

## 2024-03-22 RX ORDER — FAMOTIDINE 10 MG/ML
20 INJECTION, SOLUTION INTRAVENOUS ONCE
Status: COMPLETED | OUTPATIENT
Start: 2024-03-22 | End: 2024-03-22

## 2024-03-22 RX ORDER — HEPARIN SODIUM (PORCINE) LOCK FLUSH IV SOLN 100 UNIT/ML 100 UNIT/ML
5 SOLUTION INTRAVENOUS ONCE
OUTPATIENT
Start: 2024-03-22

## 2024-03-22 RX ORDER — SODIUM CHLORIDE 9 MG/ML
10 INJECTION, SOLUTION INTRAMUSCULAR; INTRAVENOUS; SUBCUTANEOUS ONCE
OUTPATIENT
Start: 2024-03-22

## 2024-03-22 RX ADMIN — DIPHENHYDRAMINE HYDROCHLORIDE 50 MG: 50 INJECTION INTRAMUSCULAR; INTRAVENOUS at 11:22:00

## 2024-03-22 RX ADMIN — HEPARIN SODIUM (PORCINE) LOCK FLUSH IV SOLN 100 UNIT/ML 500 UNITS: 100 SOLUTION INTRAVENOUS at 13:40:00

## 2024-03-22 RX ADMIN — FAMOTIDINE 20 MG: 10 INJECTION, SOLUTION INTRAVENOUS at 11:18:00

## 2024-03-22 NOTE — PROGRESS NOTES
Pt here for C8D15 Drug(s)Taxol.  Arrives Ambulating independently, accompanied by Spouse.  Feeling well, excited as today is her last treatment.   Survivorship to call pt to coordinate visit.     Patient was evaluated today by Treatment Nurse.    Oral medications included in this regimen:  no    Patient confirms comprehension of cancer treatment schedule:  yes    Pregnancy screening:  Denies possibility of pregnancy    Modifications in dose or schedule:  No    Medications appearance and physical integrity checked by RN: yes.    Chemotherapy IV pump settings verified by 2 RNs:  Yes.  Frequency of blood return and site check throughout administration: Prior to administration, Prior to each drug, and At completion of therapy     Infusion/treatment outcome:  patient tolerated treatment without incident      PAC hep locked and decannulated.     Education Record    Learner:  Patient  Barriers / Limitations:  None  Method:  Discussion  Education / instructions given:  reviewed plan of care  Outcome:  Shows understanding    Discharged Home, Ambulating independently, accompanied by:Spouse    Patient/family verbalized understanding of future appointments: by [employer] messaging

## 2024-03-23 ENCOUNTER — HOSPITAL ENCOUNTER (OUTPATIENT)
Dept: MRI IMAGING | Facility: HOSPITAL | Age: 39
Discharge: HOME | End: 2024-03-23
Attending: INTERNAL MEDICINE
Payer: COMMERCIAL

## 2024-03-23 DIAGNOSIS — Z17.0 MALIGNANT NEOPLASM OF UPPER-OUTER QUADRANT OF RIGHT BREAST IN FEMALE, ESTROGEN RECEPTOR POSITIVE (HCC): ICD-10-CM

## 2024-03-23 DIAGNOSIS — C50.411 MALIGNANT NEOPLASM OF UPPER-OUTER QUADRANT OF RIGHT BREAST IN FEMALE, ESTROGEN RECEPTOR POSITIVE (HCC): ICD-10-CM

## 2024-03-23 PROCEDURE — A9575 INJ GADOTERATE MEGLUMI 0.1ML: HCPCS | Performed by: INTERNAL MEDICINE

## 2024-03-23 PROCEDURE — 77049 MRI BREAST C-+ W/CAD BI: CPT | Performed by: INTERNAL MEDICINE

## 2024-03-23 RX ORDER — GADOTERATE MEGLUMINE 376.9 MG/ML
15 INJECTION INTRAVENOUS
Status: COMPLETED | OUTPATIENT
Start: 2024-03-23 | End: 2024-03-23

## 2024-03-23 RX ADMIN — GADOTERATE MEGLUMINE 15 ML: 376.9 INJECTION INTRAVENOUS at 14:15:00

## 2024-04-03 ENCOUNTER — TELEPHONE (OUTPATIENT)
Facility: CLINIC | Age: 39
End: 2024-04-03

## 2024-04-03 ENCOUNTER — OFFICE VISIT (OUTPATIENT)
Dept: SURGERY | Facility: CLINIC | Age: 39
End: 2024-04-03
Payer: COMMERCIAL

## 2024-04-03 ENCOUNTER — TELEPHONE (OUTPATIENT)
Dept: SURGERY | Facility: CLINIC | Age: 39
End: 2024-04-03

## 2024-04-03 ENCOUNTER — LAB ENCOUNTER (OUTPATIENT)
Dept: LAB | Facility: HOSPITAL | Age: 39
End: 2024-04-03
Payer: COMMERCIAL

## 2024-04-03 VITALS
TEMPERATURE: 98 F | BODY MASS INDEX: 27.52 KG/M2 | RESPIRATION RATE: 17 BRPM | HEART RATE: 96 BPM | OXYGEN SATURATION: 96 % | DIASTOLIC BLOOD PRESSURE: 84 MMHG | WEIGHT: 165.19 LBS | HEIGHT: 65 IN | SYSTOLIC BLOOD PRESSURE: 125 MMHG

## 2024-04-03 DIAGNOSIS — Z17.0 MALIGNANT NEOPLASM OF UPPER-OUTER QUADRANT OF RIGHT BREAST IN FEMALE, ESTROGEN RECEPTOR POSITIVE (HCC): Primary | ICD-10-CM

## 2024-04-03 DIAGNOSIS — Z01.818 PRE-OP TESTING: ICD-10-CM

## 2024-04-03 DIAGNOSIS — C50.411 MALIGNANT NEOPLASM OF UPPER-OUTER QUADRANT OF RIGHT BREAST IN FEMALE, ESTROGEN RECEPTOR POSITIVE (HCC): Primary | ICD-10-CM

## 2024-04-03 PROCEDURE — 93005 ELECTROCARDIOGRAM TRACING: CPT

## 2024-04-03 PROCEDURE — 93010 ELECTROCARDIOGRAM REPORT: CPT | Performed by: STUDENT IN AN ORGANIZED HEALTH CARE EDUCATION/TRAINING PROGRAM

## 2024-04-03 NOTE — TELEPHONE ENCOUNTER
Received a call from patient requesting a Prep Op appointment  .  Per patient she is getting a bilateral mastectomy and needs clearance from PCP .    Please assist .   related to diabetes

## 2024-04-03 NOTE — TELEPHONE ENCOUNTER
Calling pt in regards to scheduling surgery.  Informed pt that I have 04/29/2024 available at [facility] with Dr. Mae.  Pt verbalized understanding and in agreement with date and location.  All questions answered.   Encouraged pt to call or [employer] message office with any other questions or concerns.

## 2024-04-03 NOTE — PATIENT INSTRUCTIONS
Dr. Janet Mae  Tel: [phone]  Fax: [phone] [facility]  [address] [place] [address]  [phone]     Surgery/Procedure: Bilateral breast simple mastectomies, Allyson  localized excision of right axillary lymph node previously positive for metastatic carcinoma, right breast lymphoscintigraphy, right sentinel lymph node biopsy, possible right axillary lymph node dissection     Anesthesia:   Gen + request intraoperative pec block from anesthesia Surgery Length:   2 hours CPT:  47357, 65275, 37713, 25732   Wire LOC:   No   Nuc Med:   Yes Allyson Seed:  Yes (already placed)     Dx & ICD-10: Malignant neoplasm of upper-outer quadrant of right breast in female, estrogen receptor positive (HCC) (C50.411, Z17.0)   Radiology Instructions: N/A   _______________________________________________________________________________    Someone must accompany you the day of the procedure to drive you home safely, because of anesthesia.  You will need an adult  to stay with you the first night following your surgery.  You must remove any kind of makeup, acrylic nails, lotions, powders, creams or deodorant.  [facility] ONLY: Pre-admission will give instruct you on when to take Gatorade and Tylenol/acetaminophen prior to your surgery, purchase 2 - 12oz bottles of regular Gatorade (NOT RED/SUGAR FREE). Otherwise, you may not eat or drink anything else after 11PM the night before surgery.    [facility]: You may not eat or drink anything after midnight the day of your surgery.   Wear comfortable clothing that can be easily removed.  If you wear dentures, contacts lenses, or any prosthesis, you will be asked to remove them.  Do not drink alcohol or smoke 24 hours prior to your procedure.  Bring a picture ID and your insurance card.  Covid-19 testing is no longer required before surgery unless you are experiencing symptoms such as fever, cough, congestion, etc.   The Pre-Admission Testing  Department will call the day before to confirm your procedure, give you the time you need to arrive by and directions on where to go. They begin making calls after 2pm, if you are not contacted by 4pm, please call the surgeon's office listed above.  Do not take any blood thinners at least one week prior to the procedure/surgery. This includes aspirin, baby aspirin, Ibuprofen products, herbal supplements, diet medications, vitamin E, fish oil and green tea supplements. Please check other supplements for these ingredients. *TYLENOL or acetaminophen is acceptable*  If you take Coumadin, Plavix, Xarelto, or Eliquis, please contact your prescribing physician for special instructions on how long to hold. If you take insulin contact your primary care physician for special instructions.  Our surgery scheduler, Zoila, will be contacting you to discuss surgery dates. If you have any questions related to scheduling your surgery, please reach out to her at [phone].  _____________________________________________________________________  PRE-OPERATIVE TESTING IF INDICATED BELOW  PLEASE COMPLETE ASAP (AT LEAST 14-21 DAYS PRIOR TO SURGERY)  [] CBC [x] BMP [] CMP [x] EKG    [] PT, PTT, INR [] Cardiac Clearance  [x] H&P Medical Clearance [] Chest X-ray     Please call Central Scheduling to schedule an appointment for pre-operative labs/tests @ [phone]  Does the patient have a pacemaker or ICD?           Does the patient have sleep apnea?  [] Yes   [x] No                               [] Yes   [x] No

## 2024-04-04 ENCOUNTER — TELEPHONE (OUTPATIENT)
Dept: HEMATOLOGY/ONCOLOGY | Facility: HOSPITAL | Age: 39
End: 2024-04-04

## 2024-04-04 LAB
ATRIAL RATE: 88 BPM
P AXIS: 26 DEGREES
P-R INTERVAL: 138 MS
Q-T INTERVAL: 356 MS
QRS DURATION: 94 MS
QTC CALCULATION (BEZET): 430 MS
R AXIS: -24 DEGREES
T AXIS: 26 DEGREES
VENTRICULAR RATE: 88 BPM

## 2024-04-04 NOTE — TELEPHONE ENCOUNTER
Toxicities: C8 D15 Paclitaxel on 3/22/2024    Productive Cough    Jocelyn reports she developed a head cold last week. Her original symptoms were a runny nose, post nasal drip, head congestion, scratchy throat and dry cough. When she would blow her nose she would see yellow mucus. All of her symptoms have cleared up except the cough. It is now productive. She has it intermittently. She swallows her mucus, so she can't tell me the color of her mucus. \"I feel fine except for the cough. Yesterday I did not cough at all during the day. I did cough a lot last night.\" She confirmed she is not taking any OTC medications. She currently denies chills, shakes or fever. She denies chest heaviness, tightness or pain.     I told Jocelyn to please drink plenty of fluids. She may take an OTC cough medication. I asked her to please look at her mucus. If it is yellow or green to present to an [facility] for evaluation. She agreed with the plan. She also said she will be seeing her PCP next week for preop clearance for he reconstruction surgery.

## 2024-04-04 NOTE — TELEPHONE ENCOUNTER
Jocelyn [phone] is having a wet cough last chemo was on 3/22/24. Thought it was a cold but isn't going away . Denies nausea , vomiting, diarrhea. Diet hasn't changed. Just wants to know if their something she can do feels fine. Thanks Marisela

## 2024-04-05 NOTE — PROGRESS NOTES
Breast Surgery Surveillance      History of Present Illness:   Ms. Jocelyn Garza is a 38 year old woman who presents with subjective mass of the right breast.  The patient first noticed this in 2023.  She was diagnosed with lobular carcinoma with spread to the right axillary lymph nodes.  She has started neoadjuvant chemotherapy under the direction of Dr. Cerrato.  She has a family history of breast cancer and tested negative for genetic mutation with a variant seen in 1 GILLES D gene.  She has no personal prior history of breast disease or biopsies.  She denies any nipple discharge, skin changes or other concerns.  Her MRI demonstrated the enhancement from the area to span more than 9 cm.  Given extent of disease the patient underwent neoadjuvant chemotherapy she has completed this without sequelae.  She had posttreatment MRI on 2024 that demonstrated interval decrease in the size 2 was 7.3 cm with interval decrease in size of concern of the appearance of the right axillary lymph nodes.   She is here today for evaluation and recommendations for further therapy.        Past Medical History:   Diagnosis Date    Breast cancer in female (HCC) 10/18/2023    Right breast lymph node cancer    Hypertension     Solitary kidney, congenital        Past Surgical History:   Procedure Laterality Date    APPENDECTOMY  2007    NEEDLE BIOPSY RIGHT Right 10/09/2023    TREAT ECTOPIC PREG,RMV TUBE/OVARY Left 2014    TREAT ECTOPIC PREG,RMV TUBE/OVARY Left 2015       Gynecological History:  Pt is a   She achieved menarche at age 10 and LMP 2023, currently undergoing chemotherapy  She denies any history of hormone replacement therapy   She has history of oral contraceptive use for 13 years, last in 18 years ago.  She has recieved infertility treatment to achieve pregnancy.    Medications:     lidocaine-prilocaine 2.5-2.5 % External Cream Apply to site 1 hour prior to port a cath needle  insertion 1 each 1    lisinopril 5 MG Oral Tab Take 1 tablet (5 mg total) by mouth daily. 30 tablet 5    cefuroxime 500 MG Oral Tab Take 1 tablet (500 mg total) by mouth 2 (two) times daily. For 3 days      prochlorperazine (COMPAZINE) 10 mg tablet Take 1 tablet (10 mg total) by mouth every 8 (eight) hours as needed for Nausea. 30 tablet 3    ondansetron (ZOFRAN) 8 MG tablet Take 1 tablet (8 mg total) by mouth every 8 (eight) hours as needed for Nausea. 30 tablet 3       Allergies:    No Known Allergies    Family History:   Family History   Problem Relation Age of Onset    Other (Parkinson's Disease) Father     Prostate Cancer Maternal Grandfather 70    Pancreatic Cancer Paternal Grandmother 80    Pancreatic Cancer Maternal Uncle 60    Cancer Maternal Great-Grandmother         gastric ca    Cancer Paternal Aunt 80        breast cancer    Pancreatic Cancer Paternal Great-Grandfather        She does not know if she is of Ashkenazi Druze ancestry.    Social History:  History   Alcohol Use Not Currently     Comment: 2 -3 drinks per week       History   Smoking Status    Never   Smokeless Tobacco    Never     Ms. Jocelyn Garza is  with 2 adopted children. She has 1 siblings. She is currently On Medical Leave    Review of Systems:  General:   The patient denies, fever, chills, +night sweats, +fatigue, +generalized weakness, change in appetite or weight loss.    HEENT:     The patient denies eye irritation, cataracts, redness, glaucoma, yellowing of the eyes, change in vision, color blindness, or wearing contacts/glasses. The patient denies hearing loss, ringing in the ears, ear drainage, earaches, +nasal congestion, nose bleeds, snoring, pain in mouth/throat, hoarseness, change in voice, facial trauma.    Respiratory:  The patient denies chronic cough, +phlegm, hemoptysis, pleurisy/chest pain, pneumonia, asthma, wheezing, difficulty in breathing with exertion, emphysema, chronic bronchitis, +shortness of  breath or abnormal sound when breathing.     Cardiovascular:  There is no history of chest pain, chest pressure/discomfort, +palpitations, irregular heartbeat, fainting or near-fainting, difficulty breathing when lying flat, SOB/Coughing at night, swelling of the legs or chest pain while walking.    Breasts:  See history of present illness    Gastrointestinal:     There is no history of difficulty or pain with swallowing, reflux symptoms, vomiting, dark or bloody stools, constipation, yellowing of the skin, indigestion, nausea, change in bowel habits, diarrhea, abdominal pain or vomiting blood.     Genitourinary:  The patient denies frequent urination, needing to get up at night to urinate, urinary hesitancy or retaining urine, painful urination, urinary incontinence, decreased urine stream, blood in the urine or vaginal/penile discharge.    Skin:    The patient denies rash, itching, skin lesions, dry skin, change in skin color or change in moles.     Hematologic/Lymphatic:  The patient denies easily bruising or bleeding or persistent swollen glands or lymph nodes.     Musculoskeletal:  The patient denies muscle aches/pain, joint pain, +stiff joints, neck pain, back pain or bone pain.    Neuropsychiatric:  There is no history of migraines or severe headaches, seizure/epilepsy, speech problems, coordination problems, trembling/tremors, fainting/black outs, dizziness, memory problems, loss of sensation/numbness, problems walking, weakness, tingling or burning in hands/feet. There is no history of abusive relationship, bipolar disorder, sleep disturbance, anxiety, depression or feeling of despair.    Endocrine:    There is no history of poor/slow wound healing, weight loss/gain, +fertility or hormone problems, cold intolerance, thyroid disease.     Allergic/Immunologic:  There is no history of hives, hay fever, angioedema or anaphylaxis.    /84 (BP Location: Right arm, Patient Position: Sitting, Cuff Size: adult)    Pulse 96   Temp 98.1 °F (36.7 °C) (Temporal)   Resp 17   Ht 1.651 m (5' 5\")   Wt 74.9 kg (165 lb 3.2 oz)   LMP 11/21/2023 (Approximate)   SpO2 96%   BMI 27.49 kg/m²     Physical Exam:  The patient is an alert, oriented, well-nourished and  well-developed woman who appears her stated age. Her speech patterns and movements are normal. Her affect is appropriate.    HEENT: The head is normocephalic. The neck is supple. The thyroid is not enlarged and is without palpable masses/nodules. There are no palpable masses. The trachea is in the midline. Conjunctiva are clear, non-icteric.    Chest: The chest expands symmetrically. The lungs are clear to auscultation.    Heart: The rhythm is regular.  There are no murmurs, rubs, gallops or thrills.    Breasts:  Her breasts are symmetrical with a cup size 36D.  Right breast: The skin, nipple ,and areola appear normal. There is no skin dimpling with movement of the pectoralis. There is no nipple retraction. No nipple discharge can be elicited. The parenchyma is mildly nodular. There is no residual palpable disease in the breast or lymph node.  Left breast:   The skin, nipple, and areola appear normal. There is no skin dimpling with movement of the pectoralis. There is no nipple retraction. No nipple discharge can be elicited. The parenchyma is mildly nodular. There are no dominant masses in the breast. The axillary tail is normal.    Abdomen:  The abdomen is soft, flat and non tender. The liver is not enlarged. There are no palpable masses.    Lymph Nodes:  The supraclavicular, axillary and cervical regions are free of significant lymphadenopathy.    Back: There is no vertebral column tenderness.    Skin: The skin appears normal. There are no suspicious appearing rashes or lesions.    Extremities: The extremities are without deformity, cyanosis or edema.    Impression:   Ms. Jocelyn Garza is a 38 year old woman presents with right breast cancer status post  neoadjuvant chemotherapy.  Clinical stage T3 N1a    Discussion and Plan:  I had a discussion with the Patient regarding her breast exam. On exam today I found no residual palpable mass in the breast and no residual palpable axillary adenopathy.  I personally reviewed the recent imaging and we discussed this at length.  The patient had a great clinical response to the findings.  She still has residual disease encompassing multiple quadrants of the breast although overall her lymph nodes have also improved significantly.    The natural history and evolution of breast cancer were discussed with Ms. Jocelyn Garza and her  family, including the difference between in-situ and invasive carcinoma, and the distinction  between local and systemic disease and local and systemic therapy. For local treatment options,  I explained the risks and benefits of breast conservation and mastectomy (with or without  reconstruction), including the fact that survival rates are equal with these two approaches.  If breast conservation is elected, I explained the need for free margins, the possibility of re-  excision to achieve free margins, and the need for post-operative radiotherapy. The approach  to komal staging was also described, including the technique, risks and benefits of sentinel node  biopsy, the possible need for axillary dissection, and the long-term sequelae of this procedure.  With regard to systemic therapy, she has completed her neoadjuvant chemotherapy and will follow with medical oncology postoperatively for further systemic recommendations.  Given initial extent of disease she will qualify for right chest wall radiation therapy.  She met with plastic surgery and has declined reconstruction.  Her plan for bilateral simple mastectomies with right ELÍAS  localized excision of previously positive right axillary lymph node, right sentinel lymph node biopsy and possible right axillary lymph node dissection.   The  risks and possible complications of the procedure were explained to the patient and her family and she understood and agreed to the proposed plan. She was given ample opportunity for questions and those questions were answered to her satisfaction. She has been  encouraged to contact the office with any questions or concerns prior to her next appointment.

## 2024-04-08 ENCOUNTER — OFFICE VISIT (OUTPATIENT)
Dept: PHYSICAL THERAPY | Facility: HOSPITAL | Age: 39
End: 2024-04-08
Attending: SURGERY
Payer: COMMERCIAL

## 2024-04-08 NOTE — PROGRESS NOTES
BREAST CANCER SURGICAL SCREENINGS  [facility]      PATIENT SUMMARY:     Involved Side:          RIGHT                                           Dominant Hand:    RIGHT                              Occupation:             Pre- (currently on leave)          Prior Level of Function:   Limited d/t chemo                                       Social Activities:                                                            Pertinent PMH:       HISTORY:  Past Medical History:   Diagnosis Date    Breast cancer in female (HCC) 10/18/2023    Right breast lymph node cancer    Hypertension     Solitary kidney, congenital       Past Surgical History:   Procedure Laterality Date    APPENDECTOMY  01/01/2007    NEEDLE BIOPSY RIGHT Right 10/09/2023    TREAT ECTOPIC PREG,RMV TUBE/OVARY Left 01/01/2014    TREAT ECTOPIC PREG,RMV TUBE/OVARY Left 01/01/2015                                                                        PCP, Surgeon, and Oncologist:      Carmelita,                                                        Surgery Type and Date:    4/29/2024 B mastectomy                                                     # Nodes + / # Nodes Removed:      ?                                                                   Chemotherapy and/or Radiation:        ?                                                                                          OBJECTIVE MEASUREMENTS:   4/8/2024:  -Reviewed exercises to begin 1 week after surgery. Limit arm elevation to shoulder height when drains are in and all activity should be pain free.                  Pre-surgical screening date: 4/8/2024    Post-surgical screening date:     Post-surgical screening date:      Pain scale:      Pain scale:      Pain scale:      Screening Therapist: DB    Screening Therapist:     Screening Therapist:                                                                                        Right Left    Right Left    Right Left    Shoulder  A/AAROM A/AAROM  Shoulder  A/AAROM A/AAROM  Shoulder  A/AAROM A/AAROM   Supine flex 165 160  Supine flex    Supine flex      abd 170 160   abd     abd      ER     ER     ER      IR     IR     IR     Sitting flex 150 140*  Sitting flex    Sitting flex      abd 165 145*   abd     abd      ext     ext     ext     Functional IR     Functional IR     Functional IR         *PAIN IN PORT, LIMITED ARM MOVEMENT ON LEFT             Shoulder strength  Right Left  Shoulder strength  Right Left  Shoulder strength  Right Left    flex 5 5   flex     flex      abd 5 5   abd     abd      ext 5 5   ext     ext      ER 5 5   ER     ER      IR 5 5   IR     IR                     Posture: GOOD    Posture:     Posture:                      Cording (grade 0-3):     Cording (grade 0-3):     Cording (grade 0-3):      R: 0    R:     R:      L: 0    L:     L:      Miscellaneous:      Miscellaneous:      Miscellaneous:                                                                       Any feelings of heaviness or tightness, swelling, redness, and/or heat in the at-risk arm, breast, chest, or truncal area? TIGHTNESS post chemo    Any feelings of heaviness or tightness, swelling, redness, and/or heat in the at-risk arm, breast, chest, or truncal area?     Any feelings of heaviness or tightness, swelling, redness, and/or heat in the at-risk arm, breast, chest, or truncal area?                                      Arm Volume     Arm Volume     Arm Volume              4/8/2024     2:00 PM   Lymphedema Calculations   DATE MEASURED 4/8/2024   LOCATION/MEASUREMENTS RUE   PATIENT POSITION  supine 1 pillow   LIMB POSITION 75 deg abduction   STARTING POINT 17cm from 3rd cuticle   RIGHT HAND VOLUME 19.7cm across palm   LEFT HAND VOLUME 19.5cm across palm   MEASUREMENT A 15.5   MEASUREMENT B 16.7   MEASUREMENT C 19.2   MEASUREMENT D 23.1   MEASUREMENT E 25   MEASUREMENT F 24.5   MEASUREMENT G 25.3   MEASUREMENT H 27.4   MEASUREMENT I 29.4     TOTAL VOLUME  1728.28023   DATE MEASURED 4/8/2024   LOCATION/MEASUREMENTS LUE   MEASUREMENT A 15.4   MEASUREMENT B 17   MEASUREMENT C 19.4   MEASUREMENT D 22.8   MEASUREMENT E 25.2   MEASUREMENT F 24.8   MEASUREMENT G 26.8   MEASUREMENT H 28.5   MEASUREMENT I 33.4    TOTAL VOLUME  1843.2805   % DIFFERENCE [phone]

## 2024-04-10 ENCOUNTER — OFFICE VISIT (OUTPATIENT)
Facility: CLINIC | Age: 39
End: 2024-04-10
Payer: COMMERCIAL

## 2024-04-10 ENCOUNTER — LAB ENCOUNTER (OUTPATIENT)
Dept: LAB | Facility: REFERENCE LAB | Age: 39
End: 2024-04-10
Attending: FAMILY MEDICINE
Payer: COMMERCIAL

## 2024-04-10 VITALS
BODY MASS INDEX: 26.66 KG/M2 | HEIGHT: 65 IN | SYSTOLIC BLOOD PRESSURE: 118 MMHG | WEIGHT: 160 LBS | HEART RATE: 91 BPM | OXYGEN SATURATION: 98 % | DIASTOLIC BLOOD PRESSURE: 78 MMHG

## 2024-04-10 DIAGNOSIS — Z01.818 PREOPERATIVE CLEARANCE: Primary | ICD-10-CM

## 2024-04-10 DIAGNOSIS — C50.411 MALIGNANT NEOPLASM OF UPPER-OUTER QUADRANT OF RIGHT BREAST IN FEMALE, ESTROGEN RECEPTOR POSITIVE (HCC): ICD-10-CM

## 2024-04-10 DIAGNOSIS — Z17.0 MALIGNANT NEOPLASM OF UPPER-OUTER QUADRANT OF RIGHT BREAST IN FEMALE, ESTROGEN RECEPTOR POSITIVE (HCC): ICD-10-CM

## 2024-04-10 DIAGNOSIS — I10 ESSENTIAL HYPERTENSION: ICD-10-CM

## 2024-04-10 DIAGNOSIS — Q60.0 SOLITARY KIDNEY, CONGENITAL: ICD-10-CM

## 2024-04-10 LAB
ANION GAP SERPL CALC-SCNC: 6 MMOL/L (ref 0–18)
BUN BLD-MCNC: 20 MG/DL (ref 9–23)
BUN/CREAT SERPL: 20.6 (ref 10–20)
CALCIUM BLD-MCNC: 9.7 MG/DL (ref 8.7–10.4)
CHLORIDE SERPL-SCNC: 110 MMOL/L (ref 98–112)
CO2 SERPL-SCNC: 25 MMOL/L (ref 21–32)
CREAT BLD-MCNC: 0.97 MG/DL
EGFRCR SERPLBLD CKD-EPI 2021: 76 ML/MIN/1.73M2 (ref 60–?)
FASTING STATUS PATIENT QL REPORTED: NO
GLUCOSE BLD-MCNC: 83 MG/DL (ref 70–99)
OSMOLALITY SERPL CALC.SUM OF ELEC: 294 MOSM/KG (ref 275–295)
POTASSIUM SERPL-SCNC: 3.8 MMOL/L (ref 3.5–5.1)
SODIUM SERPL-SCNC: 141 MMOL/L (ref 136–145)

## 2024-04-10 PROCEDURE — 3078F DIAST BP <80 MM HG: CPT | Performed by: FAMILY MEDICINE

## 2024-04-10 PROCEDURE — 80048 BASIC METABOLIC PNL TOTAL CA: CPT | Performed by: FAMILY MEDICINE

## 2024-04-10 PROCEDURE — 99214 OFFICE O/P EST MOD 30 MIN: CPT | Performed by: FAMILY MEDICINE

## 2024-04-10 PROCEDURE — 3008F BODY MASS INDEX DOCD: CPT | Performed by: FAMILY MEDICINE

## 2024-04-10 PROCEDURE — 3074F SYST BP LT 130 MM HG: CPT | Performed by: FAMILY MEDICINE

## 2024-04-10 RX ORDER — AZITHROMYCIN 250 MG/1
500 TABLET, FILM COATED ORAL DAILY
COMMUNITY
Start: 2024-04-05

## 2024-04-10 NOTE — PROGRESS NOTES
Jocelyn Garza is a 38 year old female.  Chief Complaint   Patient presents with    Pre-Op Exam     Having surgery on 4/29/24 by        HPI:   PREOP EXAM    PRE-OP Physical  What is the full name of procedure/surgery? Bilateral mastectomies with right SLNB and possible axillary dissection  Date surgery or procedure is being done? 4/29/24  What is the doctor’s full name that is doing the surgery? Dr. Janet Mae  What hospital or facility will the procedure occur? [facility]    Denies any exertional cardiorespiratory symptoms. No prior issues with anesthesia. Had EKG completed last week.   Taking lisinopril daily.   Past Medical History:    Breast cancer in female (HCC)    Right breast lymph node cancer    Hypertension    Solitary kidney, congenital     Past Surgical History:   Procedure Laterality Date    Appendectomy  01/01/2007    Needle biopsy right Right 10/09/2023    Treat ectopic preg,rmv tube/ovary Left 01/01/2014    Treat ectopic preg,rmv tube/ovary Left 01/01/2015     Current Outpatient Medications   Medication Sig Dispense Refill    azithromycin 250 MG Oral Tab Take 2 tablets (500 mg total) by mouth daily.      lidocaine-prilocaine 2.5-2.5 % External Cream Apply to site 1 hour prior to port a cath needle insertion 1 each 1    lisinopril 5 MG Oral Tab Take 1 tablet (5 mg total) by mouth daily. 30 tablet 5    cefuroxime 500 MG Oral Tab Take 1 tablet (500 mg total) by mouth 2 (two) times daily. For 3 days      prochlorperazine (COMPAZINE) 10 mg tablet Take 1 tablet (10 mg total) by mouth every 8 (eight) hours as needed for Nausea. 30 tablet 3    ondansetron (ZOFRAN) 8 MG tablet Take 1 tablet (8 mg total) by mouth every 8 (eight) hours as needed for Nausea. 30 tablet 3     No Known Allergies    Immunization History   Administered Date(s) Administered    Covid-19 Vaccine Moderna 100 mcg/0.5 ml 01/07/2021, 02/05/2021, 12/13/2021    FLU VAC QIV SPLIT 3 YRS AND OLDER (56948) 09/18/2017, 10/01/2021     FLULAVAL 6 months & older 0.5 ml Prefilled syringe (50945) 10/29/2022    FLUZONE 6 months and older PFS 0.5 ml (39397) 10/01/2019, 10/02/2021, 2023    Fluarix 6 Months And Older 0.5 ml prefilled syringe (82235) 10/11/2019    Flucelvax 0.5 Ml Quad Multi-dose Vial 2019    Influenza 2019, 2020, 2021, 10/29/2022    MMR 10/23/2020    TDAP 2010, 10/23/2020    Tb Intradermal Test 2016, 2018, 10/23/2020, 2022       Family Status   Relation Status    Fa Alive, age 73y    Mo Alive, age 71y    Sepideh Alive, age 6y    Son Alive, age 4y    MGMA  at age 93    MGFA  at age 75    PGMA  at age 80    PGFA  at age 90    Bro Alive, age 33y    Maternal Unc  at age 63    Mat Great GM     Pat Aunt     Pat Great GF        Family History   Problem Relation Age of Onset    Other (Parkinson's Disease) Father     Prostate Cancer Maternal Grandfather 70    Pancreatic Cancer Paternal Grandmother 80    Pancreatic Cancer Maternal Uncle 60    Cancer Maternal Great-Grandmother         gastric ca    Cancer Paternal Aunt 80        breast cancer    Pancreatic Cancer Paternal Great-Grandfather        Social History     Socioeconomic History    Marital status:    Tobacco Use    Smoking status: Never    Smokeless tobacco: Never   Vaping Use    Vaping status: Never Used   Substance and Sexual Activity    Alcohol use: Not Currently     Comment: 2 -3 drinks per week    Drug use: No     Social Determinants of Health     Food Insecurity: No Food Insecurity (2023)    Food Insecurity     Food Insecurity: Never true   Transportation Needs: No Transportation Needs (2023)    Transportation Needs     Lack of Transportation: No   Housing Stability: Low Risk  (2023)    Housing Stability     Housing Instability: No         BP Readings from Last 6 Encounters:   04/10/24 118/78   24 125/84   24 131/81   03/15/24 122/88    03/12/24 131/74   03/08/24 107/64       Wt Readings from Last 6 Encounters:   04/10/24 160 lb (72.6 kg)   04/03/24 165 lb 3.2 oz (74.9 kg)   03/22/24 161 lb 9.6 oz (73.3 kg)   03/15/24 160 lb 9.6 oz (72.8 kg)   03/12/24 160 lb (72.6 kg)   03/08/24 160 lb 6.4 oz (72.8 kg)       REVIEW OF SYSTEMS:   GENERAL HEALTH: see hpi, feels well otherwise   SKIN: denies any unusual skin lesions or rashes  RESPIRATORY: denies shortness of breath with exertion  CARDIOVASCULAR: denies chest pain or tightness on exertion  GI: denies abdominal pain, N/V/D and denies heartburn  NEURO: denies lightheadedness, dizziness, or headaches    EXAM:   /78   Pulse 91   Ht 5' 5\" (1.651 m)   Wt 160 lb (72.6 kg)   LMP 11/21/2023 (Approximate)   SpO2 98%   BMI 26.63 kg/m²  Body mass index is 26.63 kg/m².    GENERAL: well developed, well nourished, in no apparent distress   SKIN: no rashes, no suspicious lesions  HEENT: atraumatic, normocephalic, MMM, oropharynx normal, ears normal  NECK: supple, no adenopathy, no bruits  LUNGS: clear to auscultation b/l  CARDIO: RRR without murmur  GI: good bowel sounds, no masses, HSM or tenderness  EXTREMITIES: no cyanosis, clubbing or edema    ASSESSMENT AND PLAN:   Jocelyn Garza is a 38 year old female presenting for a pre-operative physical.     1. Preoperative clearance    2. Malignant neoplasm of upper-outer quadrant of right breast in female, estrogen receptor positive (HCC)    3. Essential hypertension    4. Solitary kidney, congenital    -Reviewed recent unremarkable EKG. No concerns.   -Preop BMP ordered per request. If unremarkable, will fax clearance to surgeon.   -Patient is moderate risk for this moderate risk procedure.  -BP well-controlled. Continue lisinopril 5mg daily.   -RTC in 11/2024 for annual visit, or sooner prn.    Adolfo Campbell DO  04/10/24  10:08 AM

## 2024-04-16 ENCOUNTER — NURSE ONLY (OUTPATIENT)
Dept: HEMATOLOGY/ONCOLOGY | Facility: HOSPITAL | Age: 39
End: 2024-04-16
Attending: INTERNAL MEDICINE
Payer: COMMERCIAL

## 2024-04-16 DIAGNOSIS — Z45.2 ENCOUNTER FOR CENTRAL LINE CARE: Primary | ICD-10-CM

## 2024-04-16 LAB
BASOPHILS # BLD AUTO: 0.04 X10(3) UL (ref 0–0.2)
BASOPHILS NFR BLD AUTO: 0.7 %
DEPRECATED HBV CORE AB SER IA-ACNC: 30 NG/ML
DEPRECATED RDW RBC AUTO: 37.5 FL (ref 35.1–46.3)
EOSINOPHIL # BLD AUTO: 0.28 X10(3) UL (ref 0–0.7)
EOSINOPHIL NFR BLD AUTO: 4.7 %
ERYTHROCYTE [DISTWIDTH] IN BLOOD BY AUTOMATED COUNT: 11.8 % (ref 11–15)
HCT VFR BLD AUTO: 32.7 %
HGB BLD-MCNC: 11.2 G/DL
IMM GRANULOCYTES # BLD AUTO: 0.02 X10(3) UL (ref 0–1)
IMM GRANULOCYTES NFR BLD: 0.3 %
IRON SATN MFR SERPL: 14 %
IRON SERPL-MCNC: 45 UG/DL
LYMPHOCYTES # BLD AUTO: 1.19 X10(3) UL (ref 1–4)
LYMPHOCYTES NFR BLD AUTO: 20.1 %
MCH RBC QN AUTO: 30.2 PG (ref 26–34)
MCHC RBC AUTO-ENTMCNC: 34.3 G/DL (ref 31–37)
MCV RBC AUTO: 88.1 FL
MONOCYTES # BLD AUTO: 0.6 X10(3) UL (ref 0.1–1)
MONOCYTES NFR BLD AUTO: 10.1 %
NEUTROPHILS # BLD AUTO: 3.8 X10 (3) UL (ref 1.5–7.7)
NEUTROPHILS # BLD AUTO: 3.8 X10(3) UL (ref 1.5–7.7)
NEUTROPHILS NFR BLD AUTO: 64.1 %
PLATELET # BLD AUTO: 358 10(3)UL (ref 150–450)
RBC # BLD AUTO: 3.71 X10(6)UL
TIBC SERPL-MCNC: 317 UG/DL (ref 250–425)
TRANSFERRIN SERPL-MCNC: 213 MG/DL (ref 250–380)
WBC # BLD AUTO: 5.9 X10(3) UL (ref 4–11)

## 2024-04-16 PROCEDURE — 85025 COMPLETE CBC W/AUTO DIFF WBC: CPT

## 2024-04-16 PROCEDURE — 82728 ASSAY OF FERRITIN: CPT

## 2024-04-16 PROCEDURE — 84466 ASSAY OF TRANSFERRIN: CPT

## 2024-04-16 PROCEDURE — 83540 ASSAY OF IRON: CPT

## 2024-04-16 PROCEDURE — 36591 DRAW BLOOD OFF VENOUS DEVICE: CPT

## 2024-04-16 RX ORDER — HEPARIN SODIUM (PORCINE) LOCK FLUSH IV SOLN 100 UNIT/ML 100 UNIT/ML
5 SOLUTION INTRAVENOUS ONCE
Status: COMPLETED | OUTPATIENT
Start: 2024-04-16 | End: 2024-04-16

## 2024-04-16 RX ORDER — SODIUM CHLORIDE 9 MG/ML
10 INJECTION, SOLUTION INTRAMUSCULAR; INTRAVENOUS; SUBCUTANEOUS ONCE
OUTPATIENT
Start: 2024-04-16

## 2024-04-16 RX ORDER — HEPARIN SODIUM (PORCINE) LOCK FLUSH IV SOLN 100 UNIT/ML 100 UNIT/ML
5 SOLUTION INTRAVENOUS ONCE
OUTPATIENT
Start: 2024-04-16

## 2024-04-16 RX ADMIN — HEPARIN SODIUM (PORCINE) LOCK FLUSH IV SOLN 100 UNIT/ML 500 UNITS: 100 SOLUTION INTRAVENOUS at 09:32:00

## 2024-04-18 ENCOUNTER — TELEPHONE (OUTPATIENT)
Dept: HEMATOLOGY/ONCOLOGY | Facility: HOSPITAL | Age: 39
End: 2024-04-18

## 2024-04-18 ENCOUNTER — OFFICE VISIT (OUTPATIENT)
Dept: RADIATION ONCOLOGY | Facility: HOSPITAL | Age: 39
End: 2024-04-18
Attending: INTERNAL MEDICINE
Payer: COMMERCIAL

## 2024-04-18 VITALS
BODY MASS INDEX: 27 KG/M2 | WEIGHT: 163.38 LBS | TEMPERATURE: 98 F | HEART RATE: 85 BPM | RESPIRATION RATE: 18 BRPM | DIASTOLIC BLOOD PRESSURE: 75 MMHG | OXYGEN SATURATION: 100 % | SYSTOLIC BLOOD PRESSURE: 124 MMHG

## 2024-04-18 DIAGNOSIS — C50.411 MALIGNANT NEOPLASM OF UPPER-OUTER QUADRANT OF RIGHT BREAST IN FEMALE, ESTROGEN RECEPTOR POSITIVE (HCC): Primary | ICD-10-CM

## 2024-04-18 DIAGNOSIS — Z17.0 MALIGNANT NEOPLASM OF UPPER-OUTER QUADRANT OF RIGHT BREAST IN FEMALE, ESTROGEN RECEPTOR POSITIVE (HCC): Primary | ICD-10-CM

## 2024-04-18 PROCEDURE — 99212 OFFICE O/P EST SF 10 MIN: CPT

## 2024-04-18 NOTE — TELEPHONE ENCOUNTER
Phoned patient for care coordination.  Post operative follow up scheduled for 5/8/24 at 11am.  Schedule follow up with Dr. Calle for 12:30.  Patient acknowledged.

## 2024-04-18 NOTE — CONSULTS
[facility]  Radiation Oncology New Consultation      Patient Name: Jocelyn Garza   MRN: [id number]  PCP: Adolfo Campbell DO  Referring Physician: Janet Mae MD; Halie Calle MD    Diagnosis Site: right breast  Stage: cT3 N2 M0, stage IIIA  Histology: invasive lobular carcinoma, grade 2, ER/MO+, HER2-, Ki-67 7%    Reason for Consultation: discussion of PMRT    HPI: The patient is a 38 year old female who was seen today for consultation regarding the above diagnosis.     October 2023: patient self-palpated a mass in the R breast.    10/6/23: Diagnostic imaging with no mammographic correlate, US identified an irregular hypoechoic mass at 10:00 and R axillary node with marked cortical thickening. Biopsy of both recommended.     10/9/23: Biopsies  -R breast 10:00 with ILC, grade 2  -R axillary node positive for metastatic disease   Disease ER/MO+, HER2-, Ki-67 7%    10/16/23: Breast MRI with disease measuring up to 9.1 cm involving the entire lateral R breast. Likely liver cyst.     10/17/23: CT CAP notable for additional R subpectoral LAD. Liver MRI recommended.    10/17/23: Bone scan negative.    10/19/23: Liver US with benign findings.     10/24/23: PET with hypermetabolic R retropectoral and low axillary LAD.     Neoadjuvant chemo with ddAC-T  Genetics negative with variant in 1 GILLES D gene.     3/23/24: post-chemo breast MRI  -interval decrease in the size of biopsy proven invasive lobular carcinoma involving the lateral right breast measuring 7.3 x 4.9 x 7.3 cm (previously 9.0 x 5.9 x 9.1 cm)  -interval decrease in the size of biopsy proven komal disease now 2.8 cm, previously 4.3 cm     Scheduled for bilateral mastectomy without reconstruction at the end of April.   Energy a little better post-chemo.     Past Medical History  Past Medical History:    Breast cancer in female (HCC)    Right breast lymph node cancer    Hypertension    Solitary kidney, congenital       Past Surgical  History  Past Surgical History:   Procedure Laterality Date    Appendectomy  2007    Needle biopsy right Right 10/09/2023    Treat ectopic preg,rmv tube/ovary Left 2014    Treat ectopic preg,rmv tube/ovary Left 2015       Medications  Current Outpatient Medications   Medication Sig Dispense Refill    azithromycin 250 MG Oral Tab Take 2 tablets (500 mg total) by mouth daily.      lidocaine-prilocaine 2.5-2.5 % External Cream Apply to site 1 hour prior to port a cath needle insertion 1 each 1    lisinopril 5 MG Oral Tab Take 1 tablet (5 mg total) by mouth daily. 30 tablet 5    cefuroxime 500 MG Oral Tab Take 1 tablet (500 mg total) by mouth 2 (two) times daily. For 3 days      prochlorperazine (COMPAZINE) 10 mg tablet Take 1 tablet (10 mg total) by mouth every 8 (eight) hours as needed for Nausea. 30 tablet 3    ondansetron (ZOFRAN) 8 MG tablet Take 1 tablet (8 mg total) by mouth every 8 (eight) hours as needed for Nausea. 30 tablet 3       Allergies  No Known Allergies    OB/GYN History  Pt is a   She achieved menarche at age 10  LMP: 2023  She denies any history of hormone replacement therapy   She has history of oral contraceptive use for 13 years, last in 18 years ago.  She has recieved infertility treatment to achieve pregnancy.    Social History  -Employment: , on medical leave   -Living situation: , lives in [place], had adopted children   -Tobacco: never smoker   -Alcohol: none now    Family History  Family History   Problem Relation Age of Onset    Other (Parkinson's Disease) Father     Prostate Cancer Maternal Grandfather 70    Pancreatic Cancer Paternal Grandmother 80    Pancreatic Cancer Maternal Uncle 60    Cancer Maternal Great-Grandmother         gastric ca    Cancer Paternal Aunt 80        breast cancer    Pancreatic Cancer Paternal Great-Grandfather        Cancer Screening  -PAP: probably due  -Colonoscopy: not yet due    Review of Systems  Otherwise  negative except as noted in HPI.     No prior history of radiation.  No history of lupus, collagen-vascular disease, or inflammatory bowel disease.     Physical Exam  Vitals:    24 0738   BP: 124/75   Pulse: 85   Resp: 18       ECO    Gen: NAD  HEENT: NCAT, EOMI  Neck: no LAD  CV: RRR  Lungs: CTAB  Ext: wwp  Neuro: CN II-XII grossly intact  Breast: R breast with residual firmness involving the lateral quadrants, subtly palpable level 1 LN    Imaging: reviewed    Assessment: 39yo F with clinically detected stage III ILC of the R breast, cT3 N2 M0 s/p neoadjuvant chemo. She presents for discussion of PMRT.    Plan:     I discussed the above clinical situation with the patient and her  at the time of consultation.     Given the above clinical stage, I recommended a course of adjuvant external beam radiation therapy to the right chest wall and draining lymphatics using a standard fractionation approach over 5 weeks.    The purpose of treatment is for improved local control of disease and potential improved survival. Randomized studies have reported an improved local control and survival rate with adjuvant radiation in node positive disease.     We discussed the potential short-term and long-term side effects of radiotherapy. All questions were answered, and no guarantee of safety or efficacy was made. Alternatives to radiotherapy were discussed with the patient.    The patient is agreeable to proceed with radiotherapy.   They will return to our department for CT simulation after obtaining surgical clearance.   Mometasone sent to pharmacy.     Cecilia Sam MD  Radiation Oncology    MDM high including chart review, personal review of imaging, and time spent with the patient in counseling.

## 2024-04-18 NOTE — PROGRESS NOTES
Nursing Consultation Note  Patient: Jocelyn Garza  YOB: 1985  Age: 38 year old  Radiation Oncologist: Dr. Cecilia Sam  Referring Physician: Halie Calle  Diagnosis:[unfilled]  Consult Date: 4/18/2024      Chemotherapy: No  Labs: Reviewed  Imaging: Reviewed  Is the patient of child-bearing age?         Yes   Female: LMP: 11/21/2023 Has egg harvesting been discussed? No ... Is there any possibility that the patient is pregnant?   No    Has the patient received radiation therapy in the past? no  Does the patient have an implantable device?No - on L chest  Patient has/has had:     1. Assistive Devices: N/A    2. Flu Vaccination: yes    3. Pneumonia Vaccination:  no--referral to ask PCP    Vital Signs:   Vitals:    04/18/24 0738   BP: 124/75   Pulse: 85   Resp: 18   ,   Wt Readings from Last 6 Encounters:   04/18/24 74.1 kg (163 lb 6.4 oz)   04/10/24 72.6 kg (160 lb)   04/03/24 74.9 kg (165 lb 3.2 oz)   03/22/24 73.3 kg (161 lb 9.6 oz)   03/15/24 72.8 kg (160 lb 9.6 oz)   03/12/24 72.6 kg (160 lb)       Nursing Note:      Review of Systems   Constitutional:  Positive for fatigue.   HENT: Negative.     Eyes: Negative.    Respiratory:  Positive for cough.         Occasional cough, productive phlegm- unsure of color of phlegm   Cardiovascular:  Positive for palpitations.        Previously experienced, hasn't had recently   Gastrointestinal: Negative.    Endocrine: Negative.    Genitourinary: Negative.    Musculoskeletal:  Positive for arthralgias.        Ankle stiffness  Bilateral ankle swelling    Skin: Negative.    Allergic/Immunologic: Negative.    Neurological: Negative.    Hematological: Negative.    Psychiatric/Behavioral: Negative.            Allergies:  No Known Allergies    Current Outpatient Medications   Medication Sig Dispense Refill    azithromycin 250 MG Oral Tab Take 2 tablets (500 mg total) by mouth daily.      lidocaine-prilocaine 2.5-2.5 % External Cream Apply to site 1 hour prior  to port a cath needle insertion 1 each 1    lisinopril 5 MG Oral Tab Take 1 tablet (5 mg total) by mouth daily. 30 tablet 5    cefuroxime 500 MG Oral Tab Take 1 tablet (500 mg total) by mouth 2 (two) times daily. For 3 days      prochlorperazine (COMPAZINE) 10 mg tablet Take 1 tablet (10 mg total) by mouth every 8 (eight) hours as needed for Nausea. 30 tablet 3    ondansetron (ZOFRAN) 8 MG tablet Take 1 tablet (8 mg total) by mouth every 8 (eight) hours as needed for Nausea. 30 tablet 3       Preferred Pharmacy:    [id number] - [facility] [address], [phone], [phone] [address] [address]  Phone: [phone] Fax: [phone]      Past Medical History:    Breast cancer in female (HCC)    Right breast lymph node cancer    Hypertension    Solitary kidney, congenital       Past Surgical History:   Procedure Laterality Date    Appendectomy  01/01/2007    Needle biopsy right Right 10/09/2023    Treat ectopic preg,rmv tube/ovary Left 01/01/2014    Treat ectopic preg,rmv tube/ovary Left 01/01/2015       Social History     Socioeconomic History    Marital status:      Spouse name: Not on file    Number of children: Not on file    Years of education: Not on file    Highest education level: Not on file   Occupational History    Not on file   Tobacco Use    Smoking status: Never    Smokeless tobacco: Never   Vaping Use    Vaping status: Never Used   Substance and Sexual Activity    Alcohol use: Not Currently     Comment: 2 -3 drinks per week    Drug use: No    Sexual activity: Not on file   Other Topics Concern    Not on file   Social History Narrative    Not on file     Social Determinants of Health     Financial Resource Strain: Not on file   Food Insecurity: No Food Insecurity (11/9/2023)    Food Insecurity     Food Insecurity: Never true   Transportation Needs: No Transportation Needs (11/9/2023)    Transportation Needs      Lack of Transportation: No   Physical Activity: Not on file   Stress: Not on file   Social Connections: Not on file   Housing Stability: Low Risk  (11/9/2023)    Housing Stability     Housing Instability: No     Housing Instability Emergency: Not on file       ECOG:  Grade 0 - Fully active, able to carry on all predisease activities without restrictions.      Education:  Yes    Are ADL's met?  Yes  Does patient feel safe in their environment?  Yes  Care decisions:  Patient and/or surrogate IS involved in care decisions.  Advanced directives:  Patient DOES NOT have advanced directives.  Transportation:  Adequate transportation available for expected visits    Pain:   ;Pain Score: 0   ;    ;     Primary language:  English  Language line required?  no  Comprehension Ability:  good  Able to read?  yes  Able to write?  yes  Communication tools:   none  Patient's ability to learn:  good  Readiness to learn:  Motivated  Learning preferences:  Discussion and Handout  Barriers to learning:  None  Interventions to reduce barriers:  Consult, Face patient when speaking, Provide support/encouragement, Provide printed materials, and Patient to express feelings  Visual aids:  no  Hearing disability:  no  Dentures:  no    Knowledge Deficit Plan Of Care:    Problem:  Knowledge Deficit    Problems related to:    Radiation therapy    Interventions:  Assess patient knowledge level  Assess knowledge needs  Instruct on the disease process  Instruct on treatment planning  Instruct on radiation therapy appointment scheduling  Instruct on basic skin care  Instruct on purpose of radiation therapy  Instruct on side effects of radiation therapy    Expected Outcomes:  Knowledge of radiation therapy  Knowledge of side effects of radiation therapy and management  Comfortable with knowledge level    Progress Toward Outcome:  Making progress    Pamphlets/Handouts Given to Patient:  Radiation Process Overview       Skin Plan Of Care:    Skin  Problem:  Potential for impaired skin integrity    Problems related to:    Side effects of radiation therapy    Interventions:  Assess skin Monitor signs/symptoms of healing Instruct patient on skin care Instruct patient on basix skin care measures during radiation therapy Instruct patient on additional skin care measures during radiation therapy Keep area clean and dry Encourage fluids/diet    Expected Outcomes:  Intact skin Skin without trauma or irritation Free from infection Wound evidence of healing Knowledge of disease process knowledge of care plan    Progress Toward Outcome:  Making progress    Pamphlets/Handouts Given to Patient:  Radiation therapy symptom management for breast  Site specific side effects breast    Pt seen for consultation with Dr. Sam. I explained to the patient that today she would meet Dr. Sam but while being treated there was a possibility that she might also encounter the physicians who cover for Dr. Sam which are Dr. Jolly, Dr. Barrett, and Dr. Karan Soler. VSS, pt denies pain currently. Pt educated on radiation process, as well as possible side effects from RT. Discussed moisturizer and mometasone use with patient, as well as other skin care recommendations. Pt states her understanding. Pt scheduled for bilateral mastectomy with Dr. Mae on 4/29/2024. Pt to be brought in for reconsultation and CT simulation following surgical clearance. Given RN number in case of radiation questions/concerns.

## 2024-04-19 RX ORDER — MOMETASONE FUROATE 1 MG/G
CREAM TOPICAL
Qty: 45 G | Refills: 3 | Status: SHIPPED | OUTPATIENT
Start: 2024-04-19

## 2024-04-25 RX ORDER — ACETAMINOPHEN 500 MG
1000 TABLET ORAL ONCE
OUTPATIENT
Start: 2024-04-25 | End: 2024-04-25

## 2024-04-25 RX ORDER — SODIUM CHLORIDE, SODIUM LACTATE, POTASSIUM CHLORIDE, CALCIUM CHLORIDE 600; 310; 30; 20 MG/100ML; MG/100ML; MG/100ML; MG/100ML
INJECTION, SOLUTION INTRAVENOUS CONTINUOUS
OUTPATIENT
Start: 2024-04-25

## 2024-04-25 NOTE — DISCHARGE INSTRUCTIONS
Breast Surgery  Post-operative Instructions  Excisional Biopsy, Lumpectomy, Mastectomy, Union Node Biopsy, or Axillary  Lymph Node Dissection  Janet Mae MD  General Instructions  The following instructions will provide helpful information that will assist your recovery. These are designed to be general guidelines. Please remember that everyone heals and recovers differently. Listen to your body and rest when you are tired. If you have any questions or concerns, please do not hesitate to contact my office. I would like to see you in the office about one week after surgery, please schedule and appointment through my office to make a post-operative appointment if you do not already have one.     Restrictions  There are no lifting weight restrictions for the arm on the surgical side. You may gradually increase the amount of weight based on your comfort level. You should avoid a lot of repetitious activity with the arm until the drain is out (if one was placed) and the wound is well-healed (about two weeks).   You should not drive a car until you believe you can react to an emergency situation and you’re no longer taking narcotic pain medications.   You may shower the day after surgery. You should not bathe or swim (i.e. submerge wound) until the wound is well healed (about two weeks).  There are no dietary restrictions.    Exercise  You may begin arm exercises within a couple days. Do these 2 or 3 times per day, beginning with light exercise and gradually increase your range of motion and repetitions. This will help your arm regain full mobility. We will address your activity level again at your post-operative visit.   You will have pain medication prescribed before discharge. Take this as directed to relieve pain. It is important that you be comfortable so that you may continue your stretching exercises.   If you find the medication prescribed is too strong, try Tylenol (Acetaminophen) or  Ibuprofen.    Wound Care  You may remove the gauze dressing on the first or second postoperative day and then shower. You should leave the steri-strips in place; they will start to peel off about 10 days after your surgery. The stitches are all underneath the skin and will dissolve on their own. You will not need any stitches removed except if you have a drain in place.  I encourage you to shower once the outer bandage is removed, you may use soap and water directly over the steri-strips and pat dry following.  You should keep gauze dressing on the wound until the wound is completely dry and without drainage-usually 1-3 days.   If a surgical bra was placed after the surgery, I encourage you to wear it as much as possible during the week following the procedure (including during sleep). Alternatively, you may choose to wear your own bra provided it is comfortable, provides support and does not have an underwire. If the breast doesn’t move it is less painful.  If an elastic bandage was placed around your chest after the surgery you may remove it on the 1st or 2nd day after surgery. If you prefer to leave it on longer, you may.  It is normal to feel a lump in the area of the incisions for up to 6 months. This is part of the healing process. Eventually the breast will return to its normal condition.   Drain Care (if placed at time of axillary dissection or mastectomy)-This will be explained by your nurse prior to discharge.  Empty the drain and strip the tubing 2-3 times daily, more often if the plastic squeeze bottle fills up. This will prevent the tubing from clogging.   Starting at the top of the tubing next to your body firmly grasp the tubing with the index finger and thumb of one hand. With the other hand use the index finger and thumb to move the fluid down the tubing (this is called “stripping the tubing”).   Uncap the pouring spout and squeeze the contents of the plastic bottle into the measuring cup.   Squeeze  the bottle flat to create suction and replace the cap while squeezing to maintain the vacuum.   Measure and record the output and discard the fluid into the toilet. Record the output each time you empty the bottle.   Keep track of the output (drainage) and when the total is down to 30 cc’s or less over a 24-hour period we’ll remove the drain in the office. This is usually after 1-2  weeks, but can be longer in certain patients.   Drain removal takes about 30 seconds and is virtually painless. The suture is cut and the drain slides right out. You should call my office as the output approaches 30 cc’s over 24 hours so that we may schedule an office visit for drain removal.  If you have had reconstruction your plastic surgeon will remove the drain according to their protocol.    Pain Medication  You will be given a prescription for a narcotic pain medication (usually Norco) upon discharge. Many patients have very little pain and don’t want to use the narcotic. Don’t be afraid to use it if you’re uncomfortable. If you’d prefer you may substitute Tylenol or Ibuprofen (Motrin, Advil). Using an ice pack for a few minutes over the incision can also alleviate pain. If you do use the narcotic medication, use an over the counter stool softener or gentle laxative and stay well-hydrated as constipation is not uncommon with narcotics.    Pathology Report  The Pathology report is usually available 4-5 business days following the surgery. I will call you  with the results once the report is available.    Notify my office if:   Your temperature is over 101.5 F   You notice increasing swelling, redness, warmth or drainage from around the incision or drain site.    If you experience any problems please call my office and either my nurse or myself will respond. After hours, you will be forwarded to my answering service which will help you get in touch with myself or the physician covering for me.         HOME INSTRUCTIONS  AMBSURG HOME  CARE INSTRUCTIONS: POST-OP ANESTHESIA  The medication that you received for sedation or general anesthesia can last up to 24 hours. Your judgment and reflexes may be altered, even if you feel like your normal self.      We Recommend:   Do not drive any motor vehicle or bicycle   Avoid mowing the lawn, playing sports, or working with power tools/applicances (power saws, electric knives or mixers)   That you have someone stay with you on your first night home   Do not drink alcohol or take sleeping pills or tranquilizers   Do not sign legal documents within 24 hours of your procedure   If you had a nerve block for your surgery, take extra care not to put any pressure on your arm or hand for 24 hours    It is normal:  For you to have a sore throat if you had a breathing tube during surgery (while you were asleep!). The sore throat should get better within 48 hours. You can gargle with warm salt water (1/2 tsp in 4 oz warm water) or use a throat lozenge for comfort  To feel muscle aches or soreness especially in the abdomen, chest or neck. The achy feeling should go away in the next 24 hours  To feel weak, sleepy or \"wiped out\". Your should start feeling better in the next 24 hours.   To experience mild discomforts such as sore lip or tongue, headache, cramps, gas pains or a bloated feeling in your abdomen.   To experience mild back pain or soreness for a day or two if you had spinal or epidural anesthesia.   If you had laparoscopic surgery, to feel shoulder pain or discomfort on the day of surgery.   For some patients to have nausea after surgery/anesthesia    If you feel nausea or experience vomiting:   Try to move around less.   Eat less than usual or drink only liquids until the next morning   Nausea should resolve in about 24 hours    If you have a problem when you are at home:    Call your surgeons office   Discharge Instructions: After Your Surgery  You’ve just had surgery. During surgery, you were given medicine  called anesthesia to keep you relaxed and free of pain. After surgery, you may have some pain or nausea. This is common. Here are some tips for feeling better and getting well after surgery.   Going home  Your healthcare provider will show you how to take care of yourself when you go home. They'll also answer your questions. Have an adult family member or friend drive you home. For the first 24 hours after your surgery:   Don't drive or use heavy equipment.  Don't make important decisions or sign legal papers.  Take medicines as directed.  Don't drink alcohol.  Have someone stay with you, if needed. They can watch for problems and help keep you safe.  Be sure to go to all follow-up visits with your healthcare provider. And rest after your surgery for as long as your provider tells you to.   Coping with pain  If you have pain after surgery, pain medicine will help you feel better. Take it as directed, before pain becomes severe. Also, ask your healthcare provider or pharmacist about other ways to control pain. This might be with heat, ice, or relaxation. And follow any other instructions your surgeon or nurse gives you.      Stay on schedule with your medicine.     Tips for taking pain medicine  To get the best relief possible, remember these points:   Pain medicines can upset your stomach. Taking them with a little food may help.  Most pain relievers taken by mouth need at least 20 to 30 minutes to start to work.  Don't wait till your pain becomes severe before you take your medicine. Try to time your medicine so that you can take it before starting an activity. This might be before you get dressed, go for a walk, or sit down for dinner.  Constipation is a common side effect of some pain medicines. Call your healthcare provider before taking any medicines such as laxatives or stool softeners to help ease constipation. Also ask if you should skip any foods. Drinking lots of fluids and eating foods such as fruits and  vegetables that are high in fiber can also help. Remember, don't take laxatives unless your surgeon has prescribed them.  Drinking alcohol and taking pain medicine can cause dizziness and slow your breathing. It can even be deadly. Don't drink alcohol while taking pain medicine.  Pain medicine can make you react more slowly to things. Don't drive or run machinery while taking pain medicine.  Your healthcare provider may tell you to take acetaminophen to help ease your pain. Ask them how much you're supposed to take each day. Acetaminophen or other pain relievers may interact with your prescription medicines or other over-the-counter (OTC) medicines. Some prescription medicines have acetaminophen and other ingredients in them. Using both prescription and OTC acetaminophen for pain can cause you to accidentally overdose. Read the labels on your OTC medicines with care. This will help you to clearly know the list of ingredients, how much to take, and any warnings. It may also help you not take too much acetaminophen. If you have questions or don't understand the information, ask your pharmacist or healthcare provider to explain it to you before you take the OTC medicine.   Managing nausea  Some people have an upset stomach (nausea) after surgery. This is often because of anesthesia, pain, or pain medicine, less movement of food in the stomach, or the stress of surgery. These tips will help you handle nausea and eat healthy foods as you get better. If you were on a special food plan before surgery, ask your healthcare provider if you should follow it while you get better. Check with your provider on how your eating should progress. It may depend on the surgery you had. These general tips may help:   Don't push yourself to eat. Your body will tell you when to eat and how much.  Start off with clear liquids and soup. They're easier to digest.  Next try semi-solid foods as you feel ready. These include mashed potatoes,  applesauce, and gelatin.  Slowly move to solid foods. Don’t eat fatty, rich, or spicy foods at first.  Don't force yourself to have 3 large meals a day. Instead eat smaller amounts more often.  Take pain medicines with a small amount of solid food, such as crackers or toast. This helps prevent nausea.  When to call your healthcare provider  Call your healthcare provider right away if you have any of these:   You still have too much pain, or the pain gets worse, after taking the medicine. The medicine may not be strong enough. Or there may be a complication from the surgery.  You feel too sleepy, dizzy, or groggy. The medicine may be too strong.  Side effects such as nausea or vomiting. Your healthcare provider may advise taking other medicines to .  Skin changes such as rash, itching, or hives. This may mean you have an allergic reaction. Your provider may advise taking other medicines.  The incision looks different (for instance, part of it opens up).  Bleeding or fluid leaking from the incision site, and weren't told to expect that.  Fever of 100.4°F (38°C) or higher, or as directed by your provider.  Call 911  Call 911 right away if you have:   Trouble breathing  Facial swelling    If you have obstructive sleep apnea   You were given anesthesia medicine during surgery to keep you comfortable and free of pain. After surgery, you may have more apnea spells because of this medicine and other medicines you were given. The spells may last longer than normal.    At home:  Keep using the continuous positive airway pressure (CPAP) device when you sleep. Unless your healthcare provider tells you not to, use it when you sleep, day or night. CPAP is a common device used to treat obstructive sleep apnea.  Talk with your provider before taking any pain medicine, muscle relaxants, or sedatives. Your provider will tell you about the possible dangers of taking these medicines.  Contact your provider if your sleeping changes a  lot even when taking medicines as directed.  [place] last reviewed this educational content on 10/1/2021  © 0508-1251 [employer]. All rights reserved. This information is not intended as a substitute for professional medical care. Always follow your healthcare professional's instructions.

## 2024-04-26 ENCOUNTER — OFFICE VISIT (OUTPATIENT)
Dept: HEMATOLOGY/ONCOLOGY | Facility: HOSPITAL | Age: 39
End: 2024-04-26
Attending: SOCIAL WORKER
Payer: COMMERCIAL

## 2024-04-26 DIAGNOSIS — C50.411 MALIGNANT NEOPLASM OF UPPER-OUTER QUADRANT OF RIGHT BREAST IN FEMALE, ESTROGEN RECEPTOR POSITIVE (HCC): Primary | ICD-10-CM

## 2024-04-26 DIAGNOSIS — Z71.9 ENCOUNTER FOR HEALTH EDUCATION: ICD-10-CM

## 2024-04-26 DIAGNOSIS — Z17.0 MALIGNANT NEOPLASM OF UPPER-OUTER QUADRANT OF RIGHT BREAST IN FEMALE, ESTROGEN RECEPTOR POSITIVE (HCC): Primary | ICD-10-CM

## 2024-04-26 PROCEDURE — 99211 OFF/OP EST MAY X REQ PHY/QHP: CPT

## 2024-04-26 RX ORDER — CEFAZOLIN SODIUM/WATER 2 G/20 ML
2 SYRINGE (ML) INTRAVENOUS ONCE
OUTPATIENT
Start: 2024-04-29 | End: 2024-04-26

## 2024-04-26 NOTE — PROGRESS NOTES
[facility].  She is accompanied by her spouse for support.    Patient is scheduled on 4/29/24 for bilateral breast simple mastectomies, [place]  localized excision of right axillary lymph node previously positive for metastatic carcinoma, right breast lymphoscintigraphy and sentinel lymph node biopsy, possible right axillary lymph node dissection.     Reviewed with patient and spouse what to expect on the day of surgery.  Nuclear Medicine is scheduled for 11:30.  It is typical for Surgery to ask that patients arrive 90 minutes prior to this time.  However, patient will receive a call after 3pm on day prior to surgery as to a definitive time of arrival.  Reinforced to patient what to expect during lymphoscintigraphy, why this is performed and why this is performed.  Shared with patient she will also have a blue dye injected during her surgical case to further assist surgeon during the surgical case.  This will result in her urine being blue in color until cleared by her kidneys.     Shared with patient she will be visited by Dr. Mae as well as Anesthesiology staff prior to her going to the OR.  Encouraged patient to address any last-minute questions or concerns at this time.  Patient is aware her surgical case is scheduled for 2 hours.  Following surgery, she will proceed to recovery for closer observation prior to returning to Day Surgery for discharge should she meet criteria to do so.     Pink compression bra demonstrated to patient.  This included where she can adjust this for comfort as well as drain management system.  Demonstrated to patient where she can expect her HANSEL drain incision to be located.  Showed to patient HANSEL dressing of biopatch and tegaderm.  Instructed patient to always have compression bra in place unless performing hygiene.  Patient aware she will have a chest incision that will have a surgical dressing of gauze and ABD.  Her incisions  will be covered with steri strips.  Instructed patient to leave steri strips in place. Patient may also have an ACE bandage in place.       Educated as to what to expect from HANSEL drain.  This included color and volume of drainage, and how volume should slowly decrease and color change over time.  Shared with patient to expect blood clots to also be present.  Shared with patient not to expect each drain to produce equal amounts of drainage.     Educated patient as to HANSEL drain care.  Care should be performed BID and prn dependent on volume of output.  Patient should gather needed equipment to include measurement cups, alcohol swabs, drain record, and writing utensil.  Instructed patient to perform hand hygiene prior to performing drain care.  Demonstrated to patient stripping of tubing, emptying of drain, measurement of drainage and documentation.  This skill was demonstrated back to this writer by patient's spouse successfully.  Discussed troubleshooting the drain as well as when to call MD office.  Provided patient with measurement cups, alcohol swabs, written HANSEL drain instructions, video link, as well as drain record.     Educated patient as to showering.  Instructed patient she may shower 24-48 hrs after her surgery.  Encouraged patient to perform HANSEL drain care prior to showering.  Provided lanyard for management of drain while showering. Patient to remove compression bra, ACE bandage if in place, and surgical dressing.  Surgical dressing can be discarded.  HANSEL drain dressing should be examined and reinforced if needed.  Instructed patient no scrubbing over her incision while in the shower.  Once shower completed, she should pat dry.  Compression bra needs to be reapplied as well as gauze dressing to her comfort.  Provided patient with gauze and ABD dressings for home management.     Should patient or Care Partner observe moisture under the tegaderm dressing to her drain site this will need to be changed.   Demonstrated how to change HANSEL drain dressing should it be needed.  Provided patient with tegaderm and biopatch for home management.  Patient is aware she will have a pectoral nerve block during her surgical case to assist with post operative pain management.  Patient will also be prescribed Norco for pain management at home.  Discussed side effects of narcotic pain medication.  Encouraged hydration, ambulation, stool softer, and mild laxative for home management of constipation.  Encouraged patient to keep a Pain Log, documenting her pain level, date and time as well as what was taken for pain relief.  Shared with patient she will be unable to drive until off narcotic pain medication for 24 hours and can react in an emergency.     Patient has met with Physical Therapy and is aware of activity restrictions and arm exercise program.     Patient has follow up scheduled with Surgical Oncology, and Medical Oncology.  Emotional support provided to patient.  Encouraged her to call with questions, concerns, or needs.

## 2024-04-29 ENCOUNTER — ANESTHESIA EVENT (OUTPATIENT)
Dept: SURGERY | Facility: HOSPITAL | Age: 39
End: 2024-04-29
Payer: COMMERCIAL

## 2024-04-29 ENCOUNTER — ANESTHESIA (OUTPATIENT)
Dept: SURGERY | Facility: HOSPITAL | Age: 39
End: 2024-04-29
Payer: COMMERCIAL

## 2024-04-29 ENCOUNTER — HOSPITAL ENCOUNTER (OUTPATIENT)
Dept: NUCLEAR MEDICINE | Facility: HOSPITAL | Age: 39
Discharge: HOME | End: 2024-04-29
Attending: SURGERY
Payer: COMMERCIAL

## 2024-04-29 ENCOUNTER — HOSPITAL ENCOUNTER (OUTPATIENT)
Dept: MAMMOGRAPHY | Facility: HOSPITAL | Age: 39
Discharge: HOME | End: 2024-04-29
Attending: SURGERY
Payer: COMMERCIAL

## 2024-04-29 ENCOUNTER — HOSPITAL ENCOUNTER (OUTPATIENT)
Facility: HOSPITAL | Age: 39
Discharge: HOME | End: 2024-04-30
Attending: SURGERY | Admitting: SURGERY
Payer: COMMERCIAL

## 2024-04-29 DIAGNOSIS — Z17.0 MALIGNANT NEOPLASM OF UPPER-OUTER QUADRANT OF RIGHT BREAST IN FEMALE, ESTROGEN RECEPTOR POSITIVE (HCC): ICD-10-CM

## 2024-04-29 DIAGNOSIS — C50.411 MALIGNANT NEOPLASM OF UPPER-OUTER QUADRANT OF RIGHT BREAST IN FEMALE, ESTROGEN RECEPTOR POSITIVE (HCC): ICD-10-CM

## 2024-04-29 LAB — B-HCG UR QL: NEGATIVE

## 2024-04-29 PROCEDURE — 0HTV0ZZ RESECTION OF BILATERAL BREAST, OPEN APPROACH: ICD-10-PCS | Performed by: SURGERY

## 2024-04-29 PROCEDURE — 88360 TUMOR IMMUNOHISTOCHEM/MANUAL: CPT | Performed by: SURGERY

## 2024-04-29 PROCEDURE — 88305 TISSUE EXAM BY PATHOLOGIST: CPT | Performed by: SURGERY

## 2024-04-29 PROCEDURE — 78195 LYMPH SYSTEM IMAGING: CPT | Performed by: SURGERY

## 2024-04-29 PROCEDURE — 88300 SURGICAL PATH GROSS: CPT | Performed by: SURGERY

## 2024-04-29 PROCEDURE — 81025 URINE PREGNANCY TEST: CPT

## 2024-04-29 PROCEDURE — 88334 PATH CONSLTJ SURG CYTO XM EA: CPT | Performed by: SURGERY

## 2024-04-29 PROCEDURE — 07T50ZZ RESECTION OF RIGHT AXILLARY LYMPHATIC, OPEN APPROACH: ICD-10-PCS | Performed by: SURGERY

## 2024-04-29 PROCEDURE — 88331 PATH CONSLTJ SURG 1 BLK 1SPC: CPT | Performed by: SURGERY

## 2024-04-29 PROCEDURE — 88342 IMHCHEM/IMCYTCHM 1ST ANTB: CPT | Performed by: SURGERY

## 2024-04-29 PROCEDURE — 88341 IMHCHEM/IMCYTCHM EA ADD ANTB: CPT | Performed by: SURGERY

## 2024-04-29 PROCEDURE — 88307 TISSUE EXAM BY PATHOLOGIST: CPT | Performed by: SURGERY

## 2024-04-29 PROCEDURE — 88309 TISSUE EXAM BY PATHOLOGIST: CPT | Performed by: SURGERY

## 2024-04-29 RX ORDER — MORPHINE SULFATE 4 MG/ML
6 INJECTION, SOLUTION INTRAMUSCULAR; INTRAVENOUS EVERY 2 HOUR PRN
Status: DISCONTINUED | OUTPATIENT
Start: 2024-04-29 | End: 2024-04-30

## 2024-04-29 RX ORDER — ACETAMINOPHEN 325 MG/1
650 TABLET ORAL EVERY 4 HOURS PRN
Status: DISCONTINUED | OUTPATIENT
Start: 2024-04-29 | End: 2024-04-30

## 2024-04-29 RX ORDER — MIDAZOLAM HYDROCHLORIDE 1 MG/ML
INJECTION INTRAMUSCULAR; INTRAVENOUS AS NEEDED
Status: DISCONTINUED | OUTPATIENT
Start: 2024-04-29 | End: 2024-04-29 | Stop reason: SURG

## 2024-04-29 RX ORDER — ROCURONIUM BROMIDE 10 MG/ML
INJECTION, SOLUTION INTRAVENOUS AS NEEDED
Status: DISCONTINUED | OUTPATIENT
Start: 2024-04-29 | End: 2024-04-29 | Stop reason: SURG

## 2024-04-29 RX ORDER — OXYCODONE HYDROCHLORIDE 5 MG/1
10 TABLET ORAL EVERY 4 HOURS PRN
Status: DISCONTINUED | OUTPATIENT
Start: 2024-04-29 | End: 2024-04-30

## 2024-04-29 RX ORDER — SCOLOPAMINE TRANSDERMAL SYSTEM 1 MG/1
1 PATCH, EXTENDED RELEASE TRANSDERMAL
Status: DISCONTINUED | OUTPATIENT
Start: 2024-04-29 | End: 2024-04-29 | Stop reason: HOSPADM

## 2024-04-29 RX ORDER — HYDROMORPHONE HYDROCHLORIDE 1 MG/ML
INJECTION, SOLUTION INTRAMUSCULAR; INTRAVENOUS; SUBCUTANEOUS AS NEEDED
Status: DISCONTINUED | OUTPATIENT
Start: 2024-04-29 | End: 2024-04-29 | Stop reason: SURG

## 2024-04-29 RX ORDER — PROCHLORPERAZINE EDISYLATE 5 MG/ML
5 INJECTION INTRAMUSCULAR; INTRAVENOUS EVERY 8 HOURS PRN
Status: DISCONTINUED | OUTPATIENT
Start: 2024-04-29 | End: 2024-04-29 | Stop reason: HOSPADM

## 2024-04-29 RX ORDER — LIDOCAINE HYDROCHLORIDE 10 MG/ML
INJECTION, SOLUTION EPIDURAL; INFILTRATION; INTRACAUDAL; PERINEURAL AS NEEDED
Status: DISCONTINUED | OUTPATIENT
Start: 2024-04-29 | End: 2024-04-29 | Stop reason: SURG

## 2024-04-29 RX ORDER — ONDANSETRON 2 MG/ML
4 INJECTION INTRAMUSCULAR; INTRAVENOUS EVERY 6 HOURS PRN
Status: DISCONTINUED | OUTPATIENT
Start: 2024-04-29 | End: 2024-04-29 | Stop reason: HOSPADM

## 2024-04-29 RX ORDER — KETAMINE HYDROCHLORIDE 50 MG/ML
INJECTION, SOLUTION INTRAMUSCULAR; INTRAVENOUS AS NEEDED
Status: DISCONTINUED | OUTPATIENT
Start: 2024-04-29 | End: 2024-04-29 | Stop reason: SURG

## 2024-04-29 RX ORDER — HYDROMORPHONE HYDROCHLORIDE 1 MG/ML
0.2 INJECTION, SOLUTION INTRAMUSCULAR; INTRAVENOUS; SUBCUTANEOUS EVERY 5 MIN PRN
Status: DISCONTINUED | OUTPATIENT
Start: 2024-04-29 | End: 2024-04-29 | Stop reason: HOSPADM

## 2024-04-29 RX ORDER — ACETAMINOPHEN 500 MG
1000 TABLET ORAL EVERY 8 HOURS
Status: DISCONTINUED | OUTPATIENT
Start: 2024-04-29 | End: 2024-04-30

## 2024-04-29 RX ORDER — MORPHINE SULFATE 30 MG/1
15 TABLET ORAL EVERY 4 HOURS PRN
Status: DISCONTINUED | OUTPATIENT
Start: 2024-04-29 | End: 2024-04-30

## 2024-04-29 RX ORDER — DEXAMETHASONE SODIUM PHOSPHATE 4 MG/ML
VIAL (ML) INJECTION AS NEEDED
Status: DISCONTINUED | OUTPATIENT
Start: 2024-04-29 | End: 2024-04-29 | Stop reason: SURG

## 2024-04-29 RX ORDER — CEFAZOLIN SODIUM/WATER 2 G/20 ML
SYRINGE (ML) INTRAVENOUS AS NEEDED
Status: DISCONTINUED | OUTPATIENT
Start: 2024-04-29 | End: 2024-04-29 | Stop reason: SURG

## 2024-04-29 RX ORDER — ONDANSETRON 4 MG/1
4 TABLET, ORALLY DISINTEGRATING ORAL EVERY 6 HOURS PRN
Status: DISCONTINUED | OUTPATIENT
Start: 2024-04-29 | End: 2024-04-30

## 2024-04-29 RX ORDER — MORPHINE SULFATE 4 MG/ML
4 INJECTION, SOLUTION INTRAMUSCULAR; INTRAVENOUS EVERY 2 HOUR PRN
Status: DISCONTINUED | OUTPATIENT
Start: 2024-04-29 | End: 2024-04-30

## 2024-04-29 RX ORDER — HYDROCODONE BITARTRATE AND ACETAMINOPHEN 5; 325 MG/1; MG/1
1 TABLET ORAL EVERY 4 HOURS PRN
Status: DISCONTINUED | OUTPATIENT
Start: 2024-04-29 | End: 2024-04-30

## 2024-04-29 RX ORDER — HYDROMORPHONE HYDROCHLORIDE 1 MG/ML
0.6 INJECTION, SOLUTION INTRAMUSCULAR; INTRAVENOUS; SUBCUTANEOUS EVERY 5 MIN PRN
Status: DISCONTINUED | OUTPATIENT
Start: 2024-04-29 | End: 2024-04-29 | Stop reason: HOSPADM

## 2024-04-29 RX ORDER — MORPHINE SULFATE 10 MG/ML
6 INJECTION, SOLUTION INTRAMUSCULAR; INTRAVENOUS EVERY 10 MIN PRN
Status: DISCONTINUED | OUTPATIENT
Start: 2024-04-29 | End: 2024-04-29 | Stop reason: HOSPADM

## 2024-04-29 RX ORDER — ROPIVACAINE HYDROCHLORIDE 5 MG/ML
INJECTION, SOLUTION EPIDURAL; INFILTRATION; PERINEURAL
Status: COMPLETED | OUTPATIENT
Start: 2024-04-29 | End: 2024-04-29

## 2024-04-29 RX ORDER — HYDROMORPHONE HYDROCHLORIDE 1 MG/ML
0.4 INJECTION, SOLUTION INTRAMUSCULAR; INTRAVENOUS; SUBCUTANEOUS EVERY 5 MIN PRN
Status: DISCONTINUED | OUTPATIENT
Start: 2024-04-29 | End: 2024-04-29 | Stop reason: HOSPADM

## 2024-04-29 RX ORDER — DEXAMETHASONE SODIUM PHOSPHATE 10 MG/ML
INJECTION, SOLUTION INTRAMUSCULAR; INTRAVENOUS
Status: COMPLETED | OUTPATIENT
Start: 2024-04-29 | End: 2024-04-29

## 2024-04-29 RX ORDER — MORPHINE SULFATE 4 MG/ML
2 INJECTION, SOLUTION INTRAMUSCULAR; INTRAVENOUS EVERY 10 MIN PRN
Status: DISCONTINUED | OUTPATIENT
Start: 2024-04-29 | End: 2024-04-29 | Stop reason: HOSPADM

## 2024-04-29 RX ORDER — ONDANSETRON 2 MG/ML
INJECTION INTRAMUSCULAR; INTRAVENOUS
Status: COMPLETED
Start: 2024-04-29 | End: 2024-04-29

## 2024-04-29 RX ORDER — HYDROCODONE BITARTRATE AND ACETAMINOPHEN 5; 325 MG/1; MG/1
2 TABLET ORAL EVERY 4 HOURS PRN
Status: DISCONTINUED | OUTPATIENT
Start: 2024-04-29 | End: 2024-04-30

## 2024-04-29 RX ORDER — PHENYLEPHRINE HCL 10 MG/ML
VIAL (ML) INJECTION AS NEEDED
Status: DISCONTINUED | OUTPATIENT
Start: 2024-04-29 | End: 2024-04-29 | Stop reason: SURG

## 2024-04-29 RX ORDER — MORPHINE SULFATE 2 MG/ML
2 INJECTION, SOLUTION INTRAMUSCULAR; INTRAVENOUS EVERY 2 HOUR PRN
Status: DISCONTINUED | OUTPATIENT
Start: 2024-04-29 | End: 2024-04-30

## 2024-04-29 RX ORDER — SODIUM CHLORIDE, SODIUM LACTATE, POTASSIUM CHLORIDE, CALCIUM CHLORIDE 600; 310; 30; 20 MG/100ML; MG/100ML; MG/100ML; MG/100ML
INJECTION, SOLUTION INTRAVENOUS CONTINUOUS PRN
Status: DISCONTINUED | OUTPATIENT
Start: 2024-04-29 | End: 2024-04-29 | Stop reason: SURG

## 2024-04-29 RX ORDER — SODIUM CHLORIDE, SODIUM LACTATE, POTASSIUM CHLORIDE, CALCIUM CHLORIDE 600; 310; 30; 20 MG/100ML; MG/100ML; MG/100ML; MG/100ML
INJECTION, SOLUTION INTRAVENOUS CONTINUOUS
Status: DISCONTINUED | OUTPATIENT
Start: 2024-04-29 | End: 2024-04-29 | Stop reason: HOSPADM

## 2024-04-29 RX ORDER — MORPHINE SULFATE 4 MG/ML
4 INJECTION, SOLUTION INTRAMUSCULAR; INTRAVENOUS EVERY 10 MIN PRN
Status: DISCONTINUED | OUTPATIENT
Start: 2024-04-29 | End: 2024-04-29 | Stop reason: HOSPADM

## 2024-04-29 RX ORDER — HYDROCODONE BITARTRATE AND ACETAMINOPHEN 5; 325 MG/1; MG/1
1-2 TABLET ORAL EVERY 6 HOURS PRN
Qty: 40 TABLET | Refills: 0 | Status: SHIPPED | OUTPATIENT
Start: 2024-04-29

## 2024-04-29 RX ORDER — NALOXONE HYDROCHLORIDE 0.4 MG/ML
80 INJECTION, SOLUTION INTRAMUSCULAR; INTRAVENOUS; SUBCUTANEOUS AS NEEDED
Status: DISCONTINUED | OUTPATIENT
Start: 2024-04-29 | End: 2024-04-29 | Stop reason: HOSPADM

## 2024-04-29 RX ORDER — OXYCODONE HYDROCHLORIDE 5 MG/1
5 TABLET ORAL EVERY 4 HOURS PRN
Status: DISCONTINUED | OUTPATIENT
Start: 2024-04-29 | End: 2024-04-30

## 2024-04-29 RX ADMIN — SODIUM CHLORIDE, SODIUM LACTATE, POTASSIUM CHLORIDE, CALCIUM CHLORIDE: 600; 310; 30; 20 INJECTION, SOLUTION INTRAVENOUS at 14:07:00

## 2024-04-29 RX ADMIN — PHENYLEPHRINE HCL 100 MCG: 10 MG/ML VIAL (ML) INJECTION at 15:31:00

## 2024-04-29 RX ADMIN — ROPIVACAINE HYDROCHLORIDE 15 ML: 5 INJECTION, SOLUTION EPIDURAL; INFILTRATION; PERINEURAL at 14:15:00

## 2024-04-29 RX ADMIN — PHENYLEPHRINE HCL 100 MCG: 10 MG/ML VIAL (ML) INJECTION at 15:45:00

## 2024-04-29 RX ADMIN — ROPIVACAINE HYDROCHLORIDE 15 ML: 5 INJECTION, SOLUTION EPIDURAL; INFILTRATION; PERINEURAL at 14:22:00

## 2024-04-29 RX ADMIN — DEXAMETHASONE SODIUM PHOSPHATE 8 MG: 4 MG/ML VIAL (ML) INJECTION at 14:40:00

## 2024-04-29 RX ADMIN — LIDOCAINE HYDROCHLORIDE 50 MG: 10 INJECTION, SOLUTION EPIDURAL; INFILTRATION; INTRACAUDAL; PERINEURAL at 14:08:00

## 2024-04-29 RX ADMIN — KETAMINE HYDROCHLORIDE 50 MG: 50 INJECTION, SOLUTION INTRAMUSCULAR; INTRAVENOUS at 14:59:00

## 2024-04-29 RX ADMIN — ROPIVACAINE HYDROCHLORIDE 15 ML: 5 INJECTION, SOLUTION EPIDURAL; INFILTRATION; PERINEURAL at 14:20:00

## 2024-04-29 RX ADMIN — MIDAZOLAM HYDROCHLORIDE 2 MG: 1 INJECTION INTRAMUSCULAR; INTRAVENOUS at 14:06:00

## 2024-04-29 RX ADMIN — PHENYLEPHRINE HCL 100 MCG: 10 MG/ML VIAL (ML) INJECTION at 14:35:00

## 2024-04-29 RX ADMIN — DEXAMETHASONE SODIUM PHOSPHATE 3 MG: 10 INJECTION, SOLUTION INTRAMUSCULAR; INTRAVENOUS at 14:15:00

## 2024-04-29 RX ADMIN — PHENYLEPHRINE HCL 100 MCG: 10 MG/ML VIAL (ML) INJECTION at 15:19:00

## 2024-04-29 RX ADMIN — DEXAMETHASONE SODIUM PHOSPHATE 4 MG: 10 INJECTION, SOLUTION INTRAMUSCULAR; INTRAVENOUS at 14:22:00

## 2024-04-29 RX ADMIN — ROCURONIUM BROMIDE 50 MG: 10 INJECTION, SOLUTION INTRAVENOUS at 14:09:00

## 2024-04-29 RX ADMIN — PHENYLEPHRINE HCL 100 MCG: 10 MG/ML VIAL (ML) INJECTION at 15:10:00

## 2024-04-29 RX ADMIN — PHENYLEPHRINE HCL 100 MCG: 10 MG/ML VIAL (ML) INJECTION at 14:16:00

## 2024-04-29 RX ADMIN — DEXAMETHASONE SODIUM PHOSPHATE 3 MG: 10 INJECTION, SOLUTION INTRAMUSCULAR; INTRAVENOUS at 14:20:00

## 2024-04-29 RX ADMIN — HYDROMORPHONE HYDROCHLORIDE 0.5 MG: 1 INJECTION, SOLUTION INTRAMUSCULAR; INTRAVENOUS; SUBCUTANEOUS at 14:53:00

## 2024-04-29 RX ADMIN — CEFAZOLIN SODIUM/WATER 2 G: 2 G/20 ML SYRINGE (ML) INTRAVENOUS at 14:30:00

## 2024-04-29 NOTE — ANESTHESIA PREPROCEDURE EVALUATION
Anesthesia PreOp Note    HPI:     Jocelyn Garza is a 38 year old female who presents for preoperative consultation requested by: Janet Mae MD    Date of Surgery: 4/29/2024    Procedure(s):  Bilateral breast simple mastectomies, Allyson  localized excision of right axillary lymph node previously positive for metastatic carcinoma, right breast lymphoscintigraphy, right sentinel lymph node biopsy, possible right axillary lymph node dissection  AXILLARY NODE DISSECTION  BREAST SENTINEL LYMPH NODE BIOPSY  Indication: Malignant neoplasm of upper-outer quadrant of right breast in female, estrogen receptor positive (HCC) [C50.411, Z17.0]    Relevant Problems   No relevant active problems       NPO:  Last Liquid Consumption Date: 04/29/24  Last Liquid Consumption Time: 0600 (sip of water)  Last Solid Consumption Date: 04/29/24  Last Solid Consumption Time: 0000  Last Liquid Consumption Date: 04/29/24          History Review:  Patient Active Problem List    Diagnosis Date Noted    Subareolar mass of left breast 03/12/2024    Left shoulder strain, initial encounter 01/05/2024    Chemotherapy management, encounter for 01/05/2024    Chemotherapy follow-up examination 11/16/2023    Thrombocytopenia (HCC) 11/09/2023    Neutropenic fever (HCC) 11/09/2023    Anemia associated with chemotherapy 11/09/2023    Encounter for central line care 10/23/2023    Family history of pancreatic cancer 10/16/2023    Family history of malignant neoplasm of prostate 10/16/2023    Malignant neoplasm of upper-outer quadrant of right breast in female, estrogen receptor positive (HCC) 10/12/2023    Essential hypertension 05/29/2018    Ectopic pregnancy (HCC) 03/04/2015    History of ectopic pregnancy 08/04/2014    Congenital absence of ovary 05/04/2014    Solitary kidney, congenital 03/07/2013       Past Medical History:    Breast cancer in female (HCC)    Right breast lymph node cancer    High blood pressure    Hx of motion sickness     Hypertension    Solitary kidney, congenital       Past Surgical History:   Procedure Laterality Date    Appendectomy  01/01/2007    Needle biopsy right Right 10/09/2023    Treat ectopic preg,rmv tube/ovary Left 01/01/2014    Treat ectopic preg,rmv tube/ovary Left 01/01/2015       Medications Prior to Admission   Medication Sig Dispense Refill Last Dose    lidocaine-prilocaine 2.5-2.5 % External Cream Apply to site 1 hour prior to port a cath needle insertion 1 each 1 4/15/2024    lisinopril 5 MG Oral Tab Take 1 tablet (5 mg total) by mouth daily. 30 tablet 5 4/28/2024 at 0630    Mometasone Furoate 0.1 % External Cream Apply BID during radiation therapy. 45 g 3 Unknown    prochlorperazine (COMPAZINE) 10 mg tablet Take 1 tablet (10 mg total) by mouth every 8 (eight) hours as needed for Nausea. 30 tablet 3 More than a month    ondansetron (ZOFRAN) 8 MG tablet Take 1 tablet (8 mg total) by mouth every 8 (eight) hours as needed for Nausea. 30 tablet 3 More than a month     Current Facility-Administered Medications Ordered in Epic   Medication Dose Route Frequency Provider Last Rate Last Admin    scopolamine (Transderm-Scop) 1 MG/3DAYS patch 1 patch  1 patch Transdermal Q72H Kiersten Lawrence MD        lidocaine-prilocaine (Emla) 2.5-2.5 % cream   Topical Once Halie Calle MD         No current [facility]-ordered outpatient medications on file.       No Known Allergies    Family History   Problem Relation Age of Onset    Other (Parkinson's Disease) Father     Prostate Cancer Maternal Grandfather 70    Pancreatic Cancer Paternal Grandmother 80    Pancreatic Cancer Maternal Uncle 60    Cancer Maternal Great-Grandmother         gastric ca    Cancer Paternal Aunt 80        breast cancer    Pancreatic Cancer Paternal Great-Grandfather      Social History     Socioeconomic History    Marital status:    Tobacco Use    Smoking status: Never    Smokeless tobacco: Never   Vaping Use    Vaping status: Never Used   Substance and  Sexual Activity    Alcohol use: Not Currently     Comment: 2 -3 drinks per week    Drug use: No       Available pre-op labs reviewed.  Lab Results   Component Value Date    WBC 5.9 04/16/2024    RBC 3.71 (L) 04/16/2024    HGB 11.2 (L) 04/16/2024    HCT 32.7 (L) 04/16/2024    MCV 88.1 04/16/2024    MCH 30.2 04/16/2024    MCHC 34.3 04/16/2024    RDW 11.8 04/16/2024    .0 04/16/2024    URINEPREG Negative 04/29/2024     Lab Results   Component Value Date     04/10/2024    K 3.8 04/10/2024     04/10/2024    CO2 25.0 04/10/2024    BUN 20 04/10/2024    CREATSERUM 0.97 04/10/2024    GLU 83 04/10/2024    CA 9.7 04/10/2024          Vital Signs:  Body mass index is 25.97 kg/m².   height is 1.676 m (5' 6\") and weight is 73 kg (160 lb 14.4 oz). Her oral temperature is 98.2 °F (36.8 °C). Her blood pressure is 128/77 and her pulse is 87. Her respiration is 18 and oxygen saturation is 100%.   Vitals:    04/25/24 1512 04/29/24 1016   BP:  128/77   Pulse:  87   Resp:  18   Temp:  98.2 °F (36.8 °C)   TempSrc:  Oral   SpO2:  100%   Weight: 72.6 kg (160 lb) 73 kg (160 lb 14.4 oz)   Height: 1.651 m (5' 5\") 1.676 m (5' 6\")        Anesthesia Evaluation     Patient summary reviewed and Nursing notes reviewed    No history of anesthetic complications   Airway   Mallampati: II  TM distance: >3 FB  Neck ROM: full  Dental - Dentition appears grossly intact     Pulmonary - negative ROS and normal exam   Cardiovascular - normal exam  Exercise tolerance: good  (+) hypertension    ECG reviewed  ROS comment: + incomplete right bundle branch block on EKG  + had some palpitations with chemo, none in the last two weeks    Neuro/Psych - negative ROS     GI/Hepatic/Renal    (+) chronic renal disease (congenital solitary kidney. No history of dialysis)    Endo/Other - negative ROS   Abdominal  - normal exam     Other findings: + chemo port left chest             Anesthesia Plan:   ASA:  2  Plan:   General and regional  Monitors and  Lines:   BIS and Additonal IV  Airway:  ETT  Post-op Pain Management: IV analgesics  Plan Comments: Urine pregnancy test negative today.   PONV prophylaxis with Scopolamine patch, Decadron and Ondansetron.   Will perform bilateral PEC1 blocks and right-sided PEC2 blocks (will be having right axillary lymph node dissection). Paralytic ok for first part of case per surgeon.   Informed Consent Plan and Risks Discussed With:  Patient  Use of Blood Products Discussed With:  Patient  Blood Product Use Consented    Discussed plan with:  Surgeon      I have informed Jocelyn KOMAL Garza and/or legal guardian or family member of the nature of the anesthetic plan, benefits, risks including possible dental damage if relevant, major complications, and any alternative forms of anesthetic management.   All of the patient's questions were answered to the best of my ability. The patient desires the anesthetic management as planned.  Kiersten Lawrence MD  4/29/2024 1:15 PM  Present on Admission:  **None**

## 2024-04-29 NOTE — H&P
History of Present Illness:   Ms. Jocelyn Garza is a 38 year old woman who presents with subjective mass of the right breast.  The patient first noticed this in 2023.  She was diagnosed with lobular carcinoma with spread to the right axillary lymph nodes.  She has started neoadjuvant chemotherapy under the direction of Dr. Cerrato.  She has a family history of breast cancer and tested negative for genetic mutation with a variant seen in 1 GILLES D gene.  She has no personal prior history of breast disease or biopsies.  She denies any nipple discharge, skin changes or other concerns.  Her MRI demonstrated the enhancement from the area to span more than 9 cm.  Given extent of disease the patient underwent neoadjuvant chemotherapy she has completed this without sequelae.  She had posttreatment MRI on 2024 that demonstrated interval decrease in the size 2 was 7.3 cm with interval decrease in size of concern of the appearance of the right axillary lymph nodes.   She is here today for evaluation and recommendations for further therapy.        Past Medical History        Past Medical History:   Diagnosis Date    Breast cancer in female (HCC) 10/18/2023     Right breast lymph node cancer    Hypertension      Solitary kidney, congenital              Past Surgical History         Past Surgical History:   Procedure Laterality Date    APPENDECTOMY   2007    NEEDLE BIOPSY RIGHT Right 10/09/2023    TREAT ECTOPIC PREG,RMV TUBE/OVARY Left 2014    TREAT ECTOPIC PREG,RMV TUBE/OVARY Left 2015            Gynecological History:  Pt is a   She achieved menarche at age 10 and LMP 2023, currently undergoing chemotherapy  She denies any history of hormone replacement therapy   She has history of oral contraceptive use for 13 years, last in 18 years ago.  She has recieved infertility treatment to achieve pregnancy.     Medications:    Medications Ordered Today    lidocaine-prilocaine 2.5-2.5 %  External Cream Apply to site 1 hour prior to port a cath needle insertion 1 each 1    lisinopril 5 MG Oral Tab Take 1 tablet (5 mg total) by mouth daily. 30 tablet 5    cefuroxime 500 MG Oral Tab Take 1 tablet (500 mg total) by mouth 2 (two) times daily. For 3 days        prochlorperazine (COMPAZINE) 10 mg tablet Take 1 tablet (10 mg total) by mouth every 8 (eight) hours as needed for Nausea. 30 tablet 3    ondansetron (ZOFRAN) 8 MG tablet Take 1 tablet (8 mg total) by mouth every 8 (eight) hours as needed for Nausea. 30 tablet 3            Allergies:    Allergies   No Known Allergies        Family History:   Family History         Family History   Problem Relation Age of Onset    Other (Parkinson's Disease) Father      Prostate Cancer Maternal Grandfather 70    Pancreatic Cancer Paternal Grandmother 80    Pancreatic Cancer Maternal Uncle 60    Cancer Maternal Great-Grandmother           gastric ca    Cancer Paternal Aunt 80         breast cancer    Pancreatic Cancer Paternal Great-Grandfather              She does not know if she is of Ashkenazi Rastafari ancestry.     Social History:        History   Alcohol Use Not Currently       Comment: 2 -3 drinks per week              History   Smoking Status    Never   Smokeless Tobacco    Never      Ms. Jocelyn Garza is  with 2 adopted children. She has 1 siblings. She is currently On Medical Leave     Review of Systems:  General:   The patient denies, fever, chills, +night sweats, +fatigue, +generalized weakness, change in appetite or weight loss.     HEENT:     The patient denies eye irritation, cataracts, redness, glaucoma, yellowing of the eyes, change in vision, color blindness, or wearing contacts/glasses. The patient denies hearing loss, ringing in the ears, ear drainage, earaches, +nasal congestion, nose bleeds, snoring, pain in mouth/throat, hoarseness, change in voice, facial trauma.     Respiratory:  The patient denies chronic cough, +phlegm,  hemoptysis, pleurisy/chest pain, pneumonia, asthma, wheezing, difficulty in breathing with exertion, emphysema, chronic bronchitis, +shortness of breath or abnormal sound when breathing.      Cardiovascular:  There is no history of chest pain, chest pressure/discomfort, +palpitations, irregular heartbeat, fainting or near-fainting, difficulty breathing when lying flat, SOB/Coughing at night, swelling of the legs or chest pain while walking.     Breasts:  See history of present illness     Gastrointestinal:     There is no history of difficulty or pain with swallowing, reflux symptoms, vomiting, dark or bloody stools, constipation, yellowing of the skin, indigestion, nausea, change in bowel habits, diarrhea, abdominal pain or vomiting blood.      Genitourinary:  The patient denies frequent urination, needing to get up at night to urinate, urinary hesitancy or retaining urine, painful urination, urinary incontinence, decreased urine stream, blood in the urine or vaginal/penile discharge.     Skin:    The patient denies rash, itching, skin lesions, dry skin, change in skin color or change in moles.      Hematologic/Lymphatic:  The patient denies easily bruising or bleeding or persistent swollen glands or lymph nodes.      Musculoskeletal:  The patient denies muscle aches/pain, joint pain, +stiff joints, neck pain, back pain or bone pain.     Neuropsychiatric:  There is no history of migraines or severe headaches, seizure/epilepsy, speech problems, coordination problems, trembling/tremors, fainting/black outs, dizziness, memory problems, loss of sensation/numbness, problems walking, weakness, tingling or burning in hands/feet. There is no history of abusive relationship, bipolar disorder, sleep disturbance, anxiety, depression or feeling of despair.     Endocrine:    There is no history of poor/slow wound healing, weight loss/gain, +fertility or hormone problems, cold intolerance, thyroid disease.       Allergic/Immunologic:  There is no history of hives, hay fever, angioedema or anaphylaxis.     /84 (BP Location: Right arm, Patient Position: Sitting, Cuff Size: adult)   Pulse 96   Temp 98.1 °F (36.7 °C) (Temporal)   Resp 17   Ht 1.651 m (5' 5\")   Wt 74.9 kg (165 lb 3.2 oz)   LMP 11/21/2023 (Approximate)   SpO2 96%   BMI 27.49 kg/m²      Physical Exam:  The patient is an alert, oriented, well-nourished and  well-developed woman who appears her stated age. Her speech patterns and movements are normal. Her affect is appropriate.     HEENT: The head is normocephalic. The neck is supple. The thyroid is not enlarged and is without palpable masses/nodules. There are no palpable masses. The trachea is in the midline. Conjunctiva are clear, non-icteric.     Chest: The chest expands symmetrically. The lungs are clear to auscultation.     Heart: The rhythm is regular.  There are no murmurs, rubs, gallops or thrills.     Breasts:  Her breasts are symmetrical with a cup size 36D.  Right breast: The skin, nipple ,and areola appear normal. There is no skin dimpling with movement of the pectoralis. There is no nipple retraction. No nipple discharge can be elicited. The parenchyma is mildly nodular. There is no residual palpable disease in the breast or lymph node.  Left breast:   The skin, nipple, and areola appear normal. There is no skin dimpling with movement of the pectoralis. There is no nipple retraction. No nipple discharge can be elicited. The parenchyma is mildly nodular. There are no dominant masses in the breast. The axillary tail is normal.     Abdomen:  The abdomen is soft, flat and non tender. The liver is not enlarged. There are no palpable masses.     Lymph Nodes:  The supraclavicular, axillary and cervical regions are free of significant lymphadenopathy.     Back: There is no vertebral column tenderness.     Skin: The skin appears normal. There are no suspicious appearing rashes or lesions.      Extremities: The extremities are without deformity, cyanosis or edema.     Impression:   Ms. Jocelyn Garza is a 38 year old woman presents with right breast cancer status post neoadjuvant chemotherapy.  Clinical stage T3 N1a     Discussion and Plan:  I had a discussion with the Patient regarding her breast exam. On exam today I found no residual palpable mass in the breast and no residual palpable axillary adenopathy.  I personally reviewed the recent imaging and we discussed this at length.  The patient had a great clinical response to the findings.  She still has residual disease encompassing multiple quadrants of the breast although overall her lymph nodes have also improved significantly.    The natural history and evolution of breast cancer were discussed with Ms. Jocelyn Garza and her  family, including the difference between in-situ and invasive carcinoma, and the distinction  between local and systemic disease and local and systemic therapy. For local treatment options,  I explained the risks and benefits of breast conservation and mastectomy (with or without  reconstruction), including the fact that survival rates are equal with these two approaches.  If breast conservation is elected, I explained the need for free margins, the possibility of re-  excision to achieve free margins, and the need for post-operative radiotherapy. The approach  to komal staging was also described, including the technique, risks and benefits of sentinel node  biopsy, the possible need for axillary dissection, and the long-term sequelae of this procedure.  With regard to systemic therapy, she has completed her neoadjuvant chemotherapy and will follow with medical oncology postoperatively for further systemic recommendations.  Given initial extent of disease she will qualify for right chest wall radiation therapy.  She met with plastic surgery and has declined reconstruction.  Her plan for bilateral simple mastectomies  with right ELÍAS  localized excision of previously positive right axillary lymph node, right sentinel lymph node biopsy and possible right axillary lymph node dissection.   The risks and possible complications of the procedure were explained to the patient and her family and she understood and agreed to the proposed plan. She was given ample opportunity for questions and those questions were answered to her satisfaction. She has been  encouraged to contact the office with any questions or concerns prior to her next appointment.     Pre-op Diagnosis: Malignant neoplasm of upper-outer quadrant of right breast in female, estrogen receptor positive (HCC) [C50.411, Z17.0]    The above referenced H&P was reviewed by Janet Mae MD on 4/29/2024, the patient was examined and no significant changes have occurred in the patient's condition since the H&P was performed.  I discussed with the patient and/or legal representative the potential benefits, risks and side effects of this procedure; the likelihood of the patient achieving goals; and potential problems that might occur during recuperation.  I discussed reasonable alternatives to the procedure, including risks, benefits and side effects related to the alternatives and risks related to not receiving this procedure.  We will proceed with procedure as planned.

## 2024-04-29 NOTE — ANESTHESIA POSTPROCEDURE EVALUATION
Patient: Jocelyn Garza    Procedure Summary       Date: 04/29/24 Room / Location: [facility] MAIN OR 08 / [facility] MAIN OR    Anesthesia Start: 1404 Anesthesia Stop:     Procedures:       Bilateral breast simple mastectomies, Allyson  localized excision of right axillary lymph node previously positive for metastatic carcinoma, right breast lymphoscintigraphy, right sentinel lymph node biopsy, right axillary lymph node dissection (Bilateral: Breast)      AXILLARY NODE DISSECTION (Bilateral)      BREAST SENTINEL LYMPH NODE BIOPSY (Right: Breast) Diagnosis:       Malignant neoplasm of upper-outer quadrant of right breast in female, estrogen receptor positive (HCC)      (Malignant neoplasm of upper-outer quadrant of right breast in female, estrogen receptor positive (HCC) [C50.411, Z17.0])    Surgeons: Janet Mae MD Anesthesiologist: Uyen Jorge MD    Anesthesia Type: general, regional ASA Status: 2            Anesthesia Type: general, regional    Vitals Value Taken Time   /77 04/29/24 1650   Temp 98 °F (36.7 °C) 04/29/24 1650   Pulse 99 04/29/24 1650   Resp 21 04/29/24 1650   SpO2 100 % 04/29/24 1650       EM AN Post Evaluation:   Patient Evaluated in PACU  Patient Participation: complete - patient participated  Level of Consciousness: sleepy but conscious  Pain Score: 0  Pain Management: adequate  Airway Patency:patent  Dental exam unchanged from preop  Yes    Cardiovascular Status: acceptable  Respiratory Status: acceptable  Postoperative Hydration acceptable      Jeremiah Velazco CRNA  4/29/2024 4:50 PM

## 2024-04-29 NOTE — BRIEF OP NOTE
Pre-Operative Diagnosis: Malignant neoplasm of upper-outer quadrant of right breast in female, estrogen receptor positive (HCC) [C50.411, Z17.0]     Post-Operative Diagnosis: Malignant neoplasm of upper-outer quadrant of right breast in female, estrogen receptor positive (HCC) [C50.411, Z17.0]      Procedure Performed:   Bilateral breast simple mastectomies, Allyson  localized excision of right axillary lymph node previously positive for metastatic carcinoma, right breast lymphoscintigraphy, right sentinel lymph node biopsy, right axillary lymph node dissection    Surgeons and Role:     * Janet Mae MD - Primary    Assistant(s):  Surgical Assistant.: Chrissy Sainz     Surgical Findings: Clip noted on xray, SLN positive on frozen section for carcinoma     Specimen: Right sentinel lymph node x 1, bilateral breast, right axillary contents     Estimated Blood Loss: Blood Output: 75 mL (4/29/2024  4:29 PM)      Janet Mae MD  4/29/2024  4:43 PM

## 2024-04-29 NOTE — ANESTHESIA PROCEDURE NOTES
Peripheral Block    Date/Time: 4/29/2024 2:20 PM    Performed by: Kiersten Lawrence MD  Authorized by: Kiersten Lawrence MD      General Information and Staff    Start Time:  4/29/2024 2:20 PM  End Time:  4/29/2024 2:22 PM  Anesthesiologist:  Kiersten Lawrence MD  Performed by:  Anesthesiologist  Patient Location:  OR    Block Placement: Post Induction  Site Identification: real time ultrasound guided, surface landmarks and image stored and retrievable    Block site/laterality marked before start: site marked  Reason for Block: at surgeon's request and post-op pain management    Preanesthetic Checklist: 2 patient identifers, IV checked, site marked, risks and benefits discussed, monitors and equipment checked, pre-op evaluation, timeout performed, anesthesia consent, sterile technique used, no prohibitive neurological deficits and no local skin infection at insertion site      Procedure Details    Patient Position:  Supine  Prep: ChloraPrep and patient draped    Monitoring:  Heart rate, continuous pulse ox and blood pressure cuff  Block Type:  PEC1  Laterality:  Right  Injection Technique:  Single-shot    Needle    Needle Type:  Short-bevel  Needle Gauge:  20 G  Needle Length:  25 mm  Needle Localization:  Anatomical landmarks and ultrasound guidance  Reason for Ultrasound Use: appropriate spread of the medication was noted in real time and no ultrasound evidence of intravascular and/or intraneural injection            Assessment    Injection Assessment:  Good spread noted, incremental injection, local visualized surrounding nerve on ultrasound, low pressure, negative aspiration for heme and negative resistance  Paresthesia Pain:  None  Heart Rate Change: No    - Patient tolerated block procedure well without evidence of immediate block related complications.     Medications  4/29/2024 2:20 PM  dexamethasone (DECADRON) PF injection 10 mg/ml - Injection   3 mg - 4/29/2024 2:20:00 PM  ropivacaine (NAROPIN) injection  0.5% - Infiltration   15 mL - 4/29/2024 2:20:00 PM    Additional Comments

## 2024-04-29 NOTE — ANESTHESIA PROCEDURE NOTES
Peripheral Block    Date/Time: 4/29/2024 2:15 PM    Performed by: Kiersten Lawrence MD  Authorized by: Kiersten Lawrence MD      General Information and Staff    Start Time:  4/29/2024 2:15 PM  End Time:  4/29/2024 2:20 PM  Anesthesiologist:  Kiersten Lawrence MD  Performed by:  Anesthesiologist  Patient Location:  OR    Block Placement: Post Induction  Site Identification: real time ultrasound guided, surface landmarks and image stored and retrievable    Block site/laterality marked before start: site marked  Reason for Block: at surgeon's request and post-op pain management    Preanesthetic Checklist: 2 patient identifers, IV checked, site marked, risks and benefits discussed, monitors and equipment checked, pre-op evaluation, timeout performed, anesthesia consent, sterile technique used, no prohibitive neurological deficits and no local skin infection at insertion site      Procedure Details    Patient Position:   Prep: ChloraPrep and patient draped    Monitoring:  Heart rate, continuous pulse ox and blood pressure cuff  Block Type:  PEC1  Laterality:  Left  Injection Technique:  Single-shot    Needle    Needle Type:  Short-bevel  Needle Gauge:  20 G  Needle Length:  25 mm  Needle Localization:  Anatomical landmarks and ultrasound guidance  Reason for Ultrasound Use: appropriate spread of the medication was noted in real time and no ultrasound evidence of intravascular and/or intraneural injection            Assessment    Injection Assessment:  Good spread noted, incremental injection, negative resistance, no pain on injection, local visualized surrounding nerve on ultrasound, low pressure and negative aspiration for heme  Paresthesia Pain:  None  Heart Rate Change: No    - Patient tolerated block procedure well without evidence of immediate block related complications.     Medications  4/29/2024 2:15 PM  ropivacaine (NAROPIN) injection 0.5% - Infiltration   15 mL - 4/29/2024 2:15:00 PM  dexamethasone (DECADRON)  PF injection 10 mg/ml - Injection   3 mg - 4/29/2024 2:15:00 PM    Additional Comments    Placed lateral to left chest port

## 2024-04-29 NOTE — ANESTHESIA PROCEDURE NOTES
Peripheral Block    Date/Time: 4/29/2024 2:22 PM    Performed by: Kiersten Lawrence MD  Authorized by: Kiersten Lawrence MD      General Information and Staff    Start Time:  4/29/2024 2:22 PM  End Time:  4/29/2024 2:25 PM  Anesthesiologist:  Kiersten Lawrence MD  Performed by:  Anesthesiologist  Patient Location:  OR    Block Placement: Post Induction  Site Identification: real time ultrasound guided, surface landmarks and image stored and retrievable    Block site/laterality marked before start: site marked  Reason for Block: at surgeon's request and post-op pain management    Preanesthetic Checklist: 2 patient identifers, IV checked, site marked, risks and benefits discussed, monitors and equipment checked, pre-op evaluation, timeout performed, anesthesia consent, sterile technique used, no prohibitive neurological deficits and no local skin infection at insertion site      Procedure Details    Patient Position:  Supine  Prep: ChloraPrep and patient draped    Monitoring:  Heart rate, continuous pulse ox and blood pressure cuff  Block Type:  PEC2  Laterality:  Right  Injection Technique:  Single-shot    Needle    Needle Type:  Short-bevel  Needle Gauge:  20 G  Needle Length:  25 mm  Needle Localization:  Anatomical landmarks and ultrasound guidance  Reason for Ultrasound Use: appropriate spread of the medication was noted in real time and no ultrasound evidence of intravascular and/or intraneural injection            Assessment    Injection Assessment:  Good spread noted, incremental injection, local visualized surrounding nerve on ultrasound, low pressure, negative aspiration for heme and negative resistance  Paresthesia Pain:  None  Heart Rate Change: No    - Patient tolerated block procedure well without evidence of immediate block related complications.     Medications  4/29/2024 2:22 PM  ropivacaine (NAROPIN) injection 0.5% - Infiltration   15 mL - 4/29/2024 2:22:00 PM  dexamethasone (DECADRON) PF injection  10 mg/ml - Injection   4 mg - 4/29/2024 2:22:00 PM    Additional Comments

## 2024-04-30 VITALS
WEIGHT: 160.88 LBS | HEART RATE: 97 BPM | HEIGHT: 66 IN | DIASTOLIC BLOOD PRESSURE: 83 MMHG | OXYGEN SATURATION: 98 % | BODY MASS INDEX: 25.85 KG/M2 | RESPIRATION RATE: 18 BRPM | SYSTOLIC BLOOD PRESSURE: 133 MMHG | TEMPERATURE: 98 F

## 2024-04-30 NOTE — PLAN OF CARE
Ms. Banks arrived from PACU via cart last night with her  and her Mom following her.  On arrival to the unit, she was able to ambulate from the cart to the washroom with minimal assist. Her main complaint was nausea.    Over night, Ms. Banks was able to rest. The call light was kept at hand for safety.    Problem: Patient Centered Care  Goal: Patient preferences are identified and integrated in the patient's plan of care  Description: Interventions:  - What would you like us to know as we care for you? I live with my spouse and my Mom lives near by  - Provide timely, complete, and accurate information to patient/family  - Incorporate patient and family knowledge, values, beliefs, and cultural backgrounds into the planning and delivery of care  - Encourage patient/family to participate in care and decision-making at the level they choose  - Honor patient and family perspectives and choices  Outcome: Progressing     Problem: Patient/Family Goals  Goal: Patient/Family Long Term Goal  Description: Patient's Long Term Goal: To heal from this surgery    Interventions:  - maintain clean environment, follow provider instructions  - See additional Care Plan goals for specific interventions  Outcome: Progressing  Goal: Patient/Family Short Term Goal  Description: Patient's Short Term Goal: no more nausea    Interventions:   - antiemetics as needed, small sips of water and Sprite.  - See additional Care Plan goals for specific interventions  Outcome: Progressing     Problem: PAIN - ADULT  Goal: Verbalizes/displays adequate comfort level or patient's stated pain goal  Description: INTERVENTIONS:  - Encourage pt to monitor pain and request assistance  - Assess pain using appropriate pain scale  - Administer analgesics based on type and severity of pain and evaluate response  - Implement non-pharmacological measures as appropriate and evaluate response  - Consider cultural and social influences on pain and pain  management  - Manage/alleviate anxiety  - Utilize distraction and/or relaxation techniques  - Monitor for opioid side effects  - Notify MD/LIP if interventions unsuccessful or patient reports new pain  - Anticipate increased pain with activity and pre-medicate as appropriate  Outcome: Progressing     Problem: SAFETY ADULT - FALL  Goal: Free from fall injury  Description: INTERVENTIONS:  - Assess pt frequently for physical needs  - Identify cognitive and physical deficits and behaviors that affect risk of falls.  - [place] fall precautions as indicated by assessment.  - Educate pt/family on patient safety including physical limitations  - Instruct pt to call for assistance with activity based on assessment  - Modify environment to reduce risk of injury  - Provide assistive devices as appropriate  - Consider OT/PT consult to assist with strengthening/mobility  - Encourage toileting schedule  Outcome: Progressing     Problem: DISCHARGE PLANNING  Goal: Discharge to home or other facility with appropriate resources  Description: INTERVENTIONS:  - Identify barriers to discharge w/pt and caregiver  - Include patient/family/discharge partner in discharge planning  - Arrange for needed discharge resources and transportation as appropriate  - Identify discharge learning needs (meds, wound care, etc)  - Arrange for interpreters to assist at discharge as needed  - Consider post-discharge preferences of patient/family/discharge partner  - Complete POLST form as appropriate  - Assess patient's ability to be responsible for managing their own health  - Refer to Case Management Department for coordinating discharge planning if the patient needs post-hospital services based on physician/LIP order or complex needs related to functional status, cognitive ability or social support system  Outcome: Progressing

## 2024-04-30 NOTE — PLAN OF CARE
Pt a/o x 4, vss. Pt c/o discomfort to chest, HANSEL drains x 4 in place. Discussed POC, offered emotional support. Discussed discharge instructions, HANSEL drain care, pt and  verbalized understanding of drain care. IV removed and pt discharged home.   Problem: Patient Centered Care  Goal: Patient preferences are identified and integrated in the patient's plan of care  Description: Interventions:  - What would you like us to know as we care for you? I live with my spouse and my Mom lives near by  - Provide timely, complete, and accurate information to patient/family  - Incorporate patient and family knowledge, values, beliefs, and cultural backgrounds into the planning and delivery of care  - Encourage patient/family to participate in care and decision-making at the level they choose  - Honor patient and family perspectives and choices  Outcome: Adequate for Discharge     Problem: Patient/Family Goals  Goal: Patient/Family Long Term Goal  Description: Patient's Long Term Goal: To heal from this surgery    Interventions:  - maintain clean environment, follow provider instructions  - See additional Care Plan goals for specific interventions  Outcome: Adequate for Discharge  Goal: Patient/Family Short Term Goal  Description: Patient's Short Term Goal: no more nausea    Interventions:   - antiemetics as needed, small sips of water and Sprite.  - See additional Care Plan goals for specific interventions  Outcome: Adequate for Discharge     Problem: PAIN - ADULT  Goal: Verbalizes/displays adequate comfort level or patient's stated pain goal  Description: INTERVENTIONS:  - Encourage pt to monitor pain and request assistance  - Assess pain using appropriate pain scale  - Administer analgesics based on type and severity of pain and evaluate response  - Implement non-pharmacological measures as appropriate and evaluate response  - Consider cultural and social influences on pain and pain management  - Manage/alleviate  anxiety  - Utilize distraction and/or relaxation techniques  - Monitor for opioid side effects  - Notify MD/LIP if interventions unsuccessful or patient reports new pain  - Anticipate increased pain with activity and pre-medicate as appropriate  Outcome: Adequate for Discharge     Problem: SAFETY ADULT - FALL  Goal: Free from fall injury  Description: INTERVENTIONS:  - Assess pt frequently for physical needs  - Identify cognitive and physical deficits and behaviors that affect risk of falls.  - [place] fall precautions as indicated by assessment.  - Educate pt/family on patient safety including physical limitations  - Instruct pt to call for assistance with activity based on assessment  - Modify environment to reduce risk of injury  - Provide assistive devices as appropriate  - Consider OT/PT consult to assist with strengthening/mobility  - Encourage toileting schedule  Outcome: Adequate for Discharge     Problem: DISCHARGE PLANNING  Goal: Discharge to home or other facility with appropriate resources  Description: INTERVENTIONS:  - Identify barriers to discharge w/pt and caregiver  - Include patient/family/discharge partner in discharge planning  - Arrange for needed discharge resources and transportation as appropriate  - Identify discharge learning needs (meds, wound care, etc)  - Arrange for interpreters to assist at discharge as needed  - Consider post-discharge preferences of patient/family/discharge partner  - Complete POLST form as appropriate  - Assess patient's ability to be responsible for managing their own health  - Refer to Case Management Department for coordinating discharge planning if the patient needs post-hospital services based on physician/LIP order or complex needs related to functional status, cognitive ability or social support system  Outcome: Adequate for Discharge

## 2024-04-30 NOTE — OPERATIVE REPORT
[facility]    PATIENT'S NAME: BRIGITTE CRONIN   ATTENDING PHYSICIAN: Janet Mae MD   OPERATING PHYSICIAN: Janet Mae MD   PATIENT ACCOUNT#:   [id number]    LOCATION:  [address] [place] [address]  MEDICAL RECORD #:   [id number]       YOB: 1985  ADMISSION DATE:       04/29/2024      OPERATION DATE:  04/29/2024    OPERATIVE REPORT      PREOPERATIVE DIAGNOSIS:  Right breast cancer.  POSTOPERATIVE DIAGNOSIS:  Right breast cancer.  PROCEDURE:  Bilateral simple mastectomies with the ELÍAS  localized excision of previously positive right axillary lymph node, right breast injection of blue dye for sentinel lymph node identification, right sentinel lymph node biopsy, and completion right axillary lymph node dissection.    ASSISTANT:  Chrissy Sainz CSA.    ANESTHESIA:  General anesthesia and bilateral pectoral nerve blocks placed per Anesthesia.    ESTIMATED BLOOD LOSS:  75 mL.    DRAINS:  Two surgical drains placed to each chest wall.    COMPLICATIONS:  No immediate complications.    DISPOSITION:  Stable on transfer to recovery room.    INDICATIONS:  The patient is a 38-year-old female who presents with a subjective mass of the right breast.  She was found to have a positive breast cancer spanning more than 9 cm, underwent neoadjuvant chemotherapy.  She has had a good clinical and radiological response.  Given initial extent of disease, mastectomy was recommended.  She was not interested in reconstruction.  She is electing for bilateral mastectomies for maximal risk reduction.  We discussed the approach to komal staging including excision of the previously positive lymph node with intraoperative frozen section analysis of the infected lymph nodes and possible axillary dissection.  Risks and possible complications including, but not limited to, infection, bleeding, injury to surrounding structures, possible need for reoperation were discussed with the patient.  The approach to komal  staging was also described including technique of sentinel biopsy, possible need for axillary dissection, possible long-term sequelae of the procedure, and she agreed to proceed.    OPERATIVE TECHNIQUE:  Patient was brought to the nuclear medicine department where she underwent injection of radioisotope as well as lymphoscintigraphy for sentinel lymph node identification preoperatively in her right breast.  She was brought to the OR, placed in supine position, properly padded and secured, given a dose of IV antibiotics, and sequential compression devices were applied to her legs for DVT prophylaxis.  General anesthesia induced.  Bilateral pectoral nerve blocks were placed per Anesthesia.  A diluted methylene blue dye was injected in a periareolar location to the right breast, massaged approximately 5 minutes.  The bilateral breasts and arms were then prepped and draped in usual sterile fashion.  A mastectomy incision was marked.  The superolateral aspect of this was incised with a 15 blade knife after confirming uptake with both the hand-held gamma counter as well as the ELÍAS  probe.  Using sharp dissection and electrocautery, I dissected through the clavipectoral fascia to the deep axilla.  The node containing the highest gamma counter was the same node as the one containing the .  This was therefore excised, placed in the specimen radiogram device that identified the presence of the targeted clip and the lymph node.  This was sent for frozen section.  The results of which were positive for the presence of metastatic disease, and therefore, we completed an axillary lymph node dissection.  The borders of the dissection were defined as the axillary vein, latissimus tendon, and chest wall.  Blunt and sharp dissection were used to evacuate the right axillary contents.  These were sent for permanent pathologic evaluation, labeled as right axillary contents.  Her long thoracic and thoracodorsal nerves and  branches of the intercostal brachial nerve were confirmed to be intact at the conclusion of this portion of procedure.  This wound was irrigated.  Hemostasis was assured with electrocautery and hemoclips.  The mastectomy incision was completed with a 15 blade knife for the skin.  Through this, mastectomy flaps were raised to the borders of the clavicle, sternum, inframammary fold, and latissimus tendon laterally.  The right breast was then dissected off the underlying muscle with electrocautery including an en bloc excision of the pectoralis fascia.  It was oriented with a stitch at the axillary tail and sent for routine analysis.  Wound was irrigated.  Hemostasis was assured with electrocautery and hemoclips.  Two surgical drains were placed, a #7 up toward the right axilla, a #10 underlying the chest wall defect.  Both were fully perforated flat Forrest-Daniel drains and secured laterally with a nylon suture, Biopatch and Tegaderms.  The wound was then closed with an interrupted 3-0 Vicryl for deep layer and a running 4-0 subcuticular Monocryl for skin and a sterile dressing was applied.  Attention was taken toward the contralateral left breast.  A symmetrical incision was incised with a 15 blade knife for the skin.  The mastectomy flaps were raised to the borders of the clavicle, sternum, inframammary fold, latissimus tendon laterally, and the left breast was then dissected off the underlying muscle with electrocautery including en bloc excision of pectoralis fascia.  It was oriented with a suture at the axillary tail and sent for routine analysis.  Resultant defect was irrigated with warm sterile saline.  Hemostasis was assured with electrocautery and hemoclips.  Two drains were placed underlying the defect, a #7 up toward the axilla and a #10 underlying the chest wall defect.  Both were fully perforated flat Forrest-Daniel drains and secured laterally with a nylon suture, Biopatch, and Tegaderm.  Closure of the  wound was accomplished with interrupted 3-0 Vicryl for deep layer and running 4-0 subcuticular Monocryl for skin.  Mastisol and Steri-Strips were applied.  Sterile dressing and compression bra were placed.  Blood loss was minimal.  All counts were correct at the conclusion of the procedure.  She tolerated the procedure well.  She was transferred to recovery room in stable condition.     Dictated By Janet Mae MD  d: 04/29/2024 16:51:19  t: 04/29/2024 23:50:37  [facility] [id number]  [facility] [place]/    cc: MD Halie Soto MD    Lampe Node Biopsy for Breast Cancer - Right  Operation performed with curative intent. Yes   Tracer(s) used to identify sentinel nodes in the upfront surgery (non-neoadjuvant) setting (select all that apply). N/A   Tracer(s) used to identify sentinel nodes in the neoadjuvant setting (select all that apply). Dye and Radioactive tracer   All nodes (colored or non-colored) present at the end of a dye-filled lymphatic channel were removed. Yes   All significantly radioactive nodes were removed. Yes   All palpably suspicious nodes were removed. Yes   Biopsy-proven positive nodes marked with clips prior to chemotherapy were identified and removed. Yes     Axillary Lymph Node Dissection for Breast Cancer - Right  Operation performed with curative intent. Yes   Resection was performed within the boundaries of the axillary vein, chest wall (serratus anterior), and latissimus dorsi. Yes   Nerves identified and preserved during dissection (select all that apply).   Long thoracic nerve, Thoracodorsal nerve, and Branches of the intercostobrachial nerves   Level III nodes were removed. No

## 2024-05-01 ENCOUNTER — PATIENT OUTREACH (OUTPATIENT)
Dept: CASE MANAGEMENT | Age: 39
End: 2024-05-01

## 2024-05-06 ENCOUNTER — OFFICE VISIT (OUTPATIENT)
Dept: SURGERY | Facility: CLINIC | Age: 39
End: 2024-05-06
Payer: COMMERCIAL

## 2024-05-06 DIAGNOSIS — Z17.0 MALIGNANT NEOPLASM OF UPPER-OUTER QUADRANT OF RIGHT BREAST IN FEMALE, ESTROGEN RECEPTOR POSITIVE (HCC): Primary | ICD-10-CM

## 2024-05-06 DIAGNOSIS — C50.411 MALIGNANT NEOPLASM OF UPPER-OUTER QUADRANT OF RIGHT BREAST IN FEMALE, ESTROGEN RECEPTOR POSITIVE (HCC): Primary | ICD-10-CM

## 2024-05-06 PROCEDURE — 99024 POSTOP FOLLOW-UP VISIT: CPT

## 2024-05-07 NOTE — PROGRESS NOTES
The patient presents today for HANSEL drain removal.  Left breast drain # 4 was removed due to low output.   Per pt's written record, the output has remained < 20 cc for two days consecutively.   Explained drain removal process to pt.  Sutures removed, drain decompressed & pulled.  no residual output post-removal.  Pt tolerated well  Any remaining drains were assessed and re-dressed.  Covered with sterile gauze and Tegaderm.  Pt instructed to keep covered and dry until scabbed over/healed.  All incision care and showering instructions reviewed as well as signs of infection and reasons to contact our office.   We discussed activity and lift restrictions of no greater than 10 pounds (if other drains are still in place) and no repetitive overhead motions.   The patient verbalized understanding and she will follow-up with Dr. Mae on 5/8/2024 at 11 AM.  Pt verbalized understanding.  All questions answered.  Pt encouraged to call office with any further questions or concerns.

## 2024-05-08 ENCOUNTER — OFFICE VISIT (OUTPATIENT)
Dept: SURGERY | Facility: CLINIC | Age: 39
End: 2024-05-08

## 2024-05-08 ENCOUNTER — OFFICE VISIT (OUTPATIENT)
Dept: HEMATOLOGY/ONCOLOGY | Facility: HOSPITAL | Age: 39
End: 2024-05-08
Attending: INTERNAL MEDICINE

## 2024-05-08 VITALS
TEMPERATURE: 98 F | BODY MASS INDEX: 26 KG/M2 | HEART RATE: 69 BPM | SYSTOLIC BLOOD PRESSURE: 131 MMHG | WEIGHT: 158.19 LBS | DIASTOLIC BLOOD PRESSURE: 82 MMHG | RESPIRATION RATE: 18 BRPM | OXYGEN SATURATION: 98 %

## 2024-05-08 VITALS
RESPIRATION RATE: 18 BRPM | HEART RATE: 69 BPM | WEIGHT: 158 LBS | HEIGHT: 65 IN | DIASTOLIC BLOOD PRESSURE: 82 MMHG | BODY MASS INDEX: 26.33 KG/M2 | OXYGEN SATURATION: 98 % | TEMPERATURE: 98 F | SYSTOLIC BLOOD PRESSURE: 131 MMHG

## 2024-05-08 DIAGNOSIS — Z17.0 MALIGNANT NEOPLASM OF UPPER-OUTER QUADRANT OF RIGHT BREAST IN FEMALE, ESTROGEN RECEPTOR POSITIVE (HCC): Primary | ICD-10-CM

## 2024-05-08 DIAGNOSIS — C50.411 MALIGNANT NEOPLASM OF UPPER-OUTER QUADRANT OF RIGHT BREAST IN FEMALE, ESTROGEN RECEPTOR POSITIVE (HCC): Primary | ICD-10-CM

## 2024-05-08 DIAGNOSIS — N91.2 AMENORRHEA: ICD-10-CM

## 2024-05-08 PROCEDURE — 99024 POSTOP FOLLOW-UP VISIT: CPT | Performed by: SURGERY

## 2024-05-08 PROCEDURE — 99215 OFFICE O/P EST HI 40 MIN: CPT | Performed by: INTERNAL MEDICINE

## 2024-05-08 PROCEDURE — 3075F SYST BP GE 130 - 139MM HG: CPT | Performed by: SURGERY

## 2024-05-08 PROCEDURE — 3079F DIAST BP 80-89 MM HG: CPT | Performed by: SURGERY

## 2024-05-08 NOTE — PATIENT INSTRUCTIONS
IN-FLIGHT FITNESS from [employer].    Don’t let cramped conditions put you at risk of DVT. Keep your body moving - even when traveling by airplane. Take proper precautions to reduce the risk of “Economy Class Syndrome.”     Seated Exercises:    Ankle Circles: Lift your feet off the floor and twirl your feet as if you’re drawing circles with your toes. Continue this for 15 seconds, then reverse direction. Repeat as desired.    Foot Pumps: Keep your heels on the floor and lift the front of your feet toward you as high as possible. Hold for a second or two, then flatten your feet and lift your heels as high as possible, keeping the balls of your feet on the floor. Continue for 30 seconds, and repeat as desired.    Knee Lifts: Keeping your leg bent, lift your knee up to your chest. Bring back to normal position and repeat with your other leg. Repeat 20 to 30 times for each leg.    Shoulder Roll: Lift your shoulders upward, then pull them backward, downward, and forward, creating a gentle circular motion. Continue for 30 seconds. Then reverse direction if desired.    Arm Curl: Start with arms on chair rests, bent at a 90-degree angle. Raise one hand up to your chest and back down. Alternate hands and continue for 30 seconds. Repeat as desired.    Seated Stretches:    Knee to Chest: With both hands clasped around your right knee, bend forward slightly and pull your knee to your chest. Hold the stretch for 15 seconds; then slowly let your knee down. Repeat the same stretch with your left knee. Perform 10 stretches for each leg.    Forward Flex: Keep both feet on the floor and slowly bend forward, reaching for your ankles. Hold the stretch for 15 seconds and slowly return to a normal seated position.    Overhead Stretch: Raise both hands straight up over your head. Use one hand to grab the wrist of the opposite hand and gently pull to one side. Hold the stretch for 15 seconds, and repeat with the other arm.    Shoulder  Stretch: Bring your right hand over your left shoulder. Then place your left hand behind your right elbow and gently pull your elbow toward your body. Hold the stretch for 15 seconds and  repeat with the other arm.    Neck Roll: Relax your neck and shoulders. Then drop your right ear to your right shoulder and gently roll your head forward and to the other side, holding each position about 5 seconds. Repeat 5 times.    General Tips:  Try to keep your feet elevated by using the leg rests at the highest elevation. Rest your feet on your carry-on luggage if necessary.  If you have an opportunity to move around the cabin, walk to the restroom and back.  Drink plenty of fluids, preferably water, to avoid dehydration.  Walk for 30 minutes before boarding the plane. Wear compression stockings.

## 2024-05-08 NOTE — PROGRESS NOTES
HPI   Jocelyn Garza is a 38 year old female for evaluation of   Encounter Diagnosis   Name Primary?    Malignant neoplasm of upper-outer quadrant of right breast in female, estrogen receptor positive (HCC) Yes       Patient completed course of neoadjuvant dose dense Adriamycin Cytoxan followed by weekly Taxol.  Last dose of treatment on 3/22/2024.    Patient then underwent bilateral mastectomies on 4/29/2024 with a right axillary lymph node dissection.    Patient here to discuss next steps in terms of management.    She is tearful, given the amount of residual disease  in her breast and lymph nodes.    LMP November of 2023.        ECOG PS  0    Review of Systems:   Review of Systems - Oncology  Deferred.      Current Outpatient Medications   Medication Sig Dispense Refill    HYDROcodone-acetaminophen 5-325 MG Oral Tab Take 1-2 tablets by mouth every 6 (six) hours as needed for Pain. 40 tablet 0    Mometasone Furoate 0.1 % External Cream Apply BID during radiation therapy. 45 g 3    lidocaine-prilocaine 2.5-2.5 % External Cream Apply to site 1 hour prior to port a cath needle insertion 1 each 1    lisinopril 5 MG Oral Tab Take 1 tablet (5 mg total) by mouth daily. 30 tablet 5    prochlorperazine (COMPAZINE) 10 mg tablet Take 1 tablet (10 mg total) by mouth every 8 (eight) hours as needed for Nausea. 30 tablet 3    ondansetron (ZOFRAN) 8 MG tablet Take 1 tablet (8 mg total) by mouth every 8 (eight) hours as needed for Nausea. 30 tablet 3     Allergies:   No Known Allergies    Past Medical History:    Breast cancer in female (HCC)    Right breast lymph node cancer    High blood pressure    Hx of motion sickness    Hypertension    Solitary kidney, congenital     Past Surgical History:   Procedure Laterality Date    Appendectomy  01/01/2007    Needle biopsy right Right 10/09/2023    Treat ectopic preg,rmv tube/ovary Left 01/01/2014    Treat ectopic preg,rmv tube/ovary Left 01/01/2015     Social History      Socioeconomic History    Marital status:    Tobacco Use    Smoking status: Never    Smokeless tobacco: Never   Vaping Use    Vaping status: Never Used   Substance and Sexual Activity    Alcohol use: Not Currently     Comment: 2 -3 drinks per week    Drug use: No     Social Determinants of Health     Food Insecurity: No Food Insecurity (4/30/2024)    Food Insecurity     Food Insecurity: Never true   Transportation Needs: No Transportation Needs (4/30/2024)    Transportation Needs     Lack of Transportation: No   Housing Stability: Low Risk  (4/30/2024)    Housing Stability     Housing Instability: No       Family History   Problem Relation Age of Onset    Other (Parkinson's Disease) Father     Prostate Cancer Maternal Grandfather 70    Pancreatic Cancer Paternal Grandmother 80    Pancreatic Cancer Maternal Uncle 60    Cancer Maternal Great-Grandmother         gastric ca    Cancer Paternal Aunt 80        breast cancer    Pancreatic Cancer Paternal Great-Grandfather          PHYSICAL EXAM:    /82 (BP Location: Left arm, Patient Position: Sitting, Cuff Size: adult)   Pulse 69   Temp 98.4 °F (36.9 °C) (Temporal)   Resp 18   Ht 1.651 m (5' 5\")   Wt 71.7 kg (158 lb)   LMP 11/21/2023 (Approximate)   SpO2 98%   BMI 26.29 kg/m²   Wt Readings from Last 6 Encounters:   05/08/24 71.7 kg (158 lb)   05/08/24 71.8 kg (158 lb 3.2 oz)   04/29/24 73 kg (160 lb 14.4 oz)   04/18/24 74.1 kg (163 lb 6.4 oz)   04/10/24 72.6 kg (160 lb)   04/03/24 74.9 kg (165 lb 3.2 oz)     Physical Exam  General: Patient is alert, not in acute distress.  HEENT: EOMs intact.  Anicteric.    Psych/Depression: nl          ASSESSMENT/PLAN:     1. Malignant neoplasm of upper-outer quadrant of right breast in female, estrogen receptor positive (HCC)         Cancer Staging   Malignant neoplasm of upper-outer quadrant of right breast in female, estrogen receptor positive (HCC)  Staging form: Breast, AJCC 8th Edition  - Clinical stage from  10/12/2023: Stage IIA (cT3, cN1(f), cM0, G2, ER+, KS+, HER2-) - Signed by Halie Calle MD on 10/20/2023  - Pathologic stage from 5/8/2024: ypT3, pN2a, cM0, G2, ER+, KS+, HER2- - Signed by Halie Calle MD on 5/8/2024    Patient has completed staging work-up.  Thus far no evidence of metastatic disease.  Findings in the liver evaluated with ultrasound, and the one side is a hemangioma, the other likely a cyst, but a question of metastatic disease and PET scan has been ordered.  I discussed with the patient that most likely this is going to be a cyst.    Discussed that she still has early stage disease, and that her staging has not changed despite the size of the primary tumor.    Given extensive disease in the breast and komal involvement, she will benefit from neoadjuvant chemotherapy, which may help with surgical outcomes by shrinking some of the primary tumor and of the lymph nodes.  I discussed with the patient that the primary tumor will not likely decrease in size significantly to make her a candidate for breast conservation.  I did discuss with the patient that she will need a mastectomy and likely axillary lymph node dissection.  Given the size of the primary tumor, she is a candidate for postmastectomy radiation therapy plus minus radiation to the komal basins pending on surgical management of the axilla.  I discussed with the patient that once she completes treatment with radiation, she will then be initiated on adjuvant endocrine therapy, given the size of the tumor and number of positive lymph nodes, she also will be a candidate for 2 years of endovascular in the initial 2 years of adjuvant endocrine therapy.  Discussed that adjuvant endocrine therapy total duration will be no less than 5 years but given her high risk features, likely up to 8 to 10 years.  We will also discuss options for ovarian function suppression after completion of chemotherapy.    Patient inquired regarding contralateral  prophylactic mastectomy.  I discussed with the patient that we should await results of her genetic testing.  If the genetic testing is positive for a deleterious mutation which will increase her lifetime second primary breast cancer, then she should proceed with prophylactic contralateral mastectomy.  However, if this is negative, discussed with the patient that there is no benefit for proceeding with contralateral mastectomy, as it will not change her disease-free or overall survival.    Discussed with the patient that while she is receiving neoadjuvant treatment, she can have the evaluation by plastic surgery so that when she completes her neoadjuvant course of therapy, she will be able to have her surgery coordinated with Dr. Hutson and a plastic surgeon    She completed course of neoadjuvant DD AC x 4 cycles followed by weekly Taxol x12 weeks    Status post bilateral mastectomy with right-sided axillary lymph node dissection on 4/29/2024.    Discussed with the patient that it is not uncommon for patients with ER/NY positive breast cancer that have neoadjuvant treatment not to have a robust response from therapy.  I did discuss that for her treatment, in terms of systemic disease, after completion of radiation therapy, we will proceed with ovarian function suppression, which can be initiated at this point, this will then be followed by adjuvant endocrine therapy with 2 years of Abemaciclib.  In addition, patient is also a candidate for the [place] 2 clinical trial which is comparing standard of care adjuvant endocrine therapy with Abemaciclib versus camizestrant which is an oral SERD.    Patient will follow-up after radiation to initiate above systemic therapy.    MDM high risk.    No orders of the defined types were placed in this encounter.      Results From Past 48 Hours:  No results found for this or any previous visit (from the past 48 hour(s)).        Imaging & Referrals:  None     Collected: 04/29/24 6423  Lab Status: Edited Result - FINALSpecimen: Tissue from [place] Lymph node; Breast tissue from Breast, right; Breast tissue from Breast, right; Breast tissue from Breast, leftUpdated: 05/03/24 0838 Case Report-- Surgical Pathology                                Case: [id number]                                  Authorizing Provider:  Janet Mae MD      Collected:           04/29/2024 02:52 PM          Ordering Location:     [facility]          Received:            04/29/2024 03:02 PM                                 Operating Room                                                               Pathologist:           Velvet Roca MD                                                                           Specimens:   A) - [place] Lymph node, 1. Right breast sentinel lymph node for frozen                            B) - Breast, right, 2. Right breast mastectomy w/ stitch @ axillary tail                            C) - Breast, right, 3. right breast axillary contents                                               D) - Breast, left, 4. left breast mastectomy stitch marks axillary tail                Addendum 1--   Repeat HER2/bell by Immunohistochemistry (Polymer based detection method) using the Consensus Panel recommendation for breast carcinoma performed at [facility] on Formalin Fixed Paraffin Embedded Tissue (Block B7):      HER-2/bell (Leica, monoclonal, clone [id number]) status: 1+ (Negative)      HER-2/bell:  0 (Negative):  No staining  1+ (Negative): Weak and incomplete membrane staining in more than 10% of tumor cells  2+ (Equivocal): Weak to moderate complete membranous staining in more than 10% of tumor cells  3+ (Positive): Strong complete staining in more than 10% of tumor cells     The tissue that has been submitted for tumor markers by manual quantitative and semi quantitative analysis was fixed in  10% neutral buffered formalin, following the recommended CAP guidelines time fixation (6-72 hours).     All antibodies are validated to document appropriate staining reactions.  All immunohistochemical positive controls are examined and show appropriate reactivity. Validation, performance, reporting,  and proficiency testing regarding the assay are in compliance with the published ASCO-CAP Guideline (2018).  Final Diagnosis:--      A. Right breast sentinel lymph node; dissection:   One lymph node, positive for metastatic carcinoma (1/1), 11 mm in greatest dimension.  Focal extranodal extension is identified.  AE1/AE3 immunohistochemical stain is positive.     B. Right breast; mastectomy:   Residual invasive lobular carcinoma with signet ring features and calcifications, status post neoadjuvant chemotherapy, 85 mm in greatest dimension, present at approximately less than 0.1 mm from the inked anterior/inferior surgical resection margin.  Lymphovascular and perineural invasion are identified identified.  Intraductal papilloma with sclerosis, 2 mm in greatest dimension.  Background breast parenchyma with fibroadenomatous and fibrocystic changes including simple cysts formation, calcifications and adenosis.  Skin with no significant pathological abnormalities.  Metallic biopsy clip, gross examination only.  Prior procedure site changes are identified.  Pathological stage classification (pTNM, AJCC 8th edition): ypT3 ypN2a     C. Right breast axillary contents; dissection:  Five lymph nodes with metastatic carcinoma (5/5).  AE1/AE3 immunohistochemical stain is positive.  Allyson  clip, gross examination only.     D. Left breast; mastectomy:   Focal atypical ductal hyperplasia, excised.  Background breast parenchyma with fibroadenomatous and fibrocystic changes including simple cysts formation with features of rupture, papillomatosis, sclerosing adenosis, apocrine metaplasia and calcifications.  Skin  with no significant pathological abnormalities.  No definite evidence of in situ or carcinoma, including inked surgical resection margins.     Embedded Images-- Final Diagnosis Comment--   Repeat HER2/bell by Immunohistochemistry on Formalin Fixed Paraffin Embedded Tissue (Block B7) will be reported in an addendum.     The immunohistochemical stains interpreted in this case demonstrate appropriate reactivity of positive controls.  The stains were performed on formalin-fixed, paraffin-embedded tissue sections with each antibody analysis being performed on a separate slide.     Synoptic Report-- INVASIVE CARCINOMA OF THE BREAST: Resection   INVASIVE CARCINOMA OF THE BREAST: RESECTION - All Specimens   8th Edition - Protocol posted: 12/13/2023     SPECIMEN     Procedure:    Total mastectomy      Specimen Laterality:    Right     TUMOR     Histologic Type:    Invasive lobular carcinoma with signet ring features      Histologic Grade (Xavier Histologic Score):           Glandular (Acinar) / Tubular Differentiation:    Score 3        Nuclear Pleomorphism:    Score 3        Mitotic Rate:    Score 1        Overall Grade:    Grade 2 (scores of 6 or 7)      Tumor Size:    Greatest dimension of largest invasive focus (Millimeters): 85 mm      Ductal Carcinoma In Situ (DCIS):    Not identified      Lymphatic and / or Vascular Invasion:    Present      Dermal Lymphatic and / or Vascular Invasion:    Not identified      Microcalcifications:    Present in invasive carcinoma      Microcalcifications:    Present in non-neoplastic tissue      Treatment Effect in the Breast:    No definite response to presurgical therapy in the invasive carcinoma      Treatment Effect in the Lymph Nodes:    No definite response to presurgical therapy in metastatic carcinoma     MARGINS     Margin Status for Invasive Carcinoma:    All margins negative for invasive carcinoma        Distance from Invasive Carcinoma to Closest Margin:    Less than 0.1  mm        Closest Margin(s) to Invasive Carcinoma:    Anterior        Closest Margin(s) to Invasive Carcinoma:    Inferior     REGIONAL LYMPH NODES      Regional Lymph Node Status:           :    Tumor present in regional lymph node(s)          Number of Lymph Nodes with Macrometastases:    5          Number of Lymph Nodes with Micrometastases:    0          Number of Lymph Nodes with Isolated Tumor Cells:    1          Size of Largest Karan Metastatic Deposit:    11 mm          Extranodal Extension:    Present, 2 mm or less        Total Number of Lymph Nodes Examined (sentinel and non-sentinel):    6        Number of [place] Nodes Examined:    1     pTNM CLASSIFICATION (AJCC 8th Edition)      Reporting of pT, pN, and (when applicable) pM categories is based on information available to the pathologist at the time the report is issued. As per the AJCC (Chapter 1, 8th Ed.) it is the managing physician’s responsibility to establish the final pathologic stage based upon all pertinent information, including but potentially not limited to this pathology report.      Modified Classification:    y      pT Category:    pT3      pN Category:    pN2a      N Suffix:    (sn)     SPECIAL STUDIES      Estrogen Receptor (ER) Status:    Positive (greater than 10% of cells demonstrate nuclear positivity)        Percentage of Cells with Nuclear Positivity:    61-70%      Progesterone Receptor (PgR) Status:    Positive        Percentage of Cells with Nuclear Positivity:    %      HER2 (by immunohistochemistry):    Negative (Score 1+)      Ki-67 Percentage of Positive Nuclei:    7 %      Testing Performed on Case Number:    [id number]   Intraoperative consultation--

## 2024-05-08 NOTE — PROGRESS NOTES
Breast Surgery Post-Operative Visit    Diagnosis: Invasive lobular carcinoma, right breast, status post bilateral mastectomies with right sentinel lymph node dissection on 4/29/2024    Stage: Cancer Staging   Malignant neoplasm of upper-outer quadrant of right breast in female, estrogen receptor positive (HCC)  Staging form: Breast, AJCC 8th Edition  - Clinical stage from 10/12/2023: Stage IIA (cT3, cN1(f), cM0, G2, ER+, MD+, HER2-) - Signed by Halie Calle MD on 10/20/2023    Disease Status:  Surgical treatment complete, neoadjuvant chemotherapy complete, further medical and radiation oncology recommendations pending.    History of Present Illness:   Ms. Jocelyn Garza is a 38 year old woman who presents with subjective mass of the right breast.  The patient first noticed this in October 2023.  She was diagnosed with lobular carcinoma with spread to the right axillary lymph nodes.  She has started neoadjuvant chemotherapy under the direction of Dr. Cerrato.  She has a family history of breast cancer and tested negative for genetic mutation with a variant seen in 1 GILLES D gene.  She has no personal prior history of breast disease or biopsies.  She denies any nipple discharge, skin changes or other concerns.  Her MRI demonstrated the enhancement from the area to span more than 9 cm.  Given extent of disease the patient underwent neoadjuvant chemotherapy she has completed this without sequelae.  She had posttreatment MRI on March 23, 2024 that demonstrated interval decrease in the size 2 was 7.3 cm with interval decrease in size of concern of the appearance of the right axillary lymph nodes. She underwent bilateral simple mastectomies with right axillary lymph node dissection, which occurred without complication. She is here for postoperative visit. She reports her pain is under control. She denies erythema, warmth, drainage, or fevers.  She has productive surgical drains in place.  She is here today for evaluation  and recommendations for further therapy.        Past Medical History:    Breast cancer in female (HCC)    Right breast lymph node cancer    High blood pressure    Hx of motion sickness    Hypertension    Solitary kidney, congenital       Past Surgical History:   Procedure Laterality Date    Appendectomy  2007    Needle biopsy right Right 10/09/2023    Treat ectopic preg,rmv tube/ovary Left 2014    Treat ectopic preg,rmv tube/ovary Left 2015       Gynecological History:  Pt is a   She achieved menarche at age 10 and LMP 2023, currently undergoing chemotherapy  She denies any history of hormone replacement therapy   She has history of oral contraceptive use for 13 years, last in 18 years ago.  She has recieved infertility treatment to achieve pregnancy.    Medications:     HYDROcodone-acetaminophen 5-325 MG Oral Tab Take 1-2 tablets by mouth every 6 (six) hours as needed for Pain. 40 tablet 0    Mometasone Furoate 0.1 % External Cream Apply BID during radiation therapy. 45 g 3    lidocaine-prilocaine 2.5-2.5 % External Cream Apply to site 1 hour prior to port a cath needle insertion 1 each 1    lisinopril 5 MG Oral Tab Take 1 tablet (5 mg total) by mouth daily. 30 tablet 5    prochlorperazine (COMPAZINE) 10 mg tablet Take 1 tablet (10 mg total) by mouth every 8 (eight) hours as needed for Nausea. 30 tablet 3    ondansetron (ZOFRAN) 8 MG tablet Take 1 tablet (8 mg total) by mouth every 8 (eight) hours as needed for Nausea. 30 tablet 3       Allergies:    No Known Allergies    Family History:   Family History   Problem Relation Age of Onset    Other (Parkinson's Disease) Father     Prostate Cancer Maternal Grandfather 70    Pancreatic Cancer Paternal Grandmother 80    Pancreatic Cancer Maternal Uncle 60    Cancer Maternal Great-Grandmother         gastric ca    Cancer Paternal Aunt 80        breast cancer    Pancreatic Cancer Paternal Great-Grandfather        She does not know if she is of  Ashkenazi Mormon ancestry.    Social History:  History   Alcohol Use Not Currently     Comment: 2 -3 drinks per week       History   Smoking Status    Never   Smokeless Tobacco    Never     Ms. Jocelyn Garza is  with 2 adopted children. She has 1 siblings. She is currently On Medical Leave    Review of Systems:  General:   The patient denies, fever, chills, +night sweats, +fatigue, +generalized weakness, change in appetite or weight loss.    HEENT:     The patient denies eye irritation, cataracts, redness, glaucoma, yellowing of the eyes, change in vision, color blindness, or wearing contacts/glasses. The patient denies hearing loss, ringing in the ears, ear drainage, earaches, +nasal congestion, nose bleeds, snoring, pain in mouth/throat, hoarseness, change in voice, facial trauma.    Respiratory:  The patient denies chronic cough, +phlegm, hemoptysis, pleurisy/chest pain, pneumonia, asthma, wheezing, difficulty in breathing with exertion, emphysema, chronic bronchitis, +shortness of breath or abnormal sound when breathing.     Cardiovascular:  There is no history of chest pain, chest pressure/discomfort, +palpitations, irregular heartbeat, fainting or near-fainting, difficulty breathing when lying flat, SOB/Coughing at night, swelling of the legs or chest pain while walking.    Breasts:  See history of present illness    Gastrointestinal:     There is no history of difficulty or pain with swallowing, reflux symptoms, vomiting, dark or bloody stools, constipation, yellowing of the skin, indigestion, nausea, change in bowel habits, diarrhea, abdominal pain or vomiting blood.     Genitourinary:  The patient denies frequent urination, needing to get up at night to urinate, urinary hesitancy or retaining urine, painful urination, urinary incontinence, decreased urine stream, blood in the urine or vaginal/penile discharge.    Skin:    The patient denies rash, itching, skin lesions, dry skin, change in skin  color or change in moles.     Hematologic/Lymphatic:  The patient denies easily bruising or bleeding or persistent swollen glands or lymph nodes.     Musculoskeletal:  The patient denies muscle aches/pain, joint pain, +stiff joints, neck pain, back pain or bone pain.    Neuropsychiatric:  There is no history of migraines or severe headaches, seizure/epilepsy, speech problems, coordination problems, trembling/tremors, fainting/black outs, dizziness, memory problems, loss of sensation/numbness, problems walking, weakness, tingling or burning in hands/feet. There is no history of abusive relationship, bipolar disorder, sleep disturbance, anxiety, depression or feeling of despair.    Endocrine:    There is no history of poor/slow wound healing, weight loss/gain, +fertility or hormone problems, cold intolerance, thyroid disease.     Allergic/Immunologic:  There is no history of hives, hay fever, angioedema or anaphylaxis.    /82 (BP Location: Left arm, Patient Position: Sitting, Cuff Size: adult)   Pulse 69   Temp 98.4 °F (36.9 °C) (Temporal)   Resp 18   Wt 71.8 kg (158 lb 3.2 oz)   LMP 11/21/2023 (Approximate)   SpO2 98%   BMI 25.53 kg/m²     Physical Exam:  The patient is an alert, oriented, well-nourished and  well-developed woman who appears her stated age. Her speech patterns and movements are normal. Her affect is appropriate.    HEENT: The head is normocephalic. The neck is supple. The thyroid is not enlarged and is without palpable masses/nodules. There are no palpable masses. The trachea is in the midline. Conjunctiva are clear, non-icteric.    Chest: The chest expands symmetrically. The lungs are clear to auscultation.    Heart: The rhythm is regular.  There are no murmurs, rubs, gallops or thrills.    Breasts:  Her breasts are surgically absent with clean dry and intact with no evidence of erythema or necrosis.  She has a productive surgical drain.    Abdomen:  The abdomen is soft, flat and non  tender. The liver is not enlarged. There are no palpable masses.    Lymph Nodes:  The supraclavicular, axillary and cervical regions are free of significant lymphadenopathy.    Back: There is no vertebral column tenderness.    Skin: The skin appears normal. There are no suspicious appearing rashes or lesions.    Extremities: The extremities are without deformity, cyanosis or edema.    Impression:   Ms. Jocelyn Garza is a 38 year old woman presents with right breast cancer status post neoadjuvant chemotherapy and now status post bilateral mastectomy with right axillary lymph node dissection.     Recommendations:   I had a discussion with the Patient regarding her breast exam.  She is healing well since surgery with no signs of infection. I personally reviewed her pathology.  The pathology confirmed a 8.5 cm span of residual disease with final negative margins and 5 out of 6 positive lymph nodes with an additional lymph node demonstrating isolated tumor cells.  She will meet with medical oncology status post completion of the neoadjuvant chemotherapy for further systemic treatment recommendations.  Her final drain is too high for removal and she will see us for a wound check in 1 week.  Given high risk for lymphedema we will coordinate a consultation with our lymphedema clinic.  Given initial extent and residual extensive disease she will qualify for postmastectomy radiation therapy.   I encouraged her to continue monitoring her ROM and strength and explained that a referral to physical therapy may be warranted in the future if she identifies any limitations or restrictions. She was given ample opportunity for questions and those questions were answered to her satisfaction. She was encouraged to contact the office with any questions or concerns prior to her next scheduled appointment.

## 2024-05-14 ENCOUNTER — LAB ENCOUNTER (OUTPATIENT)
Dept: LAB | Age: 39
End: 2024-05-14
Attending: INTERNAL MEDICINE

## 2024-05-14 ENCOUNTER — OFFICE VISIT (OUTPATIENT)
Dept: SURGERY | Facility: CLINIC | Age: 39
End: 2024-05-14

## 2024-05-14 VITALS
RESPIRATION RATE: 18 BRPM | SYSTOLIC BLOOD PRESSURE: 114 MMHG | OXYGEN SATURATION: 100 % | TEMPERATURE: 98 F | HEART RATE: 90 BPM | DIASTOLIC BLOOD PRESSURE: 78 MMHG

## 2024-05-14 DIAGNOSIS — C50.411 MALIGNANT NEOPLASM OF UPPER-OUTER QUADRANT OF RIGHT BREAST IN FEMALE, ESTROGEN RECEPTOR POSITIVE (HCC): Primary | ICD-10-CM

## 2024-05-14 DIAGNOSIS — Z17.0 MALIGNANT NEOPLASM OF UPPER-OUTER QUADRANT OF RIGHT BREAST IN FEMALE, ESTROGEN RECEPTOR POSITIVE (HCC): Primary | ICD-10-CM

## 2024-05-14 DIAGNOSIS — N91.2 AMENORRHEA: ICD-10-CM

## 2024-05-14 LAB
ESTRADIOL SERPL-MCNC: 49.2 PG/ML
FSH SERPL-ACNC: 80.6 MIU/ML
LH SERPL-ACNC: 40.2 MIU/ML

## 2024-05-14 PROCEDURE — 3078F DIAST BP <80 MM HG: CPT

## 2024-05-14 PROCEDURE — 83002 ASSAY OF GONADOTROPIN (LH): CPT

## 2024-05-14 PROCEDURE — 3074F SYST BP LT 130 MM HG: CPT

## 2024-05-14 PROCEDURE — 99024 POSTOP FOLLOW-UP VISIT: CPT

## 2024-05-14 PROCEDURE — 83001 ASSAY OF GONADOTROPIN (FSH): CPT

## 2024-05-14 PROCEDURE — 82670 ASSAY OF TOTAL ESTRADIOL: CPT

## 2024-05-14 PROCEDURE — 36415 COLL VENOUS BLD VENIPUNCTURE: CPT

## 2024-05-15 NOTE — PROGRESS NOTES
Patient presents today for a post-op visit for surgical wound check.  She stated that her remaining drain has been leaking.   I stripped the drain to remove any clots from the line. I applied a new biopatch and tegaderm to the area. I encouraged her to apply gauze over her drain to collect any drainage. Her drain output still remains high at this time, so we will keep her drain in place.  Pt verbalized understanding.  All questions were answered.  Encouraged to call or MyChart message with any other questions or concerns.

## 2024-05-16 ENCOUNTER — TELEPHONE (OUTPATIENT)
Dept: HEMATOLOGY/ONCOLOGY | Facility: HOSPITAL | Age: 39
End: 2024-05-16

## 2024-05-16 NOTE — TELEPHONE ENCOUNTER
Patient contacted [facility] today with questions. Patient called per Dr. Calle's request and notified ok to start Zoladex for ovarian suppression with RT. Informed will have infusion call to schedule once authorized by insurance. Discussed Dr. Calle ordering Abemaciclib which will start post RT. 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 Patient verbalizes understanding.

## 2024-05-17 ENCOUNTER — TELEPHONE (OUTPATIENT)
Dept: NEPHROLOGY | Facility: CLINIC | Age: 39
End: 2024-05-17

## 2024-05-17 DIAGNOSIS — Q60.0 SOLITARY KIDNEY, CONGENITAL: Primary | ICD-10-CM

## 2024-05-17 RX ORDER — LISINOPRIL 5 MG/1
5 TABLET ORAL DAILY
Qty: 90 TABLET | Refills: 1 | Status: SHIPPED | OUTPATIENT
Start: 2024-05-17

## 2024-05-17 NOTE — TELEPHONE ENCOUNTER
Advance Refill approval request    Current Outpatient Medications   Medication Sig Dispense Refill                         lisinopril 5 MG Oral Tab Take 1 tablet (5 mg total) by mouth daily. 30 tablet 5

## 2024-05-20 ENCOUNTER — TELEPHONE (OUTPATIENT)
Dept: SURGERY | Facility: CLINIC | Age: 39
End: 2024-05-20

## 2024-05-20 NOTE — TELEPHONE ENCOUNTER
Patient called in and states her drains are not ready to have an appointment on Wednesday.    Please provide me with an additional post op for drain removal.

## 2024-05-21 ENCOUNTER — OFFICE VISIT (OUTPATIENT)
Dept: SURGERY | Facility: CLINIC | Age: 39
End: 2024-05-21

## 2024-05-21 DIAGNOSIS — C50.411 MALIGNANT NEOPLASM OF UPPER-OUTER QUADRANT OF RIGHT BREAST IN FEMALE, ESTROGEN RECEPTOR POSITIVE (HCC): Primary | ICD-10-CM

## 2024-05-21 DIAGNOSIS — Z17.0 MALIGNANT NEOPLASM OF UPPER-OUTER QUADRANT OF RIGHT BREAST IN FEMALE, ESTROGEN RECEPTOR POSITIVE (HCC): Primary | ICD-10-CM

## 2024-05-21 PROCEDURE — 99024 POSTOP FOLLOW-UP VISIT: CPT

## 2024-05-22 NOTE — PROGRESS NOTES
Patient presents today for a post-op visit for surgical wound check.  She continues to have her right axilla drain in place.  It continues to drain, and she has noticed an increase in drainage from around the insertion site.  She does continue to have significant drainage into the bulb as well.  Pt has been healing well since surgery.  Pt denies any signs or symptoms of infection, including fever, chills, redness at surgical site, increase swelling, surrounding skin that is hot to touch, and abnormal drainage from the site.  Steri strips are still intact and in place.   Plan to keep her drain in place at this time as it is still functioning.  She should change her drain dressing as needed.    I stripped the drain to remove any clots from the line.  I applied a new Biopatch and Tegaderm to the drain.  She should continue to apply gauze over the drain to collect any drainage.  I encouraged her to call the office if she notices that the drain stops producing.  She will follow-up with me in 1 week for a drain check.  Discussed with Dr. Mae.  Pt verbalized understanding.  All questions were answered.  Encouraged to call or MyChart message with any other questions or concerns.

## 2024-05-23 ENCOUNTER — TELEPHONE (OUTPATIENT)
Dept: PHYSICAL THERAPY | Facility: HOSPITAL | Age: 39
End: 2024-05-23

## 2024-05-23 ENCOUNTER — HOSPITAL ENCOUNTER (OUTPATIENT)
Dept: CT IMAGING | Facility: HOSPITAL | Age: 39
Discharge: HOME | End: 2024-05-23
Attending: INTERNAL MEDICINE

## 2024-05-23 DIAGNOSIS — C50.411 MALIGNANT NEOPLASM OF UPPER-OUTER QUADRANT OF RIGHT BREAST IN FEMALE, ESTROGEN RECEPTOR POSITIVE (HCC): ICD-10-CM

## 2024-05-23 DIAGNOSIS — Z17.0 MALIGNANT NEOPLASM OF UPPER-OUTER QUADRANT OF RIGHT BREAST IN FEMALE, ESTROGEN RECEPTOR POSITIVE (HCC): ICD-10-CM

## 2024-05-23 LAB
CREAT BLD-MCNC: 0.9 MG/DL
EGFRCR SERPLBLD CKD-EPI 2021: 83 ML/MIN/1.73M2 (ref 60–?)

## 2024-05-23 PROCEDURE — 71260 CT THORAX DX C+: CPT | Performed by: INTERNAL MEDICINE

## 2024-05-23 PROCEDURE — 82565 ASSAY OF CREATININE: CPT

## 2024-05-23 PROCEDURE — 74177 CT ABD & PELVIS W/CONTRAST: CPT | Performed by: INTERNAL MEDICINE

## 2024-05-24 ENCOUNTER — APPOINTMENT (OUTPATIENT)
Dept: RADIATION ONCOLOGY | Facility: HOSPITAL | Age: 39
End: 2024-05-24
Attending: INTERNAL MEDICINE
Payer: COMMERCIAL

## 2024-05-28 ENCOUNTER — OFFICE VISIT (OUTPATIENT)
Dept: PHYSICAL THERAPY | Facility: HOSPITAL | Age: 39
End: 2024-05-28
Attending: SURGERY

## 2024-05-28 DIAGNOSIS — Z17.0 MALIGNANT NEOPLASM OF UPPER-OUTER QUADRANT OF RIGHT BREAST IN FEMALE, ESTROGEN RECEPTOR POSITIVE (HCC): Primary | ICD-10-CM

## 2024-05-28 DIAGNOSIS — C50.411 MALIGNANT NEOPLASM OF UPPER-OUTER QUADRANT OF RIGHT BREAST IN FEMALE, ESTROGEN RECEPTOR POSITIVE (HCC): Primary | ICD-10-CM

## 2024-05-28 NOTE — PROGRESS NOTES
BREAST CANCER SURGICAL SCREENINGS  [facility]      PATIENT SUMMARY:     Involved Side:          RIGHT                                           Dominant Hand:    RIGHT                              Occupation:             Pre- (currently on leave)          Prior Level of Function:   Limited d/t chemo                                       Social Activities:                                                            Pertinent PMH:       HISTORY:  Past Medical History:    Breast cancer in female (HCC)    Right breast lymph node cancer    High blood pressure    Hx of motion sickness    Hypertension    Solitary kidney, congenital      Past Surgical History:   Procedure Laterality Date    Appendectomy  01/01/2007    Needle biopsy right Right 10/09/2023    Treat ectopic preg,rmv tube/ovary Left 01/01/2014    Treat ectopic preg,rmv tube/ovary Left 01/01/2015                                                                        PCP, Surgeon, and Oncologist:      Carmelita,                                                        Surgery Type and Date:    4/29/2024 B mastectomy                                                     # Nodes + / # Nodes Removed:      6/5                                                                 Chemotherapy and/or Radiation:        Radiation and targeted therapy.                                                                                          OBJECTIVE MEASUREMENTS:   5/28/2024  -poor ROM B shoulders, R drain still in. L drains removed 2 weeks ago.  -encouraged ROM/stretching LUE  -Recommended pt have PT to help improve ROM prior to starting RT.  4/8/2024:  -Reviewed exercises to begin 1 week after surgery. Limit arm elevation to shoulder height when drains are in and all activity should be pain free.                  Pre-surgical screening date: 4/8/2024    Post-surgical screening date: 5/28/2024    Post-surgical screening date:      Pain scale:       Pain scale:  3-4/10 tight    Pain scale:      Screening Therapist: RADHA    Screening Therapist: RADHA    Screening Therapist:                                                                                        Right Left    Right Left    Right Left   Shoulder  A/AAROM A/AAROM  Shoulder  A/AAROM A/AAROM  Shoulder  A/AAROM A/AAROM   Supine flex 165 160  Supine flex    Supine flex      abd 170 160   abd     abd      ER     ER     ER      IR     IR     IR     Sitting flex 150 140*  Sitting flex 90 90  Sitting flex      abd 165 145*   abd 85 90   abd      ext     ext     ext     Functional IR     Functional IR     Functional IR         *PAIN IN PORT, LIMITED ARM MOVEMENT ON LEFT             Shoulder strength  Right Left  Shoulder strength  Right Left  Shoulder strength  Right Left    flex 5 5   flex     flex      abd 5 5   abd     abd      ext 5 5   ext     ext      ER 5 5   ER     ER      IR 5 5   IR     IR                     Posture: GOOD    Posture:     Posture:                      Cording (grade 0-3):     Cording (grade 0-3):     Cording (grade 0-3):      R: 0    R: 1    R:      L: 0    L: 0    L:      Miscellaneous:      Miscellaneous:      Miscellaneous:                                                                       Any feelings of heaviness or tightness, swelling, redness, and/or heat in the at-risk arm, breast, chest, or truncal area? TIGHTNESS post chemo    Any feelings of heaviness or tightness, swelling, redness, and/or heat in the at-risk arm, breast, chest, or truncal area? *INFORMATION SENT TO [facility] MED[facility]    Any feelings of heaviness or tightness, swelling, redness, and/or heat in the at-risk arm, breast, chest, or truncal area?                                      Arm Volume     Arm Volume     Arm Volume              5/28/2024     8:00 PM 4/8/2024     2:00 PM   Lymphedema Calculations   DATE MEASURED 5/28/2024 4/8/2024   LOCATION/MEASUREMENTS ISAURO BOX   PATIENT POSITION  supine 1 pillow supine 1  pillow   LIMB POSITION 75 deg abduction 75 deg abduction   STARTING POINT 17cm from 3rd cuticle 17cm from 3rd cuticle   RIGHT HAND VOLUME 19.7cm across palm 19.7cm across palm   LEFT HAND VOLUME 19.5cm across palm 19.5cm across palm   MEASUREMENT A 15.5 15.5   MEASUREMENT B 16.6 16.7   MEASUREMENT C 19.1 19.2   MEASUREMENT D 23 23.1   MEASUREMENT E 25 25   MEASUREMENT F 24.5 24.5   MEASUREMENT G 25.5 25.3   MEASUREMENT H 27.5 27.4   MEASUREMENT I 29.6 29.4    TOTAL VOLUME  [id number]   DATE MEASURED 4/8/2024 4/8/2024   LOCATION/MEASUREMENTS LUE LUE   MEASUREMENT A 15.4 15.4   MEASUREMENT B 17.1 17   MEASUREMENT C 19.6 19.4   MEASUREMENT D 22.7 22.8   MEASUREMENT E 25.2 25.2   MEASUREMENT F 24.8 24.8   MEASUREMENT G 26.9 26.8   MEASUREMENT H 28.6 28.5   MEASUREMENT I 33.5 33.4    TOTAL VOLUME  [id number] 1843.2805   % DIFFERENCE [phone]

## 2024-05-29 ENCOUNTER — TELEPHONE (OUTPATIENT)
Dept: PHYSICAL THERAPY | Facility: HOSPITAL | Age: 39
End: 2024-05-29

## 2024-05-29 ENCOUNTER — OFFICE VISIT (OUTPATIENT)
Dept: SURGERY | Facility: CLINIC | Age: 39
End: 2024-05-29

## 2024-05-29 VITALS
DIASTOLIC BLOOD PRESSURE: 78 MMHG | OXYGEN SATURATION: 100 % | TEMPERATURE: 98 F | SYSTOLIC BLOOD PRESSURE: 113 MMHG | WEIGHT: 158 LBS | RESPIRATION RATE: 18 BRPM | BODY MASS INDEX: 26 KG/M2 | HEART RATE: 85 BPM

## 2024-05-29 DIAGNOSIS — Z17.0 MALIGNANT NEOPLASM OF UPPER-OUTER QUADRANT OF RIGHT BREAST IN FEMALE, ESTROGEN RECEPTOR POSITIVE (HCC): Primary | ICD-10-CM

## 2024-05-29 DIAGNOSIS — C50.411 MALIGNANT NEOPLASM OF UPPER-OUTER QUADRANT OF RIGHT BREAST IN FEMALE, ESTROGEN RECEPTOR POSITIVE (HCC): Primary | ICD-10-CM

## 2024-05-29 PROCEDURE — 3074F SYST BP LT 130 MM HG: CPT

## 2024-05-29 PROCEDURE — 99024 POSTOP FOLLOW-UP VISIT: CPT

## 2024-05-29 PROCEDURE — 3078F DIAST BP <80 MM HG: CPT

## 2024-05-29 NOTE — PROGRESS NOTES
Patient presents today for a post-op visit for surgical wound check.  She continues to have her right axilla drain in place with high output. Pt has been healing well since surgery.  Pt denies any signs or symptoms of infection, including fever, chills, redness at surgical site, increase swelling, surrounding skin that is hot to touch, and abnormal drainage from the site.  Steri strips have been removed and incision is clean, dry, and intact.  Plan to keep her drain in place at this time as it is still functioning.  She should change her drain dressing as needed.    I stripped the drain to remove any clots from the line.  I applied a new Biopatch and Tegaderm to the drain.  She should continue to apply gauze over the drain to collect any drainage.  I encouraged her to call the office if she notices that the drain stops producing.  She will follow-up with me in on Monday for a drain check.   Pt verbalized understanding.  All questions were answered.  Encouraged to call or MyChart message with any other questions or concerns.

## 2024-05-31 ENCOUNTER — OFFICE VISIT (OUTPATIENT)
Dept: PHYSICAL THERAPY | Facility: HOSPITAL | Age: 39
End: 2024-05-31

## 2024-05-31 DIAGNOSIS — C50.411 MALIGNANT NEOPLASM OF UPPER-OUTER QUADRANT OF RIGHT BREAST IN FEMALE, ESTROGEN RECEPTOR POSITIVE (HCC): ICD-10-CM

## 2024-05-31 DIAGNOSIS — Z17.0 MALIGNANT NEOPLASM OF UPPER-OUTER QUADRANT OF RIGHT BREAST IN FEMALE, ESTROGEN RECEPTOR POSITIVE (HCC): ICD-10-CM

## 2024-05-31 DIAGNOSIS — Z17.0 MALIGNANT NEOPLASM OF UPPER-OUTER QUADRANT OF RIGHT BREAST IN FEMALE, ESTROGEN RECEPTOR POSITIVE (HCC): Primary | ICD-10-CM

## 2024-05-31 DIAGNOSIS — C50.411 MALIGNANT NEOPLASM OF UPPER-OUTER QUADRANT OF RIGHT BREAST IN FEMALE, ESTROGEN RECEPTOR POSITIVE (HCC): Primary | ICD-10-CM

## 2024-05-31 PROCEDURE — 97161 PT EVAL LOW COMPLEX 20 MIN: CPT | Performed by: PHYSICAL THERAPIST

## 2024-05-31 PROCEDURE — 97530 THERAPEUTIC ACTIVITIES: CPT | Performed by: PHYSICAL THERAPIST

## 2024-05-31 PROCEDURE — 97110 THERAPEUTIC EXERCISES: CPT | Performed by: PHYSICAL THERAPIST

## 2024-05-31 PROCEDURE — 97140 MANUAL THERAPY 1/> REGIONS: CPT | Performed by: PHYSICAL THERAPIST

## 2024-05-31 NOTE — PROGRESS NOTES
Referring Provider:  Jesus Manuel  Diagnosis: Malignant neoplasm of upper-outer quadrant of right breast in female, estrogen receptor positive (HCC) (C50.411,Z17.0)     Date of onset: 4/29/2024 Evaluation Date: 5/31/2024         LYMPHEDEMA EVALUATION:        PATIENT SUMMARY   Jocelyn Garza is a 38 year old female who presents to therapy today evaluation after breast surgery    History of current condition: Right breast cancer, 4/29/2024 B mastectomy w/ ALND (6+/6)   Pain level 3/10 across B chest and R axilla/trunk      Current functional limitations  difficulty using UEs for ADLs    Jocelyn describes prior level of function as independent w/ ADLs    Social History:  lives w/ spouse, pre-   Hand Dominance: right handed  Pt goals include Help with swelling of armpits, chest, increase mobility, prepare for radiation     Are you being hurt, frightened, demeaned, or taken advantage of by anyone at your home or in your life?  No        Have you recently had thoughts of hurting yourself?  No    Have you tried to hurt yourself in the past?  No        Past medical history was reviewed with Jocelyn.   Past Medical History:    Breast cancer in female (HCC)    Right breast lymph node cancer    High blood pressure    Hx of motion sickness    Hypertension    Solitary kidney, congenital         Precautions:  HTN, R breast cancer, + chemo, will need radiation   Education or treatment limitation: none  Rehab Potential:good    ASSESSMENT   Jocelyn presents to [facility] evaluation with swelling B chest/trunk (R trunk more than L), decreased ROM and strength B shoulders, cording R axilla, decreased scar mobility B chest, decreased tissue mobility B chest, decreased pect flexibility, and poor posture.  Pt still has drain R trunk.    Pt and therapist discussed evaluation findings, lymphedema education, POC and self care/management. Skilled Physical Therapy is medically necessary for stretching, strengthening,  posture re-education,  Complete Decongestive Therapy to reduce swelling and decrease risk of infection, garment prescription , compression device prescription and education/self management.    OBJECTIVE:     5/31/2024 (Evaluation):  Sensation:  decreased sensation R axilla/inner upper arm  AROM/Strength:     5/31/2024  Right AROM 5/31/2024  Left AROM 5/31/2024  Right Strength 5/31/2024  Left Strength   Shoulder             Flexion 90 90 NT NT        Abduction 90 90 NT NT        IR wfl WFL NT NT        ER WFL WFL NT NT   Elbow            Flexion WFL WFL NT NT        Extension WFL WFL NT NT                 Posture: Rounded shld. guarding    Palpation: hypersensitivity L abdomen/inferior to mastectomy incision, decreased sensation R chest/axilla     Edema/Tissue Observations:    - Swelling B chest  - Swelling B trunk (R more than L, especially posteriorly)   Trunk measurement:     Under arms: 96.2 cm     Level of mastectomy scars: 89.4 cm   Stemmer's Sign: negative     Photo:         Arm Volume Measurements:  deferred (done recently at screening)      5/28/2024     8:00 PM 4/8/2024     2:00 PM   Lymphedema Calculations   DATE MEASURED 5/28/2024 4/8/2024   LOCATION/MEASUREMENTS RUE RUE   PATIENT POSITION  supine 1 pillow supine 1 pillow   LIMB POSITION 75 deg abduction 75 deg abduction   STARTING POINT 17cm from 3rd cuticle 17cm from 3rd cuticle   RIGHT HAND VOLUME 19.7cm across palm 19.7cm across palm   LEFT HAND VOLUME 19.5cm across palm 19.5cm across palm   MEASUREMENT A 15.5 15.5   MEASUREMENT B 16.6 16.7   MEASUREMENT C 19.1 19.2   MEASUREMENT D 23 23.1   MEASUREMENT E 25 25   MEASUREMENT F 24.5 24.5   MEASUREMENT G 25.5 25.3   MEASUREMENT H 27.5 27.4   MEASUREMENT I 29.6 29.4    TOTAL VOLUME  [id number] 1728.90780   DATE MEASURED 4/8/2024 4/8/2024   LOCATION/MEASUREMENTS [facility]   MEASUREMENT A 15.4 15.4   MEASUREMENT B 17.1 17   MEASUREMENT C 19.6 19.4   MEASUREMENT D 22.7 22.8   MEASUREMENT E 25.2 25.2    MEASUREMENT F 24.8 24.8   MEASUREMENT G 26.9 26.8   MEASUREMENT H 28.6 28.5   MEASUREMENT I 33.5 33.4    TOTAL VOLUME  [id number] 1843.2805   % DIFFERENCE -[phone] -[phone]             Lymphedema Life Impact Scale: Evaluation Score 5/31/2024: LLIS Score Evaluation: 25 % impaired (0% is no impairment, 100% total impairment)        Today’s Treatment and Response:   Date 5/31/2024 Date: * Date: * Date: * Date: * Date: *   Visit # 1/10  Certification From: 5/31/2024  To:8/29/2024    Visit # 2/* Visit: #3/* Visit: #4/* Visit: #5/* Visit: #6/*   Manual Therapy (35 minutes)    MLD: neck sequence, diaphragmatic breathing, B inguinals, B trunk    STM B abd, axilla, pect tendon    Compression:  - fabricated foam compression pad for R chest/trunk  - adjusted compression bra (moved hooks to provide better compression without overcompression lower band)  - [employer] confirmed in-network, messaged the to contact pt to set up fitting for prophylactic sleeve and gauntlet              There Ex ( 10 minutes):  - pulleys flex, abd and scaption  - encouraged to purchase pulleys  - instr to cont w/ ROM exercises given on rehab screening            ThereAct (20 min):       Self-Care:  Management/Education: Explained Lymphedema diagnosis; informed patient of lymphedema precautions and risk reduction practices, and importance of skin care.     - instr in de-sensitizing techniques for left abd  - instr to do self STM of abdomen to initiate tissue movement  - instr to wear compression pad as-tolerate, remove if painful or becomes uncomfortable  - instr to avoid STM and lotion over incisions or drain sites               Charges: Physical Therapy Eval: Low Complexity, mm2, ex1, act1      Total Timed Treatment: 65 min     Total Treatment Time: 90 min     Based on clinical rationale and outcome measures, this evaluation is   stable (low eval)      PLAN OF CARE:    Goals (discussed and planned with patient involvement):      To be met in  10 visits:  1) Decrease swelling chest/trunk by a total of 3 cm to decrease risks of infections   2) Pt to be independent with self MLD, ROM exercises, and donning/doffing compression bandages/garments to facilitate swelling reduction and to be independent at self management once discharged  3) Increase B shoulder AROM flex and abd to 160 degrees, ER to at least 80 degrees when arms are elevated  to improve ease of dressing/bathing/and reaching overhead and to be able to tolerate positioning for radiation  4) Increase B UE strength to at least 4/5  to improve ease of lifting and performing household chores      PLAN:  Frequency / Duration: Patient will be seen for 1-2 x/week or a total of 10 visits over a 90 day period. Frequency will decrease as symptoms improve.     Treatment will include: Complete Decongestive Therapy: Manual Therapy, Self-Care/Home Management Training, Therapeutic Exercise, Neuromuscular Re-education, Orthotic Management and Training      Patient/Family/Caregiver was advised of these findings, precautions, and treatment options and has agreed to actively participate in planning and for this course of care.    Thank you for your referral. Please co-sign or sign and return this letter via fax as soon as possible to [phone]. If you have any questions, please contact me at Dept: [phone]    Sincerely,  Electronically signed by therapist: Alejandra Flores PT, DPT, ROSI  Physician's certification required: Yes  I certify the need for these services furnished under this plan of treatment and while under my care.    X___________________________________________________ Date____________________    Certification From: 5/31/2024  To:8/29/2024

## 2024-06-01 ENCOUNTER — APPOINTMENT (OUTPATIENT)
Dept: RADIATION ONCOLOGY | Facility: HOSPITAL | Age: 39
End: 2024-06-01
Attending: INTERNAL MEDICINE
Payer: COMMERCIAL

## 2024-06-03 ENCOUNTER — OFFICE VISIT (OUTPATIENT)
Dept: PHYSICAL THERAPY | Facility: HOSPITAL | Age: 39
End: 2024-06-03
Payer: COMMERCIAL

## 2024-06-03 ENCOUNTER — OFFICE VISIT (OUTPATIENT)
Dept: SURGERY | Facility: CLINIC | Age: 39
End: 2024-06-03
Payer: COMMERCIAL

## 2024-06-03 VITALS
HEART RATE: 107 BPM | TEMPERATURE: 98 F | DIASTOLIC BLOOD PRESSURE: 77 MMHG | RESPIRATION RATE: 16 BRPM | WEIGHT: 157 LBS | OXYGEN SATURATION: 97 % | SYSTOLIC BLOOD PRESSURE: 127 MMHG | BODY MASS INDEX: 26 KG/M2

## 2024-06-03 DIAGNOSIS — C50.411 MALIGNANT NEOPLASM OF UPPER-OUTER QUADRANT OF RIGHT BREAST IN FEMALE, ESTROGEN RECEPTOR POSITIVE (HCC): Primary | ICD-10-CM

## 2024-06-03 DIAGNOSIS — Z17.0 MALIGNANT NEOPLASM OF UPPER-OUTER QUADRANT OF RIGHT BREAST IN FEMALE, ESTROGEN RECEPTOR POSITIVE (HCC): Primary | ICD-10-CM

## 2024-06-03 PROCEDURE — 3078F DIAST BP <80 MM HG: CPT

## 2024-06-03 PROCEDURE — 99024 POSTOP FOLLOW-UP VISIT: CPT

## 2024-06-03 PROCEDURE — 97140 MANUAL THERAPY 1/> REGIONS: CPT

## 2024-06-03 PROCEDURE — 3074F SYST BP LT 130 MM HG: CPT

## 2024-06-03 NOTE — PROGRESS NOTES
The patient presents today for HANSEL drain check/removal  Right axilla drain was removed.  Explained drain removal process to pt.  Sutures removed, drain decompressed & pulled.  no residual output post-removal.  Pt tolerated well  Covered with sterile gauze and Tegaderm.  Pt instructed to keep covered and dry until scabbed over/healed.  All incision care and showering instructions reviewed as well as signs of infection and reasons to contact our office.   She is clear to begin lymphedema therapy to her right side.   She will follow up with me on Friday 6/7 for a wound check  Pt verbalized understanding.  All questions answered.  Pt encouraged to call office with any further questions or concerns.

## 2024-06-03 NOTE — PROGRESS NOTES
Referring Provider:  Jesus Manuel  Diagnosis: Malignant neoplasm of upper-outer quadrant of right breast in female, estrogen receptor positive (HCC) (C50.411,Z17.0)      Date of onset: 4/29/2024 Evaluation Date: 5/31/2024     Physical Therapy Lymphedema Daily Note    Precautions:  HTN, R breast cancer, + chemo, will need radiation   Education or treatment limitation: none  Rehab Potential:good    Past Medical History:    Breast cancer in female (HCC)    Right breast lymph node cancer    High blood pressure    Hx of motion sickness    Hypertension    Solitary kidney, congenital         Pain:  3/10 across B chest and R axilla/trunk       Subjective: Pt states that she had her drained removed but had about 100cc output still.  Pt reports that the gauze pad had to be changed once she returned home because it was saturated.        Objective:    5/31/2024 (Evaluation):  Sensation:  decreased sensation R axilla/inner upper arm  AROM/Strength:      5/31/2024  Right AROM 5/31/2024  Left AROM 5/31/2024  Right Strength 5/31/2024  Left Strength   Shoulder                 Flexion 90 90 NT NT        Abduction 90 90 NT NT        IR wfl WFL NT NT        ER WFL WFL NT NT   Elbow                Flexion WFL WFL NT NT        Extension WFL WFL NT NT                         Posture: Rounded shld. guarding     Palpation: hypersensitivity L abdomen/inferior to mastectomy incision, decreased sensation R chest/axilla      Edema/Tissue Observations:    - Swelling B chest  - Swelling B trunk (R more than L, especially posteriorly)               Trunk measurement:                             Under arms: 96.2 cm                             Level of mastectomy scars: 89.4 cm   Stemmer's Sign: negative      Photo:            Arm Volume Measurements:  deferred (done recently at screening)       5/28/2024     8:00 PM 4/8/2024     2:00 PM   Lymphedema Calculations   DATE MEASURED 5/28/2024 4/8/2024   LOCATION/MEASUREMENTS RUE RUE   PATIENT POSITION  supine 1  pillow supine 1 pillow   LIMB POSITION 75 deg abduction 75 deg abduction   STARTING POINT 17cm from 3rd cuticle 17cm from 3rd cuticle   RIGHT HAND VOLUME 19.7cm across palm 19.7cm across palm   LEFT HAND VOLUME 19.5cm across palm 19.5cm across palm   MEASUREMENT A 15.5 15.5   MEASUREMENT B 16.6 16.7   MEASUREMENT C 19.1 19.2   MEASUREMENT D 23 23.1   MEASUREMENT E 25 25   MEASUREMENT F 24.5 24.5   MEASUREMENT G 25.5 25.3   MEASUREMENT H 27.5 27.4   MEASUREMENT I 29.6 29.4    TOTAL VOLUME  1732.72631 1728.64601   DATE MEASURED 4/8/2024 4/8/2024   LOCATION/MEASUREMENTS LUE LUE   MEASUREMENT A 15.4 15.4   MEASUREMENT B 17.1 17   MEASUREMENT C 19.6 19.4   MEASUREMENT D 22.7 22.8   MEASUREMENT E 25.2 25.2   MEASUREMENT F 24.8 24.8   MEASUREMENT G 26.9 26.8   MEASUREMENT H 28.6 28.5   MEASUREMENT I 33.5 33.4    TOTAL VOLUME  1851.31970 1843.2805   % DIFFERENCE -6.53641 -6.71619                           Lymphedema Life Impact Scale: Evaluation Score 5/31/2024: LLIS Score Evaluation: 25 % impaired (0% is no impairment, 100% total impairment)           Today’s Treatment and Response:   Date 5/31/2024 Date: 6/3/2024 Date: * Date: * Date: * Date: *   Visit # 1/10  Certification From: 5/31/2024  To:8/29/2024     Visit # 2/10  Certification From: 5/31/2024  To:8/29/2024 Visit: #3/* Visit: #4/* Visit: #5/* Visit: #6/*   Manual Therapy (35 minutes)     MLD: neck sequence, diaphragmatic breathing, B inguinals, B trunk     STM B abd, axilla, pect tendon     Compression:  - fabricated foam compression pad for R chest/trunk  - adjusted compression bra (moved hooks to provide better compression without overcompression lower band)  - [employer] confirmed in-network, messaged the to contact pt to set up fitting for prophylactic sleeve and gauntlet           Manual therapy (40 minutes)      MLD: neck sequence, diaphragmatic breathing, B inguinals, B trunk     STM B abd, axilla, pect tendon      *reviewed using carmella guadarrama type  sponge in shower to desensitize area of abdomen and lateral trunk    Compression:  -pt wearing foam on R side  -fabricated smaller foam for L chest  -pt will be measured for compression sleeve next Thursday morning.               There Ex ( 10 minutes):  - pulleys flex, abd and scaption  - encouraged to purchase pulleys  - instr to cont w/ ROM exercises given on rehab screening        There Ex (5 minutes)    -PROM B shoulders           ThereAct (20 min):         Self-Care:  Management/Education: Explained Lymphedema diagnosis; informed patient of lymphedema precautions and risk reduction practices, and importance of skin care.      - instr in de-sensitizing techniques for left abd  - instr to do self STM of abdomen to initiate tissue movement  - instr to wear compression pad as-tolerate, remove if painful or becomes uncomfortable  - instr to avoid STM and lotion over incisions or drain sites                     Assessment: Pt hypersensitive B abdominal and B lateral trunk (L>R).  Last drained removed today.  Will progress manual techniques and stretches on R next session as pt still having large output from drain site.  APN aware and will be following up with patient on Friday to assess. (Drain was in place x 5 weeks)    Goals:   Goals (discussed and planned with patient involvement):      To be met in 10 visits:  1) Decrease swelling chest/trunk by a total of 3 cm to decrease risks of infections   2) Pt to be independent with self MLD, ROM exercises, and donning/doffing compression bandages/garments to facilitate swelling reduction and to be independent at self management once discharged  3) Increase B shoulder AROM flex and abd to 160 degrees, ER to at least 80 degrees when arms are elevated  to improve ease of dressing/bathing/and reaching overhead and to be able to tolerate positioning for radiation  4) Increase B UE strength to at least 4/5  to improve ease of lifting and performing household chores          Plan:    Frequency / Duration: Patient will be seen for 1-2 x/week or a total of 10 visits over a 90 day period. Frequency will decrease as symptoms improve.      Treatment will include: Complete Decongestive Therapy: Manual Therapy, Self-Care/Home Management Training, Therapeutic Exercise, Neuromuscular Re-education, Orthotic Management and Training        Charges: MM3   Total Timed Treatment: 45 min  Total Treatment Time: 45 min

## 2024-06-04 ENCOUNTER — NURSE ONLY (OUTPATIENT)
Dept: HEMATOLOGY/ONCOLOGY | Facility: HOSPITAL | Age: 39
End: 2024-06-04
Attending: INTERNAL MEDICINE
Payer: COMMERCIAL

## 2024-06-04 ENCOUNTER — TELEPHONE (OUTPATIENT)
Dept: HEMATOLOGY/ONCOLOGY | Facility: HOSPITAL | Age: 39
End: 2024-06-04

## 2024-06-04 DIAGNOSIS — Z17.0 MALIGNANT NEOPLASM OF UPPER-OUTER QUADRANT OF RIGHT BREAST IN FEMALE, ESTROGEN RECEPTOR POSITIVE (HCC): ICD-10-CM

## 2024-06-04 DIAGNOSIS — C50.411 MALIGNANT NEOPLASM OF UPPER-OUTER QUADRANT OF RIGHT BREAST IN FEMALE, ESTROGEN RECEPTOR POSITIVE (HCC): ICD-10-CM

## 2024-06-04 DIAGNOSIS — Z45.2 ENCOUNTER FOR CENTRAL LINE CARE: Primary | ICD-10-CM

## 2024-06-04 PROCEDURE — 96402 CHEMO HORMON ANTINEOPL SQ/IM: CPT

## 2024-06-04 RX ORDER — SODIUM CHLORIDE 9 MG/ML
10 INJECTION, SOLUTION INTRAMUSCULAR; INTRAVENOUS; SUBCUTANEOUS ONCE
OUTPATIENT
Start: 2024-07-02

## 2024-06-04 RX ORDER — HEPARIN SODIUM (PORCINE) LOCK FLUSH IV SOLN 100 UNIT/ML 100 UNIT/ML
5 SOLUTION INTRAVENOUS ONCE
Status: COMPLETED | OUTPATIENT
Start: 2024-06-04 | End: 2024-06-04

## 2024-06-04 RX ORDER — HEPARIN SODIUM (PORCINE) LOCK FLUSH IV SOLN 100 UNIT/ML 100 UNIT/ML
5 SOLUTION INTRAVENOUS ONCE
OUTPATIENT
Start: 2024-07-02

## 2024-06-04 RX ADMIN — HEPARIN SODIUM (PORCINE) LOCK FLUSH IV SOLN 100 UNIT/ML 500 UNITS: 100 SOLUTION INTRAVENOUS at 14:50:00

## 2024-06-04 NOTE — TELEPHONE ENCOUNTER
[facility] [phone]   [facility] with a zero dollar copay.   Thanks Marisela

## 2024-06-06 ENCOUNTER — OFFICE VISIT (OUTPATIENT)
Dept: PHYSICAL THERAPY | Facility: HOSPITAL | Age: 39
End: 2024-06-06
Payer: COMMERCIAL

## 2024-06-06 PROCEDURE — 97140 MANUAL THERAPY 1/> REGIONS: CPT | Performed by: PHYSICAL THERAPIST

## 2024-06-06 NOTE — PROGRESS NOTES
Referring Provider:  Jesus Manuel  Diagnosis: Malignant neoplasm of upper-outer quadrant of right breast in female, estrogen receptor positive (HCC) (C50.411,Z17.0)      Date of onset: 4/29/2024 Evaluation Date: 5/31/2024     Physical Therapy Lymphedema Daily Note    Precautions:  HTN, R breast cancer, + chemo, will need radiation   Education or treatment limitation: none  Rehab Potential:good    Past Medical History:    Breast cancer in female (HCC)    Right breast lymph node cancer    High blood pressure    Hx of motion sickness    Hypertension    Solitary kidney, congenital         Pain:  3/10 across B chest and R axilla/trunk        Subjective: \"I'm feeling pretty good\"    Objective:    6/6/2024:  Decreased flexibility B pect and lats  Swelling R upper/outer abdomen  Cording R axilla   Cording L abdomen  AROM/Strength:      5/31/2024  Right AROM 6/6/2024  'R AROM 5/31/2024  Left AROM 6/6/2024  L AROM   5/31/2024  Right Strength 5/31/2024  Left Strength   Shoulder                   Flexion 90 120 90 125 NT NT        Abduction 90 95 90 98 NT NT        IR wfl WFL WFL WFL NT NT        ER WFL WFL WFL WFL NT NT   Elbow                  Flexion WFL  WFL  NT NT        Extension WFL  WFL  NT NT                             5/31/2024 (Evaluation):  Sensation:  decreased sensation R axilla/inner upper arm  AROM/Strength:      5/31/2024  Right AROM 5/31/2024  Left AROM 5/31/2024  Right Strength 5/31/2024  Left Strength   Shoulder                 Flexion 90 90 NT NT        Abduction 90 90 NT NT        IR wfl WFL NT NT        ER WFL WFL NT NT   Elbow                Flexion WFL WFL NT NT        Extension WFL WFL NT NT                         Posture: Rounded shld. guarding     Palpation: hypersensitivity L abdomen/inferior to mastectomy incision, decreased sensation R chest/axilla      Edema/Tissue Observations:    - Swelling B chest  - Swelling B trunk (R more than L, especially posteriorly)               Trunk measurement:                              Under arms: 96.2 cm                             Level of mastectomy scars: 89.4 cm   Stemmer's Sign: negative        Arm Volume Measurements:  deferred (done recently at screening)       5/28/2024     8:00 PM 4/8/2024     2:00 PM   Lymphedema Calculations   DATE MEASURED 5/28/2024 4/8/2024   LOCATION/MEASUREMENTS RUE RUE   PATIENT POSITION  supine 1 pillow supine 1 pillow   LIMB POSITION 75 deg abduction 75 deg abduction   STARTING POINT 17cm from 3rd cuticle 17cm from 3rd cuticle   RIGHT HAND VOLUME 19.7cm across palm 19.7cm across palm   LEFT HAND VOLUME 19.5cm across palm 19.5cm across palm   MEASUREMENT A 15.5 15.5   MEASUREMENT B 16.6 16.7   MEASUREMENT C 19.1 19.2   MEASUREMENT D 23 23.1   MEASUREMENT E 25 25   MEASUREMENT F 24.5 24.5   MEASUREMENT G 25.5 25.3   MEASUREMENT H 27.5 27.4   MEASUREMENT I 29.6 29.4    TOTAL VOLUME  1732.21005 1728.27826   DATE MEASURED 4/8/2024 4/8/2024   LOCATION/MEASUREMENTS LUE LUE   MEASUREMENT A 15.4 15.4   MEASUREMENT B 17.1 17   MEASUREMENT C 19.6 19.4   MEASUREMENT D 22.7 22.8   MEASUREMENT E 25.2 25.2   MEASUREMENT F 24.8 24.8   MEASUREMENT G 26.9 26.8   MEASUREMENT H 28.6 28.5   MEASUREMENT I 33.5 33.4    TOTAL VOLUME  1851.89522 1843.2805   % DIFFERENCE -6.53811 -6.93685                           Lymphedema Life Impact Scale: Evaluation Score 5/31/2024: LLIS Score Evaluation: 25 % impaired (0% is no impairment, 100% total impairment)           Today’s Treatment and Response:   Date 5/31/2024 Date: 6/3/2024 Date: 6/6/2024 Date: * Date: * Date: *   Visit # 1/10  Certification From: 5/31/2024  To:8/29/2024     Visit # 2/10  Certification From: 5/31/2024  To:8/29/2024 Visit: #3/10  Certification From: 5/31/2024  To:8/29/2024 Visit: #4/* Visit: #5/* Visit: #6/*   Manual Therapy (35 minutes)     MLD: neck sequence, diaphragmatic breathing, B inguinals, B trunk     STM B abd, axilla, pect tendon     Compression:  - fabricated foam compression pad for R  chest/trunk  - adjusted compression bra (moved hooks to provide better compression without overcompression lower band)  - [employer] confirmed in-network, messaged the to contact pt to set up fitting for prophylactic sleeve and gauntlet           Manual therapy (40 minutes)      MLD: neck sequence, diaphragmatic breathing, B inguinals, B trunk     STM B abd, axilla, pect tendon      *reviewed using washcloth, loofa type sponge in shower to desensitize area of abdomen and lateral trunk    Compression:  -pt wearing foam on R side  -fabricated smaller foam for L chest  -pt will be measured for compression sleeve next Thursday morning.      Manual Therapy (45 min)    MLD: neck sequence, diaphragmatic breathing, B inguinals, B trunk     STM B abd, axilla, pect tendon, areas of cording    Compression  - encouraged pt to continue to use compression bra            There Ex ( 10 minutes):  - pulleys flex, abd and scaption  - encouraged to purchase pulleys  - instr to cont w/ ROM exercises given on rehab screening        There Ex (5 minutes)    -PROM B shoulders           ThereAct (20 min):         Self-Care:  Management/Education: Explained Lymphedema diagnosis; informed patient of lymphedema precautions and risk reduction practices, and importance of skin care.      - instr in de-sensitizing techniques for left abd  - instr to do self STM of abdomen to initiate tissue movement  - instr to wear compression pad as-tolerate, remove if painful or becomes uncomfortable  - instr to avoid STM and lotion over incisions or drain sites                     Assessment: ROM slowly improving, pt w/ significant hypersensitivity L abdomen. ROM still significantly limited       Goals:   Goals (discussed and planned with patient involvement):      To be met in 10 visits:  1) Decrease swelling chest/trunk by a total of 3 cm to decrease risks of infections   2) Pt to be independent with self MLD, ROM exercises, and donning/doffing  compression bandages/garments to facilitate swelling reduction and to be independent at self management once discharged  3) Increase B shoulder AROM flex and abd to 160 degrees, ER to at least 80 degrees when arms are elevated  to improve ease of dressing/bathing/and reaching overhead and to be able to tolerate positioning for radiation  4) Increase B UE strength to at least 4/5  to improve ease of lifting and performing household chores         Plan:    Frequency / Duration: Patient will be seen for 1-2 x/week or a total of 10 visits over a 90 day period. Frequency will decrease as symptoms improve.      Treatment will include: Complete Decongestive Therapy: Manual Therapy, Self-Care/Home Management Training, Therapeutic Exercise, Neuromuscular Re-education, Orthotic Management and Training        Charges: MM3    Total Timed Treatment: 45 min  Total Treatment Time: 45 min

## 2024-06-07 ENCOUNTER — OFFICE VISIT (OUTPATIENT)
Dept: SURGERY | Facility: CLINIC | Age: 39
End: 2024-06-07
Payer: COMMERCIAL

## 2024-06-07 DIAGNOSIS — C50.411 MALIGNANT NEOPLASM OF UPPER-OUTER QUADRANT OF RIGHT BREAST IN FEMALE, ESTROGEN RECEPTOR POSITIVE (HCC): Primary | ICD-10-CM

## 2024-06-07 DIAGNOSIS — Z17.0 MALIGNANT NEOPLASM OF UPPER-OUTER QUADRANT OF RIGHT BREAST IN FEMALE, ESTROGEN RECEPTOR POSITIVE (HCC): Primary | ICD-10-CM

## 2024-06-07 PROCEDURE — 99024 POSTOP FOLLOW-UP VISIT: CPT

## 2024-06-07 NOTE — PROGRESS NOTES
Breast Surgery Post-Operative Visit    Diagnosis: Invasive lobular carcinoma, right breast, status post bilateral mastectomies with right sentinel lymph node dissection on 4/29/2024    Stage:  Cancer Staging   Malignant neoplasm of upper-outer quadrant of right breast in female, estrogen receptor positive (HCC)  Staging form: Breast, AJCC 8th Edition  - Clinical stage from 10/12/2023: Stage IIA (cT3, cN1(f), cM0, G2, ER+, HI+, HER2-) - Signed by Halie Calle MD on 5/8/2024  - Pathologic stage from 5/8/2024: ypT3, pN2a, cM0, G2, ER+, HI+, HER2- - Signed by Halie Calle MD on 5/8/2024    Disease Status:  Surgical treatment complete, neoadjuvant chemotherapy complete, further medical and radiation oncology recommendations pending.    History of Present Illness:   Ms. Jocelyn Garza is a 38 year old woman who presents with subjective mass of the right breast.  The patient first noticed this in October 2023.  She was diagnosed with lobular carcinoma with spread to the right axillary lymph nodes.  She has started neoadjuvant chemotherapy under the direction of Dr. Calle.  She has a family history of breast cancer and tested negative for genetic mutation with a variant seen in 1 GILLES D gene.  She has no personal prior history of breast disease or biopsies.  She denies any nipple discharge, skin changes or other concerns.  Her MRI demonstrated the enhancement from the area to span more than 9 cm.  Given extent of disease the patient underwent neoadjuvant chemotherapy she has completed this without sequelae.  She had posttreatment MRI on March 23, 2024 that demonstrated interval decrease in the size 2 was 7.3 cm with interval decrease in size of concern of the appearance of the right axillary lymph nodes. She underwent bilateral simple mastectomies with right axillary lymph node dissection, which occurred without complication. She is here for postoperative visit. She reports her pain is under control. She denies  erythema, warmth, drainage, or fevers.  Surgical drain was removed at her last visit. She is currently undergoing treatment with the lymphedema clinic.  She is here today for evaluation and recommendations for further therapy.        Past Medical History:    Breast cancer in female (HCC)    Right breast lymph node cancer    High blood pressure    Hx of motion sickness    Hypertension    Solitary kidney, congenital       Past Surgical History:   Procedure Laterality Date    Appendectomy  2007    Needle biopsy right Right 10/09/2023    Treat ectopic preg,rmv tube/ovary Left 2014    Treat ectopic preg,rmv tube/ovary Left 2015       Gynecological History:  Pt is a   She achieved menarche at age 10 and LMP 2023, currently undergoing chemotherapy  She denies any history of hormone replacement therapy   She has history of oral contraceptive use for 13 years, last in 18 years ago.  She has recieved infertility treatment to achieve pregnancy.    Medications:    No outpatient medications have been marked as taking for the 24 encounter (Appointment) with Sylvia Ken APRN.       Allergies:    No Known Allergies    Family History:   Family History   Problem Relation Age of Onset    Other (Parkinson's Disease) Father     Prostate Cancer Maternal Grandfather 70    Pancreatic Cancer Paternal Grandmother 80    Pancreatic Cancer Maternal Uncle 60    Cancer Maternal Great-Grandmother         gastric ca    Cancer Paternal Aunt 80        breast cancer    Pancreatic Cancer Paternal Great-Grandfather        She does not know if she is of Ashkenazi Evangel[employer] ancestry.    Social History:  History   Alcohol Use Not Currently     Comment: 2 -3 drinks per week       History   Smoking Status    Never   Smokeless Tobacco    Never     Ms. Jocelyn Garza is  with 2 adopted children. She has 1 siblings. She is currently On Medical Leave    Review of Systems:  General:   The patient denies, fever, chills,  +night sweats, +fatigue, +generalized weakness, change in appetite or weight loss.    HEENT:     The patient denies eye irritation, cataracts, redness, glaucoma, yellowing of the eyes, change in vision, color blindness, or wearing contacts/glasses. The patient denies hearing loss, ringing in the ears, ear drainage, earaches, +nasal congestion, nose bleeds, snoring, pain in mouth/throat, hoarseness, change in voice, facial trauma.    Respiratory:  The patient denies chronic cough, +phlegm, hemoptysis, pleurisy/chest pain, pneumonia, asthma, wheezing, difficulty in breathing with exertion, emphysema, chronic bronchitis, +shortness of breath or abnormal sound when breathing.     Cardiovascular:  There is no history of chest pain, chest pressure/discomfort, +palpitations, irregular heartbeat, fainting or near-fainting, difficulty breathing when lying flat, SOB/Coughing at night, swelling of the legs or chest pain while walking.    Breasts:  See history of present illness    Gastrointestinal:     There is no history of difficulty or pain with swallowing, reflux symptoms, vomiting, dark or bloody stools, constipation, yellowing of the skin, indigestion, nausea, change in bowel habits, diarrhea, abdominal pain or vomiting blood.     Genitourinary:  The patient denies frequent urination, needing to get up at night to urinate, urinary hesitancy or retaining urine, painful urination, urinary incontinence, decreased urine stream, blood in the urine or vaginal/penile discharge.    Skin:    The patient denies rash, itching, skin lesions, dry skin, change in skin color or change in moles.     Hematologic/Lymphatic:  The patient denies easily bruising or bleeding or persistent swollen glands or lymph nodes.     Musculoskeletal:  The patient denies muscle aches/pain, joint pain, +stiff joints, neck pain, back pain or bone pain.    Neuropsychiatric:  There is no history of migraines or severe headaches, seizure/epilepsy, speech  problems, coordination problems, trembling/tremors, fainting/black outs, dizziness, memory problems, loss of sensation/numbness, problems walking, weakness, tingling or burning in hands/feet. There is no history of abusive relationship, bipolar disorder, sleep disturbance, anxiety, depression or feeling of despair.    Endocrine:    There is no history of poor/slow wound healing, weight loss/gain, +fertility or hormone problems, cold intolerance, thyroid disease.     Allergic/Immunologic:  There is no history of hives, hay fever, angioedema or anaphylaxis.    LMP 11/21/2023 (Approximate)     Physical Exam:  The patient is an alert, oriented, well-nourished and  well-developed woman who appears her stated age. Her speech patterns and movements are normal. Her affect is appropriate.    HEENT: The head is normocephalic. The neck is supple. The thyroid is not enlarged and is without palpable masses/nodules. There are no palpable masses. The trachea is in the midline. Conjunctiva are clear, non-icteric.    Chest: The chest expands symmetrically. The lungs are clear to auscultation.    Heart: The rhythm is regular.  There are no murmurs, rubs, gallops or thrills.    Breasts:  Her breasts are surgically absent with clean dry and intact with no evidence of erythema or necrosis.  .    Abdomen:  The abdomen is soft, flat and non tender. The liver is not enlarged. There are no palpable masses.    Lymph Nodes:  The supraclavicular, axillary and cervical regions are free of significant lymphadenopathy.    Back: There is no vertebral column tenderness.    Skin: The skin appears normal. There are no suspicious appearing rashes or lesions.    Extremities: The extremities are without deformity, cyanosis or edema.    Impression:   Ms. Jocelyn Garza is a 38 year old woman presents with right breast cancer status post neoadjuvant chemotherapy and now status post bilateral mastectomy with right axillary lymph node dissection.      Recommendations:   I had a discussion with the Patient regarding her breast exam.  She is healing well since surgery with no signs of infection. She has no signs of symptoms of seroma. She will follow up with Dr. Mae in 1 week.  Given initial extent and residual extensive disease she will qualify for postmastectomy radiation therapy.   I encouraged her to continue monitoring her ROM and strength and explained that a referral to physical therapy may be warranted in the future if she identifies any limitations or restrictions. She was given ample opportunity for questions and those questions were answered to her satisfaction. She was encouraged to contact the office with any questions or concerns prior to her next scheduled appointment.

## 2024-06-11 ENCOUNTER — OFFICE VISIT (OUTPATIENT)
Dept: PHYSICAL THERAPY | Facility: HOSPITAL | Age: 39
End: 2024-06-11
Payer: COMMERCIAL

## 2024-06-11 PROCEDURE — 97140 MANUAL THERAPY 1/> REGIONS: CPT

## 2024-06-11 PROCEDURE — 97110 THERAPEUTIC EXERCISES: CPT

## 2024-06-11 NOTE — PROGRESS NOTES
Referring Provider:  Jesus Manuel  Diagnosis: Malignant neoplasm of upper-outer quadrant of right breast in female, estrogen receptor positive (HCC) (C50.411,Z17.0)      Date of onset: 4/29/2024 Evaluation Date: 5/31/2024     Physical Therapy Lymphedema Daily Note    Precautions:  HTN, R breast cancer, + chemo, will need radiation   Education or treatment limitation: none  Rehab Potential:good    Past Medical History:    Breast cancer in female (HCC)    Right breast lymph node cancer    High blood pressure    Hx of motion sickness    Hypertension    Solitary kidney, congenital         Pain:  3/10 across B chest and R axilla/trunk        Subjective: Pt feeling better, moving better.  Less pain complaints.    Objective:    6/6/2024:  Decreased flexibility B pect and lats  Swelling R upper/outer abdomen  Cording R axilla   Cording L abdomen  AROM/Strength:      5/31/2024  Right AROM 6/6/2024  'R AROM 5/31/2024  Left AROM 6/6/2024  L AROM   5/31/2024  Right Strength 5/31/2024  Left Strength   Shoulder                   Flexion 90 120 90 125 NT NT        Abduction 90 95 90 98 NT NT        IR wfl WFL WFL WFL NT NT        ER WFL WFL WFL WFL NT NT   Elbow                  Flexion WFL  WFL  NT NT        Extension WFL  WFL  NT NT                             5/31/2024 (Evaluation):  Sensation:  decreased sensation R axilla/inner upper arm  AROM/Strength:      5/31/2024  Right AROM 5/31/2024  Left AROM 5/31/2024  Right Strength 5/31/2024  Left Strength   Shoulder                 Flexion 90 90 NT NT        Abduction 90 90 NT NT        IR wfl WFL NT NT        ER WFL WFL NT NT   Elbow                Flexion WFL WFL NT NT        Extension WFL WFL NT NT                         Posture: Rounded shld. guarding     Palpation: hypersensitivity L abdomen/inferior to mastectomy incision, decreased sensation R chest/axilla      Edema/Tissue Observations:    - Swelling B chest  - Swelling B trunk (R more than L, especially posteriorly)                Trunk measurement:                             Under arms: 96.2 cm                             Level of mastectomy scars: 89.4 cm   Stemmer's Sign: negative        Arm Volume Measurements:  deferred (done recently at screening)       5/28/2024     8:00 PM 4/8/2024     2:00 PM   Lymphedema Calculations   DATE MEASURED 5/28/2024 4/8/2024   LOCATION/MEASUREMENTS RUE RUE   PATIENT POSITION  supine 1 pillow supine 1 pillow   LIMB POSITION 75 deg abduction 75 deg abduction   STARTING POINT 17cm from 3rd cuticle 17cm from 3rd cuticle   RIGHT HAND VOLUME 19.7cm across palm 19.7cm across palm   LEFT HAND VOLUME 19.5cm across palm 19.5cm across palm   MEASUREMENT A 15.5 15.5   MEASUREMENT B 16.6 16.7   MEASUREMENT C 19.1 19.2   MEASUREMENT D 23 23.1   MEASUREMENT E 25 25   MEASUREMENT F 24.5 24.5   MEASUREMENT G 25.5 25.3   MEASUREMENT H 27.5 27.4   MEASUREMENT I 29.6 29.4    TOTAL VOLUME  [id number] 1728.35706   DATE MEASURED 4/8/2024 4/8/2024   LOCATION/MEASUREMENTS [facility]   MEASUREMENT A 15.4 15.4   MEASUREMENT B 17.1 17   MEASUREMENT C 19.6 19.4   MEASUREMENT D 22.7 22.8   MEASUREMENT E 25.2 25.2   MEASUREMENT F 24.8 24.8   MEASUREMENT G 26.9 26.8   MEASUREMENT H 28.6 28.5   MEASUREMENT I 33.5 33.4    TOTAL VOLUME  [id number] 1843.2805   % DIFFERENCE -[phone] -[phone]                           Lymphedema Life Impact Scale: Evaluation Score 5/31/2024: LLIS Score Evaluation: 25 % impaired (0% is no impairment, 100% total impairment)           Today’s Treatment and Response:   Date 5/31/2024 Date: 6/3/2024 Date: 6/6/2024 Date: 6/11/2024 Date: * Date: *   Visit # 1/10  Certification From: 5/31/2024  To:8/29/2024     Visit # 2/10  Certification From: 5/31/2024  To:8/29/2024 Visit: #3/10  Certification From: 5/31/2024  To:8/29/2024 Visit: #4/10  Certification From: 5/31/2024  To:8/29/2024 Visit: #5/* Visit: #6/*   Manual Therapy (35 minutes)     MLD: neck sequence, diaphragmatic breathing, B inguinals, B trunk     STM B abd,  axilla, pect tendon     Compression:  - fabricated foam compression pad for R chest/trunk  - adjusted compression bra (moved hooks to provide better compression without overcompression lower band)  - [facility] confirmed in-network, messaged the to contact pt to set up fitting for prophylactic sleeve and gauntlet           Manual therapy (40 minutes)      MLD: neck sequence, diaphragmatic breathing, B inguinals, B trunk     STM B abd, axilla, pect tendon      *reviewed using washcloth, loofa type sponge in shower to desensitize area of abdomen and lateral trunk    Compression:  -pt wearing foam on R side  -fabricated smaller foam for L chest  -pt will be measured for compression sleeve next Thursday morning.      Manual Therapy (45 min)    MLD: neck sequence, diaphragmatic breathing, B inguinals, B trunk     STM B abd, axilla, pect tendon, areas of cording    Compression  - encouraged pt to continue to use compression bra     Manual therapy (30 minutes)      MLD: neck sequence, diaphragmatic breathing, B inguinals, B trunk     STM B abd, axilla, pect tendon, areas of cording    Compression  - encouraged pt to continue to use compression bra        There Ex ( 10 minutes):  - pulleys flex, abd and scaption  - encouraged to purchase pulleys  - instr to cont w/ ROM exercises given on rehab screening        There Ex (5 minutes)    -PROM B shoulders    There Ex (10 minutes)    -PROM B shoulders  -LTR with arms out to side x 10 reps  -W slides in supine x10       ThereAct (20 min):         Self-Care:  Management/Education: Explained Lymphedema diagnosis; informed patient of lymphedema precautions and risk reduction practices, and importance of skin care.      - instr in de-sensitizing techniques for left abd  - instr to do self STM of abdomen to initiate tissue movement  - instr to wear compression pad as-tolerate, remove if painful or becomes uncomfortable  - instr to avoid STM and lotion over incisions or drain  sites                     Assessment: ROM slowly improving as well as decreased sensitivity noted during STM and MLD techniques B'ly    Goals:   Goals (discussed and planned with patient involvement):      To be met in 10 visits:  1) Decrease swelling chest/trunk by a total of 3 cm to decrease risks of infections   2) Pt to be independent with self MLD, ROM exercises, and donning/doffing compression bandages/garments to facilitate swelling reduction and to be independent at self management once discharged  3) Increase B shoulder AROM flex and abd to 160 degrees, ER to at least 80 degrees when arms are elevated  to improve ease of dressing/bathing/and reaching overhead and to be able to tolerate positioning for radiation  4) Increase B UE strength to at least 4/5  to improve ease of lifting and performing household chores         Plan:    Frequency / Duration: Patient will be seen for 1-2 x/week or a total of 10 visits over a 90 day period. Frequency will decrease as symptoms improve.      Treatment will include: Complete Decongestive Therapy: Manual Therapy, Self-Care/Home Management Training, Therapeutic Exercise, Neuromuscular Re-education, Orthotic Management and Training        Charges: Mm2, Ex1    Total Timed Treatment: 45 min  Total Treatment Time: 45 min

## 2024-06-14 ENCOUNTER — OFFICE VISIT (OUTPATIENT)
Dept: PHYSICAL THERAPY | Facility: HOSPITAL | Age: 39
End: 2024-06-14
Payer: COMMERCIAL

## 2024-06-14 ENCOUNTER — OFFICE VISIT (OUTPATIENT)
Dept: SURGERY | Facility: CLINIC | Age: 39
End: 2024-06-14

## 2024-06-14 VITALS
TEMPERATURE: 98 F | BODY MASS INDEX: 26 KG/M2 | RESPIRATION RATE: 18 BRPM | OXYGEN SATURATION: 100 % | WEIGHT: 158.19 LBS | SYSTOLIC BLOOD PRESSURE: 98 MMHG | DIASTOLIC BLOOD PRESSURE: 62 MMHG | HEART RATE: 92 BPM

## 2024-06-14 DIAGNOSIS — C50.411 MALIGNANT NEOPLASM OF UPPER-OUTER QUADRANT OF RIGHT BREAST IN FEMALE, ESTROGEN RECEPTOR POSITIVE (HCC): Primary | ICD-10-CM

## 2024-06-14 DIAGNOSIS — Z17.0 MALIGNANT NEOPLASM OF UPPER-OUTER QUADRANT OF RIGHT BREAST IN FEMALE, ESTROGEN RECEPTOR POSITIVE (HCC): Primary | ICD-10-CM

## 2024-06-14 PROCEDURE — 99024 POSTOP FOLLOW-UP VISIT: CPT | Performed by: SURGERY

## 2024-06-14 PROCEDURE — 3078F DIAST BP <80 MM HG: CPT | Performed by: SURGERY

## 2024-06-14 PROCEDURE — 97140 MANUAL THERAPY 1/> REGIONS: CPT

## 2024-06-14 PROCEDURE — 3074F SYST BP LT 130 MM HG: CPT | Performed by: SURGERY

## 2024-06-14 NOTE — PROGRESS NOTES
Referring Provider:  Jesus Manuel  Diagnosis: Malignant neoplasm of upper-outer quadrant of right breast in female, estrogen receptor positive (HCC) (C50.411,Z17.0)      Date of onset: 4/29/2024 Evaluation Date: 5/31/2024     Physical Therapy Lymphedema Daily Note    Precautions:  HTN, R breast cancer, + chemo, will need radiation   Education or treatment limitation: none  Rehab Potential:good    Past Medical History:    Breast cancer in female (HCC)    Right breast lymph node cancer    High blood pressure    Hx of motion sickness    Hypertension    Solitary kidney, congenital         Pain:  3/10 across B chest and R axilla/trunk        Subjective: Pt feels better overall, but feeling \"more tightness\" then yesterday.  Pt following up with surgeon to get cleared for radiation therapy.    Objective:    6/14/2024:    Edema/Tissue Observations:    - Swelling B chest  - Swelling B trunk (R more than L, especially posteriorly)               Trunk measurement:                             Under arms: 95.4 cm                             Level of mastectomy scars: 89.4 cm - NO CHANGE TODAY  6/6/2024:  Decreased flexibility B pect and lats  Swelling R upper/outer abdomen  Cording R axilla   Cording L abdomen  AROM/Strength:      5/31/2024  Right AROM 6/6/2024  'R AROM 5/31/2024  Left AROM 6/6/2024  L AROM   5/31/2024  Right Strength 5/31/2024  Left Strength   Shoulder                   Flexion 90 120 90 125 NT NT        Abduction 90 95 90 98 NT NT        IR wfl WFL WFL WFL NT NT        ER WFL WFL WFL WFL NT NT   Elbow                  Flexion WFL  WFL  NT NT        Extension WFL  WFL  NT NT                             5/31/2024 (Evaluation):  Sensation:  decreased sensation R axilla/inner upper arm  AROM/Strength:      5/31/2024  Right AROM 5/31/2024  Left AROM 5/31/2024  Right Strength 5/31/2024  Left Strength   Shoulder                 Flexion 90 90 NT NT        Abduction 90 90 NT NT        IR wfl WFL NT NT        ER WFL WFL NT NT    Elbow                Flexion WFL WFL NT NT        Extension WFL WFL NT NT                         Posture: Rounded shld. guarding     Palpation: hypersensitivity L abdomen/inferior to mastectomy incision, decreased sensation R chest/axilla      Edema/Tissue Observations:    - Swelling B chest  - Swelling B trunk (R more than L, especially posteriorly)               Trunk measurement:                             Under arms: 96.2 cm                             Level of mastectomy scars: 89.4 cm   Stemmer's Sign: negative        Arm Volume Measurements:  deferred (done recently at screening)       5/28/2024     8:00 PM 4/8/2024     2:00 PM   Lymphedema Calculations   DATE MEASURED 5/28/2024 4/8/2024   LOCATION/MEASUREMENTS RUE RUE   PATIENT POSITION  supine 1 pillow supine 1 pillow   LIMB POSITION 75 deg abduction 75 deg abduction   STARTING POINT 17cm from 3rd cuticle 17cm from 3rd cuticle   RIGHT HAND VOLUME 19.7cm across palm 19.7cm across palm   LEFT HAND VOLUME 19.5cm across palm 19.5cm across palm   MEASUREMENT A 15.5 15.5   MEASUREMENT B 16.6 16.7   MEASUREMENT C 19.1 19.2   MEASUREMENT D 23 23.1   MEASUREMENT E 25 25   MEASUREMENT F 24.5 24.5   MEASUREMENT G 25.5 25.3   MEASUREMENT H 27.5 27.4   MEASUREMENT I 29.6 29.4    TOTAL VOLUME  1732.55640 1728.62140   DATE MEASURED 4/8/2024 4/8/2024   LOCATION/MEASUREMENTS [facility]   MEASUREMENT A 15.4 15.4   MEASUREMENT B 17.1 17   MEASUREMENT C 19.6 19.4   MEASUREMENT D 22.7 22.8   MEASUREMENT E 25.2 25.2   MEASUREMENT F 24.8 24.8   MEASUREMENT G 26.9 26.8   MEASUREMENT H 28.6 28.5   MEASUREMENT I 33.5 33.4    TOTAL VOLUME  1851.77771 1843.2805   % DIFFERENCE -6.94553 -6.18830                           Lymphedema Life Impact Scale: Evaluation Score 5/31/2024: LLIS Score Evaluation: 25 % impaired (0% is no impairment, 100% total impairment)           Today’s Treatment and Response:   Date 5/31/2024 Date: 6/3/2024 Date: 6/6/2024 Date: 6/11/2024 Date: 6/14/2024 Date: *    Visit # 1/10  Certification From: 5/31/2024  To:8/29/2024     Visit # 2/10  Certification From: 5/31/2024  To:8/29/2024 Visit: #3/10  Certification From: 5/31/2024  To:8/29/2024 Visit: #4/10  Certification From: 5/31/2024  To:8/29/2024 Visit: #5/10  Certification From: 5/31/2024  To:8/29/2024 Visit: #6/*   Manual Therapy (35 minutes)     MLD: neck sequence, diaphragmatic breathing, B inguinals, B trunk     STM B abd, axilla, pect tendon     Compression:  - fabricated foam compression pad for R chest/trunk  - adjusted compression bra (moved hooks to provide better compression without overcompression lower band)  - [facility] confirmed in-network, messaged the to contact pt to set up fitting for prophylactic sleeve and gauntlet           Manual therapy (40 minutes)      MLD: neck sequence, diaphragmatic breathing, B inguinals, B trunk     STM B abd, axilla, pect tendon      *reviewed using washcloth, loofa type sponge in shower to desensitize area of abdomen and lateral trunk    Compression:  -pt wearing foam on R side  -fabricated smaller foam for L chest  -pt will be measured for compression sleeve next Thursday morning.      Manual Therapy (45 min)    MLD: neck sequence, diaphragmatic breathing, B inguinals, B trunk     STM B abd, axilla, pect tendon, areas of cording    Compression  - encouraged pt to continue to use compression bra     Manual therapy (30 minutes)      MLD: neck sequence, diaphragmatic breathing, B inguinals, B trunk     STM B abd, axilla, pect tendon, areas of cording    Compression  - encouraged pt to continue to use compression bra   Manual therapy (40 minutes)    MLD: neck sequence, diaphragmatic breathing, B inguinals, B trunk     STM B abd, axilla, pect tendon, areas of cording - no cavitation but cording stretched giving some relief of tightness.    Compression:  -pt measured for compression yesterday 6/13/2924 ([facility])       There Ex ( 10 minutes):  - pulleys flex, abd  and scaption  - encouraged to purchase pulleys  - instr to cont w/ ROM exercises given on rehab screening        There Ex (5 minutes)    -PROM B shoulders    There Ex (10 minutes)    -PROM B shoulders  -LTR with arms out to side x 10 reps  -W slides in supine x10  There Ex (5 minutes)    -encouraged daily stretching  -PROM B Shoulders  -supine W slides     ThereAct (20 min):         Self-Care:  Management/Education: Explained Lymphedema diagnosis; informed patient of lymphedema precautions and risk reduction practices, and importance of skin care.      - instr in de-sensitizing techniques for left abd  - instr to do self STM of abdomen to initiate tissue movement  - instr to wear compression pad as-tolerate, remove if painful or becomes uncomfortable  - instr to avoid STM and lotion over incisions or drain sites                     Assessment: Pt may be cleared to start radiation therapy.  Continue with PT to address deficits in B UE decreased ROM/strength and swelling B axilla, chest, trunk, UE's s/p B breast mastectomy and ALND      Goals:   Goals (discussed and planned with patient involvement):      To be met in 10 visits:  1) Decrease swelling chest/trunk by a total of 3 cm to decrease risks of infections   2) Pt to be independent with self MLD, ROM exercises, and donning/doffing compression bandages/garments to facilitate swelling reduction and to be independent at self management once discharged  3) Increase B shoulder AROM flex and abd to 160 degrees, ER to at least 80 degrees when arms are elevated  to improve ease of dressing/bathing/and reaching overhead and to be able to tolerate positioning for radiation  4) Increase B UE strength to at least 4/5  to improve ease of lifting and performing household chores         Plan:    Frequency / Duration: Patient will be seen for 1-2 x/week or a total of 10 visits over a 90 day period. Frequency will decrease as symptoms improve.      Treatment will include:  Complete Decongestive Therapy: Manual Therapy, Self-Care/Home Management Training, Therapeutic Exercise, Neuromuscular Re-education, Orthotic Management and Training        Charges: Mm3  Total Timed Treatment: 45 min  Total Treatment Time: 45 min

## 2024-06-17 ENCOUNTER — OFFICE VISIT (OUTPATIENT)
Dept: PHYSICAL THERAPY | Facility: HOSPITAL | Age: 39
End: 2024-06-17
Payer: COMMERCIAL

## 2024-06-17 PROCEDURE — 97110 THERAPEUTIC EXERCISES: CPT

## 2024-06-17 PROCEDURE — 97140 MANUAL THERAPY 1/> REGIONS: CPT

## 2024-06-17 NOTE — PROGRESS NOTES
Referring Provider:  Jesus Manuel  Diagnosis: Malignant neoplasm of upper-outer quadrant of right breast in female, estrogen receptor positive (HCC) (C50.411,Z17.0)      Date of onset: 4/29/2024 Evaluation Date: 5/31/2024     Physical Therapy Lymphedema Daily Note    Precautions:  HTN, R breast cancer, + chemo, will need radiation   Education or treatment limitation: none  Rehab Potential:good    Past Medical History:    Breast cancer in female (HCC)    Right breast lymph node cancer    High blood pressure    Hx of motion sickness    Hypertension    Solitary kidney, congenital         Pain:  3/10 across B chest and R axilla/trunk        Subjective: Pt feels better overall, but feeling \"more tightness\" then yesterday.  Pt following up with surgeon to get cleared for radiation therapy.    Objective:    6/14/2024:    Edema/Tissue Observations:    - Swelling B chest  - Swelling B trunk (R more than L, especially posteriorly)               Trunk measurement:                             Under arms: 95.4 cm                             Level of mastectomy scars: 89.4 cm - NO CHANGE TODAY  6/6/2024:  Decreased flexibility B pect and lats  Swelling R upper/outer abdomen  Cording R axilla   Cording L abdomen  AROM/Strength:      5/31/2024  Right AROM 6/6/2024  'R AROM 5/31/2024  Left AROM 6/6/2024  L AROM   5/31/2024  Right Strength 5/31/2024  Left Strength   Shoulder                   Flexion 90 120 90 125 NT NT        Abduction 90 95 90 98 NT NT        IR wfl WFL WFL WFL NT NT        ER WFL WFL WFL WFL NT NT   Elbow                  Flexion WFL  WFL  NT NT        Extension WFL  WFL  NT NT                             5/31/2024 (Evaluation):  Sensation:  decreased sensation R axilla/inner upper arm  AROM/Strength:      5/31/2024  Right AROM 5/31/2024  Left AROM 5/31/2024  Right Strength 5/31/2024  Left Strength   Shoulder                 Flexion 90 90 NT NT        Abduction 90 90 NT NT        IR wfl WFL NT NT        ER WFL WFL NT NT    Elbow                Flexion WFL WFL NT NT        Extension WFL WFL NT NT                         Posture: Rounded shld. guarding     Palpation: hypersensitivity L abdomen/inferior to mastectomy incision, decreased sensation R chest/axilla      Edema/Tissue Observations:    - Swelling B chest  - Swelling B trunk (R more than L, especially posteriorly)               Trunk measurement:                             Under arms: 96.2 cm                             Level of mastectomy scars: 89.4 cm   Stemmer's Sign: negative        Arm Volume Measurements:  deferred (done recently at screening)       5/28/2024     8:00 PM 4/8/2024     2:00 PM   Lymphedema Calculations   DATE MEASURED 5/28/2024 4/8/2024   LOCATION/MEASUREMENTS RUE RUE   PATIENT POSITION  supine 1 pillow supine 1 pillow   LIMB POSITION 75 deg abduction 75 deg abduction   STARTING POINT 17cm from 3rd cuticle 17cm from 3rd cuticle   RIGHT HAND VOLUME 19.7cm across palm 19.7cm across palm   LEFT HAND VOLUME 19.5cm across palm 19.5cm across palm   MEASUREMENT A 15.5 15.5   MEASUREMENT B 16.6 16.7   MEASUREMENT C 19.1 19.2   MEASUREMENT D 23 23.1   MEASUREMENT E 25 25   MEASUREMENT F 24.5 24.5   MEASUREMENT G 25.5 25.3   MEASUREMENT H 27.5 27.4   MEASUREMENT I 29.6 29.4    TOTAL VOLUME  1732.96802 1728.34185   DATE MEASURED 4/8/2024 4/8/2024   LOCATION/MEASUREMENTS [facility]   MEASUREMENT A 15.4 15.4   MEASUREMENT B 17.1 17   MEASUREMENT C 19.6 19.4   MEASUREMENT D 22.7 22.8   MEASUREMENT E 25.2 25.2   MEASUREMENT F 24.8 24.8   MEASUREMENT G 26.9 26.8   MEASUREMENT H 28.6 28.5   MEASUREMENT I 33.5 33.4    TOTAL VOLUME  1851.29705 1843.2805   % DIFFERENCE -[phone] -[phone]                           Lymphedema Life Impact Scale: Evaluation Score 5/31/2024: LLIS Score Evaluation: 25 % impaired (0% is no impairment, 100% total impairment)           Today’s Treatment and Response:   Date 5/31/2024 Date: 6/3/2024 Date: 6/6/2024 Date: 6/11/2024 Date: 6/14/2024 Date:  6/17/2024   Visit # 1/10  Certification From: 5/31/2024  To:8/29/2024     Visit # 2/10  Certification From: 5/31/2024  To:8/29/2024 Visit: #3/10  Certification From: 5/31/2024  To:8/29/2024 Visit: #4/10  Certification From: 5/31/2024  To:8/29/2024 Visit: #5/10  Certification From: 5/31/2024  To:8/29/2024 Visit: #6/10  Certification From: 5/31/2024  To:8/29/2024   Manual Therapy (35 minutes)     MLD: neck sequence, diaphragmatic breathing, B inguinals, B trunk     STM B abd, axilla, pect tendon     Compression:  - fabricated foam compression pad for R chest/trunk  - adjusted compression bra (moved hooks to provide better compression without overcompression lower band)  - [employer] confirmed in-network, messaged the to contact pt to set up fitting for prophylactic sleeve and gauntlet           Manual therapy (40 minutes)      MLD: neck sequence, diaphragmatic breathing, B inguinals, B trunk     STM B abd, axilla, pect tendon      *reviewed using washcloth, loofa type sponge in shower to desensitize area of abdomen and lateral trunk    Compression:  -pt wearing foam on R side  -fabricated smaller foam for L chest  -pt will be measured for compression sleeve next Thursday morning.      Manual Therapy (45 min)    MLD: neck sequence, diaphragmatic breathing, B inguinals, B trunk     STM B abd, axilla, pect tendon, areas of cording    Compression  - encouraged pt to continue to use compression bra     Manual therapy (30 minutes)      MLD: neck sequence, diaphragmatic breathing, B inguinals, B trunk     STM B abd, axilla, pect tendon, areas of cording    Compression  - encouraged pt to continue to use compression bra   Manual therapy (40 minutes)    MLD: neck sequence, diaphragmatic breathing, B inguinals, B trunk     STM B abd, axilla, pect tendon, areas of cording - no cavitation but cording stretched giving some relief of tightness.    Compression:  -pt measured for compression yesterday 6/13/2924 ([facility])    Manual therapy (35 minutes)    MLD: neck sequence, diaphragmatic breathing, B inguinals, B trunk     STM B abd, axilla, pect tendon, areas of cording - no cavitation but cording stretched giving some relief of tightness.      Compression:  -pt measured for compression yesterday 6/13/2924 ([facility])    -discussed with patient that she can remove compression pads fabricated as needed. (Pt wants to swim with kids before radiation starts)   There Ex ( 10 minutes):  - pulleys flex, abd and scaption  - encouraged to purchase pulleys  - instr to cont w/ ROM exercises given on rehab screening        There Ex (5 minutes)    -PROM B shoulders    There Ex (10 minutes)    -PROM B shoulders  -LTR with arms out to side x 10 reps  -W slides in supine x10  There Ex (5 minutes)    -encouraged daily stretching  -PROM B Shoulders  -supine W slides  There Ex (10 Minutes)  PROM B shoulders  W slides   ThereAct (20 min):         Self-Care:  Management/Education: Explained Lymphedema diagnosis; informed patient of lymphedema precautions and risk reduction practices, and importance of skin care.      - instr in de-sensitizing techniques for left abd  - instr to do self STM of abdomen to initiate tissue movement  - instr to wear compression pad as-tolerate, remove if painful or becomes uncomfortable  - instr to avoid STM and lotion over incisions or drain sites                     Assessment: Pt meeting with rad onc this Thursday for CT mapping.      Goals:   Goals (discussed and planned with patient involvement):      To be met in 10 visits:  1) Decrease swelling chest/trunk by a total of 3 cm to decrease risks of infections   2) Pt to be independent with self MLD, ROM exercises, and donning/doffing compression bandages/garments to facilitate swelling reduction and to be independent at self management once discharged  3) Increase B shoulder AROM flex and abd to 160 degrees, ER to at least 80 degrees when arms are  elevated  to improve ease of dressing/bathing/and reaching overhead and to be able to tolerate positioning for radiation  4) Increase B UE strength to at least 4/5  to improve ease of lifting and performing household chores         Plan:    Frequency / Duration: Patient will be seen for 1-2 x/week or a total of 10 visits over a 90 day period. Frequency will decrease as symptoms improve.      Treatment will include: Complete Decongestive Therapy: Manual Therapy, Self-Care/Home Management Training, Therapeutic Exercise, Neuromuscular Re-education, Orthotic Management and Training        Charges: Mm2, Ex1  Total Timed Treatment: 45 min  Total Treatment Time: 45 min

## 2024-06-20 ENCOUNTER — HOSPITAL ENCOUNTER (OUTPATIENT)
Dept: RADIATION ONCOLOGY | Facility: HOSPITAL | Age: 39
Discharge: HOME | End: 2024-06-20
Attending: INTERNAL MEDICINE
Payer: COMMERCIAL

## 2024-06-20 ENCOUNTER — OFFICE VISIT (OUTPATIENT)
Dept: PHYSICAL THERAPY | Facility: HOSPITAL | Age: 39
End: 2024-06-20

## 2024-06-20 PROCEDURE — 77333 RADIATION TREATMENT AID(S): CPT | Performed by: INTERNAL MEDICINE

## 2024-06-20 PROCEDURE — 97110 THERAPEUTIC EXERCISES: CPT | Performed by: PHYSICAL THERAPIST

## 2024-06-20 PROCEDURE — 77290 THER RAD SIMULAJ FIELD CPLX: CPT | Performed by: INTERNAL MEDICINE

## 2024-06-20 PROCEDURE — 97140 MANUAL THERAPY 1/> REGIONS: CPT | Performed by: PHYSICAL THERAPIST

## 2024-06-20 NOTE — PROGRESS NOTES
Referring Provider:  Jesus Manuel  Diagnosis: Malignant neoplasm of upper-outer quadrant of right breast in female, estrogen receptor positive (HCC) (C50.411,Z17.0)      Date of onset: 4/29/2024 Evaluation Date: 5/31/2024     Physical Therapy Lymphedema Daily Note    Precautions:  HTN, R breast cancer, + chemo, will need radiation   Education or treatment limitation: none  Rehab Potential:good    Past Medical History:    Breast cancer in female (HCC)    Right breast lymph node cancer    High blood pressure    Hx of motion sickness    Hypertension    Solitary kidney, congenital         Pain:  3/10 across B chest and R axilla/trunk        Subjective: \"I'm feeling pretty good, just some areas of pulling)    Objective:    6/20/2024:  AROM/Strength:    Fascial tightness R axilla into arm  Decreased flexibility B pect  Decreased skin flexibility over chest  Swelling L chest (just inf to incision)     5/31/2024  Right AROM 6/6/2024  'R AROM 6/20/2024  R AROM 5/31/2024  Left AROM 6/6/2024  L AROM   6/20/2024   R AROM 5/31/2024  Right Strength 5/31/2024  Left Strength   Shoulder                     Flexion 90 120 145 90 125 145 NT NT        Abduction 90 95 132 90 98 133 NT NT        IR wfl WFL  WFL WFL  NT NT        ER WFL WFL  WFL WFL  NT NT   Elbow                    Flexion WFL   WFL   NT NT        Extension WFL   WFL   NT NT                               6/14/2024:    Edema/Tissue Observations:    - Swelling B chest  - Swelling B trunk (R more than L, especially posteriorly)               Trunk measurement:                             Under arms: 95.4 cm                             Level of mastectomy scars: 89.4 cm - NO CHANGE TODAY  6/6/2024:  Decreased flexibility B pect and lats  Swelling R upper/outer abdomen  Cording R axilla   Cording L abdomen  AROM/Strength:      5/31/2024  Right AROM 6/6/2024  'R AROM 5/31/2024  Left AROM 6/6/2024  L AROM   5/31/2024  Right Strength 5/31/2024  Left Strength   Shoulder                    Flexion 90 120 90 125 NT NT        Abduction 90 95 90 98 NT NT        IR wfl WFL WFL WFL NT NT        ER WFL WFL WFL WFL NT NT   Elbow                  Flexion WFL  WFL  NT NT        Extension WFL  WFL  NT NT                             5/31/2024 (Evaluation):  Sensation:  decreased sensation R axilla/inner upper arm  AROM/Strength:      5/31/2024  Right AROM 5/31/2024  Left AROM 5/31/2024  Right Strength 5/31/2024  Left Strength   Shoulder                 Flexion 90 90 NT NT        Abduction 90 90 NT NT        IR wfl WFL NT NT        ER WFL WFL NT NT   Elbow                Flexion WFL WFL NT NT        Extension WFL WFL NT NT                         Posture: Rounded shld. guarding     Palpation: hypersensitivity L abdomen/inferior to mastectomy incision, decreased sensation R chest/axilla      Edema/Tissue Observations:    - Swelling B chest  - Swelling B trunk (R more than L, especially posteriorly)               Trunk measurement:                             Under arms: 96.2 cm                             Level of mastectomy scars: 89.4 cm   Stemmer's Sign: negative        Arm Volume Measurements:  deferred (done recently at screening)       5/28/2024     8:00 PM 4/8/2024     2:00 PM   Lymphedema Calculations   DATE MEASURED 5/28/2024 4/8/2024   LOCATION/MEASUREMENTS [facility]   PATIENT POSITION  supine 1 pillow supine 1 pillow   LIMB POSITION 75 deg abduction 75 deg abduction   STARTING POINT 17cm from 3rd cuticle 17cm from 3rd cuticle   RIGHT HAND VOLUME 19.7cm across palm 19.7cm across palm   LEFT HAND VOLUME 19.5cm across palm 19.5cm across palm   MEASUREMENT A 15.5 15.5   MEASUREMENT B 16.6 16.7   MEASUREMENT C 19.1 19.2   MEASUREMENT D 23 23.1   MEASUREMENT E 25 25   MEASUREMENT F 24.5 24.5   MEASUREMENT G 25.5 25.3   MEASUREMENT H 27.5 27.4   MEASUREMENT I 29.6 29.4    TOTAL VOLUME  1732.53020 1728.35452   DATE MEASURED 4/8/2024 4/8/2024   LOCATION/MEASUREMENTS [facility]   MEASUREMENT A 15.4 15.4   MEASUREMENT  B 17.1 17   MEASUREMENT C 19.6 19.4   MEASUREMENT D 22.7 22.8   MEASUREMENT E 25.2 25.2   MEASUREMENT F 24.8 24.8   MEASUREMENT G 26.9 26.8   MEASUREMENT H 28.6 28.5   MEASUREMENT I 33.5 33.4    TOTAL VOLUME  1851.14616 1843.2805   % DIFFERENCE -6.04786 -6.47875                           Lymphedema Life Impact Scale: Evaluation Score 5/31/2024: LLIS Score Evaluation: 25 % impaired (0% is no impairment, 100% total impairment)           Today’s Treatment and Response:   Date 5/31/2024 Date: 6/3/2024 Date: 6/6/2024 Date: 6/11/2024 Date: 6/14/2024 Date: 6/17/2024 6/20/2024   Visit # 1/10  Certification From: 5/31/2024  To:8/29/2024     Visit # 2/10  Certification From: 5/31/2024  To:8/29/2024 Visit: #3/10  Certification From: 5/31/2024  To:8/29/2024 Visit: #4/10  Certification From: 5/31/2024  To:8/29/2024 Visit: #5/10  Certification From: 5/31/2024  To:8/29/2024 Visit: #6/10  Certification From: 5/31/2024  To:8/29/2024 Visit: #7/10  Certification From: 5/31/2024  To:8/29/2024   Manual Therapy (35 minutes)     MLD: neck sequence, diaphragmatic breathing, B inguinals, B trunk     STM B abd, axilla, pect tendon     Compression:  - fabricated foam compression pad for R chest/trunk  - adjusted compression bra (moved hooks to provide better compression without overcompression lower band)  - [facility] confirmed in-network, messaged the to contact pt to set up fitting for prophylactic sleeve and gauntlet           Manual therapy (40 minutes)      MLD: neck sequence, diaphragmatic breathing, B inguinals, B trunk     STM B abd, axilla, pect tendon      *reviewed using washcloth, loofa type sponge in shower to desensitize area of abdomen and lateral trunk    Compression:  -pt wearing foam on R side  -fabricated smaller foam for L chest  -pt will be measured for compression sleeve next Thursday morning.      Manual Therapy (45 min)    MLD: neck sequence, diaphragmatic breathing, B inguinals, B trunk     STM B abd, axilla,  pect tendon, areas of cording    Compression  - encouraged pt to continue to use compression bra     Manual therapy (30 minutes)      MLD: neck sequence, diaphragmatic breathing, B inguinals, B trunk     STM B abd, axilla, pect tendon, areas of cording    Compression  - encouraged pt to continue to use compression bra   Manual therapy (40 minutes)    MLD: neck sequence, diaphragmatic breathing, B inguinals, B trunk     STM B abd, axilla, pect tendon, areas of cording - no cavitation but cording stretched giving some relief of tightness.    Compression:  -pt measured for compression yesterday 6/13/2924 ([facility])    Manual therapy (35 minutes)    MLD: neck sequence, diaphragmatic breathing, B inguinals, B trunk     STM B abd, axilla, pect tendon, areas of cording - no cavitation but cording stretched giving some relief of tightness.      Compression:  -pt measured for compression yesterday 6/13/2924 ([facility])    -discussed with patient that she can remove compression pads fabricated as needed. (Pt wants to swim with kids before radiation starts) Manual therapy (25 min)      MLD: neck sequence, diaphragmatic breathing, B inguinals, B trunk     STM B abd, axilla, pect tendon,    There Ex ( 10 minutes):  - pulleys flex, abd and scaption  - encouraged to purchase pulleys  - instr to cont w/ ROM exercises given on rehab screening        There Ex (5 minutes)    -PROM B shoulders    There Ex (10 minutes)    -PROM B shoulders  -LTR with arms out to side x 10 reps  -W slides in supine x10  There Ex (5 minutes)    -encouraged daily stretching  -PROM B Shoulders  -supine W slides  There Ex (10 Minutes)  PROM B shoulders  W slides There Ex (20 min)    - sidely windmills CW/CCW B x 10  - sidely horiz abd x 10 B   - supine shld \"butterflies\" x 10   - kneeling child pose x 3  - kneeling angled child pose x 3  -doorway stretch x 3   ThereAct (20 min):         Self-Care:  Management/Education: Explained Lymphedema  diagnosis; informed patient of lymphedema precautions and risk reduction practices, and importance of skin care.      - instr in de-sensitizing techniques for left abd  - instr to do self STM of abdomen to initiate tissue movement  - instr to wear compression pad as-tolerate, remove if painful or becomes uncomfortable  - instr to avoid STM and lotion over incisions or drain sites                      Assessment: ROM improving, pt should be able to tolerate positioning needed for radiation       Goals:   Goals (discussed and planned with patient involvement):      To be met in 10 visits:  1) Decrease swelling chest/trunk by a total of 3 cm to decrease risks of infections   2) Pt to be independent with self MLD, ROM exercises, and donning/doffing compression bandages/garments to facilitate swelling reduction and to be independent at self management once discharged  3) Increase B shoulder AROM flex and abd to 160 degrees, ER to at least 80 degrees when arms are elevated  to improve ease of dressing/bathing/and reaching overhead and to be able to tolerate positioning for radiation- in progress  4) Increase B UE strength to at least 4/5  to improve ease of lifting and performing household chores         Plan:    Frequency / Duration: Patient will be seen for 1-2 x/week or a total of 10 visits over a 90 day period. Frequency will decrease as symptoms improve.      Treatment will include: Complete Decongestive Therapy: Manual Therapy, Self-Care/Home Management Training, Therapeutic Exercise, Neuromuscular Re-education, Orthotic Management and Training        Charges: Mm2, Ex1   Total Timed Treatment: 45 min  Total Treatment Time: 45 min

## 2024-06-25 ENCOUNTER — TELEPHONE (OUTPATIENT)
Dept: HEMATOLOGY/ONCOLOGY | Facility: HOSPITAL | Age: 39
End: 2024-06-25

## 2024-06-25 ENCOUNTER — OFFICE VISIT (OUTPATIENT)
Dept: PHYSICAL THERAPY | Facility: HOSPITAL | Age: 39
End: 2024-06-25

## 2024-06-25 PROCEDURE — 97140 MANUAL THERAPY 1/> REGIONS: CPT | Performed by: PHYSICAL THERAPIST

## 2024-06-25 PROCEDURE — 97110 THERAPEUTIC EXERCISES: CPT | Performed by: PHYSICAL THERAPIST

## 2024-06-25 NOTE — PROGRESS NOTES
Referring Provider:  Jesus Manuel  Diagnosis: Malignant neoplasm of upper-outer quadrant of right breast in female, estrogen receptor positive (HCC) (C50.411,Z17.0)      Date of onset: 4/29/2024 Evaluation Date: 5/31/2024     Physical Therapy Lymphedema Daily Note    Precautions:  HTN, R breast cancer, + chemo, will need radiation   Education or treatment limitation: none  Rehab Potential:good    Past Medical History:    Breast cancer in female (HCC)    Right breast lymph node cancer    High blood pressure    Hx of motion sickness    Hypertension    Solitary kidney, congenital         Pain: 2/10 across B chest and R axilla/trunk, \"more of a stretch\"     Subjective: \"I was able to ride my stationary bike yesterday and it felt really\"     Objective:    6/25/2024:  - Swelling B trunk (R more than L, especially posteriorly)               Trunk measurement:                             Under arms: 95.4 cm (IE: 96.2)                            Level of mastectomy scars: 89 cm (IE: 89.4)  - Shoulder AROM:   Flex: 145 B    Abd: R 160, L 155 (verb cues to \"fully reach up\"   - shoulder strength: 4/5 B  - poor abd strength- pt w/ poor stabilization when reaching overhead (increased lumbar extension)        6/20/2024:  AROM/Strength:    Fascial tightness R axilla into arm  Decreased flexibility B pect  Decreased skin flexibility over chest  Swelling L chest (just inf to incision)     5/31/2024  Right AROM 6/6/2024  'R AROM 6/20/2024  R AROM 5/31/2024  Left AROM 6/6/2024  L AROM   6/20/2024   R AROM 5/31/2024  Right Strength 5/31/2024  Left Strength   Shoulder                     Flexion 90 120 145 90 125 145 NT NT        Abduction 90 95 132 90 98 133 NT NT        IR wfl WFL  WFL WFL  NT NT        ER WFL WFL  WFL WFL  NT NT   Elbow                    Flexion WFL   WFL   NT NT        Extension WFL   WFL   NT NT                               6/14/2024:    Edema/Tissue Observations:    - Swelling B chest  - Swelling B trunk (R more than L,  especially posteriorly)               Trunk measurement:                             Under arms: 95.4 cm                             Level of mastectomy scars: 89.4 cm - NO CHANGE TODAY  6/6/2024:  Decreased flexibility B pect and lats  Swelling R upper/outer abdomen  Cording R axilla   Cording L abdomen  AROM/Strength:      5/31/2024  Right AROM 6/6/2024  'R AROM 5/31/2024  Left AROM 6/6/2024  L AROM   5/31/2024  Right Strength 5/31/2024  Left Strength   Shoulder                   Flexion 90 120 90 125 NT NT        Abduction 90 95 90 98 NT NT        IR wfl WFL WFL WFL NT NT        ER WFL WFL WFL WFL NT NT   Elbow                  Flexion WFL  WFL  NT NT        Extension WFL  WFL  NT NT                             5/31/2024 (Evaluation):  Sensation:  decreased sensation R axilla/inner upper arm  AROM/Strength:      5/31/2024  Right AROM 5/31/2024  Left AROM 5/31/2024  Right Strength 5/31/2024  Left Strength   Shoulder                 Flexion 90 90 NT NT        Abduction 90 90 NT NT        IR wfl WFL NT NT        ER WFL WFL NT NT   Elbow                Flexion WFL WFL NT NT        Extension WFL WFL NT NT                         Posture: Rounded shld. guarding     Palpation: hypersensitivity L abdomen/inferior to mastectomy incision, decreased sensation R chest/axilla      Edema/Tissue Observations:    - Swelling B chest  - Swelling B trunk (R more than L, especially posteriorly)               Trunk measurement:                             Under arms: 96.2 cm                             Level of mastectomy scars: 89.4 cm   Stemmer's Sign: negative        Arm Volume Measurements:  deferred (done recently at screening)       5/28/2024     8:00 PM 4/8/2024     2:00 PM   Lymphedema Calculations   DATE MEASURED 5/28/2024 4/8/2024   LOCATION/MEASUREMENTS RUE RUE   PATIENT POSITION  supine 1 pillow supine 1 pillow   LIMB POSITION 75 deg abduction 75 deg abduction   STARTING POINT 17cm from 3rd cuticle 17cm from 3rd cuticle    RIGHT HAND VOLUME 19.7cm across palm 19.7cm across palm   LEFT HAND VOLUME 19.5cm across palm 19.5cm across palm   MEASUREMENT A 15.5 15.5   MEASUREMENT B 16.6 16.7   MEASUREMENT C 19.1 19.2   MEASUREMENT D 23 23.1   MEASUREMENT E 25 25   MEASUREMENT F 24.5 24.5   MEASUREMENT G 25.5 25.3   MEASUREMENT H 27.5 27.4   MEASUREMENT I 29.6 29.4    TOTAL VOLUME  [id number]   DATE MEASURED 4/8/2024 4/8/2024   LOCATION/MEASUREMENTS LUE LUE   MEASUREMENT A 15.4 15.4   MEASUREMENT B 17.1 17   MEASUREMENT C 19.6 19.4   MEASUREMENT D 22.7 22.8   MEASUREMENT E 25.2 25.2   MEASUREMENT F 24.8 24.8   MEASUREMENT G 26.9 26.8   MEASUREMENT H 28.6 28.5   MEASUREMENT I 33.5 33.4    TOTAL VOLUME  [id number] 1843.2805   % DIFFERENCE -[phone] -[phone]                           Lymphedema Life Impact Scale: Evaluation Score 5/31/2024: LLIS Score Evaluation: 25 % impaired (0% is no impairment, 100% total impairment)           Today’s Treatment and Response:   Date: 6/14/2024 Date: 6/17/2024 6/20/2024 6/25/2024    Visit: #5/10  Certification From: 5/31/2024  To:8/29/2024 Visit: #6/10  Certification From: 5/31/2024  To:8/29/2024 Visit: #7/10  Certification From: 5/31/2024  To:8/29/2024 Visit: #8/10  Certification From: 5/31/2024  To:8/29/2024     Manual therapy (40 minutes)    MLD: neck sequence, diaphragmatic breathing, B inguinals, B trunk     STM B abd, axilla, pect tendon, areas of cording - no cavitation but cording stretched giving some relief of tightness.    Compression:  -pt measured for compression yesterday 6/13/2924 ([facility])    Manual therapy (35 minutes)    MLD: neck sequence, diaphragmatic breathing, B inguinals, B trunk     STM B abd, axilla, pect tendon, areas of cording - no cavitation but cording stretched giving some relief of tightness.      Compression:  -pt measured for compression yesterday 6/13/2924 ([facility])    -discussed with patient that she can remove compression pads fabricated as  needed. (Pt wants to swim with kids before radiation starts) Manual therapy (25 min)      MLD: neck sequence, diaphragmatic breathing, B inguinals, B trunk     STM B abd, axilla, pect tendon,  Manual therapy (25 min)      STM B abd, axilla, pect tendon. Manual stretching of pect.         MLD: neck sequence, diaphragmatic breathing, B inguinals, B trunk           Compression:  - discussed weaning off compression pad as long as she has good bra that provides compression     There Ex (5 minutes)    -encouraged daily stretching  -PROM B Shoulders  -supine W slides  There Ex (10 Minutes)  PROM B shoulders  W slides There Ex (20 min)    - sidely windmills CW/CCW B x 10  - sidely horiz abd x 10 B   - supine shld \"butterflies\" x 10   - kneeling child pose x 3  - kneeling angled child pose x 3  -doorway stretch x 3 There Ex (20 min)    - hookly PPT x 10 (10 sec hold)  - hookly PPT w/ slow marching x 10   - bridging x 10  - bridging w/ march x 10   - discussed isometric abd contractions when going for walks There Ex    Add scap exercises this session                 Assessment: ROM and strength improving, initiated abd exercises to improve stabilization.        Goals:   Goals (discussed and planned with patient involvement):      To be met in 10 visits:  1) Decrease swelling chest/trunk by a total of 3 cm to decrease risks of infections   2) Pt to be independent with self MLD, ROM exercises, and donning/doffing compression bandages/garments to facilitate swelling reduction and to be independent at self management once discharged  3) Increase B shoulder AROM flex and abd to 160 degrees, ER to at least 80 degrees when arms are elevated  to improve ease of dressing/bathing/and reaching overhead and to be able to tolerate positioning for radiation- in progress  4) Increase B UE strength to at least 4/5  to improve ease of lifting and performing household chores         Plan:   Progress scap exercises next session    Frequency /  Duration: Patient will be seen for 1-2 x/week or a total of 10 visits over a 90 day period. Frequency will decrease as symptoms improve.      Treatment will include: Complete Decongestive Therapy: Manual Therapy, Self-Care/Home Management Training, Therapeutic Exercise, Neuromuscular Re-education, Orthotic Management and Training        Charges: Mm2, Ex1    Total Timed Treatment: 45 min  Total Treatment Time: 45 min

## 2024-06-25 NOTE — TELEPHONE ENCOUNTER
Patient called and left message that Abemaciclib(Verzenio) has been authorized for a 0 dollar co pay and will be coming from [facility]. Instructed to not start medication and to call to schedule lab/education when knows delivery date.

## 2024-06-26 PROCEDURE — 77300 RADIATION THERAPY DOSE PLAN: CPT | Performed by: INTERNAL MEDICINE

## 2024-06-26 PROCEDURE — 77295 3-D RADIOTHERAPY PLAN: CPT | Performed by: INTERNAL MEDICINE

## 2024-06-26 NOTE — PROGRESS NOTES
Breast Surgery Post-Operative Visit    Diagnosis: Invasive lobular carcinoma, right breast, status post bilateral mastectomies with right sentinel lymph node dissection on 4/29/2024    Stage:  Cancer Staging   Malignant neoplasm of upper-outer quadrant of right breast in female, estrogen receptor positive (HCC)  Staging form: Breast, AJCC 8th Edition  - Clinical stage from 10/12/2023: Stage IIA (cT3, cN1(f), cM0, G2, ER+, VA+, HER2-) - Signed by Halie Calle MD on 5/8/2024  - Pathologic stage from 5/8/2024: ypT3, pN2a, cM0, G2, ER+, VA+, HER2- - Signed by Halie Calle MD on 5/8/2024    Disease Status:  Surgical treatment complete, neoadjuvant chemotherapy complete, further medical and radiation oncology recommendations pending.    History of Present Illness:   Ms. Jocelyn Garza is a 38 year old woman who presents with subjective mass of the right breast.  The patient first noticed this in October 2023.  She was diagnosed with lobular carcinoma with spread to the right axillary lymph nodes.  She has started neoadjuvant chemotherapy under the direction of Dr. Calle.  She has a family history of breast cancer and tested negative for genetic mutation with a variant seen in 1 GILLES D gene.  She has no personal prior history of breast disease or biopsies.  She denies any nipple discharge, skin changes or other concerns.  Her MRI demonstrated the enhancement from the area to span more than 9 cm.  Given extent of disease the patient underwent neoadjuvant chemotherapy she has completed this without sequelae.  She had posttreatment MRI on March 23, 2024 that demonstrated interval decrease in the size 2 was 7.3 cm with interval decrease in size of concern of the appearance of the right axillary lymph nodes. She underwent bilateral simple mastectomies with right axillary lymph node dissection, which occurred without complication. She is here for postoperative visit. She reports her pain is under control. She denies  erythema, warmth, drainage, or fevers.   She is currently undergoing treatment with the lymphedema clinic.  She is here today for evaluation and recommendations for further therapy.        Past Medical History:    Breast cancer in female (HCC)    Right breast lymph node cancer    High blood pressure    Hx of motion sickness    Hypertension    Solitary kidney, congenital       Past Surgical History:   Procedure Laterality Date    Appendectomy  2007    Needle biopsy right Right 10/09/2023    Treat ectopic preg,rmv tube/ovary Left 2014    Treat ectopic preg,rmv tube/ovary Left 2015       Gynecological History:  Pt is a   She achieved menarche at age 10 and LMP 2023, currently undergoing chemotherapy  She denies any history of hormone replacement therapy   She has history of oral contraceptive use for 13 years, last in 18 years ago.  She has recieved infertility treatment to achieve pregnancy.    Medications:     Abemaciclib 150 MG Oral Tab Take 1 tablet (150 mg total) by mouth 2 (two) times daily. may take with or without food 60 tablet 11    lisinopril 5 MG Oral Tab Take 1 tablet (5 mg total) by mouth daily. 90 tablet 1    Mometasone Furoate 0.1 % External Cream Apply BID during radiation therapy. 45 g 3    lidocaine-prilocaine 2.5-2.5 % External Cream Apply to site 1 hour prior to port a cath needle insertion 1 each 1       Allergies:    No Known Allergies    Family History:   Family History   Problem Relation Age of Onset    Other (Parkinson's Disease) Father     Prostate Cancer Maternal Grandfather 70    Pancreatic Cancer Paternal Grandmother 80    Pancreatic Cancer Maternal Uncle 60    Cancer Maternal Great-Grandmother         gastric ca    Cancer Paternal Aunt 80        breast cancer    Pancreatic Cancer Paternal Great-Grandfather        She does not know if she is of Ashkenazi [employer]an ancestry.    Social History:  History   Alcohol Use Not Currently     Comment: 2 -3 drinks per week        History   Smoking Status    Never   Smokeless Tobacco    Never     Ms. Jocelyn Garza is  with 2 adopted children. She has 1 siblings. She is currently On Medical Leave    Review of Systems:  General:   The patient denies, fever, chills, +night sweats, +fatigue, +generalized weakness, change in appetite or weight loss.    HEENT:     The patient denies eye irritation, cataracts, redness, glaucoma, yellowing of the eyes, change in vision, color blindness, or wearing contacts/glasses. The patient denies hearing loss, ringing in the ears, ear drainage, earaches, +nasal congestion, nose bleeds, snoring, pain in mouth/throat, hoarseness, change in voice, facial trauma.    Respiratory:  The patient denies chronic cough, +phlegm, hemoptysis, pleurisy/chest pain, pneumonia, asthma, wheezing, difficulty in breathing with exertion, emphysema, chronic bronchitis, +shortness of breath or abnormal sound when breathing.     Cardiovascular:  There is no history of chest pain, chest pressure/discomfort, +palpitations, irregular heartbeat, fainting or near-fainting, difficulty breathing when lying flat, SOB/Coughing at night, swelling of the legs or chest pain while walking.    Breasts:  See history of present illness    Gastrointestinal:     There is no history of difficulty or pain with swallowing, reflux symptoms, vomiting, dark or bloody stools, constipation, yellowing of the skin, indigestion, nausea, change in bowel habits, diarrhea, abdominal pain or vomiting blood.     Genitourinary:  The patient denies frequent urination, needing to get up at night to urinate, urinary hesitancy or retaining urine, painful urination, urinary incontinence, decreased urine stream, blood in the urine or vaginal/penile discharge.    Skin:    The patient denies rash, itching, skin lesions, dry skin, change in skin color or change in moles.     Hematologic/Lymphatic:  The patient denies easily bruising or bleeding or persistent  swollen glands or lymph nodes.     Musculoskeletal:  The patient denies muscle aches/pain, joint pain, +stiff joints, neck pain, back pain or bone pain.    Neuropsychiatric:  There is no history of migraines or severe headaches, seizure/epilepsy, speech problems, coordination problems, trembling/tremors, fainting/black outs, dizziness, memory problems, loss of sensation/numbness, problems walking, weakness, tingling or burning in hands/feet. There is no history of abusive relationship, bipolar disorder, sleep disturbance, anxiety, depression or feeling of despair.    Endocrine:    There is no history of poor/slow wound healing, weight loss/gain, +fertility or hormone problems, cold intolerance, thyroid disease.     Allergic/Immunologic:  There is no history of hives, hay fever, angioedema or anaphylaxis.    BP 98/62 (BP Location: Left arm, Patient Position: Sitting, Cuff Size: adult)   Pulse 92   Temp 97.9 °F (36.6 °C) (Temporal)   Resp 18   Wt 71.8 kg (158 lb 3.2 oz)   LMP 11/21/2023 (Approximate)   SpO2 100%   BMI 26.33 kg/m²     Physical Exam:  The patient is an alert, oriented, well-nourished and  well-developed woman who appears her stated age. Her speech patterns and movements are normal. Her affect is appropriate.    HEENT: The head is normocephalic. The neck is supple. The thyroid is not enlarged and is without palpable masses/nodules. There are no palpable masses. The trachea is in the midline. Conjunctiva are clear, non-icteric.    Chest: The chest expands symmetrically. The lungs are clear to auscultation.    Heart: The rhythm is regular.  There are no murmurs, rubs, gallops or thrills.    Breasts:  Her breasts are surgically absent with clean dry and intact with no evidence of erythema or necrosis.  .    Abdomen:  The abdomen is soft, flat and non tender. The liver is not enlarged. There are no palpable masses.    Lymph Nodes:  The supraclavicular, axillary and cervical regions are free of  significant lymphadenopathy.    Back: There is no vertebral column tenderness.    Skin: The skin appears normal. There are no suspicious appearing rashes or lesions.    Extremities: The extremities are without deformity, cyanosis or edema.    Impression:   Ms. Jocelyn Garza is a 38 year old woman presents with right breast cancer status post neoadjuvant chemotherapy and now status post bilateral mastectomy with right axillary lymph node dissection.     Recommendations:   I had a discussion with the Patient regarding her breast exam.  She is healing well since surgery with no signs of infection. She has no signs of symptoms of seroma. Given initial extent and residual extensive disease she will qualify for postmastectomy radiation therapy.   I encouraged her to continue monitoring her ROM and strength and explained that a referral to physical therapy is warranted in light of her restrictions.  She should see me in 6 months for her next clinical exam. She was given ample opportunity for questions and those questions were answered to her satisfaction. She was encouraged to contact the office with any questions or concerns prior to her next scheduled appointment.

## 2024-06-27 ENCOUNTER — OFFICE VISIT (OUTPATIENT)
Dept: PHYSICAL THERAPY | Facility: HOSPITAL | Age: 39
End: 2024-06-27

## 2024-06-27 PROCEDURE — 97140 MANUAL THERAPY 1/> REGIONS: CPT | Performed by: PHYSICAL THERAPIST

## 2024-06-27 PROCEDURE — 97110 THERAPEUTIC EXERCISES: CPT | Performed by: PHYSICAL THERAPIST

## 2024-06-27 NOTE — PROGRESS NOTES
Referring Provider:  Jesus Manuel  Diagnosis: Malignant neoplasm of upper-outer quadrant of right breast in female, estrogen receptor positive (HCC) (C50.411,Z17.0)      Date of onset: 4/29/2024 Evaluation Date: 5/31/2024     Physical Therapy Lymphedema Daily Note    Precautions:  HTN, R breast cancer, + chemo, will need radiation   Education or treatment limitation: none  Rehab Potential:good    Past Medical History:    Breast cancer in female (HCC)    Right breast lymph node cancer    High blood pressure    Hx of motion sickness    Hypertension    Solitary kidney, congenital         Pain: 1/10 \"just discomfort\"     Subjective: \"I feel a little tight on my side\"     Objective:    6/25/2024:  - Swelling B trunk (R more than L, especially posteriorly)               Trunk measurement:                             Under arms: 95.4 cm (IE: 96.2)                            Level of mastectomy scars: 89 cm (IE: 89.4)  - Shoulder AROM:   Flex: 145 B    Abd: R 160, L 155 (verb cues to \"fully reach up\"   - shoulder strength: 4/5 B  - poor abd strength- pt w/ poor stabilization when reaching overhead (increased lumbar extension)        6/20/2024:  AROM/Strength:    Fascial tightness R axilla into arm  Decreased flexibility B pect  Decreased skin flexibility over chest  Swelling L chest (just inf to incision)     5/31/2024  Right AROM 6/6/2024  'R AROM 6/20/2024  R AROM 5/31/2024  Left AROM 6/6/2024  L AROM   6/20/2024   R AROM 5/31/2024  Right Strength 5/31/2024  Left Strength   Shoulder                     Flexion 90 120 145 90 125 145 NT NT        Abduction 90 95 132 90 98 133 NT NT        IR wfl WFL  WFL WFL  NT NT        ER WFL WFL  WFL WFL  NT NT   Elbow                    Flexion WFL   WFL   NT NT        Extension WFL   WFL   NT NT                               6/14/2024:    Edema/Tissue Observations:    - Swelling B chest  - Swelling B trunk (R more than L, especially posteriorly)               Trunk measurement:                              Under arms: 95.4 cm                             Level of mastectomy scars: 89.4 cm - NO CHANGE TODAY  6/6/2024:  Decreased flexibility B pect and lats  Swelling R upper/outer abdomen  Cording R axilla   Cording L abdomen  AROM/Strength:      5/31/2024  Right AROM 6/6/2024  'R AROM 5/31/2024  Left AROM 6/6/2024  L AROM   5/31/2024  Right Strength 5/31/2024  Left Strength   Shoulder                   Flexion 90 120 90 125 NT NT        Abduction 90 95 90 98 NT NT        IR wfl WFL WFL WFL NT NT        ER WFL WFL WFL WFL NT NT   Elbow                  Flexion WFL  WFL  NT NT        Extension WFL  WFL  NT NT                             5/31/2024 (Evaluation):  Sensation:  decreased sensation R axilla/inner upper arm  AROM/Strength:      5/31/2024  Right AROM 5/31/2024  Left AROM 5/31/2024  Right Strength 5/31/2024  Left Strength   Shoulder                 Flexion 90 90 NT NT        Abduction 90 90 NT NT        IR wfl WFL NT NT        ER WFL WFL NT NT   Elbow                Flexion WFL WFL NT NT        Extension WFL WFL NT NT                         Posture: Rounded shld. guarding     Palpation: hypersensitivity L abdomen/inferior to mastectomy incision, decreased sensation R chest/axilla      Edema/Tissue Observations:    - Swelling B chest  - Swelling B trunk (R more than L, especially posteriorly)               Trunk measurement:                             Under arms: 96.2 cm                             Level of mastectomy scars: 89.4 cm   Stemmer's Sign: negative        Arm Volume Measurements:  deferred (done recently at screening)       5/28/2024     8:00 PM 4/8/2024     2:00 PM   Lymphedema Calculations   DATE MEASURED 5/28/2024 4/8/2024   LOCATION/MEASUREMENTS RUE RUE   PATIENT POSITION  supine 1 pillow supine 1 pillow   LIMB POSITION 75 deg abduction 75 deg abduction   STARTING POINT 17cm from 3rd cuticle 17cm from 3rd cuticle   RIGHT HAND VOLUME 19.7cm across palm 19.7cm across palm   LEFT HAND  VOLUME 19.5cm across palm 19.5cm across palm   MEASUREMENT A 15.5 15.5   MEASUREMENT B 16.6 16.7   MEASUREMENT C 19.1 19.2   MEASUREMENT D 23 23.1   MEASUREMENT E 25 25   MEASUREMENT F 24.5 24.5   MEASUREMENT G 25.5 25.3   MEASUREMENT H 27.5 27.4   MEASUREMENT I 29.6 29.4    TOTAL VOLUME  1732.36084 1728.55305   DATE MEASURED 4/8/2024 4/8/2024   LOCATION/MEASUREMENTS LUE LUE   MEASUREMENT A 15.4 15.4   MEASUREMENT B 17.1 17   MEASUREMENT C 19.6 19.4   MEASUREMENT D 22.7 22.8   MEASUREMENT E 25.2 25.2   MEASUREMENT F 24.8 24.8   MEASUREMENT G 26.9 26.8   MEASUREMENT H 28.6 28.5   MEASUREMENT I 33.5 33.4    TOTAL VOLUME  [id number] 1843.2805   % DIFFERENCE -[phone] -[phone]                           Lymphedema Life Impact Scale: Evaluation Score 5/31/2024: LLIS Score Evaluation: 25 % impaired (0% is no impairment, 100% total impairment)           Today’s Treatment and Response:   Date: 6/14/2024 Date: 6/17/2024 6/20/2024 6/25/2024 6/27/2024   Visit: #5/10  Certification From: 5/31/2024  To:8/29/2024 Visit: #6/10  Certification From: 5/31/2024  To:8/29/2024 Visit: #7/10  Certification From: 5/31/2024  To:8/29/2024 Visit: #8/10  Certification From: 5/31/2024  To:8/29/2024 Visit: #9/10  Certification From: 5/31/2024  To:8/29/2024    Manual therapy (40 minutes)    MLD: neck sequence, diaphragmatic breathing, B inguinals, B trunk     STM B abd, axilla, pect tendon, areas of cording - no cavitation but cording stretched giving some relief of tightness.    Compression:  -pt measured for compression yesterday 6/13/2924 ([facility])    Manual therapy (35 minutes)    MLD: neck sequence, diaphragmatic breathing, B inguinals, B trunk     STM B abd, axilla, pect tendon, areas of cording - no cavitation but cording stretched giving some relief of tightness.      Compression:  -pt measured for compression yesterday 6/13/2924 ([facility])    -discussed with patient that she can remove compression pads fabricated as  needed. (Pt wants to swim with kids before radiation starts) Manual therapy (25 min)      MLD: neck sequence, diaphragmatic breathing, B inguinals, B trunk     STM B abd, axilla, pect tendon,  Manual therapy (25 min)      STM B abd, axilla, pect tendon. Manual stretching of pect.         MLD: neck sequence, diaphragmatic breathing, B inguinals, B trunk           Compression:  - discussed weaning off compression pad as long as she has good bra that provides compression Manual Therapy (10 min)    STM and MLD techniques R axilla, chest, and trunk     There Ex (5 minutes)    -encouraged daily stretching  -PROM B Shoulders  -supine W slides  There Ex (10 Minutes)  PROM B shoulders  W slides There Ex (20 min)    - sidely windmills CW/CCW B x 10  - sidely horiz abd x 10 B   - supine shld \"butterflies\" x 10   - kneeling child pose x 3  - kneeling angled child pose x 3  -doorway stretch x 3 There Ex (20 min)    - hookly PPT x 10 (10 sec hold)  - hookly PPT w/ slow marching x 10   - bridging x 10  - bridging w/ march x 10   - discussed isometric abd contractions when going for walks There Ex (30 min)    - prone Rhomb x 10  - prone mid trap  x 10  - prone shld ext x 10  - standing rows (narrow and wide) GTB x 10 each  - standing shld ext GTB x 10   - supine serratus ant 3#  x 10 x2   - sidely clams x 10  - sidely hip abd x 10   - bridging x 10  - bridging w/ march x 10                Assessment: Pt having difficulty w/ hip exercises- pt w/ generalized weakness        Goals:   Goals (discussed and planned with patient involvement):      To be met in 10 visits:  1) Decrease swelling chest/trunk by a total of 3 cm to decrease risks of infections   2) Pt to be independent with self MLD, ROM exercises, and donning/doffing compression bandages/garments to facilitate swelling reduction and to be independent at self management once discharged  3) Increase B shoulder AROM flex and abd to 160 degrees, ER to at least 80 degrees when arms  are elevated  to improve ease of dressing/bathing/and reaching overhead and to be able to tolerate positioning for radiation- in progress  4) Increase B UE strength to at least 4/5  to improve ease of lifting and performing household chores         Plan:     Cont next week then anticipate holding treatment during radiation except if needed for pain/swelling issues     Treatment will include: Complete Decongestive Therapy: Manual Therapy, Self-Care/Home Management Training, Therapeutic Exercise, Neuromuscular Re-education, Orthotic Management and Training        Charges: Mm1, ex2    Total Timed Treatment: 40 min  Total Treatment Time: 45 min

## 2024-07-01 ENCOUNTER — TELEPHONE (OUTPATIENT)
Dept: HEMATOLOGY/ONCOLOGY | Facility: HOSPITAL | Age: 39
End: 2024-07-01

## 2024-07-01 NOTE — TELEPHONE ENCOUNTER
----- Message from Nadeen CABRAL sent at 7/1/2024  8:44 AM CDT -----  Regarding: FW: Rash on Neck  Contact: [phone]    ----- Message -----  From: Jocelyn Garza  Sent: 6/29/2024  10:18 PM CDT  To: Vickie Calle Rns  Subject: Rash on Neck                                     Nataliia Calle,  I hope you are doing well. A day or so ago I noticed what I thought was dry skin but now it seems like a rash. It itches but not horribly. Aquaphor has helped but it is getting bigger now. Could this be from Zoladex? My second shot is this Tuesday.  Photos attached.     Thank you!  Jocelyn Garza

## 2024-07-01 NOTE — TELEPHONE ENCOUNTER
Zoladex - Abemaciclib to be started near future    Patient noted rash to left neck (picture attached to [employer] communication) this weekend, it was itchy, crusty and looked like bruise.  She had been to pool and out is sun but was covered.  She has been applying Aquaphor and today area much better.  Was wondering if related to Zoladex injection as next one is due this week.    Denies fever or chills, no sob or chest pain, denies n/v.  Appetite is decreased lately.  Had diarrhea x 2 last night, stool brown color.  Denies lightheadedness or dizziness.  No urinary complaints.    Instructed to continue with present Aquaphor if helping, monitor site and call if worsening.    Please advise.

## 2024-07-02 ENCOUNTER — OFFICE VISIT (OUTPATIENT)
Dept: PHYSICAL THERAPY | Facility: HOSPITAL | Age: 39
End: 2024-07-02
Payer: COMMERCIAL

## 2024-07-02 ENCOUNTER — NURSE ONLY (OUTPATIENT)
Dept: HEMATOLOGY/ONCOLOGY | Facility: HOSPITAL | Age: 39
End: 2024-07-02
Attending: INTERNAL MEDICINE
Payer: COMMERCIAL

## 2024-07-02 ENCOUNTER — TELEPHONE (OUTPATIENT)
Dept: HEMATOLOGY/ONCOLOGY | Facility: HOSPITAL | Age: 39
End: 2024-07-02

## 2024-07-02 DIAGNOSIS — Z45.2 ENCOUNTER FOR CENTRAL LINE CARE: Primary | ICD-10-CM

## 2024-07-02 DIAGNOSIS — C50.411 MALIGNANT NEOPLASM OF UPPER-OUTER QUADRANT OF RIGHT BREAST IN FEMALE, ESTROGEN RECEPTOR POSITIVE (HCC): ICD-10-CM

## 2024-07-02 DIAGNOSIS — Z17.0 MALIGNANT NEOPLASM OF UPPER-OUTER QUADRANT OF RIGHT BREAST IN FEMALE, ESTROGEN RECEPTOR POSITIVE (HCC): ICD-10-CM

## 2024-07-02 PROCEDURE — 97110 THERAPEUTIC EXERCISES: CPT | Performed by: PHYSICAL THERAPIST

## 2024-07-02 PROCEDURE — 97140 MANUAL THERAPY 1/> REGIONS: CPT | Performed by: PHYSICAL THERAPIST

## 2024-07-02 PROCEDURE — 96402 CHEMO HORMON ANTINEOPL SQ/IM: CPT

## 2024-07-02 RX ORDER — HEPARIN SODIUM (PORCINE) LOCK FLUSH IV SOLN 100 UNIT/ML 100 UNIT/ML
5 SOLUTION INTRAVENOUS ONCE
Status: COMPLETED | OUTPATIENT
Start: 2024-07-02 | End: 2024-07-02

## 2024-07-02 RX ORDER — HEPARIN SODIUM (PORCINE) LOCK FLUSH IV SOLN 100 UNIT/ML 100 UNIT/ML
5 SOLUTION INTRAVENOUS ONCE
OUTPATIENT
Start: 2024-07-30

## 2024-07-02 RX ORDER — SODIUM CHLORIDE 9 MG/ML
10 INJECTION, SOLUTION INTRAMUSCULAR; INTRAVENOUS; SUBCUTANEOUS ONCE
OUTPATIENT
Start: 2024-07-30

## 2024-07-02 RX ADMIN — HEPARIN SODIUM (PORCINE) LOCK FLUSH IV SOLN 100 UNIT/ML 500 UNITS: 100 SOLUTION INTRAVENOUS at 08:47:00

## 2024-07-02 NOTE — PROGRESS NOTES
Referring Provider:  Jesus Manuel  Diagnosis: Malignant neoplasm of upper-outer quadrant of right breast in female, estrogen receptor positive (HCC) (C50.411,Z17.0)      Date of onset: 4/29/2024 Evaluation Date: 5/31/2024     Progress Summary    Pt has attended 10 visits in Physical Therapy.       Assessment: Pt w/ overall improvements in ROM, strength, and decreased swelling. Pt still w/ limited ROM and general deconditioning. Pt would benefit from cont PT to further improve ROM and strength.      Plan: Continue skilled Physical Therapy for a total of 10 more visits over a 90 day period. Treatment will include: CDT, stretching, strengthening. Pt will be starting radiation therefore appts will be extended throughout her treatments.      Certification From: 7/2/2024      To: 9/30/2024             Rehab Potential: good          Patient/Family/Caregiver was advised of these findings, precautions, and treatment options and has agreed to actively participate in planning and for this course of care.     Physical Therapy Lymphedema Daily Note    Precautions:  HTN, R breast cancer, + chemo, will need radiation   Education or treatment limitation: none  Rehab Potential:good    Past Medical History:    Breast cancer in female (HCC)    Right breast lymph node cancer    High blood pressure    Hx of motion sickness    Hypertension    Solitary kidney, congenital         Pain: 1/10 \"just discomfort\"     Subjective: \"I've been sore under my arms\"    Objective:    - new rash L neck (pt seen MD, treating w/ aquaphor)  -     6/25/2024:  - Swelling B trunk (R more than L, especially posteriorly)               Trunk measurement:                             Under arms: 95.4 cm (IE: 96.2)                            Level of mastectomy scars: 89 cm (IE: 89.4)  - Shoulder AROM:   Flex: 145 B    Abd: R 160, L 155 (verb cues to \"fully reach up\"   - shoulder strength: 4/5 B  - poor abd strength- pt w/ poor stabilization when reaching overhead  (increased lumbar extension)        6/20/2024:  AROM/Strength:    Fascial tightness R axilla into arm  Decreased flexibility B pect  Decreased skin flexibility over chest  Swelling L chest (just inf to incision)     5/31/2024  Right AROM 6/6/2024  'R AROM 6/20/2024  R AROM 5/31/2024  Left AROM 6/6/2024  L AROM   6/20/2024   R AROM 5/31/2024  Right Strength 5/31/2024  Left Strength   Shoulder                     Flexion 90 120 145 90 125 145 NT NT        Abduction 90 95 132 90 98 133 NT NT        IR wfl WFL  WFL WFL  NT NT        ER WFL WFL  WFL WFL  NT NT   Elbow                    Flexion WFL   WFL   NT NT        Extension WFL   WFL   NT NT                               6/14/2024:    Edema/Tissue Observations:    - Swelling B chest  - Swelling B trunk (R more than L, especially posteriorly)               Trunk measurement:                             Under arms: 95.4 cm                             Level of mastectomy scars: 89.4 cm - NO CHANGE TODAY  6/6/2024:  Decreased flexibility B pect and lats  Swelling R upper/outer abdomen  Cording R axilla   Cording L abdomen  AROM/Strength:      5/31/2024  Right AROM 6/6/2024  'R AROM 5/31/2024  Left AROM 6/6/2024  L AROM   5/31/2024  Right Strength 5/31/2024  Left Strength   Shoulder                   Flexion 90 120 90 125 NT NT        Abduction 90 95 90 98 NT NT        IR wfl WFL WFL WFL NT NT        ER WFL WFL WFL WFL NT NT   Elbow                  Flexion WFL  WFL  NT NT        Extension WFL  WFL  NT NT                             5/31/2024 (Evaluation):  Sensation:  decreased sensation R axilla/inner upper arm  AROM/Strength:      5/31/2024  Right AROM 5/31/2024  Left AROM 5/31/2024  Right Strength 5/31/2024  Left Strength   Shoulder                 Flexion 90 90 NT NT        Abduction 90 90 NT NT        IR wfl WFL NT NT        ER WFL WFL NT NT   Elbow                Flexion WFL WFL NT NT        Extension WFL WFL NT NT                         Posture: Rounded shld.  guarding     Palpation: hypersensitivity L abdomen/inferior to mastectomy incision, decreased sensation R chest/axilla      Edema/Tissue Observations:    - Swelling B chest  - Swelling B trunk (R more than L, especially posteriorly)               Trunk measurement:                             Under arms: 96.2 cm                             Level of mastectomy scars: 89.4 cm   Stemmer's Sign: negative        Arm Volume Measurements:  deferred (done recently at screening)       5/28/2024     8:00 PM 4/8/2024     2:00 PM   Lymphedema Calculations   DATE MEASURED 5/28/2024 4/8/2024   LOCATION/MEASUREMENTS RUE RUE   PATIENT POSITION  supine 1 pillow supine 1 pillow   LIMB POSITION 75 deg abduction 75 deg abduction   STARTING POINT 17cm from 3rd cuticle 17cm from 3rd cuticle   RIGHT HAND VOLUME 19.7cm across palm 19.7cm across palm   LEFT HAND VOLUME 19.5cm across palm 19.5cm across palm   MEASUREMENT A 15.5 15.5   MEASUREMENT B 16.6 16.7   MEASUREMENT C 19.1 19.2   MEASUREMENT D 23 23.1   MEASUREMENT E 25 25   MEASUREMENT F 24.5 24.5   MEASUREMENT G 25.5 25.3   MEASUREMENT H 27.5 27.4   MEASUREMENT I 29.6 29.4    TOTAL VOLUME  1732.66175 1728.98808   DATE MEASURED 4/8/2024 4/8/2024   LOCATION/MEASUREMENTS PARTH LUE   MEASUREMENT A 15.4 15.4   MEASUREMENT B 17.1 17   MEASUREMENT C 19.6 19.4   MEASUREMENT D 22.7 22.8   MEASUREMENT E 25.2 25.2   MEASUREMENT F 24.8 24.8   MEASUREMENT G 26.9 26.8   MEASUREMENT H 28.6 28.5   MEASUREMENT I 33.5 33.4    TOTAL VOLUME  1851.33739 1843.2805   % DIFFERENCE -6.12817 -6.92456                           Lymphedema Life Impact Scale: Evaluation Score 5/31/2024: LLIS Score Evaluation: 25 % impaired (0% is no impairment, 100% total impairment)  Lymphedema Life Impact Scale: 7/2/2024: LLIS Score Evaluation: LLIS Score Current: 13 % impaired (0% is no impairment, 100% total impairment)           Today’s Treatment and Response:   Date: 6/14/2024 Date: 6/17/2024 6/20/2024 6/25/2024 6/27/2024  7/2/2024   Visit: #5/10  Certification From: 5/31/2024  To:8/29/2024 Visit: #6/10  Certification From: 5/31/2024  To:8/29/2024 Visit: #7/10  Certification From: 5/31/2024  To:8/29/2024 Visit: #8/10  Certification From: 5/31/2024  To:8/29/2024 Visit: #9/10  Certification From: 5/31/2024  To:8/29/2024 Visit 10/20  PN  Certification From: 7/2/2024      To: 9/30/2024     Manual therapy (40 minutes)    MLD: neck sequence, diaphragmatic breathing, B inguinals, B trunk     STM B abd, axilla, pect tendon, areas of cording - no cavitation but cording stretched giving some relief of tightness.    Compression:  -pt measured for compression yesterday 6/13/2924 ([facility])    Manual therapy (35 minutes)    MLD: neck sequence, diaphragmatic breathing, B inguinals, B trunk     STM B abd, axilla, pect tendon, areas of cording - no cavitation but cording stretched giving some relief of tightness.      Compression:  -pt measured for compression yesterday 6/13/2924 ([facility])    -discussed with patient that she can remove compression pads fabricated as needed. (Pt wants to swim with kids before radiation starts) Manual therapy (25 min)      MLD: neck sequence, diaphragmatic breathing, B inguinals, B trunk     STM B abd, axilla, pect tendon,  Manual therapy (25 min)      STM B abd, axilla, pect tendon. Manual stretching of pect.         MLD: neck sequence, diaphragmatic breathing, B inguinals, B trunk           Compression:  - discussed weaning off compression pad as long as she has good bra that provides compression Manual Therapy (10 min)    STM and MLD techniques R axilla, chest, and trunk  Manual Therapy (30 min)      STM and MLD techniques B axilla, chest, and trunk (greater time on right)    Manual stretching B shoulder     There Ex (5 minutes)    -encouraged daily stretching  -PROM B Shoulders  -supine W slides  There Ex (10 Minutes)  PROM B shoulders  W slides There Ex (20 min)    - sidely windmills CW/CCW B x  10  - sidely horiz abd x 10 B   - supine shld \"butterflies\" x 10   - kneeling child pose x 3  - kneeling angled child pose x 3  -doorway stretch x 3 There Ex (20 min)    - hookly PPT x 10 (10 sec hold)  - hookly PPT w/ slow marching x 10   - bridging x 10  - bridging w/ march x 10   - discussed isometric abd contractions when going for walks There Ex (30 min)    - prone Rhomb x 10  - prone mid trap  x 10  - prone shld ext x 10  - standing rows (narrow and wide) GTB x 10 each  - standing shld ext GTB x 10   - supine serratus ant 3#  x 10 x2   - sidely clams x 10  - sidely hip abd x 10   - bridging x 10  - bridging w/ march x 10 There Ex (10 min)    - ulner nerve glides  - encouraged cont stretching during radiation     - ulnar nerve glides  - encouraged cont ROM stretching for shoulders                   Assessment: refer to progress note      Goals:   Goals (discussed and planned with patient involvement):      To be met in 10 visits:  1) Decrease swelling chest/trunk by a total of 3 cm to decrease risks of infections   2) Pt to be independent with self MLD, ROM exercises, and donning/doffing compression bandages/garments to facilitate swelling reduction and to be independent at self management once discharged  3) Increase B shoulder AROM flex and abd to 160 degrees, ER to at least 80 degrees when arms are elevated  to improve ease of dressing/bathing/and reaching overhead and to be able to tolerate positioning for radiation- in progress  4) Increase B UE strength to at least 4/5  to improve ease of lifting and performing household chores         Plan:      Cont next week then anticipate holding treatment during radiation except if needed for pain/swelling issues     Treatment will include: Complete Decongestive Therapy: Manual Therapy, Self-Care/Home Management Training, Therapeutic Exercise, Neuromuscular Re-education, Orthotic Management and Training        Charges: Mm2, ex1     Total Timed Treatment: 40  min  Total Treatment Time: 40 min

## 2024-07-02 NOTE — TELEPHONE ENCOUNTER
Patient called. Per patient to start RT tomorrow 7/3/24. Patient will have Abemaciclib delivered from [facility]. Patient will call back to schedule a lab/teaching when almost completing RT prior to start. Patient verbalizes understanding.

## 2024-07-03 ENCOUNTER — APPOINTMENT (OUTPATIENT)
Dept: RADIATION ONCOLOGY | Facility: HOSPITAL | Age: 39
End: 2024-07-03
Attending: INTERNAL MEDICINE
Payer: COMMERCIAL

## 2024-07-03 VITALS
SYSTOLIC BLOOD PRESSURE: 111 MMHG | DIASTOLIC BLOOD PRESSURE: 77 MMHG | RESPIRATION RATE: 18 BRPM | TEMPERATURE: 98 F | OXYGEN SATURATION: 100 % | HEART RATE: 87 BPM

## 2024-07-03 DIAGNOSIS — Z17.0 MALIGNANT NEOPLASM OF UPPER-OUTER QUADRANT OF RIGHT BREAST IN FEMALE, ESTROGEN RECEPTOR POSITIVE (HCC): Primary | ICD-10-CM

## 2024-07-03 DIAGNOSIS — C50.411 MALIGNANT NEOPLASM OF UPPER-OUTER QUADRANT OF RIGHT BREAST IN FEMALE, ESTROGEN RECEPTOR POSITIVE (HCC): Primary | ICD-10-CM

## 2024-07-03 PROCEDURE — 77280 THER RAD SIMULAJ FIELD SMPL: CPT | Performed by: INTERNAL MEDICINE

## 2024-07-03 PROCEDURE — 77332 RADIATION TREATMENT AID(S): CPT | Performed by: INTERNAL MEDICINE

## 2024-07-03 PROCEDURE — 77412 RADIATION TX DELIVERY LVL 3: CPT | Performed by: INTERNAL MEDICINE

## 2024-07-03 PROCEDURE — 77334 RADIATION TREATMENT AID(S): CPT | Performed by: INTERNAL MEDICINE

## 2024-07-03 NOTE — PROGRESS NOTES
- Purchase chlorhexidine gluconate solution/soap (4% concentration). This is available at pharmacies and online through retailers like [employer].    - The purpose of using this soap before and during radiation therapy is to kill normal staph bacteria on the skin that may contribute to the expected radiation skin reaction. Using this soap in conjunction with other skin care can lessen the intensity of skin side effects.     - How to use:   Wash in the shower like you normally would   Before exiting the shower, apply a thin layer of soap to the treatment area   Leave the soap on the treatment area for 1 minute and rinse off    - Once radiation starts, use the soap daily (Monday-Sunday).    - Once you get out of the shower, apply any other topical lotions as directed.

## 2024-07-03 NOTE — PROGRESS NOTES
[facility] Radiation Treatment Management Note 1-5    Patient:  Jocelyn Garza  Age:  38 year old  Visit Diagnosis:    1. Malignant neoplasm of upper-outer quadrant of right breast in female, estrogen receptor positive (HCC)      Primary Rad/Onc:  Dr. Cecilia Sam    Site Delivered Dose (cGy) Prescribed Dose (cGy) Fraction #   R drain sites 200 5000 1/25   R CW bolus 200 4000 1/20   R CW No Bolus 0 1000 0/5   R  5000 1/25     First treatment date:  7/3/24  Concurrent chemotherapy:  Zoladex        6/3/2024    12:54 PM 6/14/2024    12:16 PM 7/3/2024     1:58 PM   Oncology Vitals   Weight 157 lb 158 lb 3.2 oz    Weight 71.215 kg 71.759 kg    BSA (m2) 1.78 m2 1.79 m2    BMI 26.13 kg/m2 26.33 kg/m2    /77 98/62 111/77   Pulse 107 92 87   Resp 16 18 18   Temp 98.1 °F (36.7 °C) 97.9 °F (36.6 °C) 98.1 °F (36.7 °C)   SpO2 97 % 100 % 100 %   Pain Score 0 0 0        Toxicities:  Fatigue Grade 0= None  Pruritis Grade 0= None  Dry Skin absent  Dermatitis associated with radiation Grade 0= None  Moisturizer used Vanicream and Mometasone applied Number of times: 1 times daily.- discussed starting twice a day  Hyperpigmentation absent    Nursing Note:  Patient seen for OTV with Dr. Sam. New RT start.   Discussed moisturizer use and CHG instructions.     Delphine GOLD RN    Physician Note:  Subjective:  -new start, no issues      Objective:  Vitals noted   ECOG 0  NAD  No skin changes      Treatment setup imaging have been reviewed:  Yes    Assessment/Plan:  -cont pmrt per plan  -start skin care    We discussed expected future toxicity. All questions answered.  Next OTV 1 week    Cecilia Sam MD

## 2024-07-05 ENCOUNTER — OFFICE VISIT (OUTPATIENT)
Dept: PHYSICAL THERAPY | Facility: HOSPITAL | Age: 39
End: 2024-07-05
Payer: COMMERCIAL

## 2024-07-05 PROCEDURE — 77387 GUIDANCE FOR RADJ TX DLVR: CPT | Performed by: INTERNAL MEDICINE

## 2024-07-05 PROCEDURE — 77412 RADIATION TX DELIVERY LVL 3: CPT | Performed by: INTERNAL MEDICINE

## 2024-07-05 PROCEDURE — 97140 MANUAL THERAPY 1/> REGIONS: CPT | Performed by: PHYSICAL THERAPIST

## 2024-07-05 NOTE — PROGRESS NOTES
Referring Provider:  Jesus Manuel  Diagnosis: Malignant neoplasm of upper-outer quadrant of right breast in female, estrogen receptor positive (HCC) (C50.411,Z17.0)      Date of onset: 4/29/2024 Evaluation Date: 5/31/2024               Physical Therapy Lymphedema Daily Note    Precautions:  HTN, R breast cancer, + chemo, will need radiation   Education or treatment limitation: none  Rehab Potential:good    Past Medical History:    Breast cancer in female (HCC)    Right breast lymph node cancer    High blood pressure    Hx of motion sickness    Hypertension    Solitary kidney, congenital         Pain: 1/10 \"uncomfortable\"     Subjective: \"It feels swollen on the right\"    Objective:    7/5/2024  - Swelling B trunk (R more than L, especially posteriorly)               Trunk measurement:                             Under arms: 94.6 cm (IE: 96.2)                            Level of mastectomy scars: 90.2 cm (IE: 89.4)  - shoulder AROM:   Flex: 145 B    Abd: R 160, L 155 (verb cues to \"fully reach up\")     7/2/2024  - new rash L neck (pt seen MD, treating w/ aquaphor)      6/25/2024:  - Swelling B trunk (R more than L, especially posteriorly)               Trunk measurement:                             Under arms: 95.4 cm (IE: 96.2)                            Level of mastectomy scars: 89 cm (IE: 89.4)  - Shoulder AROM:   Flex: 145 B    Abd: R 160, L 155 (verb cues to \"fully reach up\"   - shoulder strength: 4/5 B  - poor abd strength- pt w/ poor stabilization when reaching overhead (increased lumbar extension)        6/20/2024:  AROM/Strength:    Fascial tightness R axilla into arm  Decreased flexibility B pect  Decreased skin flexibility over chest  Swelling L chest (just inf to incision)     5/31/2024  Right AROM 6/6/2024  'R AROM 6/20/2024  R AROM 5/31/2024  Left AROM 6/6/2024  L AROM   6/20/2024   R AROM 5/31/2024  Right Strength 5/31/2024  Left Strength   Shoulder                     Flexion 90 120 145 90 125 145 NT NT         Abduction 90 95 132 90 98 133 NT NT        IR wfl WFL  WFL WFL  NT NT        ER WFL WFL  WFL WFL  NT NT   Elbow                    Flexion WFL   WFL   NT NT        Extension WFL   WFL   NT NT                               6/14/2024:    Edema/Tissue Observations:    - Swelling B chest  - Swelling B trunk (R more than L, especially posteriorly)               Trunk measurement:                             Under arms: 95.4 cm                             Level of mastectomy scars: 89.4 cm - NO CHANGE TODAY  6/6/2024:  Decreased flexibility B pect and lats  Swelling R upper/outer abdomen  Cording R axilla   Cording L abdomen  AROM/Strength:      5/31/2024  Right AROM 6/6/2024  'R AROM 5/31/2024  Left AROM 6/6/2024  L AROM   5/31/2024  Right Strength 5/31/2024  Left Strength   Shoulder                   Flexion 90 120 90 125 NT NT        Abduction 90 95 90 98 NT NT        IR wfl WFL WFL WFL NT NT        ER WFL WFL WFL WFL NT NT   Elbow                  Flexion WFL  WFL  NT NT        Extension WFL  WFL  NT NT                             5/31/2024 (Evaluation):  Sensation:  decreased sensation R axilla/inner upper arm  AROM/Strength:      5/31/2024  Right AROM 5/31/2024  Left AROM 5/31/2024  Right Strength 5/31/2024  Left Strength   Shoulder                 Flexion 90 90 NT NT        Abduction 90 90 NT NT        IR wfl WFL NT NT        ER WFL WFL NT NT   Elbow                Flexion WFL WFL NT NT        Extension WFL WFL NT NT                         Posture: Rounded shld. guarding     Palpation: hypersensitivity L abdomen/inferior to mastectomy incision, decreased sensation R chest/axilla      Edema/Tissue Observations:    - Swelling B chest  - Swelling B trunk (R more than L, especially posteriorly)               Trunk measurement:                             Under arms: 96.2 cm                             Level of mastectomy scars: 89.4 cm   Stemmer's Sign: negative        Arm Volume Measurements:  deferred (done  recently at screening)       5/28/2024     8:00 PM 4/8/2024     2:00 PM   Lymphedema Calculations   DATE MEASURED 5/28/2024 4/8/2024   LOCATION/MEASUREMENTS RUE RUE   PATIENT POSITION  supine 1 pillow supine 1 pillow   LIMB POSITION 75 deg abduction 75 deg abduction   STARTING POINT 17cm from 3rd cuticle 17cm from 3rd cuticle   RIGHT HAND VOLUME 19.7cm across palm 19.7cm across palm   LEFT HAND VOLUME 19.5cm across palm 19.5cm across palm   MEASUREMENT A 15.5 15.5   MEASUREMENT B 16.6 16.7   MEASUREMENT C 19.1 19.2   MEASUREMENT D 23 23.1   MEASUREMENT E 25 25   MEASUREMENT F 24.5 24.5   MEASUREMENT G 25.5 25.3   MEASUREMENT H 27.5 27.4   MEASUREMENT I 29.6 29.4    TOTAL VOLUME  [id number]   DATE MEASURED 4/8/2024 4/8/2024   LOCATION/MEASUREMENTS LUE LUE   MEASUREMENT A 15.4 15.4   MEASUREMENT B 17.1 17   MEASUREMENT C 19.6 19.4   MEASUREMENT D 22.7 22.8   MEASUREMENT E 25.2 25.2   MEASUREMENT F 24.8 24.8   MEASUREMENT G 26.9 26.8   MEASUREMENT H 28.6 28.5   MEASUREMENT I 33.5 33.4    TOTAL VOLUME  [id number] 1843.2805   % DIFFERENCE -[phone] -[phone]                           Lymphedema Life Impact Scale: Evaluation Score 5/31/2024: LLIS Score Evaluation: 25 % impaired (0% is no impairment, 100% total impairment)  Lymphedema Life Impact Scale: 7/2/2024: LLIS Score Evaluation: LLIS Score Current: 13 % impaired (0% is no impairment, 100% total impairment)           Today’s Treatment and Response:   Date: 6/14/2024 Date: 6/17/2024 6/20/2024 6/25/2024 6/27/2024 7/2/2024 7/5/2024   Visit: #5/10  Certification From: 5/31/2024  To:8/29/2024 Visit: #6/10  Certification From: 5/31/2024  To:8/29/2024 Visit: #7/10  Certification From: 5/31/2024  To:8/29/2024 Visit: #8/10  Certification From: 5/31/2024  To:8/29/2024 Visit: #9/10  Certification From: 5/31/2024  To:8/29/2024 Visit 10/20  PN  Certification From: 7/2/2024      To: 9/30/2024  Visit 11/20    Certification From: 7/2/2024      To: 9/30/2024     Manual  therapy (40 minutes)    MLD: neck sequence, diaphragmatic breathing, B inguinals, B trunk     STM B abd, axilla, pect tendon, areas of cording - no cavitation but cording stretched giving some relief of tightness.    Compression:  -pt measured for compression yesterday 6/13/2924 ([facility])    Manual therapy (35 minutes)    MLD: neck sequence, diaphragmatic breathing, B inguinals, B trunk     STM B abd, axilla, pect tendon, areas of cording - no cavitation but cording stretched giving some relief of tightness.      Compression:  -pt measured for compression yesterday 6/13/2924 ([facility])    -discussed with patient that she can remove compression pads fabricated as needed. (Pt wants to swim with kids before radiation starts) Manual therapy (25 min)      MLD: neck sequence, diaphragmatic breathing, B inguinals, B trunk     STM B abd, axilla, pect tendon,  Manual therapy (25 min)      STM B abd, axilla, pect tendon. Manual stretching of pect.         MLD: neck sequence, diaphragmatic breathing, B inguinals, B trunk           Compression:  - discussed weaning off compression pad as long as she has good bra that provides compression Manual Therapy (10 min)    STM and MLD techniques R axilla, chest, and trunk  Manual Therapy (30 min)      STM and MLD techniques B axilla, chest, and trunk (greater time on right)    Manual stretching B shoulder  Manual therapy (40 min)      STM and MLD techniques B axilla, chest, and trunk     STM B upper trap    Manual stretching B shld flex/abd     Compression:  - awaiting custom garments to be delivered    There Ex (5 minutes)    -encouraged daily stretching  -PROM B Shoulders  -supine W slides  There Ex (10 Minutes)  PROM B shoulders  W slides There Ex (20 min)    - sidely windmills CW/CCW B x 10  - sidely horiz abd x 10 B   - supine shld \"butterflies\" x 10   - kneeling child pose x 3  - kneeling angled child pose x 3  -doorway stretch x 3 There Ex (20 min)    - hookrain  PPT x 10 (10 sec hold)  - hookly PPT w/ slow marching x 10   - bridging x 10  - bridging w/ march x 10   - discussed isometric abd contractions when going for walks There Ex (30 min)    - prone Rhomb x 10  - prone mid trap  x 10  - prone shld ext x 10  - standing rows (narrow and wide) GTB x 10 each  - standing shld ext GTB x 10   - supine serratus ant 3#  x 10 x2   - sidely clams x 10  - sidely hip abd x 10   - bridging x 10  - bridging w/ march x 10 There Ex (10 min)    - ulner nerve glides  - encouraged cont stretching during radiation     - ulnar nerve glides  - encouraged cont ROM stretching for shoulders   There Ex    - encouraged daily shld ROM/stretching                    Assessment: Pt to attempt self management during radiation. Pt will call if needs treatment (if any increase swelling, pain or ROM/strength deficits)      Goals:   Goals (discussed and planned with patient involvement):      To be met in 10 visits:  1) Decrease swelling chest/trunk by a total of 3 cm to decrease risks of infections   2) Pt to be independent with self MLD, ROM exercises, and donning/doffing compression bandages/garments to facilitate swelling reduction and to be independent at self management once discharged  3) Increase B shoulder AROM flex and abd to 160 degrees, ER to at least 80 degrees when arms are elevated  to improve ease of dressing/bathing/and reaching overhead and to be able to tolerate positioning for radiation- in progress  4) Increase B UE strength to at least 4/5  to improve ease of lifting and performing household chores         Plan:      Pt to attempt self management      Treatment will include: Complete Decongestive Therapy: Manual Therapy, Self-Care/Home Management Training, Therapeutic Exercise, Neuromuscular Re-education, Orthotic Management and Training        Charges: Mm3      Total Timed Treatment: 40 min  Total Treatment Time: 40 min

## 2024-07-08 PROCEDURE — 77412 RADIATION TX DELIVERY LVL 3: CPT | Performed by: INTERNAL MEDICINE

## 2024-07-08 PROCEDURE — 77387 GUIDANCE FOR RADJ TX DLVR: CPT | Performed by: INTERNAL MEDICINE

## 2024-07-09 PROCEDURE — 77412 RADIATION TX DELIVERY LVL 3: CPT | Performed by: INTERNAL MEDICINE

## 2024-07-09 PROCEDURE — 77387 GUIDANCE FOR RADJ TX DLVR: CPT | Performed by: INTERNAL MEDICINE

## 2024-07-10 PROCEDURE — 77387 GUIDANCE FOR RADJ TX DLVR: CPT | Performed by: INTERNAL MEDICINE

## 2024-07-10 PROCEDURE — 77412 RADIATION TX DELIVERY LVL 3: CPT | Performed by: INTERNAL MEDICINE

## 2024-07-10 NOTE — PROGRESS NOTES
[facility] Radiation Treatment Management Note 6-10    Patient:  Jocelyn Garza  Age:  38 year old  Visit Diagnosis:    1. Malignant neoplasm of upper-outer quadrant of right breast in female, estrogen receptor positive (HCC)      Primary Rad/Onc:  Dr. Cecilia Sam    Site Delivered Dose (cGy) Prescribed Dose (cGy) Fraction #   R SCF 1200 5000 6/25   R CW Bolus 1200 4000 6/20   R drain sites 1200 5000 6/25   R CW no bolus  0 1000 0/5     First treatment date:   7/3/24   Concurrent chemotherapy:  Zoladex         6/14/2024    12:16 PM 7/3/2024     1:58 PM 7/11/2024    11:48 AM   Oncology Vitals   Weight 158 lb 3.2 oz     Weight 71.759 kg     BSA (m2) 1.79 m2     BMI 26.33 kg/m2     BP 98/62 111/77 126/85   Pulse 92 87 75   Resp 18 18 18   Temp 97.9 °F (36.6 °C) 98.1 °F (36.7 °C) 97.8 °F (36.6 °C)   SpO2 100 % 100 % 100 %   Pain Score 0 0 2        Toxicities:  Fatigue Grade 1= Fatigue relieved by rest  Pruritis Grade 0= None  Dry Skin absent  Dermatitis associated with radiation Grade 0= None  Moisturizer used Vanicream, Mometasone, Aquaphor, and lubriderm  applied Number of times: 2 times daily. (Switches off)  Hyperpigmentation absent    Nursing Note:  Patient seen for OTV with Dr. Sam.   Zingers in right shoulder blade- self resolve.  Feeling a little swollen and stiff.   Switching off between various moisturizers listed above, also doing daily CHG baths.  Has noticed increase in headaches from her baseline. Takes tylenol as needed.     Delphine GOLD RN    Physician Note:  Subjective:  -doing well overall, some fatigue  -using skin care  -occ HA (did get Zoladex last week)  -some MSK pains      Objective:  Vitals noted  ECOG 0  NAD  No skin changes      Treatment setup imaging have been reviewed:  Yes    Assessment/Plan:  -cont pmrt and skin care  -add hydration, monitor SILVEIRA    We discussed expected future toxicity. All questions answered.  Next OTV 1 week    Cecilia Sam MD

## 2024-07-11 ENCOUNTER — OFFICE VISIT (OUTPATIENT)
Dept: RADIATION ONCOLOGY | Facility: HOSPITAL | Age: 39
End: 2024-07-11
Attending: INTERNAL MEDICINE
Payer: COMMERCIAL

## 2024-07-11 VITALS
SYSTOLIC BLOOD PRESSURE: 126 MMHG | DIASTOLIC BLOOD PRESSURE: 85 MMHG | TEMPERATURE: 98 F | OXYGEN SATURATION: 100 % | HEART RATE: 75 BPM | RESPIRATION RATE: 18 BRPM

## 2024-07-11 DIAGNOSIS — C50.411 MALIGNANT NEOPLASM OF UPPER-OUTER QUADRANT OF RIGHT BREAST IN FEMALE, ESTROGEN RECEPTOR POSITIVE (HCC): Primary | ICD-10-CM

## 2024-07-11 DIAGNOSIS — Z17.0 MALIGNANT NEOPLASM OF UPPER-OUTER QUADRANT OF RIGHT BREAST IN FEMALE, ESTROGEN RECEPTOR POSITIVE (HCC): Primary | ICD-10-CM

## 2024-07-11 PROCEDURE — 77412 RADIATION TX DELIVERY LVL 3: CPT | Performed by: INTERNAL MEDICINE

## 2024-07-11 PROCEDURE — 77387 GUIDANCE FOR RADJ TX DLVR: CPT | Performed by: INTERNAL MEDICINE

## 2024-07-12 PROCEDURE — 77336 RADIATION PHYSICS CONSULT: CPT | Performed by: INTERNAL MEDICINE

## 2024-07-12 PROCEDURE — 77387 GUIDANCE FOR RADJ TX DLVR: CPT | Performed by: INTERNAL MEDICINE

## 2024-07-12 PROCEDURE — 77412 RADIATION TX DELIVERY LVL 3: CPT | Performed by: INTERNAL MEDICINE

## 2024-07-15 ENCOUNTER — TELEPHONE (OUTPATIENT)
Dept: HEMATOLOGY/ONCOLOGY | Facility: HOSPITAL | Age: 39
End: 2024-07-15

## 2024-07-15 PROCEDURE — 77387 GUIDANCE FOR RADJ TX DLVR: CPT | Performed by: INTERNAL MEDICINE

## 2024-07-15 PROCEDURE — 77412 RADIATION TX DELIVERY LVL 3: CPT | Performed by: INTERNAL MEDICINE

## 2024-07-15 NOTE — TELEPHONE ENCOUNTER
Current pt of Dr. Barrett and Dr. Sam calling to make an appointment with dietician. Please call at [phone] to schedule. Called 7/15/24 KM

## 2024-07-16 PROCEDURE — 77412 RADIATION TX DELIVERY LVL 3: CPT | Performed by: INTERNAL MEDICINE

## 2024-07-16 PROCEDURE — 77387 GUIDANCE FOR RADJ TX DLVR: CPT | Performed by: INTERNAL MEDICINE

## 2024-07-17 PROCEDURE — 77412 RADIATION TX DELIVERY LVL 3: CPT | Performed by: INTERNAL MEDICINE

## 2024-07-17 PROCEDURE — 77387 GUIDANCE FOR RADJ TX DLVR: CPT | Performed by: INTERNAL MEDICINE

## 2024-07-17 NOTE — PROGRESS NOTES
[facility] Radiation Treatment Management Note 11-15    Patient:  Jocelyn Garza  Age:  38 year old  Visit Diagnosis:    1. Malignant neoplasm of upper-outer quadrant of right breast in female, estrogen receptor positive (HCC)      Primary Rad/Onc:  Dr. Cecilia Sam    Site Delivered Dose (cGy) Prescribed Dose (cGy) Fraction #   R SCF 2200 5000 11/25   R CW bolus 2200 4000 11/20   R drain sites 2200 5000 11/25   R CW no bolus 0 1000 0/5     First treatment date:   7/3/24  Concurrent chemotherapy:  None        7/3/2024     1:58 PM 7/11/2024    11:48 AM 7/18/2024    11:48 AM   Oncology Vitals   /77 126/85 131/77   Pulse 87 75 79   Resp 18 18 16   Temp 98.1 °F (36.7 °C) 97.8 °F (36.6 °C) 97.8 °F (36.6 °C)   SpO2 100 % 100 % 100 %   Pain Score 0 2 0        Toxicities:  Fatigue Grade 1= Fatigue relieved by rest- waves of fatigue. Remaining active  Pruritis Grade 0= None  Dry Skin absent  Dermatitis associated with radiation Grade 0= None  Moisturizer used Vanicream, Mometasone, Aquaphor, and lubriderm  applied Number of times: 2 times daily.- sometimes TID. Using CHG   Hyperpigmentation absent    Nursing Note:  Patient seen for OTV with Dr. Sam.   On Zoladex.  Zingers- not taking anything PRN.   Feeling stiff and swollen.  Headaches improved from last week, but still getting them occasionally  Having hot flashes    Delphine GOLD RN    Physician Note:  Subjective:  -headaches a little better, hydrating, also getting hot flashes  -mild soreness/tightness along CW, tylenol helps  -using skin care bid      Objective:  Vitals noted  ECOG 0  NAD  No erythema  Mild CW edema inferior to scar      Treatment setup imaging have been reviewed:  Yes    Assessment/Plan:  -cont RT and skin care  -tylenol prn    We discussed expected future toxicity. All questions answered.  Next OTV 1 week    Cecilia Sam MD

## 2024-07-18 ENCOUNTER — OFFICE VISIT (OUTPATIENT)
Dept: RADIATION ONCOLOGY | Facility: HOSPITAL | Age: 39
End: 2024-07-18
Attending: INTERNAL MEDICINE
Payer: COMMERCIAL

## 2024-07-18 VITALS
RESPIRATION RATE: 16 BRPM | DIASTOLIC BLOOD PRESSURE: 77 MMHG | TEMPERATURE: 98 F | OXYGEN SATURATION: 100 % | HEART RATE: 79 BPM | SYSTOLIC BLOOD PRESSURE: 131 MMHG

## 2024-07-18 DIAGNOSIS — C50.411 MALIGNANT NEOPLASM OF UPPER-OUTER QUADRANT OF RIGHT BREAST IN FEMALE, ESTROGEN RECEPTOR POSITIVE (HCC): Primary | ICD-10-CM

## 2024-07-18 DIAGNOSIS — Z17.0 MALIGNANT NEOPLASM OF UPPER-OUTER QUADRANT OF RIGHT BREAST IN FEMALE, ESTROGEN RECEPTOR POSITIVE (HCC): Primary | ICD-10-CM

## 2024-07-18 DIAGNOSIS — Z51.81 MEDICATION MONITORING ENCOUNTER: ICD-10-CM

## 2024-07-18 PROCEDURE — 77387 GUIDANCE FOR RADJ TX DLVR: CPT | Performed by: INTERNAL MEDICINE

## 2024-07-18 PROCEDURE — 77412 RADIATION TX DELIVERY LVL 3: CPT | Performed by: INTERNAL MEDICINE

## 2024-07-19 PROCEDURE — 77387 GUIDANCE FOR RADJ TX DLVR: CPT | Performed by: INTERNAL MEDICINE

## 2024-07-19 PROCEDURE — 77412 RADIATION TX DELIVERY LVL 3: CPT | Performed by: INTERNAL MEDICINE

## 2024-07-19 PROCEDURE — 77336 RADIATION PHYSICS CONSULT: CPT | Performed by: INTERNAL MEDICINE

## 2024-07-22 PROCEDURE — 77387 GUIDANCE FOR RADJ TX DLVR: CPT | Performed by: INTERNAL MEDICINE

## 2024-07-22 PROCEDURE — 77412 RADIATION TX DELIVERY LVL 3: CPT | Performed by: INTERNAL MEDICINE

## 2024-07-23 PROCEDURE — 77387 GUIDANCE FOR RADJ TX DLVR: CPT | Performed by: INTERNAL MEDICINE

## 2024-07-23 PROCEDURE — 77412 RADIATION TX DELIVERY LVL 3: CPT | Performed by: INTERNAL MEDICINE

## 2024-07-24 PROCEDURE — 77387 GUIDANCE FOR RADJ TX DLVR: CPT | Performed by: INTERNAL MEDICINE

## 2024-07-24 PROCEDURE — 77412 RADIATION TX DELIVERY LVL 3: CPT | Performed by: INTERNAL MEDICINE

## 2024-07-24 NOTE — PROGRESS NOTES
[facility] Radiation Treatment Management Note 16-20    Patient:  Jocelyn Garza  Age:  38 year old  Visit Diagnosis:  No diagnosis found.  Primary Rad/Onc:  Dr. Cecilia Sam    Site Delivered Dose (cGy) Prescribed Dose (cGy) Fraction #   R CW Bolus 3200 4000 16/20   R Drain Sites 3200 5000 16/25   R SCF 3200 5000 16/25   R CW No Bolus 0 1000 0/5     First treatment date:  7/3/2024  Concurrent chemotherapy:  None        7/3/2024     1:58 PM 7/11/2024    11:48 AM 7/18/2024    11:48 AM   Oncology Vitals   /77 126/85 131/77   Pulse 87 75 79   Resp 18 18 16   Temp 98.1 °F (36.7 °C) 97.8 °F (36.6 °C) 97.8 °F (36.6 °C)   SpO2 100 % 100 % 100 %   Pain Score 0 2 0        Toxicities:  Fatigue Grade 1= Fatigue relieved by rest  Pruritis Grade 0= None  Dry Skin absent  Dermatitis associated with radiation Grade 1= Faint erythema or dry desquamation- very mild   Moisturizer used Vanicream, Mometasone, Aquaphor, and Lubriderm  applied Number of times: 2 -3 times daily. Using CHG soap daily.   Hyperpigmentation absent    Nursing Note:  Pt seen for OTV with Dr. Barrett.  Reports swelling/sensitivity along R arm. Feels that cording is coming back. Doing home PT exercises everyday.   Headaches improved this week.     Shanthi LOVELACE, RN    Physician Note:  Subjective:    R arm swelling, pt to receive arm sleeve shortly  To f/u with PT  -headaches a little better, hydrating, also getting hot flashes  -mild soreness/tightness along CW, tylenol helps  -using skin care bid      Objective:  Vitals noted  ECOG 0  NAD  G1 erythema  Mild CW edema inferior to scar      Treatment setup imaging have been reviewed:  Yes    Assessment/Plan:  -cont RT and skin care  -tylenol prn  Pt has 1 kidney  We discussed expected future toxicity. All questions answered.  Next OTV 1 week    Hero Barrett MD for     Cecilia Sam MD

## 2024-07-25 ENCOUNTER — OFFICE VISIT (OUTPATIENT)
Dept: RADIATION ONCOLOGY | Facility: HOSPITAL | Age: 39
End: 2024-07-25
Attending: INTERNAL MEDICINE
Payer: COMMERCIAL

## 2024-07-25 VITALS
HEART RATE: 83 BPM | TEMPERATURE: 98 F | SYSTOLIC BLOOD PRESSURE: 122 MMHG | RESPIRATION RATE: 16 BRPM | DIASTOLIC BLOOD PRESSURE: 71 MMHG | OXYGEN SATURATION: 100 %

## 2024-07-25 DIAGNOSIS — Z17.0 MALIGNANT NEOPLASM OF UPPER-OUTER QUADRANT OF RIGHT BREAST IN FEMALE, ESTROGEN RECEPTOR POSITIVE (HCC): Primary | ICD-10-CM

## 2024-07-25 DIAGNOSIS — C50.411 MALIGNANT NEOPLASM OF UPPER-OUTER QUADRANT OF RIGHT BREAST IN FEMALE, ESTROGEN RECEPTOR POSITIVE (HCC): Primary | ICD-10-CM

## 2024-07-25 PROCEDURE — 77336 RADIATION PHYSICS CONSULT: CPT | Performed by: INTERNAL MEDICINE

## 2024-07-25 PROCEDURE — 77387 GUIDANCE FOR RADJ TX DLVR: CPT | Performed by: INTERNAL MEDICINE

## 2024-07-25 PROCEDURE — 77412 RADIATION TX DELIVERY LVL 3: CPT | Performed by: INTERNAL MEDICINE

## 2024-07-26 PROCEDURE — 77387 GUIDANCE FOR RADJ TX DLVR: CPT | Performed by: INTERNAL MEDICINE

## 2024-07-26 PROCEDURE — 77412 RADIATION TX DELIVERY LVL 3: CPT | Performed by: INTERNAL MEDICINE

## 2024-07-26 NOTE — PATIENT INSTRUCTIONS
Medication Education Record: Oral Therapy    Learner:  Patient    Barriers / Limitations:  None    Psychosocial Assessment:  patient psychosocial response appropriate    Diagnosis: Breast cancer     Readiness of the patient to learn and adhere to oral cancer treatment outside of the cancer center: Yes    Medication Name Dose/Strength Frequency   abemaciclib 150 mg  Twice a day                Schedule of oral medication(s):  Twice daily            Number of cycles planned:  2 years     Verified Consent to Chemotherapy/Biotherapy Cancer Treatment form signed by patient and provider:  Yes      RN/PA Verified prescription bottle against physician order: yes    Start date of treatment:      How to take your medication(s):  Swallow each tablet whole.  Do not break, crush, or chew    Plan for appointments and lab testing: monthly    Missed Dose Management:   If you miss a dose, call your doctor for further instructions.  If you vomit or throw up your medication, call your doctor for further instructions.  Do not take 2 doses at the same time to make up for a missed dose.     Drug / Drug or Drug / Food Interactions:  No grapefruit     Cancer Treatment Side Effects (refer to Chemo Care handout for further information):  Allergic reactions  Constipation  Diarrhea  Fatigue  Kidney / Bladder effects  Liver effects  Loss of appetite  Low red blood cell count / Anemia  Low White Blood Cell Count/Risk of infection  Low Platelet Count/Risk of Bleeding  Lung Effects  Mouth or Throat Sores  Muscle / Bone Effects  Nausea / Vomiting  Skin Effects  Taste Changes  Recommended Antinausea medications: (as directed by your Provider):   Prochloperazine (Compazine) 10mg every 8 hours  Take as needed  Helpful hints during cancer treatment:    Diet:  Avoid greasy or spicy foods on days surrounding chemotherapy  Eat small frequent meals per day (6-7 meals) rather than 3 large meals  Choose high calorie/high protein foods (chicken, hard cooked  eggs, peanut butter, cheese)  If nauseated, try dry foods, such as toast, crackers or pretzels; light or bland foods, such as applesauce or oatmeal.    Fluid intake:  Drink 8-10 cups of liquid a day and take a water bottle wherever you go.  Any fluid is acceptable, but caffeinated products do not count towards your intake and should be limited to 1-2 drinks/day.    Physical Activity  If your doctor approves, be as physically active as you can, but start out slowly, and increase your activity over time as you feel stronger.  Listen to your body and rest when you need to.  Do what you can when you feel up to it.      Oral Care  Keep your mouth clean.  Rinse your mouth before and after meals with plain water or with a mild mouth rinse (made with 1 quart water, 1 teaspoon salt, and 1 teaspoon baking soda, shake before using)   Avoid commercial mouthwashes that contain alcohol, alcoholic or acidic drinks or tobacco  An acceptable mouthwash is Biotene®   If soreness or sores develop, contact the office.    Diarrhea or Constipation  Imodium A-D use for diarrhea:  Take 2 tabs (4mg) at the first sign of diarrhea; then take 1 tab (2mg) every 2 hours until you have had no diarrhea for at least 12 hours; during the night take 2 tabs (4mg) every 4 hours as needed.  Maximum of 8 tablets in 24 hours.  Stool softener for constipation   Skin Care  Avoid direct sunlight.  Wear a broad-spectrum sunscreen with an SPF of 30 or higher on any skin exposed to the sun.  Re-apply every 2 hours if in the sun and after bathing or sweating.  For dry skin, use an alcohol-free lotion twice per day, especially after baths.  If scalp hair loss has occurred, protect the scalp from the sun by wearing a hat and use sunscreen.  Apply alcohol-free moisturizer as needed.      When to call the doctor:  Fever of 100.5 or greater or shaking chills  Nausea/vomiting not controlled with anti-nausea medications: Unable to drink for 24 hours or have signs of  dehydration: tiredness, thirst, dry mouth, dark and decreased amount of urine  Diarrhea - not controlled with Imodium AD or more than 6 episodes in 24 hours  Constipation -no bowel movement x 3 days, no response to stool softeners or laxatives  Mouth sores, sore throat or blisters on the lips affecting oral intake  Difficulty breathing, chest pain, or new cough  Excessive tiredness or weakness, confusion or loss of balance  New rash  Tingling or burning, redness, swelling of the palms of hands or soles of feet  Sudden new unexpected symptom -such as change in vision, swelling in arm or leg  Increase in numbness and tingling of hands or feet  Unusual bleeding (nose bleeds, blood in urine, stool or phlegm)  Pain with urination  Persistent mood changes, depression, nervousness, difficulty sleeping   Pain, redness, swelling or blistering at the IV site  If you go to Emergency Room for any reason or seek medical attention elsewhere  If you should need to cancel or reschedule any visit, it is important that you contact the office    What Phone Number to Call:   [facility] [phone] / Triage Nurse    Teaching Materials Provided:   Chemo Care chemotherapy information sheets  and Safety and Handling Sheet     Please refer to the following link if you are interested in additional information about chemotherapy for yourself or family members: [email or web address]        Safe Handling of Chemotherapy  While you or your family member is receiving chemotherapy, whether in the clinic or at home, the following precautions must be taken to lessen any exposure to the medication.     Handling oral medication:    Confirm that the medication is appropriately labeled.  Only patients who need chemotherapy should take or touch the medication.    If caregivers are involved, caregivers should wear gloves when administering the medication to protect against exposure.  Discard used gloves immediately - do  not use for anything else.  Wash hands thoroughly before and after contact with this medication.  Women of child bearing age, pregnant women or women who are nursing should not handle this medication or any contaminated waste.     If the medication is provided in a blister pack, care should be taken when opening the packet to avoid breathing in any powder that may be aerosolized.   Do not crush, break or open any pills or capsules unless instructed by your doctor.  Do not chew tablets.    Storage:   Store medication in sealed containers, out of reach of children and pets.  Do not store medication in the bathroom, as high humidity may damage the medication.    Check the medication label to confirm if medication should be stored in the refrigerator or away from light.  Store the medicine container inside another container or in a sealed bag.  Make sure it does not touch any food.    The medication should remain in its original packaging until it is ready for ingestion.  Pill containers can be used but should not be used for other medications, and as few people as possible should come in contact with it.  Empty pill containers should be washed with soap and water or disposed within a plastic bag.      Disposal:  The unused medication should not be flushed down the toilet or placed in the trash (preventing contamination of landfills and water supply).  Please contact your local police department for collection of unused prescription medications.    Handling Body Waste:   Caregivers must wear gloves if exposed to the patient’s blood, urine, stool or vomit.  Dispose of the used gloves after each use and wash hands with soap and water.  Any sheets or clothes soiled with the bodily fluids should be machine washed twice in hot water with regular laundry detergent.  Do not wash soiled garments with hands.  If the soiled garments cannot be washed right away, place them in a sealed plastic bag until they can be washed.    Absorbable undergarments, or any other items contaminated with chemotherapy, should be placed in a sealed plastic bag for disposal, separate from other trash.  Toilets should be flushed twice with the lid closed while taking this medication and for 48 hours after the last dose.  Wash your hands after using the toilet.  Wash any skin area that has come in contact with urine or stool.    Safety for my family and friends:  Hugging and kissing is safe for you and your partner or family members.  You can visit, sit with, hug and kiss the children in your life.    You can be around pregnant women, though (if possible) they should not clean up any of your body fluids after you have treatment.   Sexual activity is safe while throughout treatment.  It is possible that traces of the oral chemotherapy may be present in vaginal fluid or semen for 48 hours after taking.  A condom should be used during this time.  Effective birth control should be used throughout treatment to prevent pregnancy while on these medications and for several months or years after therapy.  Chemotherapy can have harmful side effects to the fetus, especially in the first trimester.  In addition, menstrual cycles can become irregular during and after treatment, so you may not know if you are at a time in your cycle when you could become pregnant or if you are actually pregnant.      Plan to start Aug 19

## 2024-07-29 ENCOUNTER — OFFICE VISIT (OUTPATIENT)
Dept: HEMATOLOGY/ONCOLOGY | Facility: HOSPITAL | Age: 39
End: 2024-07-29
Attending: INTERNAL MEDICINE
Payer: COMMERCIAL

## 2024-07-29 ENCOUNTER — RESEARCH ENCOUNTER (OUTPATIENT)
Dept: HEMATOLOGY/ONCOLOGY | Facility: HOSPITAL | Age: 39
End: 2024-07-29

## 2024-07-29 DIAGNOSIS — Z45.2 ENCOUNTER FOR CENTRAL LINE CARE: Primary | ICD-10-CM

## 2024-07-29 DIAGNOSIS — Z51.81 MEDICATION MONITORING ENCOUNTER: ICD-10-CM

## 2024-07-29 DIAGNOSIS — C50.411 MALIGNANT NEOPLASM OF UPPER-OUTER QUADRANT OF RIGHT BREAST IN FEMALE, ESTROGEN RECEPTOR POSITIVE (HCC): ICD-10-CM

## 2024-07-29 DIAGNOSIS — Z17.0 MALIGNANT NEOPLASM OF UPPER-OUTER QUADRANT OF RIGHT BREAST IN FEMALE, ESTROGEN RECEPTOR POSITIVE (HCC): Primary | ICD-10-CM

## 2024-07-29 DIAGNOSIS — Z17.0 MALIGNANT NEOPLASM OF UPPER-OUTER QUADRANT OF RIGHT BREAST IN FEMALE, ESTROGEN RECEPTOR POSITIVE (HCC): ICD-10-CM

## 2024-07-29 DIAGNOSIS — C50.411 MALIGNANT NEOPLASM OF UPPER-OUTER QUADRANT OF RIGHT BREAST IN FEMALE, ESTROGEN RECEPTOR POSITIVE (HCC): Primary | ICD-10-CM

## 2024-07-29 LAB
ALBUMIN SERPL-MCNC: 4.8 G/DL (ref 3.2–4.8)
ALBUMIN/GLOB SERPL: 1.7 {RATIO} (ref 1–2)
ALP LIVER SERPL-CCNC: 61 U/L
ALT SERPL-CCNC: 22 U/L
ANION GAP SERPL CALC-SCNC: 6 MMOL/L (ref 0–18)
AST SERPL-CCNC: 23 U/L (ref ?–34)
BASOPHILS # BLD AUTO: 0.04 X10(3) UL (ref 0–0.2)
BASOPHILS NFR BLD AUTO: 0.7 %
BILIRUB SERPL-MCNC: 0.6 MG/DL (ref 0.3–1.2)
BUN BLD-MCNC: 14 MG/DL (ref 9–23)
BUN/CREAT SERPL: 14.4 (ref 10–20)
CALCIUM BLD-MCNC: 10.1 MG/DL (ref 8.7–10.4)
CHLORIDE SERPL-SCNC: 109 MMOL/L (ref 98–112)
CO2 SERPL-SCNC: 28 MMOL/L (ref 21–32)
CREAT BLD-MCNC: 0.97 MG/DL
DEPRECATED RDW RBC AUTO: 36.4 FL (ref 35.1–46.3)
EGFRCR SERPLBLD CKD-EPI 2021: 76 ML/MIN/1.73M2 (ref 60–?)
EOSINOPHIL # BLD AUTO: 0.21 X10(3) UL (ref 0–0.7)
EOSINOPHIL NFR BLD AUTO: 3.9 %
ERYTHROCYTE [DISTWIDTH] IN BLOOD BY AUTOMATED COUNT: 11.5 % (ref 11–15)
FASTING STATUS PATIENT QL REPORTED: NO
GLOBULIN PLAS-MCNC: 2.8 G/DL (ref 2–3.5)
GLUCOSE BLD-MCNC: 95 MG/DL (ref 70–99)
HCG SERPL QL: NEGATIVE
HCT VFR BLD AUTO: 37.8 %
HGB BLD-MCNC: 12.4 G/DL
IMM GRANULOCYTES # BLD AUTO: 0.03 X10(3) UL (ref 0–1)
IMM GRANULOCYTES NFR BLD: 0.6 %
LYMPHOCYTES # BLD AUTO: 0.74 X10(3) UL (ref 1–4)
LYMPHOCYTES NFR BLD AUTO: 13.7 %
MAGNESIUM SERPL-MCNC: 1.8 MG/DL (ref 1.6–2.6)
MCH RBC QN AUTO: 28.6 PG (ref 26–34)
MCHC RBC AUTO-ENTMCNC: 32.8 G/DL (ref 31–37)
MCV RBC AUTO: 87.3 FL
MONOCYTES # BLD AUTO: 0.55 X10(3) UL (ref 0.1–1)
MONOCYTES NFR BLD AUTO: 10.1 %
NEUTROPHILS # BLD AUTO: 3.85 X10 (3) UL (ref 1.5–7.7)
NEUTROPHILS # BLD AUTO: 3.85 X10(3) UL (ref 1.5–7.7)
NEUTROPHILS NFR BLD AUTO: 71 %
OSMOLALITY SERPL CALC.SUM OF ELEC: 296 MOSM/KG (ref 275–295)
PLATELET # BLD AUTO: 262 10(3)UL (ref 150–450)
POTASSIUM SERPL-SCNC: 4.4 MMOL/L (ref 3.5–5.1)
PROT SERPL-MCNC: 7.6 G/DL (ref 5.7–8.2)
RBC # BLD AUTO: 4.33 X10(6)UL
SODIUM SERPL-SCNC: 143 MMOL/L (ref 136–145)
WBC # BLD AUTO: 5.4 X10(3) UL (ref 4–11)

## 2024-07-29 PROCEDURE — 80053 COMPREHEN METABOLIC PANEL: CPT

## 2024-07-29 PROCEDURE — 83735 ASSAY OF MAGNESIUM: CPT

## 2024-07-29 PROCEDURE — 77387 GUIDANCE FOR RADJ TX DLVR: CPT | Performed by: INTERNAL MEDICINE

## 2024-07-29 PROCEDURE — 77412 RADIATION TX DELIVERY LVL 3: CPT | Performed by: INTERNAL MEDICINE

## 2024-07-29 PROCEDURE — 84703 CHORIONIC GONADOTROPIN ASSAY: CPT

## 2024-07-29 PROCEDURE — 99214 OFFICE O/P EST MOD 30 MIN: CPT | Performed by: NURSE PRACTITIONER

## 2024-07-29 PROCEDURE — 36415 COLL VENOUS BLD VENIPUNCTURE: CPT

## 2024-07-29 PROCEDURE — 85025 COMPLETE CBC W/AUTO DIFF WBC: CPT

## 2024-07-29 RX ORDER — HEPARIN SODIUM (PORCINE) LOCK FLUSH IV SOLN 100 UNIT/ML 100 UNIT/ML
5 SOLUTION INTRAVENOUS ONCE
Status: DISCONTINUED | OUTPATIENT
Start: 2024-07-29 | End: 2024-07-29

## 2024-07-29 RX ORDER — HEPARIN SODIUM (PORCINE) LOCK FLUSH IV SOLN 100 UNIT/ML 100 UNIT/ML
5 SOLUTION INTRAVENOUS ONCE
OUTPATIENT
Start: 2024-08-26

## 2024-07-29 RX ORDER — SODIUM CHLORIDE 9 MG/ML
10 INJECTION, SOLUTION INTRAMUSCULAR; INTRAVENOUS; SUBCUTANEOUS ONCE
OUTPATIENT
Start: 2024-08-26

## 2024-07-29 NOTE — PROGRESS NOTES
Medication Education Record: Oral Therapy    Learner:  Patient    Barriers / Limitations:  None    Psychosocial Assessment:  patient psychosocial response appropriate    Diagnosis: Breast cancer     Readiness of the patient to learn and adhere to oral cancer treatment outside of the cancer center: Yes    Medication Name Dose/Strength Frequency   abemaciclib 150 mg  Twice a day                Schedule of oral medication(s):  Twice daily            Number of cycles planned:  2 years     Verified Consent to Chemotherapy/Biotherapy Cancer Treatment form signed by patient and provider:  Yes      RN/PA Verified prescription bottle against physician order: yes    Start date of treatment:      How to take your medication(s):  Swallow each tablet whole.  Do not break, crush, or chew    Plan for appointments and lab testing: monthly    Missed Dose Management:   If you miss a dose, call your doctor for further instructions.  If you vomit or throw up your medication, call your doctor for further instructions.  Do not take 2 doses at the same time to make up for a missed dose.     Drug / Drug or Drug / Food Interactions:  No grapefruit     Cancer Treatment Side Effects (refer to Chemo Care handout for further information):  Allergic reactions  Constipation  Diarrhea  Fatigue  Kidney / Bladder effects  Liver effects  Loss of appetite  Low red blood cell count / Anemia  Low White Blood Cell Count/Risk of infection  Low Platelet Count/Risk of Bleeding  Lung Effects  Mouth or Throat Sores  Muscle / Bone Effects  Nausea / Vomiting  Skin Effects  Taste Changes  Recommended Antinausea medications: (as directed by your Provider):   Prochloperazine (Compazine) 10mg every 8 hours  Take as needed  Helpful hints during cancer treatment:    Diet:  Avoid greasy or spicy foods on days surrounding chemotherapy  Eat small frequent meals per day (6-7 meals) rather than 3 large meals  Choose high calorie/high protein foods (chicken, hard cooked  eggs, peanut butter, cheese)  If nauseated, try dry foods, such as toast, crackers or pretzels; light or bland foods, such as applesauce or oatmeal.    Fluid intake:  Drink 8-10 cups of liquid a day and take a water bottle wherever you go.  Any fluid is acceptable, but caffeinated products do not count towards your intake and should be limited to 1-2 drinks/day.    Physical Activity  If your doctor approves, be as physically active as you can, but start out slowly, and increase your activity over time as you feel stronger.  Listen to your body and rest when you need to.  Do what you can when you feel up to it.      Oral Care  Keep your mouth clean.  Rinse your mouth before and after meals with plain water or with a mild mouth rinse (made with 1 quart water, 1 teaspoon salt, and 1 teaspoon baking soda, shake before using)   Avoid commercial mouthwashes that contain alcohol, alcoholic or acidic drinks or tobacco  An acceptable mouthwash is Biotene®   If soreness or sores develop, contact the office.    Diarrhea or Constipation  Imodium A-D use for diarrhea:  Take 2 tabs (4mg) at the first sign of diarrhea; then take 1 tab (2mg) every 2 hours until you have had no diarrhea for at least 12 hours; during the night take 2 tabs (4mg) every 4 hours as needed.  Maximum of 8 tablets in 24 hours.  Stool softener for constipation   Skin Care  Avoid direct sunlight.  Wear a broad-spectrum sunscreen with an SPF of 30 or higher on any skin exposed to the sun.  Re-apply every 2 hours if in the sun and after bathing or sweating.  For dry skin, use an alcohol-free lotion twice per day, especially after baths.  If scalp hair loss has occurred, protect the scalp from the sun by wearing a hat and use sunscreen.  Apply alcohol-free moisturizer as needed.      When to call the doctor:  Fever of 100.5 or greater or shaking chills  Nausea/vomiting not controlled with anti-nausea medications: Unable to drink for 24 hours or have signs of  dehydration: tiredness, thirst, dry mouth, dark and decreased amount of urine  Diarrhea - not controlled with Imodium AD or more than 6 episodes in 24 hours  Constipation -no bowel movement x 3 days, no response to stool softeners or laxatives  Mouth sores, sore throat or blisters on the lips affecting oral intake  Difficulty breathing, chest pain, or new cough  Excessive tiredness or weakness, confusion or loss of balance  New rash  Tingling or burning, redness, swelling of the palms of hands or soles of feet  Sudden new unexpected symptom -such as change in vision, swelling in arm or leg  Increase in numbness and tingling of hands or feet  Unusual bleeding (nose bleeds, blood in urine, stool or phlegm)  Pain with urination  Persistent mood changes, depression, nervousness, difficulty sleeping   Pain, redness, swelling or blistering at the IV site  If you go to Emergency Room for any reason or seek medical attention elsewhere  If you should need to cancel or reschedule any visit, it is important that you contact the office    What Phone Number to Call:   [facility] [phone] / Triage Nurse    Teaching Materials Provided:   Chemo Care chemotherapy information sheets  and Safety and Handling Sheet     Please refer to the following link if you are interested in additional information about chemotherapy for yourself or family members: [email or web address]        Safe Handling of Chemotherapy  While you or your family member is receiving chemotherapy, whether in the clinic or at home, the following precautions must be taken to lessen any exposure to the medication.     Handling oral medication:    Confirm that the medication is appropriately labeled.  Only patients who need chemotherapy should take or touch the medication.    If caregivers are involved, caregivers should wear gloves when administering the medication to protect against exposure.  Discard used gloves immediately - do  not use for anything else.  Wash hands thoroughly before and after contact with this medication.  Women of child bearing age, pregnant women or women who are nursing should not handle this medication or any contaminated waste.     If the medication is provided in a blister pack, care should be taken when opening the packet to avoid breathing in any powder that may be aerosolized.   Do not crush, break or open any pills or capsules unless instructed by your doctor.  Do not chew tablets.    Storage:   Store medication in sealed containers, out of reach of children and pets.  Do not store medication in the bathroom, as high humidity may damage the medication.    Check the medication label to confirm if medication should be stored in the refrigerator or away from light.  Store the medicine container inside another container or in a sealed bag.  Make sure it does not touch any food.    The medication should remain in its original packaging until it is ready for ingestion.  Pill containers can be used but should not be used for other medications, and as few people as possible should come in contact with it.  Empty pill containers should be washed with soap and water or disposed within a plastic bag.      Disposal:  The unused medication should not be flushed down the toilet or placed in the trash (preventing contamination of landfills and water supply).  Please contact your local police department for collection of unused prescription medications.    Handling Body Waste:   Caregivers must wear gloves if exposed to the patient’s blood, urine, stool or vomit.  Dispose of the used gloves after each use and wash hands with soap and water.  Any sheets or clothes soiled with the bodily fluids should be machine washed twice in hot water with regular laundry detergent.  Do not wash soiled garments with hands.  If the soiled garments cannot be washed right away, place them in a sealed plastic bag until they can be washed.    Absorbable undergarments, or any other items contaminated with chemotherapy, should be placed in a sealed plastic bag for disposal, separate from other trash.  Toilets should be flushed twice with the lid closed while taking this medication and for 48 hours after the last dose.  Wash your hands after using the toilet.  Wash any skin area that has come in contact with urine or stool.    Safety for my family and friends:  Hugging and kissing is safe for you and your partner or family members.  You can visit, sit with, hug and kiss the children in your life.    You can be around pregnant women, though (if possible) they should not clean up any of your body fluids after you have treatment.   Sexual activity is safe while throughout treatment.  It is possible that traces of the oral chemotherapy may be present in vaginal fluid or semen for 48 hours after taking.  A condom should be used during this time.  Effective birth control should be used throughout treatment to prevent pregnancy while on these medications and for several months or years after therapy.  Chemotherapy can have harmful side effects to the fetus, especially in the first trimester.  In addition, menstrual cycles can become irregular during and after treatment, so you may not know if you are at a time in your cycle when you could become pregnant or if you are actually pregnant.      Plan to start Aug 19  Patient and  here to review potential side effects of abemaciclib. Pt currently receiving RT - last dose expected 8/7/24. Reviewed major side effect is diarrhea dino first 2 cycles. Pt usually has some constipation. Will monitor. Discussed keeping well hydrated and possible addition of hydration or Gatorade etc. Pt reminded me she has 1 kidney, renal function has been good throughout chemotherapy.  Cancer treatment education, including treatment plan, supportive medications, and post-treatment care was provided to the patient.  The patient/support  person  was attentive during education, verbalized understanding, all questions were answered and patient was instructed to call with further questions.     Reinforcement of education is needed at next visit? Yes  Include language provided and  information (if applicable).    Psychosocial concerns or referrals made none currently.   This visit lasted 30 minutes with greater than 50% of the visit for discussion of symptom management and treatment.

## 2024-07-29 NOTE — PROGRESS NOTES
RESEARCH NOTE      OTIS-2: A Phase III, Open-Label, Randomised Study to Assess the Efficacy and Safety of Camizestrant (PRM0334, a Next Generation, Oral Selective Estrogen Receptor Degrader) Versus Standard Endocrine Therapy (Aromatase Inhibitor or Tamoxifen) as Adjuvant Treatment for Patients with ER+/HER2- Early Breast Cancer and an Intermediate-High or High Risk of Recurrence Who Have Completed Definitive Locoregional Treatment and Have No Evidence of Disease    Met with patient today regarding above study. Dr Calle has previously discussed study with patient, and she has reviewed the consent and has some additional questions. Questions answered, research RN will follow up with Dr Calle regarding some additional questions about the study medication.     Stressed to patient that participation is completely voluntary. Direct research RN contact info given and patient encouraged to reach out if any additional questions. Patient is interested but would like some more time to review. Research will get back to patient with additional information regarding timeline and when a decision is needed. Thanked her for her consideration of study.

## 2024-07-30 ENCOUNTER — NURSE ONLY (OUTPATIENT)
Dept: HEMATOLOGY/ONCOLOGY | Facility: HOSPITAL | Age: 39
End: 2024-07-30
Attending: INTERNAL MEDICINE
Payer: COMMERCIAL

## 2024-07-30 DIAGNOSIS — C50.411 MALIGNANT NEOPLASM OF UPPER-OUTER QUADRANT OF RIGHT BREAST IN FEMALE, ESTROGEN RECEPTOR POSITIVE (HCC): ICD-10-CM

## 2024-07-30 DIAGNOSIS — Z17.0 MALIGNANT NEOPLASM OF UPPER-OUTER QUADRANT OF RIGHT BREAST IN FEMALE, ESTROGEN RECEPTOR POSITIVE (HCC): ICD-10-CM

## 2024-07-30 DIAGNOSIS — Z45.2 ENCOUNTER FOR CENTRAL LINE CARE: Primary | ICD-10-CM

## 2024-07-30 PROCEDURE — 96402 CHEMO HORMON ANTINEOPL SQ/IM: CPT

## 2024-07-30 PROCEDURE — 77387 GUIDANCE FOR RADJ TX DLVR: CPT | Performed by: INTERNAL MEDICINE

## 2024-07-30 PROCEDURE — 77412 RADIATION TX DELIVERY LVL 3: CPT | Performed by: INTERNAL MEDICINE

## 2024-07-30 RX ORDER — HEPARIN SODIUM (PORCINE) LOCK FLUSH IV SOLN 100 UNIT/ML 100 UNIT/ML
5 SOLUTION INTRAVENOUS ONCE
Status: COMPLETED | OUTPATIENT
Start: 2024-07-30 | End: 2024-07-30

## 2024-07-30 RX ORDER — HEPARIN SODIUM (PORCINE) LOCK FLUSH IV SOLN 100 UNIT/ML 100 UNIT/ML
5 SOLUTION INTRAVENOUS ONCE
OUTPATIENT
Start: 2024-08-27

## 2024-07-30 RX ORDER — SODIUM CHLORIDE 9 MG/ML
10 INJECTION, SOLUTION INTRAMUSCULAR; INTRAVENOUS; SUBCUTANEOUS ONCE
OUTPATIENT
Start: 2024-08-27

## 2024-07-30 RX ADMIN — HEPARIN SODIUM (PORCINE) LOCK FLUSH IV SOLN 100 UNIT/ML 500 UNITS: 100 SOLUTION INTRAVENOUS at 14:33:00

## 2024-07-31 ENCOUNTER — DIETICIAN VISIT (OUTPATIENT)
Dept: NUTRITION | Facility: HOSPITAL | Age: 39
End: 2024-07-31

## 2024-07-31 VITALS — BODY MASS INDEX: 26 KG/M2 | WEIGHT: 157.38 LBS

## 2024-07-31 NOTE — PROGRESS NOTES
Oncology Nutrition Assessment    Ht Readings from Last 1 Encounters:   05/08/24 165.1 cm (5' 5\")       Wt Readings from Last 1 Encounters:   07/31/24 71.4 kg (157 lb 6.4 oz)     BMI Calculated:  Body mass index is 26.19 kg/m².  Weight History:    Wt Readings from Last 10 Encounters:   07/31/24 71.4 kg (157 lb 6.4 oz)   06/14/24 71.8 kg (158 lb 3.2 oz)   06/03/24 71.2 kg (157 lb)   05/29/24 71.7 kg (158 lb)   05/08/24 71.7 kg (158 lb)   05/08/24 71.8 kg (158 lb 3.2 oz)   04/29/24 73 kg (160 lb 14.4 oz)   04/18/24 74.1 kg (163 lb 6.4 oz)   04/10/24 72.6 kg (160 lb)   04/03/24 74.9 kg (165 lb 3.2 oz)     IBW:  125#  UBW:  155-160#    Diagnoses:  Breast Cancer Estrogen receptor +  Significant Medical History:   has a past medical history of Breast cancer in female (HCC) (10/18/2023), High blood pressure, motion sickness, Hypertension, and Solitary kidney, congenital.   Diet History:  general diet  Oral Liquid Supplement Use:  none    Appetite: good    Nutritional Physical History:   well nourished per visual exam  GI Problems:  GI intact  Adequacy of PO Intake:  good    Nutrition Risk Status:  nutrition status intact    Nutritional Goals:   wt maintenance within 5% during tx and post tx healing, then 5-10# wt loss recommended.  Handouts Provided:   Nutrition for Breast Cancer survivors and Nutrition for cancer prevention    Weekly Visits:  Nutritional Interventions:  7/31/2024 seeing pt due to her request. Pt wanting to know what foods to eat/not eat after treatment completion. Discussed healthy diet with more fresh fruits and vegetables (multicolor), more whole grains, lean meats-more fish chicken, less red meat, limit alcohol to social drinking, exercise and drink adequate fluids. Maintain healthy wt. Advised [facility] for more nutrition cancer information.  CHANTEL Leong RD, LDN   Clinical Dietitian [id number]

## 2024-07-31 NOTE — PROGRESS NOTES
[facility] Radiation Treatment Management Note 21-25    Patient:  Jocelyn Garza  Age:  38 year old  Visit Diagnosis:    1. Malignant neoplasm of upper-outer quadrant of right breast in female, estrogen receptor positive (HCC)      Primary Rad/Onc:  Dr. Cecilia Sam    Site Delivered Dose (cGy) Prescribed Dose (cGy) Fraction #   R SCF 4200 5000 21/25   R CW Bolus 4000 4000 20/20   R Drain sites 4200 5000 21/25   R CW No Bolus 200 1000 1/5     First treatment date:   7/3/24  Concurrent chemotherapy:  none        7/25/2024    10:38 AM 7/31/2024     1:11 PM 8/1/2024    11:00 AM   Oncology Vitals   Weight  157 lb 6.4 oz    Weight  71.396 kg    BSA (m2)  1.79 m2    BMI  26.19 kg/m2    /71  114/82   Pulse 83  78   Resp 16  16   Temp 97.8 °F (36.6 °C)  97.7 °F (36.5 °C)   SpO2 100 %  100 %   Pain Score 0  0        Toxicities:  Fatigue Grade 1= Fatigue relieved by rest- comes in waves. Rests as needed  Pruritis Grade 0= None  Dry Skin absent  Dermatitis associated with radiation Grade 1= Faint erythema or dry desquamation  Moisturizer used Vanicream, Mometasone, Aquaphor, and lubriderm  applied Number of times: 2 -3 times daily. Using CHG soap.  Hyperpigmentation absent    Nursing Note:  Patient seen for OTV with Dr. Sam.   Scheduled for PT due to stiffness. Doing home exercises, but doesn't have PT appointment until end of August.   Got a compression sleeve to start wearing.   Headaches improved.   AVS given. Follow up a week after last treatment for skin check.    Delphine GOLD RN    Physician Note:  Subjective:  -doing well, about the same amt of fatigue  -using skin care      Objective:  Vitals noted  ECOG 0  NAD  Minimal erythema      Treatment setup imaging have been reviewed:  Yes    Assessment/Plan:  -cont RT per plan  -discuss post RT skin care, 1 week skin check  -PT as tolerated  -RTC 6 weeks for follow-up    We discussed expected future toxicity. All questions answered.    Cecilia  MD [place]

## 2024-08-01 ENCOUNTER — APPOINTMENT (OUTPATIENT)
Dept: RADIATION ONCOLOGY | Facility: HOSPITAL | Age: 39
End: 2024-08-01
Attending: INTERNAL MEDICINE
Payer: COMMERCIAL

## 2024-08-01 VITALS
SYSTOLIC BLOOD PRESSURE: 114 MMHG | HEART RATE: 78 BPM | OXYGEN SATURATION: 100 % | TEMPERATURE: 98 F | DIASTOLIC BLOOD PRESSURE: 82 MMHG | RESPIRATION RATE: 16 BRPM

## 2024-08-01 DIAGNOSIS — C50.411 MALIGNANT NEOPLASM OF UPPER-OUTER QUADRANT OF RIGHT BREAST IN FEMALE, ESTROGEN RECEPTOR POSITIVE (HCC): Primary | ICD-10-CM

## 2024-08-01 DIAGNOSIS — Z17.0 MALIGNANT NEOPLASM OF UPPER-OUTER QUADRANT OF RIGHT BREAST IN FEMALE, ESTROGEN RECEPTOR POSITIVE (HCC): Primary | ICD-10-CM

## 2024-08-01 NOTE — PATIENT INSTRUCTIONS
Follow up 1 week after finishing radiation for a skin check.  Jazmine will call you to schedule your follow up appointment.   Please call [phone] with any radiation questions.   Continue to moisturize for the next 2 weeks with Vanicream, Aquaphor, and Lubriderm.   Continue CHG cleansing for 2 weeks after treatment.  Stop mometasone after the last day of treatment.

## 2024-08-05 ENCOUNTER — TELEPHONE (OUTPATIENT)
Dept: HEMATOLOGY/ONCOLOGY | Facility: HOSPITAL | Age: 39
End: 2024-08-05

## 2024-08-05 NOTE — TELEPHONE ENCOUNTER
Phoned patient for care coordination.  Patient is scheduled to complete her course of adjuvant radiation on 8/7/24.  Scheduled follow up with Dr. Calle for 8/7/24 at 3:30pm.

## 2024-08-07 ENCOUNTER — OFFICE VISIT (OUTPATIENT)
Dept: HEMATOLOGY/ONCOLOGY | Facility: HOSPITAL | Age: 39
End: 2024-08-07
Attending: INTERNAL MEDICINE
Payer: COMMERCIAL

## 2024-08-07 ENCOUNTER — DOCUMENTATION ONLY (OUTPATIENT)
Dept: RADIATION ONCOLOGY | Facility: HOSPITAL | Age: 39
End: 2024-08-07

## 2024-08-07 VITALS
SYSTOLIC BLOOD PRESSURE: 106 MMHG | HEART RATE: 102 BPM | TEMPERATURE: 99 F | OXYGEN SATURATION: 100 % | WEIGHT: 159.31 LBS | BODY MASS INDEX: 26.54 KG/M2 | DIASTOLIC BLOOD PRESSURE: 8 MMHG | RESPIRATION RATE: 16 BRPM | HEIGHT: 65 IN

## 2024-08-07 DIAGNOSIS — C50.411 MALIGNANT NEOPLASM OF UPPER-OUTER QUADRANT OF RIGHT BREAST IN FEMALE, ESTROGEN RECEPTOR POSITIVE (HCC): Primary | ICD-10-CM

## 2024-08-07 DIAGNOSIS — Z78.0 POST-MENOPAUSAL: ICD-10-CM

## 2024-08-07 DIAGNOSIS — Z17.0 MALIGNANT NEOPLASM OF UPPER-OUTER QUADRANT OF RIGHT BREAST IN FEMALE, ESTROGEN RECEPTOR POSITIVE (HCC): Primary | ICD-10-CM

## 2024-08-07 PROCEDURE — 99215 OFFICE O/P EST HI 40 MIN: CPT | Performed by: INTERNAL MEDICINE

## 2024-08-07 RX ORDER — LETROZOLE 2.5 MG/1
2.5 TABLET, FILM COATED ORAL DAILY
Qty: 30 TABLET | Refills: 6 | Status: SHIPPED | OUTPATIENT
Start: 2024-08-07

## 2024-08-07 NOTE — PROGRESS NOTES
HPI   Jocelyn Garza is a 38 year old female for evaluation of   Encounter Diagnosis   Name Primary?    Malignant neoplasm of upper-outer quadrant of right breast in female, estrogen receptor positive (HCC) Yes       Patient completed course of neoadjuvant dose dense Adriamycin Cytoxan followed by weekly Taxol.  Last dose of treatment on 3/22/2024.    Patient then underwent bilateral mastectomies on 4/29/2024 with a right axillary lymph node dissection.    Patient completed radiation on 8/7/2024.  No fatigue, skin healing.    LMP November of 2023.    She started goserelin for OFS in June of 2024    Here to discuss initiation of adjuvant endocrine therapy.    She received abemaciclib.        ECOG PS  0    Review of Systems:   Review of Systems - Oncology  Deferred.      Current Outpatient Medications   Medication Sig Dispense Refill    Abemaciclib 150 MG Oral Tab Take 1 tablet (150 mg total) by mouth 2 (two) times daily. may take with or without food (Patient not taking: Reported on 7/3/2024) 60 tablet 11    lisinopril 5 MG Oral Tab Take 1 tablet (5 mg total) by mouth daily. 90 tablet 1    Mometasone Furoate 0.1 % External Cream Apply BID during radiation therapy. 45 g 3    lidocaine-prilocaine 2.5-2.5 % External Cream Apply to site 1 hour prior to port a cath needle insertion (Patient not taking: Reported on 7/11/2024) 1 each 1     Allergies:   No Known Allergies    Past Medical History:    Breast cancer in female (HCC)    Right breast lymph node cancer    High blood pressure    Hx of motion sickness    Hypertension    Solitary kidney, congenital     Past Surgical History:   Procedure Laterality Date    Appendectomy  01/01/2007    Needle biopsy right Right 10/09/2023    Treat ectopic preg,rmv tube/ovary Left 01/01/2014    Treat ectopic preg,rmv tube/ovary Left 01/01/2015     Social History     Socioeconomic History    Marital status:    Tobacco Use    Smoking status: Never    Smokeless tobacco: Never    Vaping Use    Vaping status: Never Used   Substance and Sexual Activity    Alcohol use: Not Currently     Comment: 2 -3 drinks per week    Drug use: No     Social Determinants of Health     Food Insecurity: No Food Insecurity (4/30/2024)    Food Insecurity     Food Insecurity: Never true   Transportation Needs: No Transportation Needs (4/30/2024)    Transportation Needs     Lack of Transportation: No   Housing Stability: Low Risk  (4/30/2024)    Housing Stability     Housing Instability: No       Family History   Problem Relation Age of Onset    Other (Parkinson's Disease) Father     Prostate Cancer Maternal Grandfather 70    Pancreatic Cancer Paternal Grandmother 80    Pancreatic Cancer Maternal Uncle 60    Cancer Maternal Great-Grandmother         gastric ca    Cancer Paternal Aunt 80        breast cancer    Pancreatic Cancer Paternal Great-Grandfather          PHYSICAL EXAM:    BP (!) 106/8 (BP Location: Left arm, Patient Position: Sitting, Cuff Size: adult)   Pulse 102   Temp 98.5 °F (36.9 °C) (Oral)   Resp 16   Ht 1.651 m (5' 5\")   Wt 72.3 kg (159 lb 4.8 oz)   LMP 11/21/2023 (Approximate)   SpO2 100%   BMI 26.51 kg/m²   Wt Readings from Last 6 Encounters:   08/07/24 72.3 kg (159 lb 4.8 oz)   07/31/24 71.4 kg (157 lb 6.4 oz)   06/14/24 71.8 kg (158 lb 3.2 oz)   06/03/24 71.2 kg (157 lb)   05/29/24 71.7 kg (158 lb)   05/08/24 71.7 kg (158 lb)     Physical Exam  General: Patient is alert, not in acute distress.  HEENT: EOMs intact.  Anicteric.    Psych/Depression: nl          ASSESSMENT/PLAN:     1. Malignant neoplasm of upper-outer quadrant of right breast in female, estrogen receptor positive (HCC)         Cancer Staging   Malignant neoplasm of upper-outer quadrant of right breast in female, estrogen receptor positive (HCC)  Staging form: Breast, AJCC 8th Edition  - Clinical stage from 10/12/2023: Stage IIA (cT3, cN1(f), cM0, G2, ER+, RI+, HER2-) - Signed by Halie Calle MD on 5/8/2024  - Pathologic  stage from 5/8/2024: ypT3, pN2a, cM0, G2, ER+, AR+, HER2- - Signed by Halie Calle MD on 5/8/2024    Patient has completed staging work-up.  Thus far no evidence of metastatic disease.  Findings in the liver evaluated with ultrasound, and the one side is a hemangioma, the other likely a cyst, but a question of metastatic disease and PET scan has been ordered.  I discussed with the patient that most likely this is going to be a cyst.    Discussed that she still has early stage disease, and that her staging has not changed despite the size of the primary tumor.    Given extensive disease in the breast and komal involvement, she will benefit from neoadjuvant chemotherapy, which may help with surgical outcomes by shrinking some of the primary tumor and of the lymph nodes.  I discussed with the patient that the primary tumor will not likely decrease in size significantly to make her a candidate for breast conservation.  I did discuss with the patient that she will need a mastectomy and likely axillary lymph node dissection.  Given the size of the primary tumor, she is a candidate for postmastectomy radiation therapy plus minus radiation to the komal basins pending on surgical management of the axilla.  I discussed with the patient that once she completes treatment with radiation, she will then be initiated on adjuvant endocrine therapy, given the size of the tumor and number of positive lymph nodes, she also will be a candidate for 2 years of endovascular in the initial 2 years of adjuvant endocrine therapy.  Discussed that adjuvant endocrine therapy total duration will be no less than 5 years but given her high risk features, likely up to 8 to 10 years.  We will also discuss options for ovarian function suppression after completion of chemotherapy.    Patient inquired regarding contralateral prophylactic mastectomy.  I discussed with the patient that we should await results of her genetic testing.  If the genetic  testing is positive for a deleterious mutation which will increase her lifetime second primary breast cancer, then she should proceed with prophylactic contralateral mastectomy.  However, if this is negative, discussed with the patient that there is no benefit for proceeding with contralateral mastectomy, as it will not change her disease-free or overall survival.    Discussed with the patient that while she is receiving neoadjuvant treatment, she can have the evaluation by plastic surgery so that when she completes her neoadjuvant course of therapy, she will be able to have her surgery coordinated with Dr. Hutson and a plastic surgeon    She completed course of neoadjuvant DD AC x 4 cycles followed by weekly Taxol x12 weeks    Status post bilateral mastectomy with right-sided axillary lymph node dissection on 4/29/2024.    She completed adjuvant radiation therapy on 8/7/2024.    Started on ovarian function suppression, in June of 2024.      Discussed initiation of  endocrine therapy with letrozole for 10 yrs with 2 years of Abemaciclib.  In addition, patient is also a candidate for the [facility]dwell 2 clinical trial which is comparing standard of care adjuvant endocrine therapy with Abemaciclib versus camizestrant which is an oral SERD.    Patient is considering participation in the OTIS 2 trial.   Will hold off starting on the letrozole and abemaciclib until she decides about starting on the trial.      Follow up after determination on start on trial.  Research RN will schedule appointments.      MDM high risk.    No orders of the defined types were placed in this encounter.      Results From Past 48 Hours:  No results found for this or any previous visit (from the past 48 hour(s)).        Imaging & Referrals:  None     Component      Latest Ref Rng 7/29/2024   WBC      4.0 - 11.0 x10(3) uL 5.4    RBC      3.80 - 5.30 x10(6)uL 4.33    Hemoglobin      12.0 - 16.0 g/dL 12.4    Hematocrit      35.0 - 48.0 % 37.8    MCV       80.0 - 100.0 fL 87.3    MCH      26.0 - 34.0 pg 28.6    MCHC      31.0 - 37.0 g/dL 32.8    RDW-SD      35.1 - 46.3 fL 36.4    RDW      11.0 - 15.0 % 11.5    Platelet Count      150.0 - 450.0 10(3)uL 262.0    Prelim Neutrophil Abs      1.50 - 7.70 x10 (3) uL 3.85    Neutrophils Absolute      1.50 - 7.70 x10(3) uL 3.85    Lymphocytes Absolute      1.00 - 4.00 x10(3) uL 0.74 (L)    Monocytes Absolute      0.10 - 1.00 x10(3) uL 0.55    Eosinophils Absolute      0.00 - 0.70 x10(3) uL 0.21    Basophils Absolute      0.00 - 0.20 x10(3) uL 0.04    Immature Granulocyte Absolute      0.00 - 1.00 x10(3) uL 0.03    Neutrophils %      % 71.0    Lymphocytes %      % 13.7    Monocytes %      % 10.1    Eosinophils %      % 3.9    Basophils %      % 0.7    Immature Granulocyte %      % 0.6    Glucose      70 - 99 mg/dL 95    Sodium      136 - 145 mmol/L 143    Potassium      3.5 - 5.1 mmol/L 4.4    Chloride      98 - 112 mmol/L 109    Carbon Dioxide, Total      21.0 - 32.0 mmol/L 28.0    ANION GAP      0 - 18 mmol/L 6    BUN      9 - 23 mg/dL 14    CREATININE      0.55 - 1.02 mg/dL 0.97    BUN/CREATININE RATIO      10.0 - 20.0  14.4    CALCIUM      8.7 - 10.4 mg/dL 10.1    CALCULATED OSMOLALITY      275 - 295 mOsm/kg 296 (H)    EGFR      >=60 mL/min/1.73m2 76    ALT (SGPT)      10 - 49 U/L 22    AST (SGOT)      <34 U/L 23    ALKALINE PHOSPHATASE      37 - 98 U/L 61    Total Bilirubin      0.3 - 1.2 mg/dL 0.6    PROTEIN, TOTAL      5.7 - 8.2 g/dL 7.6    Albumin      3.2 - 4.8 g/dL 4.8    Globulin      2.0 - 3.5 g/dL 2.8    A/G Ratio      1.0 - 2.0  1.7    Patient Fasting for CMP? No    HCG, Serum Qualitative (S)      Negative  Negative    Magnesium, Serum      1.6 - 2.6 mg/dL 1.8       Legend:  (L) Low  (H) High

## 2024-08-09 ENCOUNTER — RESEARCH ENCOUNTER (OUTPATIENT)
Dept: HEMATOLOGY/ONCOLOGY | Facility: HOSPITAL | Age: 39
End: 2024-08-09
Attending: INTERNAL MEDICINE
Payer: COMMERCIAL

## 2024-08-09 DIAGNOSIS — C50.411 MALIGNANT NEOPLASM OF UPPER-OUTER QUADRANT OF RIGHT BREAST IN FEMALE, ESTROGEN RECEPTOR POSITIVE (HCC): Primary | ICD-10-CM

## 2024-08-09 DIAGNOSIS — Z17.0 MALIGNANT NEOPLASM OF UPPER-OUTER QUADRANT OF RIGHT BREAST IN FEMALE, ESTROGEN RECEPTOR POSITIVE (HCC): Primary | ICD-10-CM

## 2024-08-09 NOTE — PROGRESS NOTES
RESEARCH CONSENT NOTE      OTIS-2: A Phase III, Open-Label, Randomised Study to Assess the Efficacy and Safety of Camizestrant ([id number], a Next Generation, Oral Selective Estrogen Receptor Degrader) Versus Standard Endocrine Therapy (Aromatase Inhibitor or Tamoxifen) as Adjuvant Treatment for Patients with ER+/HER2- Early Breast Cancer and an Intermediate-High or High Risk of Recurrence Who Have Completed Definitive Locoregional Treatment and Have No Evidence of Disease     Patient previously given information on OTIS-2, has taken time to review the consent and ask questions, and presents today to sign consent.     Research RN reviewed entirety of consent, 30 pages, [employer] approved version 21 Aug 2023, Revised 2 Jan 2024. Explained purpose of study, optional nature of study, and the fact that she may withdraw consent at any time. Reviewed all screening procedures, length of treatment, and calendar of events, including patient diary and study questionnaires which she states she would like to do on her personal phone. Discussed randomization and treatment arms. Reviewed specimens required, genetic analysis, future research, and how her information is coded for privacy. Thorough conversation had regarding the risks and benefits of participation. Side effects of camizestrant reviewed, along with how we monitor patients for side effects. Discussed who will have information to her data, her rights as a study participant, and how to get additional information. Explained costs of participation and what costs the study will cover.     Patient is agreeable to participate in her coded data and biosamples being used for future research. For the statement \"I give my permission for [facility] research staff to search public sources, registries, or other medical records and to contact my family or other health providers to obtain my contact information and wellbeing if my study doctor loses contact with me\" she states \"yes.\"  Patient signed main consent, with research RN signing as witness.      Research RN then reviewed all 6 pages of the optional genetic research information and informed consent form, [employer] approved version 1 Jun 2023, revised 2 Jan 2024, with patient. Explained what optional genetic research is, the fact that she will not receive any direct benefits from participation, how her information will be protected, when the specimens will be collected, and the fact that study will pay for the testing. Reviewed that this is optional, and that she may withdraw consent at any time. Information regarding whom to contact for additional information given. Patient agrees to the use of her coded data and biosamples for optional genetic research. Patient signed consent for optional specimens and research RN signed as witness.     Ample time given during consent process for questions, which were answered. Explained to patient that I will assist her in scheduling her screening exams. She has direct contact information for research, and knows to reach out if any questions. Copies of both consents made and given to patient. Thanked her for her participation.

## 2024-08-13 ENCOUNTER — PATIENT MESSAGE (OUTPATIENT)
Dept: HEMATOLOGY/ONCOLOGY | Facility: HOSPITAL | Age: 39
End: 2024-08-13

## 2024-08-14 ENCOUNTER — NURSE ONLY (OUTPATIENT)
Dept: RADIATION ONCOLOGY | Facility: HOSPITAL | Age: 39
End: 2024-08-14
Attending: INTERNAL MEDICINE
Payer: COMMERCIAL

## 2024-08-14 VITALS
RESPIRATION RATE: 16 BRPM | SYSTOLIC BLOOD PRESSURE: 114 MMHG | OXYGEN SATURATION: 100 % | TEMPERATURE: 98 F | DIASTOLIC BLOOD PRESSURE: 77 MMHG | HEART RATE: 79 BPM

## 2024-08-14 NOTE — PATIENT INSTRUCTIONS
- continue moisturizing and using CHG for 1 more week.     - please call [phone] with radiation questions.     - next appointment with Dr. Sam 9/25/24.

## 2024-08-14 NOTE — PROGRESS NOTES
Nursing Follow-Up Note    Patient: Jocelyn Garza  YOB: 1985  Age: 38 year old  Radiation Oncologist: Dr. Cecilia Sam  Referring Physician: Halie Calle  Chief Complaint:   Chief Complaint   Patient presents with    Radiation     RN skin check     Date: 8/14/2024    Toxicities:   Fatigue Grade 1= Fatigue relieved by rest- much improved per patient.   Pruritis Grade 0= None  Dry Skin absent  Dermatitis associated with radiation Grade 1= Faint erythema or dry desquamation  Moisturizer used Vanicream, Aquaphor, and lubriderm  applied Number of times: 2-3 times daily. Also using CHG bath for one more week.   Hyperpigmentation noted    Vital Signs: /77 (BP Location: Left arm, Patient Position: Sitting, Cuff Size: adult)   Pulse 79   Temp 98.3 °F (36.8 °C) (Temporal)   Resp 16   LMP 11/21/2023 (Approximate)   SpO2 100% ,   Wt Readings from Last 6 Encounters:   08/07/24 72.3 kg (159 lb 4.8 oz)   07/31/24 71.4 kg (157 lb 6.4 oz)   06/14/24 71.8 kg (158 lb 3.2 oz)   06/03/24 71.2 kg (157 lb)   05/29/24 71.7 kg (158 lb)   05/08/24 71.7 kg (158 lb)       Allergies:  No Known Allergies    Nursing Note:   Patient seen for skin check. Finished R chest wall + SCF + drain sites RT 8/7/24. Patient changed into gown for visit. Redness to right chest, no open areas. Tan appearance to right axilla. Patient moisturizing and using CHG bath. Reports stiffness and swelling. Using right arm sleeve. Has appointment for PT late August. Nothing taken PRN for mild discomfort. Follow up with Dr. Sam scheduled for 9/25. Patient provided with radiation RN number for additional questions.

## 2024-08-15 ENCOUNTER — RESEARCH ENCOUNTER (OUTPATIENT)
Dept: HEMATOLOGY/ONCOLOGY | Facility: HOSPITAL | Age: 39
End: 2024-08-15

## 2024-08-15 NOTE — PROGRESS NOTES
RESEARCH TELEPHONE NOTE    Research RN called pt to review plan for screening activities.  Confirmed with patient that screening ocular assessment is scheduled for 8/19 at [facility].  Labs, EKG, and MD visit scheduled for 8/23.  Plan for randomization and treatment start week of 8/26.  Pt verbalized understanding.  Encouraged pt to call with any questions or concerns prior to next visit.

## 2024-08-22 DIAGNOSIS — Z17.0 MALIGNANT NEOPLASM OF UPPER-OUTER QUADRANT OF RIGHT BREAST IN FEMALE, ESTROGEN RECEPTOR POSITIVE (HCC): Primary | ICD-10-CM

## 2024-08-22 DIAGNOSIS — C50.411 MALIGNANT NEOPLASM OF UPPER-OUTER QUADRANT OF RIGHT BREAST IN FEMALE, ESTROGEN RECEPTOR POSITIVE (HCC): Primary | ICD-10-CM

## 2024-08-23 ENCOUNTER — HOSPITAL ENCOUNTER (OUTPATIENT)
Dept: MRI IMAGING | Facility: HOSPITAL | Age: 39
Discharge: HOME | End: 2024-08-23
Attending: INTERNAL MEDICINE
Payer: COMMERCIAL

## 2024-08-23 ENCOUNTER — RESEARCH ENCOUNTER (OUTPATIENT)
Dept: HEMATOLOGY/ONCOLOGY | Facility: HOSPITAL | Age: 39
End: 2024-08-23

## 2024-08-23 ENCOUNTER — EKG ENCOUNTER (OUTPATIENT)
Dept: LAB | Facility: HOSPITAL | Age: 39
End: 2024-08-23
Attending: INTERNAL MEDICINE
Payer: COMMERCIAL

## 2024-08-23 ENCOUNTER — NURSE ONLY (OUTPATIENT)
Dept: HEMATOLOGY/ONCOLOGY | Facility: HOSPITAL | Age: 39
End: 2024-08-23
Attending: INTERNAL MEDICINE
Payer: COMMERCIAL

## 2024-08-23 VITALS
OXYGEN SATURATION: 99 % | SYSTOLIC BLOOD PRESSURE: 108 MMHG | RESPIRATION RATE: 16 BRPM | TEMPERATURE: 98 F | WEIGHT: 159 LBS | DIASTOLIC BLOOD PRESSURE: 61 MMHG | BODY MASS INDEX: 26.49 KG/M2 | HEIGHT: 65 IN | HEART RATE: 90 BPM

## 2024-08-23 DIAGNOSIS — C50.411 MALIGNANT NEOPLASM OF UPPER-OUTER QUADRANT OF RIGHT BREAST IN FEMALE, ESTROGEN RECEPTOR POSITIVE (HCC): ICD-10-CM

## 2024-08-23 DIAGNOSIS — Z51.81 ENCOUNTER FOR MONITORING AROMATASE INHIBITOR THERAPY: ICD-10-CM

## 2024-08-23 DIAGNOSIS — Z85.3 ENCOUNTER FOR FOLLOW-UP SURVEILLANCE OF BREAST CANCER: ICD-10-CM

## 2024-08-23 DIAGNOSIS — R51.9 GENERALIZED HEADACHES: ICD-10-CM

## 2024-08-23 DIAGNOSIS — Z08 ENCOUNTER FOR FOLLOW-UP SURVEILLANCE OF BREAST CANCER: ICD-10-CM

## 2024-08-23 DIAGNOSIS — Z17.0 MALIGNANT NEOPLASM OF UPPER-OUTER QUADRANT OF RIGHT BREAST IN FEMALE, ESTROGEN RECEPTOR POSITIVE (HCC): ICD-10-CM

## 2024-08-23 DIAGNOSIS — C50.411 MALIGNANT NEOPLASM OF UPPER-OUTER QUADRANT OF RIGHT BREAST IN FEMALE, ESTROGEN RECEPTOR POSITIVE (HCC): Primary | ICD-10-CM

## 2024-08-23 DIAGNOSIS — Z79.811 ENCOUNTER FOR MONITORING AROMATASE INHIBITOR THERAPY: ICD-10-CM

## 2024-08-23 DIAGNOSIS — Z78.0 POST-MENOPAUSAL: ICD-10-CM

## 2024-08-23 DIAGNOSIS — Z17.0 MALIGNANT NEOPLASM OF UPPER-OUTER QUADRANT OF RIGHT BREAST IN FEMALE, ESTROGEN RECEPTOR POSITIVE (HCC): Primary | ICD-10-CM

## 2024-08-23 DIAGNOSIS — Z45.2 ENCOUNTER FOR CENTRAL LINE CARE: Primary | ICD-10-CM

## 2024-08-23 LAB
ALBUMIN SERPL-MCNC: 4.5 G/DL (ref 3.2–4.8)
ALBUMIN/GLOB SERPL: 1.7 {RATIO} (ref 1–2)
ALP LIVER SERPL-CCNC: 52 U/L
ALT SERPL-CCNC: 41 U/L
ANION GAP SERPL CALC-SCNC: 8 MMOL/L (ref 0–18)
AST SERPL-CCNC: 33 U/L (ref ?–34)
ATRIAL RATE: 75 BPM
BASOPHILS # BLD AUTO: 0.04 X10(3) UL (ref 0–0.2)
BASOPHILS NFR BLD AUTO: 0.7 %
BILIRUB SERPL-MCNC: 0.6 MG/DL (ref 0.3–1.2)
BUN BLD-MCNC: 16 MG/DL (ref 9–23)
BUN/CREAT SERPL: 18 (ref 10–20)
CALCIUM BLD-MCNC: 9.9 MG/DL (ref 8.7–10.4)
CHLORIDE SERPL-SCNC: 109 MMOL/L (ref 98–112)
CO2 SERPL-SCNC: 26 MMOL/L (ref 21–32)
CREAT BLD-MCNC: 0.89 MG/DL
DEPRECATED RDW RBC AUTO: 37.7 FL (ref 35.1–46.3)
EGFRCR SERPLBLD CKD-EPI 2021: 84 ML/MIN/1.73M2 (ref 60–?)
EOSINOPHIL # BLD AUTO: 0.21 X10(3) UL (ref 0–0.7)
EOSINOPHIL NFR BLD AUTO: 3.9 %
ERYTHROCYTE [DISTWIDTH] IN BLOOD BY AUTOMATED COUNT: 12.1 % (ref 11–15)
ESTRADIOL SERPL-MCNC: <11.8 PG/ML
FSH SERPL-ACNC: 9 MIU/ML
GLOBULIN PLAS-MCNC: 2.6 G/DL (ref 2–3.5)
GLUCOSE BLD-MCNC: 106 MG/DL (ref 70–99)
HCG SERPL QL: NEGATIVE
HCT VFR BLD AUTO: 34.3 %
HGB BLD-MCNC: 11.5 G/DL
IMM GRANULOCYTES # BLD AUTO: 0.01 X10(3) UL (ref 0–1)
IMM GRANULOCYTES NFR BLD: 0.2 %
LYMPHOCYTES # BLD AUTO: 0.81 X10(3) UL (ref 1–4)
LYMPHOCYTES NFR BLD AUTO: 14.9 %
MAGNESIUM SERPL-MCNC: 1.9 MG/DL (ref 1.6–2.6)
MCH RBC QN AUTO: 28.7 PG (ref 26–34)
MCHC RBC AUTO-ENTMCNC: 33.5 G/DL (ref 31–37)
MCV RBC AUTO: 85.5 FL
MONOCYTES # BLD AUTO: 0.45 X10(3) UL (ref 0.1–1)
MONOCYTES NFR BLD AUTO: 8.3 %
NEUTROPHILS # BLD AUTO: 3.91 X10 (3) UL (ref 1.5–7.7)
NEUTROPHILS # BLD AUTO: 3.91 X10(3) UL (ref 1.5–7.7)
NEUTROPHILS NFR BLD AUTO: 72 %
OSMOLALITY SERPL CALC.SUM OF ELEC: 298 MOSM/KG (ref 275–295)
P AXIS: 56 DEGREES
P-R INTERVAL: 160 MS
PLATELET # BLD AUTO: 242 10(3)UL (ref 150–450)
POTASSIUM SERPL-SCNC: 3.8 MMOL/L (ref 3.5–5.1)
PROT SERPL-MCNC: 7.1 G/DL (ref 5.7–8.2)
Q-T INTERVAL: 386 MS
QRS DURATION: 96 MS
QTC CALCULATION (BEZET): 431 MS
R AXIS: -24 DEGREES
RBC # BLD AUTO: 4.01 X10(6)UL
SODIUM SERPL-SCNC: 143 MMOL/L (ref 136–145)
T AXIS: 38 DEGREES
VENTRICULAR RATE: 75 BPM
WBC # BLD AUTO: 5.4 X10(3) UL (ref 4–11)

## 2024-08-23 PROCEDURE — 93010 ELECTROCARDIOGRAM REPORT: CPT | Performed by: INTERNAL MEDICINE

## 2024-08-23 PROCEDURE — 36591 DRAW BLOOD OFF VENOUS DEVICE: CPT

## 2024-08-23 PROCEDURE — 83735 ASSAY OF MAGNESIUM: CPT

## 2024-08-23 PROCEDURE — 80053 COMPREHEN METABOLIC PANEL: CPT

## 2024-08-23 PROCEDURE — 83001 ASSAY OF GONADOTROPIN (FSH): CPT

## 2024-08-23 PROCEDURE — 70553 MRI BRAIN STEM W/O & W/DYE: CPT | Performed by: INTERNAL MEDICINE

## 2024-08-23 PROCEDURE — 85025 COMPLETE CBC W/AUTO DIFF WBC: CPT

## 2024-08-23 PROCEDURE — 93005 ELECTROCARDIOGRAM TRACING: CPT

## 2024-08-23 PROCEDURE — 82670 ASSAY OF TOTAL ESTRADIOL: CPT

## 2024-08-23 PROCEDURE — 99215 OFFICE O/P EST HI 40 MIN: CPT | Performed by: INTERNAL MEDICINE

## 2024-08-23 PROCEDURE — 84703 CHORIONIC GONADOTROPIN ASSAY: CPT

## 2024-08-23 PROCEDURE — A9575 INJ GADOTERATE MEGLUMI 0.1ML: HCPCS | Performed by: INTERNAL MEDICINE

## 2024-08-23 RX ORDER — HEPARIN SODIUM (PORCINE) LOCK FLUSH IV SOLN 100 UNIT/ML 100 UNIT/ML
5 SOLUTION INTRAVENOUS ONCE
Status: CANCELLED | OUTPATIENT
Start: 2024-08-26

## 2024-08-23 RX ORDER — GADOTERATE MEGLUMINE 376.9 MG/ML
15 INJECTION INTRAVENOUS
Status: COMPLETED | OUTPATIENT
Start: 2024-08-23 | End: 2024-08-23

## 2024-08-23 RX ORDER — SODIUM CHLORIDE 9 MG/ML
10 INJECTION, SOLUTION INTRAMUSCULAR; INTRAVENOUS; SUBCUTANEOUS ONCE
OUTPATIENT
Start: 2024-08-26

## 2024-08-23 RX ADMIN — GADOTERATE MEGLUMINE 14 ML: 376.9 INJECTION INTRAVENOUS at 20:58:00

## 2024-08-23 NOTE — PROGRESS NOTES
RESEARCH NOTE    STUDY:  OTIS 2  PATIENT ID: [id number]  TIME POINT: Screening    Patient to clinic for OTIS 2 screening labs and MD appointment.  Screening labs drawn prior to MD appointment today.  Screening ocular assessment completed on 8/19.           Pt reports she is feeling good overall.  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  Pt continues to use steroid cream per Rad Onc recommendation.  Pt also reports hot flashes (few times a day), arthralgia (bilateral knees and hips), and headaches likely related to LHRH.  Stat MRI ordered to assess headaches.  Pt also reports feeling a intermittent nausea after meals.  Pt feels energy is returning and she is slowly increasing her fitness activity.                     Current baseline AE summary:  - Dermatitis - grade 1  - Hot flashes - grade 1   - Arthralgia - grade 1  - Headaches - grade 1(started during RT)  - Intermittent Nausea - grade 1    Pt to complete ECG after clinic appointment today.  STAT MRI brain scheduled for today.  Reviewed plan for randomization and treatment start week of 8/26.  Research RN to call pt next week and confirm dates based on screening results.  Pt verbalized understanding.

## 2024-08-23 NOTE — PROGRESS NOTES
HPI   Jocelyn Garza is a 38 year old female for evaluation of   Encounter Diagnoses   Name Primary?    Malignant neoplasm of upper-outer quadrant of right breast in female, estrogen receptor positive (HCC) Yes    Encounter for monitoring aromatase inhibitor therapy     Post-menopausal     Encounter for follow-up surveillance of breast cancer     Generalized headaches        Patient completed course of neoadjuvant dose dense Adriamycin Cytoxan followed by weekly Taxol.  Last dose of treatment on 3/22/2024.    Patient then underwent bilateral mastectomies on 4/29/2024 with a right axillary lymph node dissection.    Patient completed radiation on 8/7/2024.  No fatigue, skin healing.  Some swelling and peeling on the RT port.  Using lotions as prescribed.      LMP November of 2023.    On goserelin since 6/3/24, last injection on 7/30/24. Having hot flashes.  States a couple of minutes and then improves.  A few times a day, not as much end of the month.      Has gone back to work.  Feels that will be able to start working out again.    She received letrozole and abemaciclib.  Has not started yet as being screened for OTIS-2 trial.      Having lymphedema therapy.  Feels hardness on the R axilla.    ECOG PS  0    Review of Systems:   Review of Systems   Constitutional:  Negative for appetite change, fatigue and unexpected weight change.   Eyes:  Negative for eye problems.   Respiratory:  Negative for cough and shortness of breath.    Cardiovascular:  Negative for chest pain.   Gastrointestinal:  Negative for abdominal pain.        States full and nausea after eating at times.     Endocrine: Positive for hot flashes.   Genitourinary:  Negative for menstrual problem.    Musculoskeletal:  Positive for arthralgias (states since a few weeks ago, specially the R knee, or B hips.  States that it is worse in am or after first standing from sitting a long time.  Better with activity.). Negative for back pain and neck  pain.   Neurological:  Positive for headaches (states since she had RT.  Was recommended for hydration and improved.  More HA in the am.  States switched her pillow and improved for a short time.  States that takes tylenol.  States every other day and HA may last all day.  States in the top of the head). Negative for dizziness and numbness (sometimes if crosses legs and if wears gauntlet the fingers tingle).        SILVEIRA feels like a \"hangover headache\".  No vision changes, no n/v.  States not sure if snores.     Hematological:  Negative for adenopathy.   Psychiatric/Behavioral:  Negative for sleep disturbance.          Current Outpatient Medications   Medication Sig Dispense Refill    lisinopril 5 MG Oral Tab Take 1 tablet (5 mg total) by mouth daily. 90 tablet 1    letrozole 2.5 MG Oral Tab Take 1 tablet (2.5 mg total) by mouth daily. (Patient not taking: Reported on 8/14/2024) 30 tablet 6    Abemaciclib 150 MG Oral Tab Take 1 tablet (150 mg total) by mouth 2 (two) times daily. may take with or without food (Patient not taking: Reported on 7/3/2024) 60 tablet 11    Mometasone Furoate 0.1 % External Cream Apply BID during radiation therapy. (Patient not taking: Reported on 8/14/2024) 45 g 3    lidocaine-prilocaine 2.5-2.5 % External Cream Apply to site 1 hour prior to port a cath needle insertion (Patient not taking: Reported on 7/11/2024) 1 each 1     Allergies:   No Known Allergies    Past Medical History:    Breast cancer in female (HCC)    Right breast lymph node cancer    High blood pressure    Hx of motion sickness    Hypertension    Solitary kidney, congenital     Past Surgical History:   Procedure Laterality Date    Appendectomy  01/01/2007    Needle biopsy right Right 10/09/2023    Treat ectopic preg,rmv tube/ovary Left 01/01/2014    Treat ectopic preg,rmv tube/ovary Left 01/01/2015     Social History     Socioeconomic History    Marital status:    Tobacco Use    Smoking status: Never    Smokeless  tobacco: Never   Vaping Use    Vaping status: Never Used   Substance and Sexual Activity    Alcohol use: Not Currently     Comment: 2 -3 drinks per week    Drug use: No     Social Determinants of Health     Food Insecurity: No Food Insecurity (4/30/2024)    Food Insecurity     Food Insecurity: Never true   Transportation Needs: No Transportation Needs (4/30/2024)    Transportation Needs     Lack of Transportation: No   Housing Stability: Low Risk  (4/30/2024)    Housing Stability     Housing Instability: No       Family History   Problem Relation Age of Onset    Other (Parkinson's Disease) Father     Prostate Cancer Maternal Grandfather 70    Pancreatic Cancer Paternal Grandmother 80    Pancreatic Cancer Maternal Uncle 60    Cancer Maternal Great-Grandmother         gastric ca    Cancer Paternal Aunt 80        breast cancer    Pancreatic Cancer Paternal Great-Grandfather          PHYSICAL EXAM:    /61 (BP Location: Left arm, Patient Position: Sitting, Cuff Size: adult)   Pulse 90   Temp 98.1 °F (36.7 °C) (Oral)   Resp 16   Ht 1.651 m (5' 5\")   Wt 72.1 kg (159 lb)   LMP 11/21/2023 (Approximate)   SpO2 99%   BMI 26.46 kg/m²   Wt Readings from Last 6 Encounters:   08/23/24 72.1 kg (159 lb)   08/07/24 72.3 kg (159 lb 4.8 oz)   07/31/24 71.4 kg (157 lb 6.4 oz)   06/14/24 71.8 kg (158 lb 3.2 oz)   06/03/24 71.2 kg (157 lb)   05/29/24 71.7 kg (158 lb)     Physical Exam  General: Patient is alert, not in acute distress.  HEENT: EOMs intact. PERRL. Oropharynx is clear.   Neck: No JVD. No palpable lymphadenopathy. Neck is supple.  Chest: Clear to auscultation. B mastectomies.  R axilla with grade 1 radiation dermatitis with mild dry desquamation and mild erythema.  Scar tissue a the surgical site.  Wearing compression sleeve.   Heart: Regular rate and rhythm.   Abdomen: Soft, non tender with good bowel sounds.  Extremities: No edema.  Neurological: Grossly intact.   Lymphatics: There is no palpable  lymphadenopathy throughout in the cervical, supraclavicular, axillary, or inguinal regions.  Psych/Depression: nl        ASSESSMENT/PLAN:     1. Malignant neoplasm of upper-outer quadrant of right breast in female, estrogen receptor positive (HCC)    2. Encounter for monitoring aromatase inhibitor therapy    3. Post-menopausal    4. Encounter for follow-up surveillance of breast cancer    5. Generalized headaches         Cancer Staging   Malignant neoplasm of upper-outer quadrant of right breast in female, estrogen receptor positive (HCC)  Staging form: Breast, AJCC 8th Edition  - Clinical stage from 10/12/2023: Stage IIA (cT3, cN1(f), cM0, G2, ER+, WA+, HER2-) - Signed by Halie Calle MD on 5/8/2024  - Pathologic stage from 5/8/2024: ypT3, pN2a, cM0, G2, ER+, WA+, HER2- - Signed by Halie Calle MD on 5/8/2024    Patient has completed staging work-up.  Thus far no evidence of metastatic disease.  Findings in the liver evaluated with ultrasound, and the one side is a hemangioma, the other likely a cyst, but a question of metastatic disease and PET scan has been ordered.  I discussed with the patient that most likely this is going to be a cyst.    Discussed that she still has early stage disease, and that her staging has not changed despite the size of the primary tumor.    Given extensive disease in the breast and komal involvement, she will benefit from neoadjuvant chemotherapy, which may help with surgical outcomes by shrinking some of the primary tumor and of the lymph nodes.  I discussed with the patient that the primary tumor will not likely decrease in size significantly to make her a candidate for breast conservation.  I did discuss with the patient that she will need a mastectomy and likely axillary lymph node dissection.  Given the size of the primary tumor, she is a candidate for postmastectomy radiation therapy plus minus radiation to the komal basins pending on surgical management of the axilla.  I  discussed with the patient that once she completes treatment with radiation, she will then be initiated on adjuvant endocrine therapy, given the size of the tumor and number of positive lymph nodes, she also will be a candidate for 2 years of endovascular in the initial 2 years of adjuvant endocrine therapy.  Discussed that adjuvant endocrine therapy total duration will be no less than 5 years but given her high risk features, likely up to 8 to 10 years.  We will also discuss options for ovarian function suppression after completion of chemotherapy.    Patient inquired regarding contralateral prophylactic mastectomy.  I discussed with the patient that we should await results of her genetic testing.  If the genetic testing is positive for a deleterious mutation which will increase her lifetime second primary breast cancer, then she should proceed with prophylactic contralateral mastectomy.  However, if this is negative, discussed with the patient that there is no benefit for proceeding with contralateral mastectomy, as it will not change her disease-free or overall survival.    Discussed with the patient that while she is receiving neoadjuvant treatment, she can have the evaluation by plastic surgery so that when she completes her neoadjuvant course of therapy, she will be able to have her surgery coordinated with Dr. Hutson and a plastic surgeon    She completed course of neoadjuvant DD AC x 4 cycles followed by weekly Taxol x12 weeks    Status post bilateral mastectomy with right-sided axillary lymph node dissection on 4/29/2024.    She completed adjuvant radiation therapy on 8/7/2024.    Discussed with the patient that it is not uncommon for patients with ER/ME positive breast cancer that have neoadjuvant treatment not to have a robust response from therapy.  I did discuss that for her treatment, in terms of systemic disease, after completion of radiation therapy, we will proceed with ovarian function suppression,  which can be initiated at this point, this will then be followed by adjuvant endocrine therapy with 2 years of Abemaciclib.  In addition, patient is also a candidate for the [place] 2 clinical trial which is comparing standard of care adjuvant endocrine therapy with Abemaciclib versus camizestrant which is an oral SERD.    Patient received first dose of goserelin 3.6 mg on 6/3/2024 and last on 7/30/2024.    HA:  MRI of brain STAT.  Likely from goserelin.    Arthralgias:  secondary to goserelin.  Continue to increase activity.      Waiting on OTIS-2  randomization to start adjuvant hormonal therapy and abemaciclib.  Likely randomization next week.      Follow up per trial protocol.       MDM high risk.    No orders of the defined types were placed in this encounter.      Results From Past 48 Hours:  Recent Results (from the past 48 hour(s))   MAGNESIUM [E]    Collection Time: 08/23/24  1:32 PM   Result Value Ref Range    Magnesium 1.9 1.6 - 2.6 mg/dL   CBC W/DIFF [E]    Collection Time: 08/23/24  1:32 PM   Result Value Ref Range    WBC 5.4 4.0 - 11.0 x10(3) uL    RBC 4.01 3.80 - 5.30 x10(6)uL    HGB 11.5 (L) 12.0 - 16.0 g/dL    HCT 34.3 (L) 35.0 - 48.0 %    MCV 85.5 80.0 - 100.0 fL    MCH 28.7 26.0 - 34.0 pg    MCHC 33.5 31.0 - 37.0 g/dL    RDW-SD 37.7 35.1 - 46.3 fL    RDW 12.1 11.0 - 15.0 %    .0 150.0 - 450.0 10(3)uL    Neutrophil Absolute Prelim 3.91 1.50 - 7.70 x10 (3) uL    Neutrophil Absolute 3.91 1.50 - 7.70 x10(3) uL    Lymphocyte Absolute 0.81 (L) 1.00 - 4.00 x10(3) uL    Monocyte Absolute 0.45 0.10 - 1.00 x10(3) uL    Eosinophil Absolute 0.21 0.00 - 0.70 x10(3) uL    Basophil Absolute 0.04 0.00 - 0.20 x10(3) uL    Immature Granulocyte Absolute 0.01 0.00 - 1.00 x10(3) uL    Neutrophil % 72.0 %    Lymphocyte % 14.9 %    Monocyte % 8.3 %    Eosinophil % 3.9 %    Basophil % 0.7 %    Immature Granulocyte % 0.2 %   COMP METABOLIC PANEL [E]    Collection Time: 08/23/24  1:32 PM   Result Value Ref Range     Glucose 106 (H) 70 - 99 mg/dL    Sodium 143 136 - 145 mmol/L    Potassium 3.8 3.5 - 5.1 mmol/L    Chloride 109 98 - 112 mmol/L    CO2 26.0 21.0 - 32.0 mmol/L    Anion Gap 8 0 - 18 mmol/L    BUN 16 9 - 23 mg/dL    Creatinine 0.89 0.55 - 1.02 mg/dL    BUN/CREA Ratio 18.0 10.0 - 20.0    Calcium, Total 9.9 8.7 - 10.4 mg/dL    Calculated Osmolality 298 (H) 275 - 295 mOsm/kg    eGFR-Cr 84 >=60 mL/min/1.73m2    ALT 41 10 - 49 U/L    AST 33 <34 U/L    Alkaline Phosphatase 52 37 - 98 U/L    Bilirubin, Total 0.6 0.3 - 1.2 mg/dL    Total Protein 7.1 5.7 - 8.2 g/dL    Albumin 4.5 3.2 - 4.8 g/dL    Globulin  2.6 2.0 - 3.5 g/dL    A/G Ratio 1.7 1.0 - 2.0    Patient Fasting for CMP? Patient not present    FSH [E]    Collection Time: 08/23/24  1:32 PM   Result Value Ref Range    FSH 9.0 No established range for female sex mIU/mL   ESTRADIOL [E]    Collection Time: 08/23/24  1:32 PM   Result Value Ref Range    Estradiol <11.8 No established range for female sex pg/mL   HCG, BETA SUBUNIT, QUAL [E]    Collection Time: 08/23/24  1:32 PM   Result Value Ref Range    HCG, Serum Qualitative (S) Negative Negative           Imaging & Referrals:  MRI BRAIN (W+WO) (CPT=70553)

## 2024-08-27 ENCOUNTER — NURSE ONLY (OUTPATIENT)
Dept: HEMATOLOGY/ONCOLOGY | Facility: HOSPITAL | Age: 39
End: 2024-08-27
Attending: INTERNAL MEDICINE
Payer: COMMERCIAL

## 2024-08-27 ENCOUNTER — RESEARCH ENCOUNTER (OUTPATIENT)
Dept: HEMATOLOGY/ONCOLOGY | Facility: HOSPITAL | Age: 39
End: 2024-08-27

## 2024-08-27 DIAGNOSIS — C50.411 MALIGNANT NEOPLASM OF UPPER-OUTER QUADRANT OF RIGHT BREAST IN FEMALE, ESTROGEN RECEPTOR POSITIVE (HCC): Primary | ICD-10-CM

## 2024-08-27 DIAGNOSIS — Z17.0 MALIGNANT NEOPLASM OF UPPER-OUTER QUADRANT OF RIGHT BREAST IN FEMALE, ESTROGEN RECEPTOR POSITIVE (HCC): Primary | ICD-10-CM

## 2024-08-27 PROCEDURE — 96402 CHEMO HORMON ANTINEOPL SQ/IM: CPT

## 2024-08-27 RX ORDER — SODIUM CHLORIDE 9 MG/ML
10 INJECTION, SOLUTION INTRAMUSCULAR; INTRAVENOUS; SUBCUTANEOUS ONCE
OUTPATIENT
Start: 2024-09-24

## 2024-08-27 NOTE — PROGRESS NOTES
RESEARCH NOTE    STUDY:  OT[facility]  PATIENT ID: [id number]  TIME POINT: Screening    Research RN briefly met with patient in cancer center after her Zoladex injection to review upcoming plan for randomization and start of treatment.  Reviewed medical, surgical and medication history with patient.  Reviewed downloading of cCOA heydi to personal device prior to treatment start.  Pt verbalized understanding.  All study related questions answered.  Planning on randomization this week and treatment start for Friday 8/30.  Pt agreeable to viewing scheduled appointments on [employer].

## 2024-08-28 ENCOUNTER — OFFICE VISIT (OUTPATIENT)
Dept: PHYSICAL THERAPY | Facility: HOSPITAL | Age: 39
End: 2024-08-28
Payer: COMMERCIAL

## 2024-08-28 PROCEDURE — 97530 THERAPEUTIC ACTIVITIES: CPT | Performed by: PHYSICAL THERAPIST

## 2024-08-28 PROCEDURE — 97164 PT RE-EVAL EST PLAN CARE: CPT | Performed by: PHYSICAL THERAPIST

## 2024-08-28 PROCEDURE — 97110 THERAPEUTIC EXERCISES: CPT | Performed by: PHYSICAL THERAPIST

## 2024-08-28 PROCEDURE — 97140 MANUAL THERAPY 1/> REGIONS: CPT | Performed by: PHYSICAL THERAPIST

## 2024-08-28 NOTE — PROGRESS NOTES
Referring Provider:  Jesus Manuel  Diagnosis: Malignant neoplasm of upper-outer quadrant of right breast in female, estrogen receptor positive (HCC) (C50.411,Z17.0)      Date of onset: 4/29/2024 Evaluation Date: 5/31/2024     Progress Summary/Re-evaluation    Pt seen for re-evaluation after completion of radiation.      Assessment: Pt w/ decreased shld ROM/strength, decreased scap strength, trunk swelling, decreased flexibility, poor posture, and decreased tissue mobility R axilla. Pt's ROM/strength did decline since having radiation. Goals have been updated. Pt would benefit from resuming PT to improve ROM/strength/decrease swelling.  Plan: Continue skilled Physical Therapy 1-2 x/week or a total of 10  visits over a 90 day period. Treatment will include: CDT, ROM, strengthening, education, posture     Physician's certification required: Yes  I certify the need for these services furnished under this plan of treatment and while under my care.        X______________________________________________ Date________________  Certification From: 8/28/2024      To: 11/26/2024          LLIS: LLIS Score Current: 30 % impaired     QUICKDASH  Score 8/28/2024: QuickDASH Outcome Score Score: 38.64 % (8/28/2024  8:03 AM)        Rehab Potential: good          Patient/Family/Caregiver was advised of these findings, precautions, and treatment options and has agreed to actively participate in planning and for this course of care.            Physical Therapy Lymphedema Daily Note    Precautions:  HTN, R breast cancer, + chemo, will need radiation   Education or treatment limitation: none  Rehab Potential:good    Past Medical History:    Breast cancer in female (HCC)    Right breast lymph node cancer    High blood pressure    Hx of motion sickness    Hypertension    Solitary kidney, congenital         Pain: no pain \"just tight\"    Subjective: \"I'm done with the radiation and I feel really tight\"    Objective:    8/28/2024:  - Swelling B trunk (R  more than L) noted posteriorly and anteriorly                Trunk measurement:                             Under arms: 97.2 cm (IE: 96.2)                            Level of mastectomy scars: 92.1 cm (IE: 89.4)  - shoulder AROM:   Flex: R 137, L 140    Abd: R 130, L 140   ER: R 85, L 80 (painful on L)   IR: grossly L 4 B  - shld strength:    R grossly 4/5    L grossly 4+/5 except ER 4/5  - scap strength:   Rhomb: 4/-5 B   Mid trap: L 3/5, R 3+/5  - skin changes R chest and axilla from the radiation, axilla still red/irritated  - posture: protracted scap  - decreased flexibility B pect and lats (tenderness on the left/hypersensitive)  - no visual swelling of UEs      8/28/2024     7:00 AM 5/28/2024     8:00 PM 4/8/2024     2:00 PM   Lymphedema Calculations   DATE MEASURED 8/28/2024 5/28/2024 4/8/2024   LOCATION/MEASUREMENTS RUE RUE RUE   PATIENT POSITION  supine 1 pillow supine 1 pillow supine 1 pillow   LIMB POSITION 75 deg abduction 75 deg abduction 75 deg abduction   STARTING POINT 17cm from 3rd cuticle 17cm from 3rd cuticle 17cm from 3rd cuticle   RIGHT HAND VOLUME 19.7cm across palm 19.7cm across palm 19.7cm across palm   LEFT HAND VOLUME 19.5cm across palm 19.5cm across palm 19.5cm across palm   MEASUREMENT A 15.8 15.5 15.5   MEASUREMENT B 16.7 16.6 16.7   MEASUREMENT C 19.4 19.1 19.2   MEASUREMENT D 22.5 23 23.1   MEASUREMENT E 25.2 25 25   MEASUREMENT F 24.7 24.5 24.5   MEASUREMENT G 26 25.5 25.3   MEASUREMENT H 27.8 27.5 27.4   MEASUREMENT I 29.7 29.6 29.4    TOTAL VOLUME  1756.15235 1732.95270 1723.14946   DATE MEASURED 8/28/2024 4/8/2024 4/8/2024   LOCATION/MEASUREMENTS [facility]   MEASUREMENT A 15.3 15.4 15.4   MEASUREMENT B 17 17.1 17   MEASUREMENT C 20 19.6 19.4   MEASUREMENT D 22.8 22.7 22.8   MEASUREMENT E 25.3 25.2 25.2   MEASUREMENT F 26 24.8 24.8   MEASUREMENT G 27.1 26.9 26.8   MEASUREMENT H 28.9 28.6 28.5   MEASUREMENT I 31.2 33.5 33.4    TOTAL VOLUME  8936.72951 9561.14281 1843.2600   %  DIFFERENCE -5.62843 -6.85993 -6.97528             7/5/2024  - Swelling B trunk (R more than L, especially posteriorly)                 Trunk measurement:                             Under arms: 94.6 cm (IE: 96.2)                            Level of mastectomy scars: 90.2 cm (IE: 89.4)  - shoulder AROM:   Flex: 145 B    Abd: R 160, L 155 (verb cues to \"fully reach up\")     7/2/2024  - new rash L neck (pt seen MD, treating w/ aquaphor)      6/25/2024:  - Swelling B trunk (R more than L, especially posteriorly)               Trunk measurement:                             Under arms: 95.4 cm (IE: 96.2)                            Level of mastectomy scars: 89 cm (IE: 89.4)  - Shoulder AROM:   Flex: 145 B    Abd: R 160, L 155 (verb cues to \"fully reach up\"   - shoulder strength: 4/5 B  - poor abd strength- pt w/ poor stabilization when reaching overhead (increased lumbar extension)        6/20/2024:  AROM/Strength:    Fascial tightness R axilla into arm  Decreased flexibility B pect  Decreased skin flexibility over chest  Swelling L chest (just inf to incision)     5/31/2024  Right AROM 6/6/2024  'R AROM 6/20/2024  R AROM 5/31/2024  Left AROM 6/6/2024  L AROM   6/20/2024   R AROM 5/31/2024  Right Strength 5/31/2024  Left Strength   Shoulder                     Flexion 90 120 145 90 125 145 NT NT        Abduction 90 95 132 90 98 133 NT NT        IR wfl WFL  WFL WFL  NT NT        ER WFL WFL  WFL WFL  NT NT   Elbow                    Flexion WFL   WFL   NT NT        Extension WFL   WFL   NT NT                               6/14/2024:    Edema/Tissue Observations:    - Swelling B chest  - Swelling B trunk (R more than L, especially posteriorly)               Trunk measurement:                             Under arms: 95.4 cm                             Level of mastectomy scars: 89.4 cm - NO CHANGE TODAY  6/6/2024:  Decreased flexibility B pect and lats  Swelling R upper/outer abdomen  Cording R axilla   Cording L  abdomen  AROM/Strength:      5/31/2024  Right AROM 6/6/2024  'R AROM 5/31/2024  Left AROM 6/6/2024  L AROM   5/31/2024  Right Strength 5/31/2024  Left Strength   Shoulder                   Flexion 90 120 90 125 NT NT        Abduction 90 95 90 98 NT NT        IR wfl WFL WFL WFL NT NT        ER WFL WFL WFL WFL NT NT   Elbow                  Flexion WFL  WFL  NT NT        Extension WFL  WFL  NT NT                             5/31/2024 (Evaluation):  Sensation:  decreased sensation R axilla/inner upper arm  AROM/Strength:      5/31/2024  Right AROM 5/31/2024  Left AROM 5/31/2024  Right Strength 5/31/2024  Left Strength   Shoulder                 Flexion 90 90 NT NT        Abduction 90 90 NT NT        IR wfl WFL NT NT        ER WFL WFL NT NT   Elbow                Flexion WFL WFL NT NT        Extension WFL WFL NT NT                         Posture: Rounded shld. guarding     Palpation: hypersensitivity L abdomen/inferior to mastectomy incision, decreased sensation R chest/axilla      Edema/Tissue Observations:    - Swelling B chest  - Swelling B trunk (R more than L, especially posteriorly)               Trunk measurement:                             Under arms: 96.2 cm                             Level of mastectomy scars: 89.4 cm   Stemmer's Sign: negative        Arm Volume Measurements:  deferred (done recently at screening)       5/28/2024     8:00 PM 4/8/2024     2:00 PM   Lymphedema Calculations   DATE MEASURED 5/28/2024 4/8/2024   LOCATION/MEASUREMENTS RUE RUE   PATIENT POSITION  supine 1 pillow supine 1 pillow   LIMB POSITION 75 deg abduction 75 deg abduction   STARTING POINT 17cm from 3rd cuticle 17cm from 3rd cuticle   RIGHT HAND VOLUME 19.7cm across palm 19.7cm across palm   LEFT HAND VOLUME 19.5cm across palm 19.5cm across palm   MEASUREMENT A 15.5 15.5   MEASUREMENT B 16.6 16.7   MEASUREMENT C 19.1 19.2   MEASUREMENT D 23 23.1   MEASUREMENT E 25 25   MEASUREMENT F 24.5 24.5   MEASUREMENT G 25.5 25.3    MEASUREMENT H 27.5 27.4   MEASUREMENT I 29.6 29.4    TOTAL VOLUME  1732.45740 1728.17301   DATE MEASURED 4/8/2024 4/8/2024   LOCATION/MEASUREMENTS LUE LUE   MEASUREMENT A 15.4 15.4   MEASUREMENT B 17.1 17   MEASUREMENT C 19.6 19.4   MEASUREMENT D 22.7 22.8   MEASUREMENT E 25.2 25.2   MEASUREMENT F 24.8 24.8   MEASUREMENT G 26.9 26.8   MEASUREMENT H 28.6 28.5   MEASUREMENT I 33.5 33.4    TOTAL VOLUME  1851.75071 1843.2805   % DIFFERENCE -6.79233 -6.82855                           Lymphedema Life Impact Scale: Evaluation Score 5/31/2024: LLIS Score Evaluation: 25 % impaired (0% is no impairment, 100% total impairment)  Lymphedema Life Impact Scale: 7/2/2024: LLIS Score Evaluation: LLIS Score Current: 13 % impaired (0% is no impairment, 100% total impairment)           Today’s Treatment and Response:   6/27/2024 7/2/2024 7/5/2024 8/28/2024   Visit: #9/10  Certification From: 5/31/2024  To:8/29/2024 Visit 10/20  PN  Certification From: 7/2/2024      To: 9/30/2024  Visit 11/20    Certification From: 7/2/2024      To: 9/30/2024  Visit 12/22    Certification From: 8/28/2024      To: 11/26/2024    Manual Therapy (10 min)    STM and MLD techniques R axilla, chest, and trunk  Manual Therapy (30 min)      STM and MLD techniques B axilla, chest, and trunk (greater time on right)    Manual stretching B shoulder  Manual therapy (40 min)      STM and MLD techniques B axilla, chest, and trunk     STM B upper trap    Manual stretching B shld flex/abd     Compression:  - awaiting custom garments to be delivered Manual Therapy (30 min)    STM B pects/lats    MLD B axilla, chest, A-I       Compression:  - assessed garments: pt not pulling it up high enough, but overall good fit. Discussed \"stretching\" thumb of jakob     There Ex (30 min)    - prone Rhomb x 10  - prone mid trap  x 10  - prone shld ext x 10  - standing rows (narrow and wide) GTB x 10 each  - standing shld ext GTB x 10   - supine serratus ant 3#  x 10 x2   - sidely  clams x 10  - sidely hip abd x 10   - bridging x 10  - bridging w/ march x 10 There Ex (10 min)    - ulner nerve glides  - encouraged cont stretching during radiation     - ulnar nerve glides  - encouraged cont ROM stretching for shoulders   There Ex    - encouraged daily shld ROM/stretching   There Ex (15 min)    - shld ER GTB x 10  - shld ext GTB x 10  - narrow rows GTB x 10  - wide rows GTB x 10  - instr to cont w/ pulleys, doorway stretches, sidely horiz abd/add, and sidely windmills      There Act (10 min)  - reviewed donning/doffing compression garment           Assessment: Refer to PN/re-evaluation summary        Goals:   Goals (discussed and planned with patient involvement):      To be met in 10 visits:  1) Decrease swelling chest/trunk by a total of 3 cm to decrease risks of infections   2) Pt to be independent with self MLD, ROM exercises, and donning/doffing compression bandages/garments to facilitate swelling reduction and to be independent at self management once discharged  3) Increase B shoulder AROM flex and abd to 160 degrees, ER to at least 90 degrees when arms are elevated  to improve ease of dressing/bathing/and reaching overhead  4) Increase B UE strength to at least 4+/5  to improve ease of lifting and performing household chores  5) Increase B scap strength to at least 4/5 to allow patient to reach overhead at work without increase in pain         Plan:         Treatment will include: Complete Decongestive Therapy: Manual Therapy, Self-Care/Home Management Training, Therapeutic Exercise, Neuromuscular Re-education, Orthotic Management and Training        Charges:Re-eval1,  Mm2, ex1, act1    Total Timed Treatment: 55 min  Total Treatment Time: 70 min

## 2024-08-30 ENCOUNTER — APPOINTMENT (OUTPATIENT)
Dept: HEMATOLOGY/ONCOLOGY | Facility: HOSPITAL | Age: 39
End: 2024-08-30
Attending: INTERNAL MEDICINE
Payer: COMMERCIAL

## 2024-08-30 ENCOUNTER — RESEARCH ENCOUNTER (OUTPATIENT)
Dept: HEMATOLOGY/ONCOLOGY | Facility: HOSPITAL | Age: 39
End: 2024-08-30

## 2024-08-30 DIAGNOSIS — Z45.2 ENCOUNTER FOR CENTRAL LINE CARE: Primary | ICD-10-CM

## 2024-08-30 PROCEDURE — 36591 DRAW BLOOD OFF VENOUS DEVICE: CPT

## 2024-08-30 RX ORDER — SODIUM CHLORIDE 9 MG/ML
10 INJECTION, SOLUTION INTRAMUSCULAR; INTRAVENOUS; SUBCUTANEOUS ONCE
OUTPATIENT
Start: 2024-09-27

## 2024-08-30 NOTE — PROGRESS NOTES
RESEARCH NOTE    STUDY:  OTIS 2  PATIENT ID: [id number]  TREATMENT:  ARM B:  Camizestrant  TIME POINT: Month 1 Day 1    Patient to clinic for OTIS 2 Month 1 Day 1 visit.  MD appointment, labs, and EKG completed with screening appointment 7 days prior to today.  No clinical changes in pt condition since last week.  M1D1 pre-dose PK, ctDNA and optional genetics research blood drawn prior to appointment today.  Specimen brought immediately to lab for processing per protocol.  Frozen specimen to be stored in -80 freezer until shipment at a later date.  Ambient specimen to be shipped out today via [employer].     Pt previously downloaded [employer] heydi to personal electronic device as instructed.  Pt successfully logged on today in clinic and completed training on heydi.  Baseline Visit 2 QOLs successfully completed by patient.      After QOLs completed, Research RN met with patient and reviewed randomization with patient.  Pt randomized to ARM B:  Camizestrant on 8/29/24.  Pharmacist dispensed Camizestrant study medication (96 tablets; Kit [id number]; Lot [id number]; Exp: 07.2027) to Research RN prior to pt appointment today.  Research RN gave Camizestrant medication to patient and pt took first dose with water during appointment.  Research RN reviewed how to take medication; what to do if dose is missed; when to contact Research RN; reiterated common side effects.  Reviewed contraception requirements; no sperm banking/donation; and no blood donation while on study medication.  Pt verbalized understanding.  Gave Patient Emergency ID card for trial and advised her to carry card with her.  Time allotted for pt questions which were answered to pt satisfaction.                  Reviewed plan for next visits.  Research RN to coordinate Month 2 visit with [facility] and will have clinic call pt directly to schedule with patient.  Research RN to call pt next week to schedule Month 4 Day 1 visit with labs and EKG.   Pt verbalized understanding.  Encouraged pt to call Research RN directly with any questions or concerns prior to next visit.

## 2024-08-30 NOTE — PROGRESS NOTES
RESEARCH SHIPPING NOTE    Mandatory archival screening tumor sample shipped ambient on 8/29/24 per protocol via [facility] (Tracking #: [id number]).

## 2024-09-03 ENCOUNTER — APPOINTMENT (OUTPATIENT)
Dept: PHYSICAL THERAPY | Facility: HOSPITAL | Age: 39
End: 2024-09-03
Payer: COMMERCIAL

## 2024-09-05 ENCOUNTER — RESEARCH ENCOUNTER (OUTPATIENT)
Dept: HEMATOLOGY/ONCOLOGY | Facility: HOSPITAL | Age: 39
End: 2024-09-05

## 2024-09-05 ENCOUNTER — TELEPHONE (OUTPATIENT)
Dept: HEMATOLOGY/ONCOLOGY | Facility: HOSPITAL | Age: 39
End: 2024-09-05

## 2024-09-05 NOTE — TELEPHONE ENCOUNTER
Received message from Research team patient have \"GI symptoms\" from Abemacilib. C1D7 started 8/30. Patient called and left message to please call [facility] if symptoms not being controlled with imodium. Patient scheduled follow up on 9/12/24.

## 2024-09-05 NOTE — PROGRESS NOTES
RESEARCH TELEPHONE NOTE    Research RN called pt to check in on how she's feeling.  Pt reports GI symptoms of abdominal cramping and diarrhea that she is attributing to starting abemaciclib.  Trying to find foods that she can tolerate.  Reports \"racing heart\" when lying down.  Encouraged anti-diarrheals as instructed and suggested BRAT diet and to increase water and electrolyte drinks.  Pt verbalized understanding.  She states she will reach out to clinic regarding follow up for labs after starting abemaciclib.  States she is not experiencing any visual changes or dizziness/lightheadedness since starting camizestrant.  Confirmed that she has Month 2 ocular exam scheduled with [facility] on 9/30/24.  Research RN to follow up with Dr. Calle clinic regarding scheduling of Month 4 appointments at end of November.  Encouraged pt to reach out with any questions concerns prior to next visit.  Pt verbalized understanding.

## 2024-09-11 ENCOUNTER — RESEARCH ENCOUNTER (OUTPATIENT)
Dept: HEMATOLOGY/ONCOLOGY | Facility: HOSPITAL | Age: 39
End: 2024-09-11

## 2024-09-11 ENCOUNTER — APPOINTMENT (OUTPATIENT)
Dept: RADIATION ONCOLOGY | Facility: HOSPITAL | Age: 39
End: 2024-09-11
Attending: INTERNAL MEDICINE
Payer: COMMERCIAL

## 2024-09-11 ENCOUNTER — PATIENT MESSAGE (OUTPATIENT)
Dept: HEMATOLOGY/ONCOLOGY | Facility: HOSPITAL | Age: 39
End: 2024-09-11

## 2024-09-11 ENCOUNTER — OFFICE VISIT (OUTPATIENT)
Dept: PHYSICAL THERAPY | Facility: HOSPITAL | Age: 39
End: 2024-09-11
Payer: COMMERCIAL

## 2024-09-11 PROCEDURE — 97140 MANUAL THERAPY 1/> REGIONS: CPT

## 2024-09-11 PROCEDURE — 97110 THERAPEUTIC EXERCISES: CPT

## 2024-09-11 NOTE — PROGRESS NOTES
RESEARCH NOTE  PATIENT ID: [id number]  STUDY: OTIS-2  TIMEPOINT: M1 D1    Pre-dose PK and exploratory genetic samples collected on 8/30/2024 at 08:45 AM. Samples processed and stored in the -80 freezer.    Today, samples were retrieved and packaged per protocol on dry ice. Samples sent to [facility] via [employer]. Tracking number: [id number]  [id number]

## 2024-09-11 NOTE — PROGRESS NOTES
Referring Provider:  Jesus Manuel  Diagnosis: Malignant neoplasm of upper-outer quadrant of right breast in female, estrogen receptor positive (HCC) (C50.411,Z17.0)      Date of onset: 4/29/2024 Evaluation Date: 5/31/2024           Physical Therapy Lymphedema Daily Note    Precautions:  HTN, R breast cancer, + chemo, will need radiation   Education or treatment limitation: none  Rehab Potential:good    Past Medical History:    Breast cancer in female (HCC)    Right breast lymph node cancer    High blood pressure    Hx of motion sickness    Hypertension    Solitary kidney, congenital         Pain: no pain \"just tight\"    Subjective: Pt states that her R side feels so much tighter and discomfort compared to L side.  \"Both sides feel tight\"    Objective:    8/28/2024:  - Swelling B trunk (R more than L) noted posteriorly and anteriorly                Trunk measurement:                             Under arms: 97.2 cm (IE: 96.2)                            Level of mastectomy scars: 92.1 cm (IE: 89.4)  - shoulder AROM:   Flex: R 137, L 140    Abd: R 130, L 140   ER: R 85, L 80 (painful on L)   IR: grossly L 4 B  - shld strength:    R grossly 4/5    L grossly 4+/5 except ER 4/5  - scap strength:   Rhomb: 4/-5 B   Mid trap: L 3/5, R 3+/5  - skin changes R chest and axilla from the radiation, axilla still red/irritated  - posture: protracted scap  - decreased flexibility B pect and lats (tenderness on the left/hypersensitive)  - no visual swelling of UEs      8/28/2024     7:00 AM 5/28/2024     8:00 PM 4/8/2024     2:00 PM   Lymphedema Calculations   DATE MEASURED 8/28/2024 5/28/2024 4/8/2024   LOCATION/MEASUREMENTS RUE RUE RUE   PATIENT POSITION  supine 1 pillow supine 1 pillow supine 1 pillow   LIMB POSITION 75 deg abduction 75 deg abduction 75 deg abduction   STARTING POINT 17cm from 3rd cuticle 17cm from 3rd cuticle 17cm from 3rd cuticle   RIGHT HAND VOLUME 19.7cm across palm 19.7cm across palm 19.7cm across palm   LEFT HAND  VOLUME 19.5cm across palm 19.5cm across palm 19.5cm across palm   MEASUREMENT A 15.8 15.5 15.5   MEASUREMENT B 16.7 16.6 16.7   MEASUREMENT C 19.4 19.1 19.2   MEASUREMENT D 22.5 23 23.1   MEASUREMENT E 25.2 25 25   MEASUREMENT F 24.7 24.5 24.5   MEASUREMENT G 26 25.5 25.3   MEASUREMENT H 27.8 27.5 27.4   MEASUREMENT I 29.7 29.6 29.4    TOTAL VOLUME  [id number] 1732.80809 1728.65268   DATE MEASURED 8/28/2024 4/8/2024 4/8/2024   LOCATION/MEASUREMENTS LUE LUE LUE   MEASUREMENT A 15.3 15.4 15.4   MEASUREMENT B 17 17.1 17   MEASUREMENT C 20 19.6 19.4   MEASUREMENT D 22.8 22.7 22.8   MEASUREMENT E 25.3 25.2 25.2   MEASUREMENT F 26 24.8 24.8   MEASUREMENT G 27.1 26.9 26.8   MEASUREMENT H 28.9 28.6 28.5   MEASUREMENT I 31.2 33.5 33.4    TOTAL VOLUME  [id number] 1851.35182 1843.2805   % DIFFERENCE -[phone] -[phone] -[phone]             7/5/2024  - Swelling B trunk (R more than L, especially posteriorly)                 Trunk measurement:                             Under arms: 94.6 cm (IE: 96.2)                            Level of mastectomy scars: 90.2 cm (IE: 89.4)  - shoulder AROM:   Flex: 145 B    Abd: R 160, L 155 (verb cues to \"fully reach up\")     7/2/2024  - new rash L neck (pt seen MD, treating w/ aquaphor)      6/25/2024:  - Swelling B trunk (R more than L, especially posteriorly)               Trunk measurement:                             Under arms: 95.4 cm (IE: 96.2)                            Level of mastectomy scars: 89 cm (IE: 89.4)  - Shoulder AROM:   Flex: 145 B    Abd: R 160, L 155 (verb cues to \"fully reach up\"   - shoulder strength: 4/5 B  - poor abd strength- pt w/ poor stabilization when reaching overhead (increased lumbar extension)        6/20/2024:  AROM/Strength:    Fascial tightness R axilla into arm  Decreased flexibility B pect  Decreased skin flexibility over chest  Swelling L chest (just inf to incision)     5/31/2024  Right AROM 6/6/2024  'R AROM 6/20/2024  R AROM 5/31/2024  Left AROM  6/6/2024  L AROM   6/20/2024   R AROM 5/31/2024  Right Strength 5/31/2024  Left Strength   Shoulder                     Flexion 90 120 145 90 125 145 NT NT        Abduction 90 95 132 90 98 133 NT NT        IR wfl WFL  WFL WFL  NT NT        ER WFL WFL  WFL WFL  NT NT   Elbow                    Flexion WFL   WFL   NT NT        Extension WFL   WFL   NT NT                               6/14/2024:    Edema/Tissue Observations:    - Swelling B chest  - Swelling B trunk (R more than L, especially posteriorly)               Trunk measurement:                             Under arms: 95.4 cm                             Level of mastectomy scars: 89.4 cm - NO CHANGE TODAY  6/6/2024:  Decreased flexibility B pect and lats  Swelling R upper/outer abdomen  Cording R axilla   Cording L abdomen  AROM/Strength:      5/31/2024  Right AROM 6/6/2024  'R AROM 5/31/2024  Left AROM 6/6/2024  L AROM   5/31/2024  Right Strength 5/31/2024  Left Strength   Shoulder                   Flexion 90 120 90 125 NT NT        Abduction 90 95 90 98 NT NT        IR wfl WFL WFL WFL NT NT        ER WFL WFL WFL WFL NT NT   Elbow                  Flexion WFL  WFL  NT NT        Extension WFL  WFL  NT NT                             5/31/2024 (Evaluation):  Sensation:  decreased sensation R axilla/inner upper arm  AROM/Strength:      5/31/2024  Right AROM 5/31/2024  Left AROM 5/31/2024  Right Strength 5/31/2024  Left Strength   Shoulder                 Flexion 90 90 NT NT        Abduction 90 90 NT NT        IR wfl WFL NT NT        ER WFL WFL NT NT   Elbow                Flexion WFL WFL NT NT        Extension WFL WFL NT NT                         Posture: Rounded shld. guarding     Palpation: hypersensitivity L abdomen/inferior to mastectomy incision, decreased sensation R chest/axilla      Edema/Tissue Observations:    - Swelling B chest  - Swelling B trunk (R more than L, especially posteriorly)               Trunk measurement:                              Under arms: 96.2 cm                             Level of mastectomy scars: 89.4 cm   Stemmer's Sign: negative        Arm Volume Measurements:  deferred (done recently at screening)       5/28/2024     8:00 PM 4/8/2024     2:00 PM   Lymphedema Calculations   DATE MEASURED 5/28/2024 4/8/2024   LOCATION/MEASUREMENTS RUE RUE   PATIENT POSITION  supine 1 pillow supine 1 pillow   LIMB POSITION 75 deg abduction 75 deg abduction   STARTING POINT 17cm from 3rd cuticle 17cm from 3rd cuticle   RIGHT HAND VOLUME 19.7cm across palm 19.7cm across palm   LEFT HAND VOLUME 19.5cm across palm 19.5cm across palm   MEASUREMENT A 15.5 15.5   MEASUREMENT B 16.6 16.7   MEASUREMENT C 19.1 19.2   MEASUREMENT D 23 23.1   MEASUREMENT E 25 25   MEASUREMENT F 24.5 24.5   MEASUREMENT G 25.5 25.3   MEASUREMENT H 27.5 27.4   MEASUREMENT I 29.6 29.4    TOTAL VOLUME  1732.54970 1728.00737   DATE MEASURED 4/8/2024 4/8/2024   LOCATION/MEASUREMENTS LUE LUE   MEASUREMENT A 15.4 15.4   MEASUREMENT B 17.1 17   MEASUREMENT C 19.6 19.4   MEASUREMENT D 22.7 22.8   MEASUREMENT E 25.2 25.2   MEASUREMENT F 24.8 24.8   MEASUREMENT G 26.9 26.8   MEASUREMENT H 28.6 28.5   MEASUREMENT I 33.5 33.4    TOTAL VOLUME  1851.43413 1843.2805   % DIFFERENCE -6.36069 -[phone]                           Lymphedema Life Impact Scale: Evaluation Score 5/31/2024: LLIS Score Evaluation: 25 % impaired (0% is no impairment, 100% total impairment)  Lymphedema Life Impact Scale: 7/2/2024: LLIS Score Evaluation: LLIS Score Current: 30 % impaired (0% is no impairment, 100% total impairment)           Today’s Treatment and Response:   6/27/2024 7/2/2024 7/5/2024 8/28/2024 Date: 9/11/2024   Visit: #9/10  Certification From: 5/31/2024  To:8/29/2024 Visit 10/20  PN  Certification From: 7/2/2024      To: 9/30/2024  Visit 11/20    Certification From: 7/2/2024      To: 9/30/2024  Visit 12/22    Certification From: 8/28/2024      To: 11/26/2024  Visit #13/20    Certification From: 8/28/2024       To: 11/26/2024    Manual Therapy (10 min)    STM and MLD techniques R axilla, chest, and trunk  Manual Therapy (30 min)      STM and MLD techniques B axilla, chest, and trunk (greater time on right)    Manual stretching B shoulder  Manual therapy (40 min)      STM and MLD techniques B axilla, chest, and trunk     STM B upper trap    Manual stretching B shld flex/abd     Compression:  - awaiting custom garments to be delivered Manual Therapy (30 min)    STM B pects/lats    MLD B axilla, chest, A-I       Compression:  - assessed garments: pt not pulling it up high enough, but overall good fit. Discussed \"stretching\" thumb of gauntlet   Manual therapy (35 minutes)      STM B pects/lats    MLD B axilla, chest, A-I       Compression:  - Pt wearing compression sleeve daily.   There Ex (30 min)    - prone Rhomb x 10  - prone mid trap  x 10  - prone shld ext x 10  - standing rows (narrow and wide) GTB x 10 each  - standing shld ext GTB x 10   - supine serratus ant 3#  x 10 x2   - sidely clams x 10  - sidely hip abd x 10   - bridging x 10  - bridging w/ march x 10 There Ex (10 min)    - ulner nerve glides  - encouraged cont stretching during radiation     - ulnar nerve glides  - encouraged cont ROM stretching for shoulders   There Ex    - encouraged daily shld ROM/stretching   There Ex (15 min)    - shld ER GTB x 10  - shld ext GTB x 10  - narrow rows GTB x 10  - wide rows GTB x 10  - instr to cont w/ pulleys, doorway stretches, sidely horiz abd/add, and sidely windmills There Ex (10 minutes)  -pt to continue with exercises    -PROM R shoulder to stretch lat and pec.        There Act (10 min)  - reviewed donning/doffing compression garment            Assessment: Sensitivity and pain noted with STM to B chest/trunk R side > L side      Goals:   Goals (discussed and planned with patient involvement):      To be met in 10 visits:  1) Decrease swelling chest/trunk by a total of 3 cm to decrease risks of infections   2) Pt to be  independent with self MLD, ROM exercises, and donning/doffing compression bandages/garments to facilitate swelling reduction and to be independent at self management once discharged  3) Increase B shoulder AROM flex and abd to 160 degrees, ER to at least 90 degrees when arms are elevated  to improve ease of dressing/bathing/and reaching overhead  4) Increase B UE strength to at least 4+/5  to improve ease of lifting and performing household chores  5) Increase B scap strength to at least 4/5 to allow patient to reach overhead at work without increase in pain         Plan:         Treatment will include: Complete Decongestive Therapy: Manual Therapy, Self-Care/Home Management Training, Therapeutic Exercise, Neuromuscular Re-education, Orthotic Management and Training        Charges:MM2, Ex1    Total Timed Treatment: 45 min  Total Treatment Time: 45 min

## 2024-09-11 NOTE — TELEPHONE ENCOUNTER
From: Jocelyn Garza  To: Halie Calle  Sent: 9/11/2024 7:21 AM CDT  Subject: For Yolande-Camizestrant     Nataliia Lanier,    I was wondering if I could switch to taking the camizestrant at night instead of the morning like I’ve been doing. It’s just been brutal with the verzenio in the morning and trying to work,I really can’t eat. So hoping maybe eliminating 1 pill in the morning could help my stomach since I have to take verzenio.     Thank you!  Jocelyn Garza

## 2024-09-12 NOTE — PROGRESS NOTES
Radiation Oncology Treatment Summary    Diagnosis: right breast invasive lobular carcinoma, grade 2, ER/RI+, HER2-, Ki-67 7%, cT3 N2 M0, stage IIIA, ypT3 N2a      En face electrons  MATTHEW-RPO  Tangents     IGRT: yes    Clinical Course: The treatment course was completed as prescribed. She tolerated RT well with expected dermatitis.    Follow-up: Return to clinic in 1w for skin check or sooner if needed. Continue follow-up with other physicians as planned.     For more information, or to request a detailed radiation treatment summary, please call [facility] at our [place] location, [phone].

## 2024-09-13 ENCOUNTER — NURSE ONLY (OUTPATIENT)
Dept: HEMATOLOGY/ONCOLOGY | Facility: HOSPITAL | Age: 39
End: 2024-09-13
Attending: INTERNAL MEDICINE
Payer: COMMERCIAL

## 2024-09-13 VITALS
HEART RATE: 69 BPM | RESPIRATION RATE: 16 BRPM | WEIGHT: 155 LBS | OXYGEN SATURATION: 100 % | DIASTOLIC BLOOD PRESSURE: 54 MMHG | SYSTOLIC BLOOD PRESSURE: 100 MMHG | TEMPERATURE: 98 F | HEIGHT: 65 IN | BODY MASS INDEX: 25.83 KG/M2

## 2024-09-13 DIAGNOSIS — Z17.0 MALIGNANT NEOPLASM OF UPPER-OUTER QUADRANT OF RIGHT BREAST IN FEMALE, ESTROGEN RECEPTOR POSITIVE (HCC): Primary | ICD-10-CM

## 2024-09-13 DIAGNOSIS — Z51.81 MEDICATION MONITORING ENCOUNTER: ICD-10-CM

## 2024-09-13 DIAGNOSIS — C50.411 MALIGNANT NEOPLASM OF UPPER-OUTER QUADRANT OF RIGHT BREAST IN FEMALE, ESTROGEN RECEPTOR POSITIVE (HCC): Primary | ICD-10-CM

## 2024-09-13 DIAGNOSIS — R11.0 NAUSEA: ICD-10-CM

## 2024-09-13 DIAGNOSIS — Z17.0 MALIGNANT NEOPLASM OF UPPER-OUTER QUADRANT OF RIGHT BREAST IN FEMALE, ESTROGEN RECEPTOR POSITIVE (HCC): ICD-10-CM

## 2024-09-13 DIAGNOSIS — C50.411 MALIGNANT NEOPLASM OF UPPER-OUTER QUADRANT OF RIGHT BREAST IN FEMALE, ESTROGEN RECEPTOR POSITIVE (HCC): ICD-10-CM

## 2024-09-13 LAB
ALBUMIN SERPL-MCNC: 4.2 G/DL (ref 3.2–4.8)
ALBUMIN/GLOB SERPL: 1.8 {RATIO} (ref 1–2)
ALP LIVER SERPL-CCNC: 49 U/L
ALT SERPL-CCNC: 25 U/L
ANION GAP SERPL CALC-SCNC: 8 MMOL/L (ref 0–18)
AST SERPL-CCNC: 23 U/L (ref ?–34)
BASOPHILS # BLD AUTO: 0.04 X10(3) UL (ref 0–0.2)
BASOPHILS NFR BLD AUTO: 1 %
BILIRUB SERPL-MCNC: 0.6 MG/DL (ref 0.3–1.2)
BUN BLD-MCNC: 25 MG/DL (ref 9–23)
BUN/CREAT SERPL: 15.2 (ref 10–20)
CALCIUM BLD-MCNC: 9.1 MG/DL (ref 8.7–10.4)
CHLORIDE SERPL-SCNC: 111 MMOL/L (ref 98–112)
CO2 SERPL-SCNC: 21 MMOL/L (ref 21–32)
CREAT BLD-MCNC: 1.65 MG/DL
DEPRECATED RDW RBC AUTO: 37.4 FL (ref 35.1–46.3)
EGFRCR SERPLBLD CKD-EPI 2021: 40 ML/MIN/1.73M2 (ref 60–?)
EOSINOPHIL # BLD AUTO: 0.23 X10(3) UL (ref 0–0.7)
EOSINOPHIL NFR BLD AUTO: 5.7 %
ERYTHROCYTE [DISTWIDTH] IN BLOOD BY AUTOMATED COUNT: 11.9 % (ref 11–15)
FASTING STATUS PATIENT QL REPORTED: NO
GLOBULIN PLAS-MCNC: 2.4 G/DL (ref 2–3.5)
GLUCOSE BLD-MCNC: 83 MG/DL (ref 70–99)
HCG SERPL QL: NEGATIVE
HCT VFR BLD AUTO: 27.5 %
HGB BLD-MCNC: 9.4 G/DL
IMM GRANULOCYTES # BLD AUTO: 0.02 X10(3) UL (ref 0–1)
IMM GRANULOCYTES NFR BLD: 0.5 %
LYMPHOCYTES # BLD AUTO: 0.58 X10(3) UL (ref 1–4)
LYMPHOCYTES NFR BLD AUTO: 14.4 %
MAGNESIUM SERPL-MCNC: 1.9 MG/DL (ref 1.6–2.6)
MCH RBC QN AUTO: 29.3 PG (ref 26–34)
MCHC RBC AUTO-ENTMCNC: 34.2 G/DL (ref 31–37)
MCV RBC AUTO: 85.7 FL
MONOCYTES # BLD AUTO: 0.17 X10(3) UL (ref 0.1–1)
MONOCYTES NFR BLD AUTO: 4.2 %
NEUTROPHILS # BLD AUTO: 3 X10 (3) UL (ref 1.5–7.7)
NEUTROPHILS # BLD AUTO: 3 X10(3) UL (ref 1.5–7.7)
NEUTROPHILS NFR BLD AUTO: 74.2 %
OSMOLALITY SERPL CALC.SUM OF ELEC: 294 MOSM/KG (ref 275–295)
PLATELET # BLD AUTO: 128 10(3)UL (ref 150–450)
PLATELETS.RETICULATED NFR BLD AUTO: 4.5 % (ref 0–7)
POTASSIUM SERPL-SCNC: 4.1 MMOL/L (ref 3.5–5.1)
PROT SERPL-MCNC: 6.6 G/DL (ref 5.7–8.2)
RBC # BLD AUTO: 3.21 X10(6)UL
SODIUM SERPL-SCNC: 140 MMOL/L (ref 136–145)
WBC # BLD AUTO: 4 X10(3) UL (ref 4–11)

## 2024-09-13 PROCEDURE — 84703 CHORIONIC GONADOTROPIN ASSAY: CPT

## 2024-09-13 PROCEDURE — 96366 THER/PROPH/DIAG IV INF ADDON: CPT

## 2024-09-13 PROCEDURE — 85025 COMPLETE CBC W/AUTO DIFF WBC: CPT

## 2024-09-13 PROCEDURE — 83735 ASSAY OF MAGNESIUM: CPT

## 2024-09-13 PROCEDURE — 96365 THER/PROPH/DIAG IV INF INIT: CPT

## 2024-09-13 PROCEDURE — 80053 COMPREHEN METABOLIC PANEL: CPT

## 2024-09-13 PROCEDURE — 96375 TX/PRO/DX INJ NEW DRUG ADDON: CPT

## 2024-09-13 RX ORDER — SODIUM CHLORIDE 9 MG/ML
10 INJECTION, SOLUTION INTRAMUSCULAR; INTRAVENOUS; SUBCUTANEOUS ONCE
OUTPATIENT
Start: 2024-09-27

## 2024-09-13 RX ORDER — SODIUM CHLORIDE AND POTASSIUM CHLORIDE 150; 900 MG/100ML; MG/100ML
INJECTION, SOLUTION INTRAVENOUS
Status: COMPLETED
Start: 2024-09-13 | End: 2024-09-13

## 2024-09-13 RX ORDER — PROCHLORPERAZINE EDISYLATE 5 MG/ML
10 INJECTION INTRAMUSCULAR; INTRAVENOUS ONCE
Status: CANCELLED
Start: 2024-09-13 | End: 2024-09-13

## 2024-09-13 RX ORDER — SODIUM CHLORIDE 9 MG/ML
10 INJECTION, SOLUTION INTRAMUSCULAR; INTRAVENOUS; SUBCUTANEOUS ONCE
Status: CANCELLED | OUTPATIENT
Start: 2024-09-27

## 2024-09-13 RX ORDER — PROCHLORPERAZINE EDISYLATE 5 MG/ML
INJECTION INTRAMUSCULAR; INTRAVENOUS
Status: COMPLETED
Start: 2024-09-13 | End: 2024-09-13

## 2024-09-13 RX ORDER — PROCHLORPERAZINE EDISYLATE 5 MG/ML
10 INJECTION INTRAMUSCULAR; INTRAVENOUS ONCE
Status: CANCELLED
Start: 2024-09-27 | End: 2024-09-27

## 2024-09-13 RX ORDER — PROCHLORPERAZINE EDISYLATE 5 MG/ML
5 INJECTION INTRAMUSCULAR; INTRAVENOUS ONCE
Status: COMPLETED | OUTPATIENT
Start: 2024-09-13 | End: 2024-09-13

## 2024-09-13 RX ORDER — SODIUM CHLORIDE 9 MG/ML
10 INJECTION, SOLUTION INTRAMUSCULAR; INTRAVENOUS; SUBCUTANEOUS ONCE
Status: CANCELLED | OUTPATIENT
Start: 2024-09-13

## 2024-09-13 RX ADMIN — SODIUM CHLORIDE AND POTASSIUM CHLORIDE 1000 ML: 150; 900 INJECTION, SOLUTION INTRAVENOUS at 14:38:00

## 2024-09-13 RX ADMIN — PROCHLORPERAZINE EDISYLATE 5 MG: 5 INJECTION INTRAMUSCULAR; INTRAVENOUS at 15:40:00

## 2024-09-13 NOTE — PROGRESS NOTES
Pt here for IV hydration after DAXA visit due to nausea/vomiting.  Pt tolerated infusion without difficulty or complaint. Reviewed next apt date/time.      Education Record  Learner:  Patient  Disease / Diagnosis: Breast CA  Barriers / Limitations:  None  Method:  Discussion  General Topics:  Plan of care reviewed  Outcome:  Shows understanding

## 2024-09-13 NOTE — PROGRESS NOTES
HPI   Jocelyn Garza is a 39 year old female for evaluation of   Encounter Diagnosis   Name Primary?    Malignant neoplasm of upper-outer quadrant of right breast in female, estrogen receptor positive (HCC) Yes       Patient completed course of neoadjuvant dose dense Adriamycin Cytoxan followed by weekly Taxol.  Last dose of treatment on 3/22/2024.    Patient then underwent bilateral mastectomies on 4/29/2024 with a right axillary lymph node dissection.    Patient completed radiation on 8/7/2024. Having lymphedema therapy.  Feels hardness on the R axilla.      LMP November of 2023.    On goserelin since 6/3/24. Having hot flashes.  States a couple of minutes and then improves.  A few times a day, not as much end of the month.      Has gone back to work.  Feels that will be able to start working out again.    Pt screened for OTIS-2 trial.  Randomized to Arm B with Camizestrant started 8/30/24.    Here today for follow up of starting abemaciclib.   Start of 3 rd week abemaciclib 8/30/24 C1 D 15.   C/o abd cramping, having loose stools not much watery diarrhea.    Nausea vomiting last few days , can't keep anything down, bone broth and crackers. Tried to eat some bananas.   C/o Leg cramps, legs feel heavy.  No mouth sores but mouth \"tastes like chemo\".     Vomiting this last week - pasta last night thsn had nausea and vomiting.  Headache in early am.     She is currently taking study drug camizestrant at hs.          ECOG PS  0    Review of Systems:   Review of Systems   Constitutional:  Positive for unexpected weight change (down 4 lbs). Negative for appetite change and fatigue.   Eyes:  Negative for eye problems.   Respiratory:  Negative for cough and shortness of breath.    Cardiovascular:  Negative for chest pain.   Gastrointestinal:  Positive for abdominal pain, diarrhea, nausea and vomiting.        States full and nausea after eating at times.     Endocrine: Positive for hot flashes.   Genitourinary:   Negative for menstrual problem.    Musculoskeletal:  Positive for arthralgias (states since a few weeks ago, specially the R knee, or B hips.  States that it is worse in am or after first standing from sitting a long time.  Better with activity.). Negative for back pain and neck pain.   Neurological:  Positive for extremity weakness and headaches (Headaches recently, wakes in am with headache at times.). Negative for dizziness and numbness (sometimes if crosses legs and if wears gauntlet the fingers tingle).   Hematological:  Negative for adenopathy.   Psychiatric/Behavioral:  Positive for sleep disturbance.          Current Outpatient Medications   Medication Sig Dispense Refill    letrozole 2.5 MG Oral Tab Take 1 tablet (2.5 mg total) by mouth daily. 30 tablet 6    Abemaciclib 150 MG Oral Tab Take 1 tablet (150 mg total) by mouth 2 (two) times daily. may take with or without food 60 tablet 11    lisinopril 5 MG Oral Tab Take 1 tablet (5 mg total) by mouth daily. 90 tablet 1    Mometasone Furoate 0.1 % External Cream Apply BID during radiation therapy. 45 g 3    lidocaine-prilocaine 2.5-2.5 % External Cream Apply to site 1 hour prior to port a cath needle insertion 1 each 1     Allergies:   No Known Allergies    Past Medical History:    Breast cancer in female (HCC)    Right breast lymph node cancer    High blood pressure    Hx of motion sickness    Hypertension    Solitary kidney, congenital     Past Surgical History:   Procedure Laterality Date    Appendectomy  01/01/2007    Needle biopsy right Right 10/09/2023    Treat ectopic preg,rmv tube/ovary Left 01/01/2014    Treat ectopic preg,rmv tube/ovary Left 01/01/2015     Social History     Socioeconomic History    Marital status:    Tobacco Use    Smoking status: Never    Smokeless tobacco: Never   Vaping Use    Vaping status: Never Used   Substance and Sexual Activity    Alcohol use: Not Currently     Comment: 2 -3 drinks per week    Drug use: No     Social  Determinants of Health     Food Insecurity: No Food Insecurity (4/30/2024)    Food Insecurity     Food Insecurity: Never true   Transportation Needs: No Transportation Needs (4/30/2024)    Transportation Needs     Lack of Transportation: No   Housing Stability: Low Risk  (4/30/2024)    Housing Stability     Housing Instability: No       Family History   Problem Relation Age of Onset    Other (Parkinson's Disease) Father     Prostate Cancer Maternal Grandfather 70    Pancreatic Cancer Paternal Grandmother 80    Pancreatic Cancer Maternal Uncle 60    Cancer Maternal Great-Grandmother         gastric ca    Cancer Paternal Aunt 80        breast cancer    Pancreatic Cancer Paternal Great-Grandfather          PHYSICAL EXAM:    /54 (BP Location: Left arm, Patient Position: Sitting, Cuff Size: adult)   Pulse 69   Temp 98.1 °F (36.7 °C) (Oral)   Resp 16   Ht 1.651 m (5' 5\")   Wt 70.3 kg (155 lb)   LMP 11/21/2023 (Approximate)   SpO2 100%   BMI 25.79 kg/m²   Wt Readings from Last 6 Encounters:   09/13/24 70.3 kg (155 lb)   08/23/24 72.1 kg (159 lb)   08/07/24 72.3 kg (159 lb 4.8 oz)   07/31/24 71.4 kg (157 lb 6.4 oz)   06/14/24 71.8 kg (158 lb 3.2 oz)   06/03/24 71.2 kg (157 lb)     Physical Exam  General: Patient is alert, not in acute distress. Pale in appearance  HEENT: EOMs intact. PERRL. Oropharynx is clear.   Neck: No JVD. No palpable lymphadenopathy. Neck is supple.  Chest: Clear to auscultation. B mastectomies.  Wearing compression sleeve.   Heart: Regular rate and rhythm.   Abdomen: Soft, non tender with good bowel sounds.  Extremities: No edema.  Neurological: Grossly intact.   Lymphatics: There is no palpable lymphadenopathy throughout in the cervical, supraclavicular, axillary, or inguinal regions.  Psych/Depression: nl        ASSESSMENT/PLAN:     1. Malignant neoplasm of upper-outer quadrant of right breast in female, estrogen receptor positive (HCC)         Cancer Staging   Malignant neoplasm of  upper-outer quadrant of right breast in female, estrogen receptor positive (HCC)  Staging form: Breast, AJCC 8th Edition  - Clinical stage from 10/12/2023: Stage IIA (cT3, cN1(f), cM0, G2, ER+, PA+, HER2-) - Signed by Halie Calle MD on 5/8/2024  - Pathologic stage from 5/8/2024: ypT3, pN2a, cM0, G2, ER+, PA+, HER2- - Signed by Halie Calle MD on 5/8/2024    Patient has completed staging work-up.  Thus far no evidence of metastatic disease.  Findings in the liver evaluated with ultrasound, and the one side is a hemangioma, the other likely a cyst, but a question of metastatic disease and PET scan has been ordered.  I discussed with the patient that most likely this is going to be a cyst.    Discussed that she still has early stage disease, and that her staging has not changed despite the size of the primary tumor.    Given extensive disease in the breast and komal involvement, she will benefit from neoadjuvant chemotherapy, which may help with surgical outcomes by shrinking some of the primary tumor and of the lymph nodes.  I discussed with the patient that the primary tumor will not likely decrease in size significantly to make her a candidate for breast conservation.  I did discuss with the patient that she will need a mastectomy and likely axillary lymph node dissection.  Given the size of the primary tumor, she is a candidate for postmastectomy radiation therapy plus minus radiation to the komal basins pending on surgical management of the axilla.  I discussed with the patient that once she completes treatment with radiation, she will then be initiated on adjuvant endocrine therapy, given the size of the tumor and number of positive lymph nodes, she also will be a candidate for 2 years of endovascular in the initial 2 years of adjuvant endocrine therapy.  Discussed that adjuvant endocrine therapy total duration will be no less than 5 years but given her high risk features, likely up to 8 to 10 years.  We  will also discuss options for ovarian function suppression after completion of chemotherapy.    Patient inquired regarding contralateral prophylactic mastectomy.  I discussed with the patient that we should await results of her genetic testing.  If the genetic testing is positive for a deleterious mutation which will increase her lifetime second primary breast cancer, then she should proceed with prophylactic contralateral mastectomy.  However, if this is negative, discussed with the patient that there is no benefit for proceeding with contralateral mastectomy, as it will not change her disease-free or overall survival.    Discussed with the patient that while she is receiving neoadjuvant treatment, she can have the evaluation by plastic surgery so that when she completes her neoadjuvant course of therapy, she will be able to have her surgery coordinated with Dr. Hutson and a plastic surgeon    She completed course of neoadjuvant DD AC x 4 cycles followed by weekly Taxol x12 weeks    Status post bilateral mastectomy with right-sided axillary lymph node dissection on 4/29/2024.    She completed adjuvant radiation therapy on 8/7/2024.    Discussed with the patient that it is not uncommon for patients with ER/CA positive breast cancer that have neoadjuvant treatment not to have a robust response from therapy.  I did discuss that for her treatment, in terms of systemic disease, after completion of radiation therapy, we will proceed with ovarian function suppression, which can be initiated at this point, this will then be followed by adjuvant endocrine therapy with 2 years of Abemaciclib.  In addition, patient is also a candidate for the [place] 2 clinical trial which is comparing standard of care adjuvant endocrine therapy with Abemaciclib versus camizestrant which is an oral SERD.    Patient received first dose of goserelin 3.6 mg on 6/3/2024 and last on 7/30/2024.    HA:  Likely from goserelin, possibly from  dehydration    Arthralgias:  secondary to goserelin.  Continue to increase activity.      OTIS-2  Randomized to Arm B with Camizestrant started 8/30/24.  To start adjuvant hormonal therapy and abemaciclib.       Follow up per trial protocol.     --Nausea vomitng abd pain and cramping - check cbc cmp        HOLD abemaciclib        Hydrate 1 liter 0.9 NS + 20 KCl        Compazine 5 mg IV         Discussed with Dr Calle- will dose reduce abemaciclib         Noted dehydration- encouraged oral hydration          MDM high risk.    No orders of the defined types were placed in this encounter.      Results From Past 48 Hours:  Recent Results (from the past 48 hour(s))   CBC W/DIFF [E]    Collection Time: 09/13/24  1:32 PM   Result Value Ref Range    WBC 4.0 4.0 - 11.0 x10(3) uL    RBC 3.21 (L) 3.80 - 5.30 x10(6)uL    HGB 9.4 (L) 12.0 - 16.0 g/dL    HCT 27.5 (L) 35.0 - 48.0 %    MCV 85.7 80.0 - 100.0 fL    MCH 29.3 26.0 - 34.0 pg    MCHC 34.2 31.0 - 37.0 g/dL    RDW-SD 37.4 35.1 - 46.3 fL    RDW 11.9 11.0 - 15.0 %    .0 (L) 150.0 - 450.0 10(3)uL    Immature Platelet Fraction 4.5 0.0 - 7.0 %    Neutrophil Absolute Prelim 3.00 1.50 - 7.70 x10 (3) uL    Neutrophil Absolute 3.00 1.50 - 7.70 x10(3) uL    Lymphocyte Absolute 0.58 (L) 1.00 - 4.00 x10(3) uL    Monocyte Absolute 0.17 0.10 - 1.00 x10(3) uL    Eosinophil Absolute 0.23 0.00 - 0.70 x10(3) uL    Basophil Absolute 0.04 0.00 - 0.20 x10(3) uL    Immature Granulocyte Absolute 0.02 0.00 - 1.00 x10(3) uL    Neutrophil % 74.2 %    Lymphocyte % 14.4 %    Monocyte % 4.2 %    Eosinophil % 5.7 %    Basophil % 1.0 %    Immature Granulocyte % 0.5 %   COMP METABOLIC PANEL [E]    Collection Time: 09/13/24  1:32 PM   Result Value Ref Range    Glucose 83 70 - 99 mg/dL    Sodium 140 136 - 145 mmol/L    Potassium 4.1 3.5 - 5.1 mmol/L    Chloride 111 98 - 112 mmol/L    CO2 21.0 21.0 - 32.0 mmol/L    Anion Gap 8 0 - 18 mmol/L    BUN 25 (H) 9 - 23 mg/dL    Creatinine 1.65 (H) 0.55 -  1.02 mg/dL    BUN/CREA Ratio 15.2 10.0 - 20.0    Calcium, Total 9.1 8.7 - 10.4 mg/dL    Calculated Osmolality 294 275 - 295 mOsm/kg    eGFR-Cr 40 (L) >=60 mL/min/1.73m2    ALT 25 10 - 49 U/L    AST 23 <34 U/L    Alkaline Phosphatase 49 37 - 98 U/L    Bilirubin, Total 0.6 0.3 - 1.2 mg/dL    Total Protein 6.6 5.7 - 8.2 g/dL    Albumin 4.2 3.2 - 4.8 g/dL    Globulin  2.4 2.0 - 3.5 g/dL    A/G Ratio 1.8 1.0 - 2.0    Patient Fasting for CMP? No    MAGNESIUM [E]    Collection Time: 09/13/24  1:32 PM   Result Value Ref Range    Magnesium 1.9 1.6 - 2.6 mg/dL   HCG, BETA SUBUNIT, QUAL [E]    Collection Time: 09/13/24  1:32 PM   Result Value Ref Range    HCG, Serum Qualitative (S) Negative Negative             Imaging & Referrals:  None

## 2024-09-14 ENCOUNTER — HOSPITAL ENCOUNTER (OUTPATIENT)
Dept: BONE DENSITY | Age: 39
Discharge: HOME | End: 2024-09-14
Attending: INTERNAL MEDICINE
Payer: COMMERCIAL

## 2024-09-14 DIAGNOSIS — Z78.0 POST-MENOPAUSAL: ICD-10-CM

## 2024-09-14 PROCEDURE — 77080 DXA BONE DENSITY AXIAL: CPT | Performed by: INTERNAL MEDICINE

## 2024-09-17 ENCOUNTER — PATIENT MESSAGE (OUTPATIENT)
Dept: HEMATOLOGY/ONCOLOGY | Facility: HOSPITAL | Age: 39
End: 2024-09-17

## 2024-09-17 NOTE — TELEPHONE ENCOUNTER
From: Jocelyn Garza  To: Halie Calle  Sent: 9/17/2024 4:27 PM CDT  Subject: For Yolande: Camizestrant Prescription Question     Nataliia Lanier,  Can I get a flu shot?    Thanks!  Jocelyn Garza

## 2024-09-18 ENCOUNTER — OFFICE VISIT (OUTPATIENT)
Dept: PHYSICAL THERAPY | Facility: HOSPITAL | Age: 39
End: 2024-09-18
Payer: COMMERCIAL

## 2024-09-18 DIAGNOSIS — Q60.0 SOLITARY KIDNEY, CONGENITAL: ICD-10-CM

## 2024-09-18 PROCEDURE — 97140 MANUAL THERAPY 1/> REGIONS: CPT

## 2024-09-18 PROCEDURE — 97110 THERAPEUTIC EXERCISES: CPT

## 2024-09-18 NOTE — PROGRESS NOTES
Referring Provider:  Jesus Manuel  Diagnosis: Malignant neoplasm of upper-outer quadrant of right breast in female, estrogen receptor positive (HCC) (C50.411,Z17.0)      Date of onset: 4/29/2024 Evaluation Date: 5/31/2024           Physical Therapy Lymphedema Daily Note    Precautions:  HTN, R breast cancer, + chemo, will need radiation   Education or treatment limitation: none  Rehab Potential:good    Past Medical History:    Breast cancer in female (HCC)    Right breast lymph node cancer    High blood pressure    Hx of motion sickness    Hypertension    Solitary kidney, congenital         Pain: no pain \"just tight\"    Subjective: Pt c/o feeling soreness and swollen.  \"Maybe because I have been back at work for a couple of weeks.\"    Objective:    9/18/2024    - shoulder AROM:  (tightness, painful and swollen)  Flex: R 145, L 140    Abd: R 150, L 145   ER: R 85, L 85 (not as painful but on meds)   IR: grossly L 4 B    - Swelling B trunk (R more than L) noted posteriorly and anteriorly                Trunk measurement:                             Under arms: 96.5 cm (IE: 96.2)                            Level of mastectomy scars: 92.0 cm (IE: 89.4)    8/28/2024:  - Swelling B trunk (R more than L) noted posteriorly and anteriorly                Trunk measurement:                             Under arms: 97.2 cm (IE: 96.2)                            Level of mastectomy scars: 92.1 cm (IE: 89.4)  - shoulder AROM:   Flex: R 137, L 140    Abd: R 130, L 140   ER: R 85, L 80 (painful on L)   IR: grossly L 4 B  - shld strength:    R grossly 4/5    L grossly 4+/5 except ER 4/5  - scap strength:   Rhomb: 4/-5 B   Mid trap: L 3/5, R 3+/5  - skin changes R chest and axilla from the radiation, axilla still red/irritated  - posture: protracted scap  - decreased flexibility B pect and lats (tenderness on the left/hypersensitive)  - no visual swelling of UEs      8/28/2024     7:00 AM 5/28/2024     8:00 PM 4/8/2024     2:00 PM   Lymphedema  Calculations   DATE MEASURED 8/28/2024 5/28/2024 4/8/2024   LOCATION/MEASUREMENTS RUE RUE RUKOMAL   PATIENT POSITION  supine 1 pillow supine 1 pillow supine 1 pillow   LIMB POSITION 75 deg abduction 75 deg abduction 75 deg abduction   STARTING POINT 17cm from 3rd cuticle 17cm from 3rd cuticle 17cm from 3rd cuticle   RIGHT HAND VOLUME 19.7cm across palm 19.7cm across palm 19.7cm across palm   LEFT HAND VOLUME 19.5cm across palm 19.5cm across palm 19.5cm across palm   MEASUREMENT A 15.8 15.5 15.5   MEASUREMENT B 16.7 16.6 16.7   MEASUREMENT C 19.4 19.1 19.2   MEASUREMENT D 22.5 23 23.1   MEASUREMENT E 25.2 25 25   MEASUREMENT F 24.7 24.5 24.5   MEASUREMENT G 26 25.5 25.3   MEASUREMENT H 27.8 27.5 27.4   MEASUREMENT I 29.7 29.6 29.4    TOTAL VOLUME  [id number] 1732.87185 1728.76717   DATE MEASURED 8/28/2024 4/8/2024 4/8/2024   LOCATION/MEASUREMENTS LUE LUKOMAL LUE   MEASUREMENT A 15.3 15.4 15.4   MEASUREMENT B 17 17.1 17   MEASUREMENT C 20 19.6 19.4   MEASUREMENT D 22.8 22.7 22.8   MEASUREMENT E 25.3 25.2 25.2   MEASUREMENT F 26 24.8 24.8   MEASUREMENT G 27.1 26.9 26.8   MEASUREMENT H 28.9 28.6 28.5   MEASUREMENT I 31.2 33.5 33.4    TOTAL VOLUME  [id number] 1851.58758 1843.3585   % DIFFERENCE -[phone] -[phone] -[phone]             7/5/2024  - Swelling B trunk (R more than L, especially posteriorly)                 Trunk measurement:                             Under arms: 94.6 cm (IE: 96.2)                            Level of mastectomy scars: 90.2 cm (IE: 89.4)  - shoulder AROM:   Flex: 145 B    Abd: R 160, L 155 (verb cues to \"fully reach up\")     7/2/2024  - new rash L neck (pt seen MD, treating w/ aquaphor)      6/25/2024:  - Swelling B trunk (R more than L, especially posteriorly)               Trunk measurement:                             Under arms: 95.4 cm (IE: 96.2)                            Level of mastectomy scars: 89 cm (IE: 89.4)  - Shoulder AROM:   Flex: 145 B    Abd: R 160, L 155 (verb cues to \"fully reach  up\"   - shoulder strength: 4/5 B  - poor abd strength- pt w/ poor stabilization when reaching overhead (increased lumbar extension)        6/20/2024:  AROM/Strength:    Fascial tightness R axilla into arm  Decreased flexibility B pect  Decreased skin flexibility over chest  Swelling L chest (just inf to incision)     5/31/2024  Right AROM 6/6/2024  'R AROM 6/20/2024  R AROM 5/31/2024  Left AROM 6/6/2024  L AROM   6/20/2024   R AROM 5/31/2024  Right Strength 5/31/2024  Left Strength   Shoulder                     Flexion 90 120 145 90 125 145 NT NT        Abduction 90 95 132 90 98 133 NT NT        IR wfl WFL  WFL WFL  NT NT        ER WFL WFL  WFL WFL  NT NT   Elbow                    Flexion WFL   WFL   NT NT        Extension WFL   WFL   NT NT                               6/14/2024:    Edema/Tissue Observations:    - Swelling B chest  - Swelling B trunk (R more than L, especially posteriorly)               Trunk measurement:                             Under arms: 95.4 cm                             Level of mastectomy scars: 89.4 cm - NO CHANGE TODAY  6/6/2024:  Decreased flexibility B pect and lats  Swelling R upper/outer abdomen  Cording R axilla   Cording L abdomen  AROM/Strength:      5/31/2024  Right AROM 6/6/2024  'R AROM 5/31/2024  Left AROM 6/6/2024  L AROM   5/31/2024  Right Strength 5/31/2024  Left Strength   Shoulder                   Flexion 90 120 90 125 NT NT        Abduction 90 95 90 98 NT NT        IR wfl WFL WFL WFL NT NT        ER WFL WFL WFL WFL NT NT   Elbow                  Flexion WFL  WFL  NT NT        Extension WFL  WFL  NT NT                             5/31/2024 (Evaluation):  Sensation:  decreased sensation R axilla/inner upper arm  AROM/Strength:      5/31/2024  Right AROM 5/31/2024  Left AROM 5/31/2024  Right Strength 5/31/2024  Left Strength   Shoulder                 Flexion 90 90 NT NT        Abduction 90 90 NT NT        IR wfl WFL NT NT        ER WFL WFL NT NT   Elbow                 Flexion WFL WFL NT NT        Extension WFL WFL NT NT                         Posture: Rounded shld. guarding     Palpation: hypersensitivity L abdomen/inferior to mastectomy incision, decreased sensation R chest/axilla      Edema/Tissue Observations:    - Swelling B chest  - Swelling B trunk (R more than L, especially posteriorly)               Trunk measurement:                             Under arms: 96.2 cm                             Level of mastectomy scars: 89.4 cm   Stemmer's Sign: negative        Arm Volume Measurements:  deferred (done recently at screening)       5/28/2024     8:00 PM 4/8/2024     2:00 PM   Lymphedema Calculations   DATE MEASURED 5/28/2024 4/8/2024   LOCATION/MEASUREMENTS RUE RUE   PATIENT POSITION  supine 1 pillow supine 1 pillow   LIMB POSITION 75 deg abduction 75 deg abduction   STARTING POINT 17cm from 3rd cuticle 17cm from 3rd cuticle   RIGHT HAND VOLUME 19.7cm across palm 19.7cm across palm   LEFT HAND VOLUME 19.5cm across palm 19.5cm across palm   MEASUREMENT A 15.5 15.5   MEASUREMENT B 16.6 16.7   MEASUREMENT C 19.1 19.2   MEASUREMENT D 23 23.1   MEASUREMENT E 25 25   MEASUREMENT F 24.5 24.5   MEASUREMENT G 25.5 25.3   MEASUREMENT H 27.5 27.4   MEASUREMENT I 29.6 29.4    TOTAL VOLUME  1732.05850 1728.59601   DATE MEASURED 4/8/2024 4/8/2024   LOCATION/MEASUREMENTS [facility]   MEASUREMENT A 15.4 15.4   MEASUREMENT B 17.1 17   MEASUREMENT C 19.6 19.4   MEASUREMENT D 22.7 22.8   MEASUREMENT E 25.2 25.2   MEASUREMENT F 24.8 24.8   MEASUREMENT G 26.9 26.8   MEASUREMENT H 28.6 28.5   MEASUREMENT I 33.5 33.4    TOTAL VOLUME  [id number] 1843.2805   % DIFFERENCE -6.01007 -6.34454                           Lymphedema Life Impact Scale: Evaluation Score 5/31/2024: LLIS Score Evaluation: 25 % impaired (0% is no impairment, 100% total impairment)  Lymphedema Life Impact Scale: 7/2/2024: LLIS Score Evaluation: LLIS Score Current: 30 % impaired (0% is no impairment, 100% total impairment)            Today’s Treatment and Response:   6/27/2024 7/2/2024 7/5/2024 8/28/2024 Date: 9/11/2024 Date: 9/18/2024   Visit: #9/10  Certification From: 5/31/2024  To:8/29/2024 Visit 10/20  PN  Certification From: 7/2/2024      To: 9/30/2024  Visit 11/20    Certification From: 7/2/2024      To: 9/30/2024  Visit 12/22    Certification From: 8/28/2024      To: 11/26/2024  Visit #13/20    Certification From: 8/28/2024      To: 11/26/2024  Visit: #14/20  Certification from 8/28/2024 to 11/26/2024   Manual Therapy (10 min)    STM and MLD techniques R axilla, chest, and trunk  Manual Therapy (30 min)      STM and MLD techniques B axilla, chest, and trunk (greater time on right)    Manual stretching B shoulder  Manual therapy (40 min)      STM and MLD techniques B axilla, chest, and trunk     STM B upper trap    Manual stretching B shld flex/abd     Compression:  - awaiting custom garments to be delivered Manual Therapy (30 min)    STM B pects/lats    MLD B axilla, chest, A-I       Compression:  - assessed garments: pt not pulling it up high enough, but overall good fit. Discussed \"stretching\" thumb of gauntlet   Manual therapy (35 minutes)      STM B pects/lats    MLD B axilla, chest, A-I       Compression:  - Pt wearing compression sleeve daily. Manual therapy (45 minutes)    -measured trunk circumference    STM B pects/lats    MLD B axilla, chest, A-I   *possible abdominal cording below chest scar L side.    Compression:  - Pt wearing compression sleeve daily.  -encouraged use of foam pads day and/or night time to help with desensitization.   There Ex (30 min)    - prone Rhomb x 10  - prone mid trap  x 10  - prone shld ext x 10  - standing rows (narrow and wide) GTB x 10 each  - standing shld ext GTB x 10   - supine serratus ant 3#  x 10 x2   - sidely clams x 10  - sidely hip abd x 10   - bridging x 10  - bridging w/ march x 10 There Ex (10 min)    - ulner nerve glides  - encouraged cont stretching during radiation     - ulnar  nerve glides  - encouraged cont ROM stretching for shoulders   There Ex    - encouraged daily shld ROM/stretching   There Ex (15 min)    - shld ER GTB x 10  - shld ext GTB x 10  - narrow rows GTB x 10  - wide rows GTB x 10  - instr to cont w/ pulleys, doorway stretches, sidely horiz abd/add, and sidely windmills There Ex (10 minutes)  -pt to continue with exercises    -PROM R shoulder to stretch lat and pec.   There Ex (10 minutes)    -PROM B shoulders  -side lying shoulder abduciton and horizontal abduction  -  - prone Rhomb x 10  - prone mid trap  x 10  - prone shld ext x 10  *encouraged daily exercises/stretching      There Act (10 min)  - reviewed donning/doffing compression garment    There Act (5 minutes)    -reviewed donning sleeve correctly.  Pt arrived with sleeve slid down on upper arm and seam along middle of forearm.  Demo/return demo for donning sleeve         Assessment: Overall pt's hyper sensitivity has decreased especially on R trunk/axilla/chest.  L possibly has abdominal cord. Reinforced importance of daily stretching and exercises.      Goals:   Goals (discussed and planned with patient involvement):      To be met in 10 visits:  1) Decrease swelling chest/trunk by a total of 3 cm to decrease risks of infections   2) Pt to be independent with self MLD, ROM exercises, and donning/doffing compression bandages/garments to facilitate swelling reduction and to be independent at self management once discharged  3) Increase B shoulder AROM flex and abd to 160 degrees, ER to at least 90 degrees when arms are elevated  to improve ease of dressing/bathing/and reaching overhead  4) Increase B UE strength to at least 4+/5  to improve ease of lifting and performing household chores  5) Increase B scap strength to at least 4/5 to allow patient to reach overhead at work without increase in pain         Plan:         Treatment will include: Complete Decongestive Therapy: Manual Therapy, Self-Care/Home Management  Training, Therapeutic Exercise, Neuromuscular Re-education, Orthotic Management and Training        Charges:MM3, There ex 1    Total Timed Treatment: 60 min  Total Treatment Time: 60 min

## 2024-09-20 RX ORDER — LISINOPRIL 5 MG/1
5 TABLET ORAL DAILY
Qty: 90 TABLET | Refills: 1 | OUTPATIENT
Start: 2024-09-20

## 2024-09-23 DIAGNOSIS — Z51.81 MEDICATION MONITORING ENCOUNTER: ICD-10-CM

## 2024-09-23 DIAGNOSIS — Z17.0 MALIGNANT NEOPLASM OF UPPER-OUTER QUADRANT OF RIGHT BREAST IN FEMALE, ESTROGEN RECEPTOR POSITIVE (HCC): Primary | ICD-10-CM

## 2024-09-23 DIAGNOSIS — C50.411 MALIGNANT NEOPLASM OF UPPER-OUTER QUADRANT OF RIGHT BREAST IN FEMALE, ESTROGEN RECEPTOR POSITIVE (HCC): Primary | ICD-10-CM

## 2024-09-24 ENCOUNTER — NURSE ONLY (OUTPATIENT)
Dept: HEMATOLOGY/ONCOLOGY | Facility: HOSPITAL | Age: 39
End: 2024-09-24
Attending: INTERNAL MEDICINE
Payer: COMMERCIAL

## 2024-09-24 VITALS
HEART RATE: 62 BPM | HEIGHT: 65 IN | OXYGEN SATURATION: 100 % | RESPIRATION RATE: 16 BRPM | BODY MASS INDEX: 26.49 KG/M2 | TEMPERATURE: 98 F | WEIGHT: 159 LBS | SYSTOLIC BLOOD PRESSURE: 113 MMHG | DIASTOLIC BLOOD PRESSURE: 69 MMHG

## 2024-09-24 DIAGNOSIS — Z17.0 MALIGNANT NEOPLASM OF UPPER-OUTER QUADRANT OF RIGHT BREAST IN FEMALE, ESTROGEN RECEPTOR POSITIVE (HCC): Primary | ICD-10-CM

## 2024-09-24 DIAGNOSIS — Z51.81 MEDICATION MONITORING ENCOUNTER: ICD-10-CM

## 2024-09-24 DIAGNOSIS — C50.411 MALIGNANT NEOPLASM OF UPPER-OUTER QUADRANT OF RIGHT BREAST IN FEMALE, ESTROGEN RECEPTOR POSITIVE (HCC): Primary | ICD-10-CM

## 2024-09-24 LAB
ALBUMIN SERPL-MCNC: 4.1 G/DL (ref 3.2–4.8)
ALBUMIN/GLOB SERPL: 1.8 {RATIO} (ref 1–2)
ALP LIVER SERPL-CCNC: 50 U/L
ALT SERPL-CCNC: 13 U/L
ANION GAP SERPL CALC-SCNC: 9 MMOL/L (ref 0–18)
AST SERPL-CCNC: 17 U/L (ref ?–34)
BASOPHILS # BLD AUTO: 0.03 X10(3) UL (ref 0–0.2)
BASOPHILS NFR BLD AUTO: 1.2 %
BILIRUB SERPL-MCNC: 0.3 MG/DL (ref 0.3–1.2)
BUN BLD-MCNC: 14 MG/DL (ref 9–23)
BUN/CREAT SERPL: 10.5 (ref 10–20)
CALCIUM BLD-MCNC: 9.5 MG/DL (ref 8.7–10.4)
CHLORIDE SERPL-SCNC: 110 MMOL/L (ref 98–112)
CO2 SERPL-SCNC: 24 MMOL/L (ref 21–32)
CREAT BLD-MCNC: 1.33 MG/DL
DEPRECATED RDW RBC AUTO: 37.8 FL (ref 35.1–46.3)
EGFRCR SERPLBLD CKD-EPI 2021: 52 ML/MIN/1.73M2 (ref 60–?)
EOSINOPHIL # BLD AUTO: 0.04 X10(3) UL (ref 0–0.7)
EOSINOPHIL NFR BLD AUTO: 1.6 %
ERYTHROCYTE [DISTWIDTH] IN BLOOD BY AUTOMATED COUNT: 13.1 % (ref 11–15)
GLOBULIN PLAS-MCNC: 2.3 G/DL (ref 2–3.5)
GLUCOSE BLD-MCNC: 103 MG/DL (ref 70–99)
HCT VFR BLD AUTO: 25.7 %
HGB BLD-MCNC: 8.8 G/DL
IMM GRANULOCYTES # BLD AUTO: 0.01 X10(3) UL (ref 0–1)
IMM GRANULOCYTES NFR BLD: 0.4 %
LYMPHOCYTES # BLD AUTO: 0.76 X10(3) UL (ref 1–4)
LYMPHOCYTES NFR BLD AUTO: 29.6 %
MAGNESIUM SERPL-MCNC: 1.7 MG/DL (ref 1.6–2.6)
MCH RBC QN AUTO: 30.1 PG (ref 26–34)
MCHC RBC AUTO-ENTMCNC: 34.2 G/DL (ref 31–37)
MCV RBC AUTO: 88 FL
MONOCYTES # BLD AUTO: 0.32 X10(3) UL (ref 0.1–1)
MONOCYTES NFR BLD AUTO: 12.5 %
NEUTROPHILS # BLD AUTO: 1.41 X10 (3) UL (ref 1.5–7.7)
NEUTROPHILS # BLD AUTO: 1.41 X10(3) UL (ref 1.5–7.7)
NEUTROPHILS NFR BLD AUTO: 54.7 %
OSMOLALITY SERPL CALC.SUM OF ELEC: 297 MOSM/KG (ref 275–295)
PLATELET # BLD AUTO: 131 10(3)UL (ref 150–450)
POTASSIUM SERPL-SCNC: 3.8 MMOL/L (ref 3.5–5.1)
PROT SERPL-MCNC: 6.4 G/DL (ref 5.7–8.2)
RBC # BLD AUTO: 2.92 X10(6)UL
SODIUM SERPL-SCNC: 143 MMOL/L (ref 136–145)
WBC # BLD AUTO: 2.6 X10(3) UL (ref 4–11)

## 2024-09-24 PROCEDURE — 85025 COMPLETE CBC W/AUTO DIFF WBC: CPT

## 2024-09-24 PROCEDURE — 80053 COMPREHEN METABOLIC PANEL: CPT

## 2024-09-24 PROCEDURE — 83735 ASSAY OF MAGNESIUM: CPT

## 2024-09-24 PROCEDURE — 96402 CHEMO HORMON ANTINEOPL SQ/IM: CPT

## 2024-09-24 PROCEDURE — 36591 DRAW BLOOD OFF VENOUS DEVICE: CPT

## 2024-09-24 RX ORDER — SODIUM CHLORIDE 9 MG/ML
10 INJECTION, SOLUTION INTRAMUSCULAR; INTRAVENOUS; SUBCUTANEOUS ONCE
OUTPATIENT
Start: 2024-10-22

## 2024-09-24 NOTE — PROGRESS NOTES
HPI   Jocelyn Garza is a 39 year old female for evaluation of   Encounter Diagnoses   Name Primary?    Malignant neoplasm of upper-outer quadrant of right breast in female, estrogen receptor positive (HCC) Yes    Medication monitoring encounter        Patient completed course of neoadjuvant dose dense Adriamycin Cytoxan followed by weekly Taxol.  Last dose of treatment on 3/22/2024.    Patient then underwent bilateral mastectomies on 4/29/2024 with a right axillary lymph node dissection.    Patient completed radiation on 8/7/2024. Having lymphedema therapy.  Feels hardness on the R axilla.      LMP November of 2023.    On goserelin since 6/3/24. Having hot flashes.  States a couple of minutes and then improves.  A few times a day, not as much end of the month.      Has gone back to work.  Feels that will be able to start working out again.    Pt screened for OTIS-2 trial.  Randomized to Arm B with Camizestrant started 8/30/24.      She is currently taking study drug camizestrant at hs. Taking daily.   No issues.       Here today for follow up of starting abemaciclib. Started 8/30  Dose adjustment to 100 mg daily on 9/22/24    Today is C2 D 3 abemaciclib 100 mg, no GI issues at this time              ECOG PS  0    Review of Systems:   Review of Systems   Constitutional:  Positive for unexpected weight change (weight back up). Negative for appetite change and fatigue.   Eyes:  Negative for eye problems.   Respiratory:  Negative for cough and shortness of breath.    Cardiovascular:  Negative for chest pain.   Gastrointestinal:  Negative for abdominal pain, diarrhea (soft not diarrhea), nausea and vomiting.   Endocrine: Positive for hot flashes.   Genitourinary:  Negative for menstrual problem.    Musculoskeletal:  Positive for arthralgias (states since a few weeks ago, specially the R knee, or B hips.  States that it is worse in am or after first standing from sitting a long time.  Better with activity.).  Negative for back pain and neck pain.   Neurological:  Positive for extremity weakness and headaches (Headaches recently, wakes in am with headache at times.). Negative for dizziness and numbness (sometimes if crosses legs and if wears gauntlet the fingers tingle).   Hematological:  Negative for adenopathy.   Psychiatric/Behavioral:  Positive for sleep disturbance.          Current Outpatient Medications   Medication Sig Dispense Refill    Abemaciclib 100 MG Oral Tab Take 1 tablet (100 mg total) by mouth 2 (two) times daily. may take with or without food 60 tablet 11    lisinopril 5 MG Oral Tab Take 1 tablet (5 mg total) by mouth daily. 90 tablet 1    Mometasone Furoate 0.1 % External Cream Apply BID during radiation therapy. 45 g 3    lidocaine-prilocaine 2.5-2.5 % External Cream Apply to site 1 hour prior to port a cath needle insertion 1 each 1     Allergies:   No Known Allergies    Past Medical History:    Breast cancer in female (HCC)    Right breast lymph node cancer    High blood pressure    Hx of motion sickness    Hypertension    Solitary kidney, congenital     Past Surgical History:   Procedure Laterality Date    Appendectomy  01/01/2007    Needle biopsy right Right 10/09/2023    Treat ectopic preg,rmv tube/ovary Left 01/01/2014    Treat ectopic preg,rmv tube/ovary Left 01/01/2015     Social History     Socioeconomic History    Marital status:    Tobacco Use    Smoking status: Never    Smokeless tobacco: Never   Vaping Use    Vaping status: Never Used   Substance and Sexual Activity    Alcohol use: Not Currently     Comment: 2 -3 drinks per week    Drug use: No     Social Determinants of Health     Food Insecurity: No Food Insecurity (4/30/2024)    Food Insecurity     Food Insecurity: Never true   Transportation Needs: No Transportation Needs (4/30/2024)    Transportation Needs     Lack of Transportation: No   Housing Stability: Low Risk  (4/30/2024)    Housing Stability     Housing Instability: No        Family History   Problem Relation Age of Onset    Other (Parkinson's Disease) Father     Prostate Cancer Maternal Grandfather 70    Pancreatic Cancer Paternal Grandmother 80    Pancreatic Cancer Maternal Uncle 60    Cancer Maternal Great-Grandmother         gastric ca    Cancer Paternal Aunt 80        breast cancer    Pancreatic Cancer Paternal Great-Grandfather          PHYSICAL EXAM:    /69 (BP Location: Left arm, Patient Position: Sitting, Cuff Size: adult)   Pulse 62   Temp 98.3 °F (36.8 °C) (Oral)   Resp 16   Ht 1.651 m (5' 5\")   Wt 72.1 kg (159 lb)   LMP 11/21/2023 (Approximate)   SpO2 100%   BMI 26.46 kg/m²   Wt Readings from Last 6 Encounters:   09/13/24 70.3 kg (155 lb)   08/23/24 72.1 kg (159 lb)   08/07/24 72.3 kg (159 lb 4.8 oz)   07/31/24 71.4 kg (157 lb 6.4 oz)   06/14/24 71.8 kg (158 lb 3.2 oz)   06/03/24 71.2 kg (157 lb)     Physical Exam  General: Patient is alert, not in acute distress.   HEENT: EOMs intact. PERRL. Oropharynx is clear.   Neck: No JVD. No palpable lymphadenopathy. Neck is supple.  Chest: Clear to auscultation. B mastectomies.  Wearing compression sleeve.   Heart: Regular rate and rhythm.   Abdomen: Soft, non tender with good bowel sounds.  Extremities: No edema.  Neurological: Grossly intact.   Lymphatics: There is no palpable lymphadenopathy throughout in the cervical, supraclavicular, axillary, or inguinal regions.  Psych/Depression: nl        ASSESSMENT/PLAN:     1. Malignant neoplasm of upper-outer quadrant of right breast in female, estrogen receptor positive (HCC)    2. Medication monitoring encounter         Cancer Staging   Malignant neoplasm of upper-outer quadrant of right breast in female, estrogen receptor positive (HCC)  Staging form: Breast, AJCC 8th Edition  - Clinical stage from 10/12/2023: Stage IIA (cT3, cN1(f), cM0, G2, ER+, OH+, HER2-) - Signed by Halie Calle MD on 5/8/2024  - Pathologic stage from 5/8/2024: ypT3, pN2a, cM0, G2, ER+, OH+,  HER2- - Signed by Halie Calle MD on 5/8/2024    Patient has completed staging work-up.  Thus far no evidence of metastatic disease.  Findings in the liver evaluated with ultrasound, and the one side is a hemangioma, the other likely a cyst, but a question of metastatic disease and PET scan has been ordered.  I discussed with the patient that most likely this is going to be a cyst.    Discussed that she still has early stage disease, and that her staging has not changed despite the size of the primary tumor.    Given extensive disease in the breast and komal involvement, she will benefit from neoadjuvant chemotherapy, which may help with surgical outcomes by shrinking some of the primary tumor and of the lymph nodes.  I discussed with the patient that the primary tumor will not likely decrease in size significantly to make her a candidate for breast conservation.  I did discuss with the patient that she will need a mastectomy and likely axillary lymph node dissection.  Given the size of the primary tumor, she is a candidate for postmastectomy radiation therapy plus minus radiation to the komal basins pending on surgical management of the axilla.  I discussed with the patient that once she completes treatment with radiation, she will then be initiated on adjuvant endocrine therapy, given the size of the tumor and number of positive lymph nodes, she also will be a candidate for 2 years of endovascular in the initial 2 years of adjuvant endocrine therapy.  Discussed that adjuvant endocrine therapy total duration will be no less than 5 years but given her high risk features, likely up to 8 to 10 years.  We will also discuss options for ovarian function suppression after completion of chemotherapy.    Patient inquired regarding contralateral prophylactic mastectomy.  I discussed with the patient that we should await results of her genetic testing.  If the genetic testing is positive for a deleterious mutation which  will increase her lifetime second primary breast cancer, then she should proceed with prophylactic contralateral mastectomy.  However, if this is negative, discussed with the patient that there is no benefit for proceeding with contralateral mastectomy, as it will not change her disease-free or overall survival.    Discussed with the patient that while she is receiving neoadjuvant treatment, she can have the evaluation by plastic surgery so that when she completes her neoadjuvant course of therapy, she will be able to have her surgery coordinated with Dr. Hutson and a plastic surgeon    She completed course of neoadjuvant DD AC x 4 cycles followed by weekly Taxol x12 weeks    Status post bilateral mastectomy with right-sided axillary lymph node dissection on 4/29/2024.    She completed adjuvant radiation therapy on 8/7/2024.    Discussed with the patient that it is not uncommon for patients with ER/ME positive breast cancer that have neoadjuvant treatment not to have a robust response from therapy.  I did discuss that for her treatment, in terms of systemic disease, after completion of radiation therapy, we will proceed with ovarian function suppression, which can be initiated at this point, this will then be followed by adjuvant endocrine therapy with 2 years of Abemaciclib.  In addition, patient is also a candidate for the Otis 2 clinical trial which is comparing standard of care adjuvant endocrine therapy with Abemaciclib versus camizestrant which is an oral SERD.    Patient received first dose of goserelin 3.6 mg on 6/3/2024 and last on 7/30/2024.    HA:  Likely from goserelin, possibly from dehydration    Arthralgias:  secondary to goserelin.  Continue to increase activity.      OTIS-2  Randomized to Arm B with Camizestrant started 8/30/24.  To start adjuvant hormonal therapy and abemaciclib.       Follow up per trial protocol.     Continue camizestrant     Abemaciclib HELD due to GI symptoms requiring IV  hydration and IV nausea meds.   Dose reduced to 100 mg   C2 D 3 of 100 mg dose twice daily -- continue     Follow up     Call if GI symptoms resume. PER STUDY No ondansetron          Follow up in 4 weeks         MDM high risk.    No orders of the defined types were placed in this encounter.      Results From Past 48 Hours:  No results found for this or any previous visit (from the past 48 hour(s)).      Component      Latest Ref Rn 9/24/2024   WBC      4.0 - 11.0 x10(3) uL 2.6 (L)    RBC      3.80 - 5.30 x10(6)uL 2.92 (L)    Hemoglobin      12.0 - 16.0 g/dL 8.8 (L)    Hematocrit      35.0 - 48.0 % 25.7 (L)    MCV      80.0 - 100.0 fL 88.0    MCH      26.0 - 34.0 pg 30.1    MCHC      31.0 - 37.0 g/dL 34.2    RDW-SD      35.1 - 46.3 fL 37.8    RDW      11.0 - 15.0 % 13.1    Platelet Count      150.0 - 450.0 10(3)uL 131.0 (L)    Prelim Neutrophil Abs      1.50 - 7.70 x10 (3) uL 1.41 (L)    Neutrophils Absolute      1.50 - 7.70 x10(3) uL 1.41 (L)    Lymphocytes Absolute      1.00 - 4.00 x10(3) uL 0.76 (L)    Monocytes Absolute      0.10 - 1.00 x10(3) uL 0.32    Eosinophils Absolute      0.00 - 0.70 x10(3) uL 0.04    Basophils Absolute      0.00 - 0.20 x10(3) uL 0.03    Immature Granulocyte Absolute      0.00 - 1.00 x10(3) uL 0.01    Neutrophils %      % 54.7    Lymphocytes %      % 29.6    Monocytes %      % 12.5    Eosinophils %      % 1.6    Basophils %      % 1.2    Immature Granulocyte %      % 0.4    Glucose      70 - 99 mg/dL 103 (H)    Sodium      136 - 145 mmol/L 143    Potassium      3.5 - 5.1 mmol/L 3.8    Chloride      98 - 112 mmol/L 110    Carbon Dioxide, Total      21.0 - 32.0 mmol/L 24.0    ANION GAP      0 - 18 mmol/L 9    BUN      9 - 23 mg/dL 14    CREATININE      0.55 - 1.02 mg/dL 1.33 (H)    BUN/CREATININE RATIO      10.0 - 20.0  10.5    CALCIUM      8.7 - 10.4 mg/dL 9.5    CALCULATED OSMOLALITY      275 - 295 mOsm/kg 297 (H)    EGFR      >=60 mL/min/1.73m2 52 (L)    ALT (SGPT)      10 - 49 U/L 13     AST (SGOT)      <34 U/L 17    ALKALINE PHOSPHATASE      37 - 98 U/L 50    Total Bilirubin      0.3 - 1.2 mg/dL 0.3    PROTEIN, TOTAL      5.7 - 8.2 g/dL 6.4    Albumin      3.2 - 4.8 g/dL 4.1    Globulin      2.0 - 3.5 g/dL 2.3    A/G Ratio      1.0 - 2.0  1.8    Patient Fasting for CMP? Patient not present    Magnesium, Serum      1.6 - 2.6 mg/dL 1.7           Imaging & Referrals:  None

## 2024-09-25 ENCOUNTER — OFFICE VISIT (OUTPATIENT)
Dept: RADIATION ONCOLOGY | Facility: HOSPITAL | Age: 39
End: 2024-09-25
Attending: INTERNAL MEDICINE
Payer: COMMERCIAL

## 2024-09-25 ENCOUNTER — OFFICE VISIT (OUTPATIENT)
Dept: PHYSICAL THERAPY | Facility: HOSPITAL | Age: 39
End: 2024-09-25
Payer: COMMERCIAL

## 2024-09-25 VITALS
TEMPERATURE: 98 F | HEART RATE: 79 BPM | DIASTOLIC BLOOD PRESSURE: 53 MMHG | RESPIRATION RATE: 16 BRPM | SYSTOLIC BLOOD PRESSURE: 118 MMHG | OXYGEN SATURATION: 100 %

## 2024-09-25 DIAGNOSIS — C50.411 MALIGNANT NEOPLASM OF UPPER-OUTER QUADRANT OF RIGHT BREAST IN FEMALE, ESTROGEN RECEPTOR POSITIVE (HCC): Primary | ICD-10-CM

## 2024-09-25 DIAGNOSIS — Z17.0 MALIGNANT NEOPLASM OF UPPER-OUTER QUADRANT OF RIGHT BREAST IN FEMALE, ESTROGEN RECEPTOR POSITIVE (HCC): Primary | ICD-10-CM

## 2024-09-25 PROCEDURE — 97140 MANUAL THERAPY 1/> REGIONS: CPT

## 2024-09-25 PROCEDURE — 99211 OFF/OP EST MAY X REQ PHY/QHP: CPT

## 2024-09-25 NOTE — PROGRESS NOTES
Nursing Follow-Up Note    Patient: Jocelyn Garza  YOB: 1985  Age: 39 year old  Radiation Oncologist: Dr. Cecilia Sam  Referring Physician: Halie Calle  Chief Complaint: No chief complaint on file.    Date: 9/25/2024    Toxicities: reports R sided swelling, working with lymphedema  Reports skin is healing well post RT  Denies itchiness  Reports some dryness under arm- using moisturizer PRN  Reports CW sensitivity bilaterally- no OTC medications being used.     Vital Signs: LMP 11/21/2023 (Approximate) ,   Wt Readings from Last 6 Encounters:   09/24/24 72.1 kg (159 lb)   09/13/24 70.3 kg (155 lb)   08/23/24 72.1 kg (159 lb)   08/07/24 72.3 kg (159 lb 4.8 oz)   07/31/24 71.4 kg (157 lb 6.4 oz)   06/14/24 71.8 kg (158 lb 3.2 oz)       Allergies:  No Known Allergies    Nursing Note: Pt seen for follow up with Dr. Sam. Pt complete R CW RT on 8/7/2024. Pt reports CW sensitivity, no OTC being used. Working with PT, seen with compression sleeve during visit. Pt to follow up with Dr. Sam in August 2025. Given RN number in case of radiation questions/concerns.

## 2024-09-25 NOTE — PATIENT INSTRUCTIONS
Follow up with Dr. Sam in August 2025.  Jazmine will call you to schedule your follow up appointment.   Please call [phone] with any radiation questions.

## 2024-09-25 NOTE — PROGRESS NOTES
Referring Provider:  Jesus Manuel  Diagnosis: Malignant neoplasm of upper-outer quadrant of right breast in female, estrogen receptor positive (HCC) (C50.411,Z17.0)      Date of onset: 4/29/2024 Evaluation Date: 5/31/2024           Physical Therapy Lymphedema Daily Note    Precautions:  HTN, R breast cancer, + chemo, will need radiation   Education or treatment limitation: none  Rehab Potential:good    Past Medical History:    Breast cancer in female (HCC)    Right breast lymph node cancer    High blood pressure    Hx of motion sickness    Hypertension    Solitary kidney, congenital         Pain: no pain \"just tight\"    Subjective: Pt continues to feel \"swollen\" and \"sore\" B chest/trunk/axilla.      Objective:    9/18/2024    - shoulder AROM:  (tightness, painful and swollen)  Flex: R 145, L 140    Abd: R 150, L 145   ER: R 85, L 85 (not as painful but on meds)   IR: grossly L 4 B    - Swelling B trunk (R more than L) noted posteriorly and anteriorly                Trunk measurement:                             Under arms: 96.5 cm (IE: 96.2)                            Level of mastectomy scars: 92.0 cm (IE: 89.4)    8/28/2024:  - Swelling B trunk (R more than L) noted posteriorly and anteriorly                Trunk measurement:                             Under arms: 97.2 cm (IE: 96.2)                            Level of mastectomy scars: 92.1 cm (IE: 89.4)  - shoulder AROM:   Flex: R 137, L 140    Abd: R 130, L 140   ER: R 85, L 80 (painful on L)   IR: grossly L 4 B  - shld strength:    R grossly 4/5    L grossly 4+/5 except ER 4/5  - scap strength:   Rhomb: 4/-5 B   Mid trap: L 3/5, R 3+/5  - skin changes R chest and axilla from the radiation, axilla still red/irritated  - posture: protracted scap  - decreased flexibility B pect and lats (tenderness on the left/hypersensitive)  - no visual swelling of UEs      8/28/2024     7:00 AM 5/28/2024     8:00 PM 4/8/2024     2:00 PM   Lymphedema Calculations   DATE MEASURED  8/28/2024 5/28/2024 4/8/2024   LOCATION/MEASUREMENTS RUE RUKOMAL RUKOMAL   PATIENT POSITION  supine 1 pillow supine 1 pillow supine 1 pillow   LIMB POSITION 75 deg abduction 75 deg abduction 75 deg abduction   STARTING POINT 17cm from 3rd cuticle 17cm from 3rd cuticle 17cm from 3rd cuticle   RIGHT HAND VOLUME 19.7cm across palm 19.7cm across palm 19.7cm across palm   LEFT HAND VOLUME 19.5cm across palm 19.5cm across palm 19.5cm across palm   MEASUREMENT A 15.8 15.5 15.5   MEASUREMENT B 16.7 16.6 16.7   MEASUREMENT C 19.4 19.1 19.2   MEASUREMENT D 22.5 23 23.1   MEASUREMENT E 25.2 25 25   MEASUREMENT F 24.7 24.5 24.5   MEASUREMENT G 26 25.5 25.3   MEASUREMENT H 27.8 27.5 27.4   MEASUREMENT I 29.7 29.6 29.4    TOTAL VOLUME  [id number] 1732.33944 1728.36386   DATE MEASURED 8/28/2024 4/8/2024 4/8/2024   LOCATION/MEASUREMENTS [facility] LUKOMAL LUE   MEASUREMENT A 15.3 15.4 15.4   MEASUREMENT B 17 17.1 17   MEASUREMENT C 20 19.6 19.4   MEASUREMENT D 22.8 22.7 22.8   MEASUREMENT E 25.3 25.2 25.2   MEASUREMENT F 26 24.8 24.8   MEASUREMENT G 27.1 26.9 26.8   MEASUREMENT H 28.9 28.6 28.5   MEASUREMENT I 31.2 33.5 33.4    TOTAL VOLUME  1866.44753 1851.44935 1843.1595   % DIFFERENCE -5.34658 -6.48666 -6.45275             7/5/2024  - Swelling B trunk (R more than L, especially posteriorly)                 Trunk measurement:                             Under arms: 94.6 cm (IE: 96.2)                            Level of mastectomy scars: 90.2 cm (IE: 89.4)  - shoulder AROM:   Flex: 145 B    Abd: R 160, L 155 (verb cues to \"fully reach up\")     7/2/2024  - new rash L neck (pt seen MD, treating w/ aquaphor)      6/25/2024:  - Swelling B trunk (R more than L, especially posteriorly)               Trunk measurement:                             Under arms: 95.4 cm (IE: 96.2)                            Level of mastectomy scars: 89 cm (IE: 89.4)  - Shoulder AROM:   Flex: 145 B    Abd: R 160, L 155 (verb cues to \"fully reach up\"   - shoulder strength: 4/5  B  - poor abd strength- pt w/ poor stabilization when reaching overhead (increased lumbar extension)        6/20/2024:  AROM/Strength:    Fascial tightness R axilla into arm  Decreased flexibility B pect  Decreased skin flexibility over chest  Swelling L chest (just inf to incision)     5/31/2024  Right AROM 6/6/2024  'R AROM 6/20/2024  R AROM 5/31/2024  Left AROM 6/6/2024  L AROM   6/20/2024   R AROM 5/31/2024  Right Strength 5/31/2024  Left Strength   Shoulder                     Flexion 90 120 145 90 125 145 NT NT        Abduction 90 95 132 90 98 133 NT NT        IR wfl WFL  WFL WFL  NT NT        ER WFL WFL  WFL WFL  NT NT   Elbow                    Flexion WFL   WFL   NT NT        Extension WFL   WFL   NT NT                               6/14/2024:    Edema/Tissue Observations:    - Swelling B chest  - Swelling B trunk (R more than L, especially posteriorly)               Trunk measurement:                             Under arms: 95.4 cm                             Level of mastectomy scars: 89.4 cm - NO CHANGE TODAY  6/6/2024:  Decreased flexibility B pect and lats  Swelling R upper/outer abdomen  Cording R axilla   Cording L abdomen  AROM/Strength:      5/31/2024  Right AROM 6/6/2024  'R AROM 5/31/2024  Left AROM 6/6/2024  L AROM   5/31/2024  Right Strength 5/31/2024  Left Strength   Shoulder                   Flexion 90 120 90 125 NT NT        Abduction 90 95 90 98 NT NT        IR wfl WFL WFL WFL NT NT        ER WFL WFL WFL WFL NT NT   Elbow                  Flexion WFL  WFL  NT NT        Extension WFL  WFL  NT NT                             5/31/2024 (Evaluation):  Sensation:  decreased sensation R axilla/inner upper arm  AROM/Strength:      5/31/2024  Right AROM 5/31/2024  Left AROM 5/31/2024  Right Strength 5/31/2024  Left Strength   Shoulder                 Flexion 90 90 NT NT        Abduction 90 90 NT NT        IR wfl WFL NT NT        ER WFL WFL NT NT   Elbow                Flexion WFL WFL NT NT         Extension WFL WFL NT NT                         Posture: Rounded shld. guarding     Palpation: hypersensitivity L abdomen/inferior to mastectomy incision, decreased sensation R chest/axilla      Edema/Tissue Observations:    - Swelling B chest  - Swelling B trunk (R more than L, especially posteriorly)               Trunk measurement:                             Under arms: 96.2 cm                             Level of mastectomy scars: 89.4 cm   Stemmer's Sign: negative        Arm Volume Measurements:  deferred (done recently at screening)       5/28/2024     8:00 PM 4/8/2024     2:00 PM   Lymphedema Calculations   DATE MEASURED 5/28/2024 4/8/2024   LOCATION/MEASUREMENTS RUE RUE   PATIENT POSITION  supine 1 pillow supine 1 pillow   LIMB POSITION 75 deg abduction 75 deg abduction   STARTING POINT 17cm from 3rd cuticle 17cm from 3rd cuticle   RIGHT HAND VOLUME 19.7cm across palm 19.7cm across palm   LEFT HAND VOLUME 19.5cm across palm 19.5cm across palm   MEASUREMENT A 15.5 15.5   MEASUREMENT B 16.6 16.7   MEASUREMENT C 19.1 19.2   MEASUREMENT D 23 23.1   MEASUREMENT E 25 25   MEASUREMENT F 24.5 24.5   MEASUREMENT G 25.5 25.3   MEASUREMENT H 27.5 27.4   MEASUREMENT I 29.6 29.4    TOTAL VOLUME  [id number] 1728.70685   DATE MEASURED 4/8/2024 4/8/2024   LOCATION/MEASUREMENTS [facility]   MEASUREMENT A 15.4 15.4   MEASUREMENT B 17.1 17   MEASUREMENT C 19.6 19.4   MEASUREMENT D 22.7 22.8   MEASUREMENT E 25.2 25.2   MEASUREMENT F 24.8 24.8   MEASUREMENT G 26.9 26.8   MEASUREMENT H 28.6 28.5   MEASUREMENT I 33.5 33.4    TOTAL VOLUME  [id number] 1843.2805   % DIFFERENCE -[phone] -6.87116                           Lymphedema Life Impact Scale: Evaluation Score 5/31/2024: LLIS Score Evaluation: 25 % impaired (0% is no impairment, 100% total impairment)  Lymphedema Life Impact Scale: 7/2/2024: LLIS Score Evaluation: LLIS Score Current: 30 % impaired (0% is no impairment, 100% total impairment)           Today’s Treatment and  Response:   Date: 9/11/2024 Date: 9/18/2024 Date: 9/25/2024   Visit #13/20    Certification From: 8/28/2024      To: 11/26/2024  Visit: #14/20  Certification from 8/28/2024 to 11/26/2024 Visit: #15/20  Certification from 8/28/2024 to 11/26/2024   Manual therapy (35 minutes)      STM B pects/lats    MLD B axilla, chest, A-I       Compression:  - Pt wearing compression sleeve daily. Manual therapy (45 minutes)    -measured trunk circumference    STM B pects/lats    MLD B axilla, chest, A-I   *possible abdominal cording below chest scar L side.    Compression:  - Pt wearing compression sleeve daily.  -encouraged use of foam pads day and/or night time to help with desensitization. Manual therapy (45 minutes)      STM B pects/lats    MLD B axilla, chest, A-I   *possible abdominal cording below chest scar L side.    Compression:  - Pt wearing compression sleeve daily.  -encouraged use of foam pads day and/or night time to help with desensitization.  -trial \"it stay's\" to see if this helps keep her sleeve in place   (Pt given info to order this product if this helps today)  -encouraged pt to wash garment daily   There Ex (10 minutes)  -pt to continue with exercises    -PROM R shoulder to stretch lat and pec.   There Ex (10 minutes)    -PROM B shoulders  -side lying shoulder abduciton and horizontal abduction  -  - prone Rhomb x 10  - prone mid trap  x 10  - prone shld ext x 10  *encouraged daily exercises/stretching There Ex (5 minutes)    -PROM B shoulders with lat and pect stretch simultaneously    There Act (5 minutes)    -reviewed donning sleeve correctly.  Pt arrived with sleeve slid down on upper arm and seam along middle of forearm.  Demo/return demo for donning sleeve          Assessment: Pt's weight loss may contribute to sleeve sliding down R arm.  Trial product \"it stays\" to assess if this will help keep sleeve up on arm.      Goals:   Goals (discussed and planned with patient involvement):      To be met in 10  visits:  1) Decrease swelling chest/trunk by a total of 3 cm to decrease risks of infections   2) Pt to be independent with self MLD, ROM exercises, and donning/doffing compression bandages/garments to facilitate swelling reduction and to be independent at self management once discharged  3) Increase B shoulder AROM flex and abd to 160 degrees, ER to at least 90 degrees when arms are elevated  to improve ease of dressing/bathing/and reaching overhead  4) Increase B UE strength to at least 4+/5  to improve ease of lifting and performing household chores  5) Increase B scap strength to at least 4/5 to allow patient to reach overhead at work without increase in pain         Plan:        Assess if \"it stays\" helped keep compression sleeve in place.  Treatment will include: Complete Decongestive Therapy: Manual Therapy, Self-Care/Home Management Training, Therapeutic Exercise, Neuromuscular Re-education, Orthotic Management and Training        Charges:MM3   Total Timed Treatment: 50 min  Total Treatment Time: 50 min

## 2024-09-26 NOTE — PROGRESS NOTES
[facility]  Radiation Oncology Follow-up    Patient Name: Jocelyn Garza   MRN: [id number]  Referring Physician: Janet Mae MD; Halie Calle MD     Diagnosis: right breast invasive lobular carcinoma, grade 2, ER/[place]+, HER2-, Ki-67 7%, cT3 N2 M0, stage IIIA, ypT3 N2a     Oncologic History  Neoadjuvant chemo with ddAC-T.  24: R mastectomy and ALND, elective L mastectomy.  24: completed adjuvant RT to the R CW and nodes 5000 cGy in 25 fractions.  Enrolled on [facility]-2, randomized to camizestrant.    Cancer Screening  -PAP: probably due  -Colonoscopy: not yet due    Interval History:  The patient is a 39 year old female with above diagnosis and treatment rendered who presents today for follow-up.    Doing well  Working with lymphedema team, mostly noticing some edema/discomfort along lateral CW  Tolerating abemaciclib better with dose reductions  No skin issues  Back at work, energy improving, feels better with exercise  Getting ovarian suppression     Medications  Current Outpatient Medications   Medication Sig Dispense Refill    Abemaciclib 100 MG Oral Tab Take 1 tablet (100 mg total) by mouth 2 (two) times daily. may take with or without food 60 tablet 11    lisinopril 5 MG Oral Tab Take 1 tablet (5 mg total) by mouth daily. 90 tablet 1    lidocaine-prilocaine 2.5-2.5 % External Cream Apply to site 1 hour prior to port a cath needle insertion 1 each 1       Physical Exam  Vitals:    24 1533   BP: 118/53   Pulse: 79   Resp: 16   Temp: 97.9 °F (36.6 °C)       ECO    Gen: NAD  HEENT: NCAT, EOMI  Neck: no LAD  CV: RRR  Lungs: CTAB  Ext: wwp  Neuro: CN II-XII grossly intact  Breast: R breast surgically absent, no CW masses, no LAD, mild lateral CW edema, sleeve in place      Assessment: 39 year old female with clinically detected stage III ILC of the R breast, cT3 N2 M0 s/p neoadjuvant chemo and mastectomy/ALND showing ypT3 N2a disease. She completed adjuvant PMRT in Aug 2024 and  is doing well, clinically BARB on abemaciclib.     Plan    -cont PT/OT exercises  -RTC Aug 2025 or sooner if needed  -continue systemic treatments per [facility]      Cecilia Sam MD  Radiation Oncology    No charge

## 2024-10-02 ENCOUNTER — OFFICE VISIT (OUTPATIENT)
Dept: PHYSICAL THERAPY | Facility: HOSPITAL | Age: 39
End: 2024-10-02
Payer: COMMERCIAL

## 2024-10-02 PROCEDURE — 97530 THERAPEUTIC ACTIVITIES: CPT

## 2024-10-02 PROCEDURE — 97140 MANUAL THERAPY 1/> REGIONS: CPT

## 2024-10-02 PROCEDURE — 97110 THERAPEUTIC EXERCISES: CPT

## 2024-10-02 NOTE — PATIENT INSTRUCTIONS
HOME EXERCISES:    *note (shoulder abduction, 3rd picture, move into \"full range\" of motion so arms above head)

## 2024-10-02 NOTE — PROGRESS NOTES
Referring Provider:  Jesus Manuel  Diagnosis: Malignant neoplasm of upper-outer quadrant of right breast in female, estrogen receptor positive (HCC) (C50.411,Z17.0)      Date of onset: 4/29/2024 Evaluation Date: 5/31/2024           Physical Therapy Lymphedema Daily Note    Precautions:  HTN, R breast cancer, + chemo, will need radiation   Education or treatment limitation: none  Rehab Potential:good    Past Medical History:    Breast cancer in female (HCC)    Right breast lymph node cancer    High blood pressure    Hx of motion sickness    Hypertension    Solitary kidney, congenital         Pain: no pain \"just tight\"    Subjective: Pt continues to reports feeling \"swollen\" especially on R side.      Objective:    10/2/2024      - shoulder AROM:  (tightness, painful and swollen)  Flex: R 150, L 160   Abd: R 155, L 155    - Swelling B trunk (R more than L) noted posteriorly and anteriorly                Trunk measurement:                             Under arms: 96.5 cm (IE: 96.2)                            Level of mastectomy scars: 92.4 cm (IE: 89.4) (Weight back up as well)      9/18/2024    - shoulder AROM:  (tightness, painful and swollen)  Flex: R 145, L 140    Abd: R 150, L 145   ER: R 85, L 85 (not as painful but on meds)   IR: grossly L 4 B    - Swelling B trunk (R more than L) noted posteriorly and anteriorly                Trunk measurement:                             Under arms: 96.5 cm (IE: 96.2)                            Level of mastectomy scars: 92.0 cm (IE: 89.4)    8/28/2024:  - Swelling B trunk (R more than L) noted posteriorly and anteriorly                Trunk measurement:                             Under arms: 97.2 cm (IE: 96.2)                            Level of mastectomy scars: 92.1 cm (IE: 89.4)  - shoulder AROM:   Flex: R 137, L 140    Abd: R 130, L 140   ER: R 85, L 80 (painful on L)   IR: grossly L 4 B  - shld strength:    R grossly 4/5    L grossly 4+/5 except ER 4/5  - scap  strength:   Rhomb: 4/-5 B   Mid trap: L 3/5, R 3+/5  - skin changes R chest and axilla from the radiation, axilla still red/irritated  - posture: protracted scap  - decreased flexibility B pect and lats (tenderness on the left/hypersensitive)  - no visual swelling of UEs      8/28/2024     7:00 AM 5/28/2024     8:00 PM 4/8/2024     2:00 PM   Lymphedema Calculations   DATE MEASURED 8/28/2024 5/28/2024 4/8/2024   LOCATION/MEASUREMENTS RUE RUE RUE   PATIENT POSITION  supine 1 pillow supine 1 pillow supine 1 pillow   LIMB POSITION 75 deg abduction 75 deg abduction 75 deg abduction   STARTING POINT 17cm from 3rd cuticle 17cm from 3rd cuticle 17cm from 3rd cuticle   RIGHT HAND VOLUME 19.7cm across palm 19.7cm across palm 19.7cm across palm   LEFT HAND VOLUME 19.5cm across palm 19.5cm across palm 19.5cm across palm   MEASUREMENT A 15.8 15.5 15.5   MEASUREMENT B 16.7 16.6 16.7   MEASUREMENT C 19.4 19.1 19.2   MEASUREMENT D 22.5 23 23.1   MEASUREMENT E 25.2 25 25   MEASUREMENT F 24.7 24.5 24.5   MEASUREMENT G 26 25.5 25.3   MEASUREMENT H 27.8 27.5 27.4   MEASUREMENT I 29.7 29.6 29.4    TOTAL VOLUME  [id number] 1732.15220 1728.69159   DATE MEASURED 8/28/2024 4/8/2024 4/8/2024   LOCATION/MEASUREMENTS LUE LUE LUE   MEASUREMENT A 15.3 15.4 15.4   MEASUREMENT B 17 17.1 17   MEASUREMENT C 20 19.6 19.4   MEASUREMENT D 22.8 22.7 22.8   MEASUREMENT E 25.3 25.2 25.2   MEASUREMENT F 26 24.8 24.8   MEASUREMENT G 27.1 26.9 26.8   MEASUREMENT H 28.9 28.6 28.5   MEASUREMENT I 31.2 33.5 33.4    TOTAL VOLUME  [id number] 1851.36459 1843.2805   % DIFFERENCE -5.92274 -6.30943 -6.49348             7/5/2024  - Swelling B trunk (R more than L, especially posteriorly)                 Trunk measurement:                             Under arms: 94.6 cm (IE: 96.2)                            Level of mastectomy scars: 90.2 cm (IE: 89.4)  - shoulder AROM:   Flex: 145 B    Abd: R 160, L 155 (verb cues to \"fully reach up\")     7/2/2024  - new rash L neck  (pt seen MD, treating w/ aquaphor)      6/25/2024:  - Swelling B trunk (R more than L, especially posteriorly)               Trunk measurement:                             Under arms: 95.4 cm (IE: 96.2)                            Level of mastectomy scars: 89 cm (IE: 89.4)  - Shoulder AROM:   Flex: 145 B    Abd: R 160, L 155 (verb cues to \"fully reach up\"   - shoulder strength: 4/5 B  - poor abd strength- pt w/ poor stabilization when reaching overhead (increased lumbar extension)        6/20/2024:  AROM/Strength:    Fascial tightness R axilla into arm  Decreased flexibility B pect  Decreased skin flexibility over chest  Swelling L chest (just inf to incision)     5/31/2024  Right AROM 6/6/2024  'R AROM 6/20/2024  R AROM 5/31/2024  Left AROM 6/6/2024  L AROM   6/20/2024   R AROM 5/31/2024  Right Strength 5/31/2024  Left Strength   Shoulder                     Flexion 90 120 145 90 125 145 NT NT        Abduction 90 95 132 90 98 133 NT NT        IR wfl WFL  WFL WFL  NT NT        ER WFL WFL  WFL WFL  NT NT   Elbow                    Flexion WFL   WFL   NT NT        Extension WFL   WFL   NT NT                               6/14/2024:    Edema/Tissue Observations:    - Swelling B chest  - Swelling B trunk (R more than L, especially posteriorly)               Trunk measurement:                             Under arms: 95.4 cm                             Level of mastectomy scars: 89.4 cm - NO CHANGE TODAY  6/6/2024:  Decreased flexibility B pect and lats  Swelling R upper/outer abdomen  Cording R axilla   Cording L abdomen  AROM/Strength:      5/31/2024  Right AROM 6/6/2024  'R AROM 5/31/2024  Left AROM 6/6/2024  L AROM   5/31/2024  Right Strength 5/31/2024  Left Strength   Shoulder                   Flexion 90 120 90 125 NT NT        Abduction 90 95 90 98 NT NT        IR wfl WFL WFL WFL NT NT        ER WFL WFL WFL WFL NT NT   Elbow                  Flexion WFL  WFL  NT NT        Extension WFL  WFL  NT NT                              5/31/2024 (Evaluation):  Sensation:  decreased sensation R axilla/inner upper arm  AROM/Strength:      5/31/2024  Right AROM 5/31/2024  Left AROM 5/31/2024  Right Strength 5/31/2024  Left Strength   Shoulder                 Flexion 90 90 NT NT        Abduction 90 90 NT NT        IR wfl WFL NT NT        ER WFL WFL NT NT   Elbow                Flexion WFL WFL NT NT        Extension WFL WFL NT NT                         Posture: Rounded shld. guarding     Palpation: hypersensitivity L abdomen/inferior to mastectomy incision, decreased sensation R chest/axilla      Edema/Tissue Observations:    - Swelling B chest  - Swelling B trunk (R more than L, especially posteriorly)               Trunk measurement:                             Under arms: 96.2 cm                             Level of mastectomy scars: 89.4 cm   Stemmer's Sign: negative        Arm Volume Measurements:  deferred (done recently at screening)       5/28/2024     8:00 PM 4/8/2024     2:00 PM   Lymphedema Calculations   DATE MEASURED 5/28/2024 4/8/2024   LOCATION/MEASUREMENTS STARE RUE   PATIENT POSITION  supine 1 pillow supine 1 pillow   LIMB POSITION 75 deg abduction 75 deg abduction   STARTING POINT 17cm from 3rd cuticle 17cm from 3rd cuticle   RIGHT HAND VOLUME 19.7cm across palm 19.7cm across palm   LEFT HAND VOLUME 19.5cm across palm 19.5cm across palm   MEASUREMENT A 15.5 15.5   MEASUREMENT B 16.6 16.7   MEASUREMENT C 19.1 19.2   MEASUREMENT D 23 23.1   MEASUREMENT E 25 25   MEASUREMENT F 24.5 24.5   MEASUREMENT G 25.5 25.3   MEASUREMENT H 27.5 27.4   MEASUREMENT I 29.6 29.4    TOTAL VOLUME  1732.45796 1728.73423   DATE MEASURED 4/8/2024 4/8/2024   LOCATION/MEASUREMENTS [facility]   MEASUREMENT A 15.4 15.4   MEASUREMENT B 17.1 17   MEASUREMENT C 19.6 19.4   MEASUREMENT D 22.7 22.8   MEASUREMENT E 25.2 25.2   MEASUREMENT F 24.8 24.8   MEASUREMENT G 26.9 26.8   MEASUREMENT H 28.6 28.5   MEASUREMENT I 33.5 33.4    TOTAL VOLUME  1851.93796  1843.2805   % DIFFERENCE -6.80012 -6.23746                           Lymphedema Life Impact Scale: Evaluation Score 5/31/2024: LLIS Score Evaluation: 25 % impaired (0% is no impairment, 100% total impairment)  Lymphedema Life Impact Scale: 7/2/2024: LLIS Score Evaluation: LLIS Score Current: 30 % impaired (0% is no impairment, 100% total impairment)           Today’s Treatment and Response:   Date: 9/11/2024 Date: 9/18/2024 Date: 9/25/2024 Date: 10/2/2024   Visit #13/20    Certification From: 8/28/2024      To: 11/26/2024  Visit: #14/20  Certification from 8/28/2024 to 11/26/2024 Visit: #15/20  Certification from 8/28/2024 to 11/26/2024 Visit: #16/20  Certification from 8/28/2024 to 11/26/2024   Manual therapy (35 minutes)      STM B pects/lats    MLD B axilla, chest, A-I       Compression:  - Pt wearing compression sleeve daily. Manual therapy (45 minutes)    -measured trunk circumference    STM B pects/lats    MLD B axilla, chest, A-I   *possible abdominal cording below chest scar L side.    Compression:  - Pt wearing compression sleeve daily.  -encouraged use of foam pads day and/or night time to help with desensitization. Manual therapy (45 minutes)      STM B pects/lats    MLD B axilla, chest, A-I   *possible abdominal cording below chest scar L side.    Compression:  - Pt wearing compression sleeve daily.  -encouraged use of foam pads day and/or night time to help with desensitization.  -trial \"it stay's\" to see if this helps keep her sleeve in place   (Pt given info to order this product if this helps today)  -encouraged pt to wash garment daily Manual therapy (35 minutes)    -measurements taken     STM B pects/lats    MLD B axilla, chest, A-I     Compression:  -pt not wearing sleeve to PT appointment.  -pt only wearing pads at night but no compression bra  -ordered \"it stays\" but has not arrived yet.   There Ex (10 minutes)  -pt to continue with exercises    -PROM R shoulder to stretch lat and pec.   There  Ex (10 minutes)    -PROM B shoulders  -side lying shoulder abduciton and horizontal abduction  -  - prone Rhomb x 10  - prone mid trap  x 10  - prone shld ext x 10  *encouraged daily exercises/stretching There Ex (5 minutes)    -PROM B shoulders with lat and pect stretch simultaneously There Ex (10 minutes)    -PROM B shoulders in supine with Lat stretch/pect stretch  -standing OH flexion, abduction and Horiz abduction x 10 reps each 1lb weight  -scap strengthening w/ Red band: narrow rows and extension x 10 reps each (cued on keeping from compensation with upper trap)    There Act (5 minutes)    -reviewed donning sleeve correctly.  Pt arrived with sleeve slid down on upper arm and seam along middle of forearm.  Demo/return demo for donning sleeve  There Activity (10 minutes)    -discussed importance of performing exercises daily to improve overall UE strength  -reviewed importance of wearing compression (especially chest/trunk compression area of most complaint)         Assessment: Pt encouraged to exercise daily and wear compression daily.  Added 1lb dumbbell to arm exercises to increase UE strength.  Pt fatigues easily.      Goals:   Goals (discussed and planned with patient involvement):      To be met in 10 visits:  1) Decrease swelling chest/trunk by a total of 3 cm to decrease risks of infections   2) Pt to be independent with self MLD, ROM exercises, and donning/doffing compression bandages/garments to facilitate swelling reduction and to be independent at self management once discharged  3) Increase B shoulder AROM flex and abd to 160 degrees, ER to at least 90 degrees when arms are elevated  to improve ease of dressing/bathing/and reaching overhead  4) Increase B UE strength to at least 4+/5  to improve ease of lifting and performing household chores  5) Increase B scap strength to at least 4/5 to allow patient to reach overhead at work without increase in pain         Plan:        Progress strengthening  and stretching exercises.    Treatment will include: Complete Decongestive Therapy: Manual Therapy, Self-Care/Home Management Training, Therapeutic Exercise, Neuromuscular Re-education, Orthotic Management and Training        Charges:MM2, Ex1, Act1   Total Timed Treatment: 50 min  Total Treatment Time: 50 min

## 2024-10-04 RX ORDER — LISINOPRIL 5 MG/1
5 TABLET ORAL DAILY
Qty: 90 TABLET | Refills: 0 | Status: SHIPPED | OUTPATIENT
Start: 2024-10-04

## 2024-10-04 NOTE — TELEPHONE ENCOUNTER
Current Outpatient Medications   Medication Sig Dispense Refill    lisinopril 5 MG Oral Tab Take 1 tablet (5 mg total) by mouth daily. 90 tablet 1

## 2024-10-04 NOTE — TELEPHONE ENCOUNTER
Last office visit -12/19/23  Return to clinic-1 year   Follow up -no appt yet.  Please sign  pending order

## 2024-10-09 ENCOUNTER — APPOINTMENT (OUTPATIENT)
Dept: PHYSICAL THERAPY | Facility: HOSPITAL | Age: 39
End: 2024-10-09
Payer: COMMERCIAL

## 2024-10-12 ENCOUNTER — LAB ENCOUNTER (OUTPATIENT)
Dept: LAB | Facility: HOSPITAL | Age: 39
End: 2024-10-12
Attending: FAMILY MEDICINE
Payer: COMMERCIAL

## 2024-10-12 DIAGNOSIS — Z00.00 PHYSICAL EXAM, ANNUAL: ICD-10-CM

## 2024-10-12 LAB
CHOLEST SERPL-MCNC: 173 MG/DL (ref ?–200)
FASTING PATIENT LIPID ANSWER: YES
HDLC SERPL-MCNC: 81 MG/DL (ref 40–59)
LDLC SERPL CALC-MCNC: 79 MG/DL (ref ?–100)
NONHDLC SERPL-MCNC: 92 MG/DL (ref ?–130)
TRIGL SERPL-MCNC: 67 MG/DL (ref 30–149)
VLDLC SERPL CALC-MCNC: 10 MG/DL (ref 0–30)

## 2024-10-12 PROCEDURE — 80053 COMPREHEN METABOLIC PANEL: CPT | Performed by: FAMILY MEDICINE

## 2024-10-12 PROCEDURE — 83036 HEMOGLOBIN GLYCOSYLATED A1C: CPT | Performed by: FAMILY MEDICINE

## 2024-10-12 PROCEDURE — 80061 LIPID PANEL: CPT | Performed by: FAMILY MEDICINE

## 2024-10-16 ENCOUNTER — OFFICE VISIT (OUTPATIENT)
Dept: PHYSICAL THERAPY | Facility: HOSPITAL | Age: 39
End: 2024-10-16
Payer: COMMERCIAL

## 2024-10-16 PROCEDURE — 97140 MANUAL THERAPY 1/> REGIONS: CPT

## 2024-10-16 NOTE — PROGRESS NOTES
Referring Provider:  Jesus Manuel  Diagnosis: Malignant neoplasm of upper-outer quadrant of right breast in female, estrogen receptor positive (HCC) (C50.411,Z17.0)      Date of onset: 4/29/2024 Evaluation Date: 5/31/2024           Physical Therapy Lymphedema Daily Note    Precautions:  HTN, R breast cancer, + chemo, will need radiation   Education or treatment limitation: none  Rehab Potential:good    Past Medical History:    Breast cancer in female (HCC)    Right breast lymph node cancer    High blood pressure    Hx of motion sickness    Hypertension    Solitary kidney, congenital         Pain: no pain \"just tight\"    Subjective: No significant changes.  \"I feel better wearing my sleeve.\"  Pt reports that she needs to follow up with oncology for some neuropathy symptoms that seem to be getting worse not better.  \"My hands are so cold all the time.\"    Objective:      10/16/2024    - shoulder AROM:    Flex: R 150, L 160   Abd: R 155, L 160    - Swelling B trunk (R more than L) noted posteriorly and anteriorly                Trunk measurement:                             Under arms: 96.5 cm (IE: 96.2)                              Level of mastectomy scars: 92.4 cm (IE: 89.4) (Weight back up as well)  10/2/2024      - shoulder AROM:  (tightness, painful and swollen)  Flex: R 150, L 160   Abd: R 155, L 155    - Swelling B trunk (R more than L) noted posteriorly and anteriorly                Trunk measurement:                             Under arms: 96.5 cm (IE: 96.2)                            Level of mastectomy scars: 92.4 cm (IE: 89.4) (Weight back up as well)      9/18/2024    - shoulder AROM:  (tightness, painful and swollen)  Flex: R 145, L 140    Abd: R 150, L 145   ER: R 85, L 85 (not as painful but on meds)   IR: grossly L 4 B    - Swelling B trunk (R more than L) noted posteriorly and anteriorly                Trunk measurement:                             Under arms: 96.5 cm (IE: 96.2)                             Level of mastectomy scars: 92.0 cm (IE: 89.4)    8/28/2024:  - Swelling B trunk (R more than L) noted posteriorly and anteriorly                Trunk measurement:                             Under arms: 97.2 cm (IE: 96.2)                            Level of mastectomy scars: 92.1 cm (IE: 89.4)  - shoulder AROM:   Flex: R 137, L 140    Abd: R 130, L 140   ER: R 85, L 80 (painful on L)   IR: grossly L 4 B  - shld strength:    R grossly 4/5    L grossly 4+/5 except ER 4/5  - scap strength:   Rhomb: 4/-5 B   Mid trap: L 3/5, R 3+/5  - skin changes R chest and axilla from the radiation, axilla still red/irritated  - posture: protracted scap  - decreased flexibility B pect and lats (tenderness on the left/hypersensitive)  - no visual swelling of UEs      8/28/2024     7:00 AM 5/28/2024     8:00 PM 4/8/2024     2:00 PM   Lymphedema Calculations   DATE MEASURED 8/28/2024 5/28/2024 4/8/2024   LOCATION/MEASUREMENTS RUE RUE RUE   PATIENT POSITION  supine 1 pillow supine 1 pillow supine 1 pillow   LIMB POSITION 75 deg abduction 75 deg abduction 75 deg abduction   STARTING POINT 17cm from 3rd cuticle 17cm from 3rd cuticle 17cm from 3rd cuticle   RIGHT HAND VOLUME 19.7cm across palm 19.7cm across palm 19.7cm across palm   LEFT HAND VOLUME 19.5cm across palm 19.5cm across palm 19.5cm across palm   MEASUREMENT A 15.8 15.5 15.5   MEASUREMENT B 16.7 16.6 16.7   MEASUREMENT C 19.4 19.1 19.2   MEASUREMENT D 22.5 23 23.1   MEASUREMENT E 25.2 25 25   MEASUREMENT F 24.7 24.5 24.5   MEASUREMENT G 26 25.5 25.3   MEASUREMENT H 27.8 27.5 27.4   MEASUREMENT I 29.7 29.6 29.4    TOTAL VOLUME  [id number] 6698.72350 9624.95522   DATE MEASURED 8/28/2024 4/8/2024 4/8/2024   LOCATION/MEASUREMENTS LUE LUKOMAL LUKOMAL   MEASUREMENT A 15.3 15.4 15.4   MEASUREMENT B 17 17.1 17   MEASUREMENT C 20 19.6 19.4   MEASUREMENT D 22.8 22.7 22.8   MEASUREMENT E 25.3 25.2 25.2   MEASUREMENT F 26 24.8 24.8   MEASUREMENT G 27.1 26.9 26.8   MEASUREMENT H 28.9 28.6 28.5    MEASUREMENT I 31.2 33.5 33.4    TOTAL VOLUME  [id number] 1851.89025 1843.2805   % DIFFERENCE -5.08668 -6.29985 -6.18258             7/5/2024  - Swelling B trunk (R more than L, especially posteriorly)                 Trunk measurement:                             Under arms: 94.6 cm (IE: 96.2)                            Level of mastectomy scars: 90.2 cm (IE: 89.4)  - shoulder AROM:   Flex: 145 B    Abd: R 160, L 155 (verb cues to \"fully reach up\")     7/2/2024  - new rash L neck (pt seen MD, treating w/ aquaphor)      6/25/2024:  - Swelling B trunk (R more than L, especially posteriorly)               Trunk measurement:                             Under arms: 95.4 cm (IE: 96.2)                            Level of mastectomy scars: 89 cm (IE: 89.4)  - Shoulder AROM:   Flex: 145 B    Abd: R 160, L 155 (verb cues to \"fully reach up\"   - shoulder strength: 4/5 B  - poor abd strength- pt w/ poor stabilization when reaching overhead (increased lumbar extension)        6/20/2024:  AROM/Strength:    Fascial tightness R axilla into arm  Decreased flexibility B pect  Decreased skin flexibility over chest  Swelling L chest (just inf to incision)     5/31/2024  Right AROM 6/6/2024  'R AROM 6/20/2024  R AROM 5/31/2024  Left AROM 6/6/2024  L AROM   6/20/2024   R AROM 5/31/2024  Right Strength 5/31/2024  Left Strength   Shoulder                     Flexion 90 120 145 90 125 145 NT NT        Abduction 90 95 132 90 98 133 NT NT        IR wfl WFL  WFL WFL  NT NT        ER WFL WFL  WFL WFL  NT NT   Elbow                    Flexion WFL   WFL   NT NT        Extension WFL   WFL   NT NT                               6/14/2024:    Edema/Tissue Observations:    - Swelling B chest  - Swelling B trunk (R more than L, especially posteriorly)               Trunk measurement:                             Under arms: 95.4 cm                             Level of mastectomy scars: 89.4 cm - NO CHANGE TODAY  6/6/2024:  Decreased flexibility B pect  and lats  Swelling R upper/outer abdomen  Cording R axilla   Cording L abdomen  AROM/Strength:      5/31/2024  Right AROM 6/6/2024  'R AROM 5/31/2024  Left AROM 6/6/2024  L AROM   5/31/2024  Right Strength 5/31/2024  Left Strength   Shoulder                   Flexion 90 120 90 125 NT NT        Abduction 90 95 90 98 NT NT        IR wfl WFL WFL WFL NT NT        ER WFL WFL WFL WFL NT NT   Elbow                  Flexion WFL  WFL  NT NT        Extension WFL  WFL  NT NT                             5/31/2024 (Evaluation):  Sensation:  decreased sensation R axilla/inner upper arm  AROM/Strength:      5/31/2024  Right AROM 5/31/2024  Left AROM 5/31/2024  Right Strength 5/31/2024  Left Strength   Shoulder                 Flexion 90 90 NT NT        Abduction 90 90 NT NT        IR wfl WFL NT NT        ER WFL WFL NT NT   Elbow                Flexion WFL WFL NT NT        Extension WFL WFL NT NT                         Posture: Rounded shld. guarding     Palpation: hypersensitivity L abdomen/inferior to mastectomy incision, decreased sensation R chest/axilla      Edema/Tissue Observations:    - Swelling B chest  - Swelling B trunk (R more than L, especially posteriorly)               Trunk measurement:                             Under arms: 96.2 cm                             Level of mastectomy scars: 89.4 cm   Stemmer's Sign: negative        Arm Volume Measurements:  deferred (done recently at screening)       5/28/2024     8:00 PM 4/8/2024     2:00 PM   Lymphedema Calculations   DATE MEASURED 5/28/2024 4/8/2024   LOCATION/MEASUREMENTS RUE RUE   PATIENT POSITION  supine 1 pillow supine 1 pillow   LIMB POSITION 75 deg abduction 75 deg abduction   STARTING POINT 17cm from 3rd cuticle 17cm from 3rd cuticle   RIGHT HAND VOLUME 19.7cm across palm 19.7cm across palm   LEFT HAND VOLUME 19.5cm across palm 19.5cm across palm   MEASUREMENT A 15.5 15.5   MEASUREMENT B 16.6 16.7   MEASUREMENT C 19.1 19.2   MEASUREMENT D 23 23.1    MEASUREMENT E 25 25   MEASUREMENT F 24.5 24.5   MEASUREMENT G 25.5 25.3   MEASUREMENT H 27.5 27.4   MEASUREMENT I 29.6 29.4    TOTAL VOLUME  1732.62304 1728.67278   DATE MEASURED 4/8/2024 4/8/2024   LOCATION/MEASUREMENTS LUE LUE   MEASUREMENT A 15.4 15.4   MEASUREMENT B 17.1 17   MEASUREMENT C 19.6 19.4   MEASUREMENT D 22.7 22.8   MEASUREMENT E 25.2 25.2   MEASUREMENT F 24.8 24.8   MEASUREMENT G 26.9 26.8   MEASUREMENT H 28.6 28.5   MEASUREMENT I 33.5 33.4    TOTAL VOLUME  1851.59554 1843.2805   % DIFFERENCE -6.54607 -6.60171                           Lymphedema Life Impact Scale: Evaluation Score 5/31/2024: LLIS Score Evaluation: 25 % impaired (0% is no impairment, 100% total impairment)  Lymphedema Life Impact Scale: 7/2/2024: LLIS Score Evaluation: LLIS Score Current: 30 % impaired (0% is no impairment, 100% total impairment)           Today’s Treatment and Response:   Date: 9/11/2024 Date: 9/18/2024 Date: 9/25/2024 Date: 10/2/2024 Date: 10/16/2024   Visit #13/20    Certification From: 8/28/2024      To: 11/26/2024  Visit: #14/20  Certification from 8/28/2024 to 11/26/2024 Visit: #15/20  Certification from 8/28/2024 to 11/26/2024 Visit: #16/20  Certification from 8/28/2024 to 11/26/2024 Visit: #17/20  Certification from 8/28/2024 to 11/26/2024   Manual therapy (35 minutes)      STM B pects/lats    MLD B axilla, chest, A-I       Compression:  - Pt wearing compression sleeve daily. Manual therapy (45 minutes)    -measured trunk circumference    STM B pects/lats    MLD B axilla, chest, A-I   *possible abdominal cording below chest scar L side.    Compression:  - Pt wearing compression sleeve daily.  -encouraged use of foam pads day and/or night time to help with desensitization. Manual therapy (45 minutes)      STM B pects/lats    MLD B axilla, chest, A-I   *possible abdominal cording below chest scar L side.    Compression:  - Pt wearing compression sleeve daily.  -encouraged use of foam pads day and/or night  time to help with desensitization.  -trial \"it stay's\" to see if this helps keep her sleeve in place   (Pt given info to order this product if this helps today)  -encouraged pt to wash garment daily Manual therapy (35 minutes)    -measurements taken     STM B pects/lats    MLD B axilla, chest, A-I     Compression:  -pt not wearing sleeve to PT appointment.  -pt only wearing pads at night but no compression bra  -ordered \"it stays\" but has not arrived yet. Manual therapy (40 minutes)      -measurements taken     STM B pects/lats    MLD B axilla, chest, A-I    Compression:  -encouraged pt to start wearing foam padding fabricated at earlier session to see if this helps her feeling of swelling and fullness.  -demonstrated soft post mastectomy binder as an another option for chest compression.  Pt feels that her compression bra shoulder straps bother her port, just not comfortable.  Pt took picture of product box to search for online purchasing.   There Ex (10 minutes)  -pt to continue with exercises    -PROM R shoulder to stretch lat and pec.   There Ex (10 minutes)    -PROM B shoulders  -side lying shoulder abduciton and horizontal abduction  -  - prone Rhomb x 10  - prone mid trap  x 10  - prone shld ext x 10  *encouraged daily exercises/stretching There Ex (5 minutes)    -PROM B shoulders with lat and pect stretch simultaneously There Ex (10 minutes)    -PROM B shoulders in supine with Lat stretch/pect stretch  -standing OH flexion, abduction and Horiz abduction x 10 reps each 1lb weight  -scap strengthening w/ Red band: narrow rows and extension x 10 reps each (cued on keeping from compensation with upper trap) There Ex (5 minutes)    -PROM B shoulders in supine    There Act (5 minutes)    -reviewed donning sleeve correctly.  Pt arrived with sleeve slid down on upper arm and seam along middle of forearm.  Demo/return demo for donning sleeve  There Activity (10 minutes)    -discussed importance of performing  exercises daily to improve overall UE strength  -reviewed importance of wearing compression (especially chest/trunk compression area of most complaint)          Assessment: Pt reminded of going back to wearing compression around chest and trunk with added foam padding to address feeling of fullness/swelling in this area.        Goals:   Goals (discussed and planned with patient involvement):      To be met in 10 visits:  1) Decrease swelling chest/trunk by a total of 3 cm to decrease risks of infections   2) Pt to be independent with self MLD, ROM exercises, and donning/doffing compression bandages/garments to facilitate swelling reduction and to be independent at self management once discharged  3) Increase B shoulder AROM flex and abd to 160 degrees, ER to at least 90 degrees when arms are elevated  to improve ease of dressing/bathing/and reaching overhead  4) Increase B UE strength to at least 4+/5  to improve ease of lifting and performing household chores  5) Increase B scap strength to at least 4/5 to allow patient to reach overhead at work without increase in pain         Plan:        Trial compression padding and look into purchasing post mastectomy binder as another option of compression.    Treatment will include: Complete Decongestive Therapy: Manual Therapy, Self-Care/Home Management Training, Therapeutic Exercise, Neuromuscular Re-education, Orthotic Management and Training        Charges:MM3   Total Timed Treatment: 45 min  Total Treatment Time: 45 min

## 2024-10-22 ENCOUNTER — APPOINTMENT (OUTPATIENT)
Dept: HEMATOLOGY/ONCOLOGY | Facility: HOSPITAL | Age: 39
End: 2024-10-22
Attending: INTERNAL MEDICINE
Payer: COMMERCIAL

## 2024-10-22 ENCOUNTER — OFFICE VISIT (OUTPATIENT)
Dept: HEMATOLOGY/ONCOLOGY | Facility: HOSPITAL | Age: 39
End: 2024-10-22
Attending: NURSE PRACTITIONER
Payer: COMMERCIAL

## 2024-10-22 VITALS
OXYGEN SATURATION: 100 % | RESPIRATION RATE: 16 BRPM | HEIGHT: 65 IN | WEIGHT: 158.13 LBS | HEART RATE: 64 BPM | TEMPERATURE: 98 F | DIASTOLIC BLOOD PRESSURE: 66 MMHG | SYSTOLIC BLOOD PRESSURE: 114 MMHG | BODY MASS INDEX: 26.35 KG/M2

## 2024-10-22 DIAGNOSIS — C50.411 MALIGNANT NEOPLASM OF UPPER-OUTER QUADRANT OF RIGHT BREAST IN FEMALE, ESTROGEN RECEPTOR POSITIVE (HCC): Primary | ICD-10-CM

## 2024-10-22 DIAGNOSIS — Z51.81 MEDICATION MONITORING ENCOUNTER: ICD-10-CM

## 2024-10-22 DIAGNOSIS — Z17.0 MALIGNANT NEOPLASM OF UPPER-OUTER QUADRANT OF RIGHT BREAST IN FEMALE, ESTROGEN RECEPTOR POSITIVE (HCC): Primary | ICD-10-CM

## 2024-10-22 LAB
ALBUMIN SERPL-MCNC: 4.2 G/DL (ref 3.2–4.8)
ALBUMIN/GLOB SERPL: 1.9 {RATIO} (ref 1–2)
ALP LIVER SERPL-CCNC: 47 U/L
ALT SERPL-CCNC: 17 U/L
ANION GAP SERPL CALC-SCNC: 8 MMOL/L (ref 0–18)
AST SERPL-CCNC: 20 U/L (ref ?–34)
BASOPHILS # BLD AUTO: 0.05 X10(3) UL (ref 0–0.2)
BASOPHILS NFR BLD AUTO: 1.7 %
BILIRUB SERPL-MCNC: 0.4 MG/DL (ref 0.3–1.2)
BUN BLD-MCNC: 13 MG/DL (ref 9–23)
BUN/CREAT SERPL: 9.7 (ref 10–20)
CALCIUM BLD-MCNC: 9.3 MG/DL (ref 8.7–10.4)
CHLORIDE SERPL-SCNC: 111 MMOL/L (ref 98–112)
CO2 SERPL-SCNC: 26 MMOL/L (ref 21–32)
CREAT BLD-MCNC: 1.34 MG/DL
DEPRECATED RDW RBC AUTO: 49.3 FL (ref 35.1–46.3)
EGFRCR SERPLBLD CKD-EPI 2021: 51 ML/MIN/1.73M2 (ref 60–?)
EOSINOPHIL # BLD AUTO: 0.08 X10(3) UL (ref 0–0.7)
EOSINOPHIL NFR BLD AUTO: 2.6 %
ERYTHROCYTE [DISTWIDTH] IN BLOOD BY AUTOMATED COUNT: 15.1 % (ref 11–15)
FASTING STATUS PATIENT QL REPORTED: NO
GLOBULIN PLAS-MCNC: 2.2 G/DL (ref 2–3.5)
GLUCOSE BLD-MCNC: 116 MG/DL (ref 70–99)
HCT VFR BLD AUTO: 26.1 %
HGB BLD-MCNC: 8.9 G/DL
IMM GRANULOCYTES # BLD AUTO: 0.02 X10(3) UL (ref 0–1)
IMM GRANULOCYTES NFR BLD: 0.7 %
LYMPHOCYTES # BLD AUTO: 0.67 X10(3) UL (ref 1–4)
LYMPHOCYTES NFR BLD AUTO: 22.2 %
MAGNESIUM SERPL-MCNC: 1.8 MG/DL (ref 1.6–2.6)
MCH RBC QN AUTO: 31.4 PG (ref 26–34)
MCHC RBC AUTO-ENTMCNC: 34.1 G/DL (ref 31–37)
MCV RBC AUTO: 92.2 FL
MONOCYTES # BLD AUTO: 0.18 X10(3) UL (ref 0.1–1)
MONOCYTES NFR BLD AUTO: 6 %
NEUTROPHILS # BLD AUTO: 2.02 X10 (3) UL (ref 1.5–7.7)
NEUTROPHILS # BLD AUTO: 2.02 X10(3) UL (ref 1.5–7.7)
NEUTROPHILS NFR BLD AUTO: 66.8 %
OSMOLALITY SERPL CALC.SUM OF ELEC: 301 MOSM/KG (ref 275–295)
PLATELET # BLD AUTO: 160 10(3)UL (ref 150–450)
POTASSIUM SERPL-SCNC: 3.6 MMOL/L (ref 3.5–5.1)
PROT SERPL-MCNC: 6.4 G/DL (ref 5.7–8.2)
RBC # BLD AUTO: 2.83 X10(6)UL
SODIUM SERPL-SCNC: 145 MMOL/L (ref 136–145)
WBC # BLD AUTO: 3 X10(3) UL (ref 4–11)

## 2024-10-22 PROCEDURE — 83735 ASSAY OF MAGNESIUM: CPT

## 2024-10-22 PROCEDURE — 80053 COMPREHEN METABOLIC PANEL: CPT

## 2024-10-22 PROCEDURE — 85025 COMPLETE CBC W/AUTO DIFF WBC: CPT

## 2024-10-22 PROCEDURE — 96402 CHEMO HORMON ANTINEOPL SQ/IM: CPT

## 2024-10-22 RX ORDER — SODIUM CHLORIDE 9 MG/ML
10 INJECTION, SOLUTION INTRAMUSCULAR; INTRAVENOUS; SUBCUTANEOUS ONCE
OUTPATIENT
Start: 2024-11-19

## 2024-10-23 ENCOUNTER — OFFICE VISIT (OUTPATIENT)
Dept: PHYSICAL THERAPY | Facility: HOSPITAL | Age: 39
End: 2024-10-23
Payer: COMMERCIAL

## 2024-10-23 PROCEDURE — 97140 MANUAL THERAPY 1/> REGIONS: CPT

## 2024-10-23 NOTE — PROGRESS NOTES
Referring Provider:  Jesus Manuel  Diagnosis: Malignant neoplasm of upper-outer quadrant of right breast in female, estrogen receptor positive (HCC) (C50.411,Z17.0)      Date of onset: 4/29/2024 Evaluation Date: 5/31/2024           Physical Therapy Lymphedema Daily Note    Precautions:  HTN, R breast cancer, + chemo, will need radiation   Education or treatment limitation: none  Rehab Potential:good    Past Medical History:    Breast cancer in female (HCC)    Right breast lymph node cancer    High blood pressure    Hx of motion sickness    Hypertension    Solitary kidney, congenital         Pain: no pain \"just tight\"    Subjective: Pt states that she is having some relief from wearing her sleeve during the day, a new chest band in the evening and then her compression bra with foam pads to sleep.  \"I think it is helping.\"    Objective:      10/16/2024    - shoulder AROM:    Flex: R 150, L 160   Abd: R 155, L 160    - Swelling B trunk (R more than L) noted posteriorly and anteriorly                Trunk measurement:                             Under arms: 96.5 cm (IE: 96.2)                              Level of mastectomy scars: 92.4 cm (IE: 89.4) (Weight back up as well)  10/2/2024      - shoulder AROM:  (tightness, painful and swollen)  Flex: R 150, L 160   Abd: R 155, L 155    - Swelling B trunk (R more than L) noted posteriorly and anteriorly                Trunk measurement:                             Under arms: 96.5 cm (IE: 96.2)                            Level of mastectomy scars: 92.4 cm (IE: 89.4) (Weight back up as well)      9/18/2024    - shoulder AROM:  (tightness, painful and swollen)  Flex: R 145, L 140    Abd: R 150, L 145   ER: R 85, L 85 (not as painful but on meds)   IR: grossly L 4 B    - Swelling B trunk (R more than L) noted posteriorly and anteriorly                Trunk measurement:                             Under arms: 96.5 cm (IE: 96.2)                            Level of mastectomy scars: 92.0  cm (IE: 89.4)    8/28/2024:  - Swelling B trunk (R more than L) noted posteriorly and anteriorly                Trunk measurement:                             Under arms: 97.2 cm (IE: 96.2)                            Level of mastectomy scars: 92.1 cm (IE: 89.4)  - shoulder AROM:   Flex: R 137, L 140    Abd: R 130, L 140   ER: R 85, L 80 (painful on L)   IR: grossly L 4 B  - shld strength:    R grossly 4/5    L grossly 4+/5 except ER 4/5  - scap strength:   Rhomb: 4/-5 B   Mid trap: L 3/5, R 3+/5  - skin changes R chest and axilla from the radiation, axilla still red/irritated  - posture: protracted scap  - decreased flexibility B pect and lats (tenderness on the left/hypersensitive)  - no visual swelling of UEs      8/28/2024     7:00 AM 5/28/2024     8:00 PM 4/8/2024     2:00 PM   Lymphedema Calculations   DATE MEASURED 8/28/2024 5/28/2024 4/8/2024   LOCATION/MEASUREMENTS RUE RUE RUE   PATIENT POSITION  supine 1 pillow supine 1 pillow supine 1 pillow   LIMB POSITION 75 deg abduction 75 deg abduction 75 deg abduction   STARTING POINT 17cm from 3rd cuticle 17cm from 3rd cuticle 17cm from 3rd cuticle   RIGHT HAND VOLUME 19.7cm across palm 19.7cm across palm 19.7cm across palm   LEFT HAND VOLUME 19.5cm across palm 19.5cm across palm 19.5cm across palm   MEASUREMENT A 15.8 15.5 15.5   MEASUREMENT B 16.7 16.6 16.7   MEASUREMENT C 19.4 19.1 19.2   MEASUREMENT D 22.5 23 23.1   MEASUREMENT E 25.2 25 25   MEASUREMENT F 24.7 24.5 24.5   MEASUREMENT G 26 25.5 25.3   MEASUREMENT H 27.8 27.5 27.4   MEASUREMENT I 29.7 29.6 29.4    TOTAL VOLUME  [id number] 1731.34265 1724.37758   DATE MEASURED 8/28/2024 4/8/2024 4/8/2024   LOCATION/MEASUREMENTS [facility]   MEASUREMENT A 15.3 15.4 15.4   MEASUREMENT B 17 17.1 17   MEASUREMENT C 20 19.6 19.4   MEASUREMENT D 22.8 22.7 22.8   MEASUREMENT E 25.3 25.2 25.2   MEASUREMENT F 26 24.8 24.8   MEASUREMENT G 27.1 26.9 26.8   MEASUREMENT H 28.9 28.6 28.5   MEASUREMENT I 31.2 33.5 33.4     TOTAL VOLUME  [id number] 1851.33402 1843.2805   % DIFFERENCE -[phone] -[phone] -6.24013             7/5/2024  - Swelling B trunk (R more than L, especially posteriorly)                 Trunk measurement:                             Under arms: 94.6 cm (IE: 96.2)                            Level of mastectomy scars: 90.2 cm (IE: 89.4)  - shoulder AROM:   Flex: 145 B    Abd: R 160, L 155 (verb cues to \"fully reach up\")     7/2/2024  - new rash L neck (pt seen MD, treating w/ aquaphor)      6/25/2024:  - Swelling B trunk (R more than L, especially posteriorly)               Trunk measurement:                             Under arms: 95.4 cm (IE: 96.2)                            Level of mastectomy scars: 89 cm (IE: 89.4)  - Shoulder AROM:   Flex: 145 B    Abd: R 160, L 155 (verb cues to \"fully reach up\"   - shoulder strength: 4/5 B  - poor abd strength- pt w/ poor stabilization when reaching overhead (increased lumbar extension)        6/20/2024:  AROM/Strength:    Fascial tightness R axilla into arm  Decreased flexibility B pect  Decreased skin flexibility over chest  Swelling L chest (just inf to incision)     5/31/2024  Right AROM 6/6/2024  'R AROM 6/20/2024  R AROM 5/31/2024  Left AROM 6/6/2024  L AROM   6/20/2024   R AROM 5/31/2024  Right Strength 5/31/2024  Left Strength   Shoulder                     Flexion 90 120 145 90 125 145 NT NT        Abduction 90 95 132 90 98 133 NT NT        IR wfl WFL  WFL WFL  NT NT        ER WFL WFL  WFL WFL  NT NT   Elbow                    Flexion WFL   WFL   NT NT        Extension WFL   WFL   NT NT                               6/14/2024:    Edema/Tissue Observations:    - Swelling B chest  - Swelling B trunk (R more than L, especially posteriorly)               Trunk measurement:                             Under arms: 95.4 cm                             Level of mastectomy scars: 89.4 cm - NO CHANGE TODAY  6/6/2024:  Decreased flexibility B pect and lats  Swelling R upper/outer  abdomen  Cording R axilla   Cording L abdomen  AROM/Strength:      5/31/2024  Right AROM 6/6/2024  'R AROM 5/31/2024  Left AROM 6/6/2024  L AROM   5/31/2024  Right Strength 5/31/2024  Left Strength   Shoulder                   Flexion 90 120 90 125 NT NT        Abduction 90 95 90 98 NT NT        IR wfl WFL WFL WFL NT NT        ER WFL WFL WFL WFL NT NT   Elbow                  Flexion WFL  WFL  NT NT        Extension WFL  WFL  NT NT                             5/31/2024 (Evaluation):  Sensation:  decreased sensation R axilla/inner upper arm  AROM/Strength:      5/31/2024  Right AROM 5/31/2024  Left AROM 5/31/2024  Right Strength 5/31/2024  Left Strength   Shoulder                 Flexion 90 90 NT NT        Abduction 90 90 NT NT        IR wfl WFL NT NT        ER WFL WFL NT NT   Elbow                Flexion WFL WFL NT NT        Extension WFL WFL NT NT                         Posture: Rounded shld. guarding     Palpation: hypersensitivity L abdomen/inferior to mastectomy incision, decreased sensation R chest/axilla      Edema/Tissue Observations:    - Swelling B chest  - Swelling B trunk (R more than L, especially posteriorly)               Trunk measurement:                             Under arms: 96.2 cm                             Level of mastectomy scars: 89.4 cm   Stemmer's Sign: negative        Arm Volume Measurements:  deferred (done recently at screening)       5/28/2024     8:00 PM 4/8/2024     2:00 PM   Lymphedema Calculations   DATE MEASURED 5/28/2024 4/8/2024   LOCATION/MEASUREMENTS RUE RUE   PATIENT POSITION  supine 1 pillow supine 1 pillow   LIMB POSITION 75 deg abduction 75 deg abduction   STARTING POINT 17cm from 3rd cuticle 17cm from 3rd cuticle   RIGHT HAND VOLUME 19.7cm across palm 19.7cm across palm   LEFT HAND VOLUME 19.5cm across palm 19.5cm across palm   MEASUREMENT A 15.5 15.5   MEASUREMENT B 16.6 16.7   MEASUREMENT C 19.1 19.2   MEASUREMENT D 23 23.1   MEASUREMENT E 25 25   MEASUREMENT F 24.5  24.5   MEASUREMENT G 25.5 25.3   MEASUREMENT H 27.5 27.4   MEASUREMENT I 29.6 29.4    TOTAL VOLUME  1732.87764 1728.64417   DATE MEASURED 4/8/2024 4/8/2024   LOCATION/MEASUREMENTS LUE LUE   MEASUREMENT A 15.4 15.4   MEASUREMENT B 17.1 17   MEASUREMENT C 19.6 19.4   MEASUREMENT D 22.7 22.8   MEASUREMENT E 25.2 25.2   MEASUREMENT F 24.8 24.8   MEASUREMENT G 26.9 26.8   MEASUREMENT H 28.6 28.5   MEASUREMENT I 33.5 33.4    TOTAL VOLUME  [id number] 1843.2805   % DIFFERENCE -6.27909 -6.91118                           Lymphedema Life Impact Scale: Evaluation Score 5/31/2024: LLIS Score Evaluation: 25 % impaired (0% is no impairment, 100% total impairment)  Lymphedema Life Impact Scale: 7/2/2024: LLIS Score Evaluation: LLIS Score Current: 30 % impaired (0% is no impairment, 100% total impairment)           Today’s Treatment and Response:   Date: 9/11/2024 Date: 9/18/2024 Date: 9/25/2024 Date: 10/2/2024 Date: 10/16/2024 Date: 10/23/2024   Visit #13/20    Certification From: 8/28/2024      To: 11/26/2024  Visit: #14/20  Certification from 8/28/2024 to 11/26/2024 Visit: #15/20  Certification from 8/28/2024 to 11/26/2024 Visit: #16/20  Certification from 8/28/2024 to 11/26/2024 Visit: #17/20  Certification from 8/28/2024 to 11/26/2024 Visit: #18/20  Certification from 8/28/2024 to 11/26/2024   Manual therapy (35 minutes)      STM B pects/lats    MLD B axilla, chest, A-I       Compression:  - Pt wearing compression sleeve daily. Manual therapy (45 minutes)    -measured trunk circumference    STM B pects/lats    MLD B axilla, chest, A-I   *possible abdominal cording below chest scar L side.    Compression:  - Pt wearing compression sleeve daily.  -encouraged use of foam pads day and/or night time to help with desensitization. Manual therapy (45 minutes)      STM B pects/lats    MLD B axilla, chest, A-I   *possible abdominal cording below chest scar L side.    Compression:  - Pt wearing compression sleeve daily.  -encouraged use  of foam pads day and/or night time to help with desensitization.  -trial \"it stay's\" to see if this helps keep her sleeve in place   (Pt given info to order this product if this helps today)  -encouraged pt to wash garment daily Manual therapy (35 minutes)    -measurements taken     STM B pects/lats    MLD B axilla, chest, A-I     Compression:  -pt not wearing sleeve to PT appointment.  -pt only wearing pads at night but no compression bra  -ordered \"it stays\" but has not arrived yet. Manual therapy (40 minutes)      -measurements taken     STM B pects/lats    MLD B axilla, chest, A-I    Compression:  -encouraged pt to start wearing foam padding fabricated at earlier session to see if this helps her feeling of swelling and fullness.  -demonstrated soft post mastectomy binder as an another option for chest compression.  Pt feels that her compression bra shoulder straps bother her port, just not comfortable.  Pt took picture of product box to search for online purchasing. Manual therapy (45 minutes)      STM B pects/lats    MLD B axilla, chest, A-I    Compression:  -pt wearing day time compression sleeve and uses \"it stays\" to help if from sliding down her arm.  -she purchased a chest strap which she feels is helping her B trunk swelling. She uses this in the evenings when at home  -she wears her compression bra at night with added foam pads to sleep.   There Ex (10 minutes)  -pt to continue with exercises    -PROM R shoulder to stretch lat and pec.   There Ex (10 minutes)    -PROM B shoulders  -side lying shoulder abduciton and horizontal abduction  -  - prone Rhomb x 10  - prone mid trap  x 10  - prone shld ext x 10  *encouraged daily exercises/stretching There Ex (5 minutes)    -PROM B shoulders with lat and pect stretch simultaneously There Ex (10 minutes)    -PROM B shoulders in supine with Lat stretch/pect stretch  -standing OH flexion, abduction and Horiz abduction x 10 reps each 1lb weight  -scap strengthening  w/ Red band: narrow rows and extension x 10 reps each (cued on keeping from compensation with upper trap) There Ex (5 minutes)    -PROM B shoulders in supine     There Act (5 minutes)    -reviewed donning sleeve correctly.  Pt arrived with sleeve slid down on upper arm and seam along middle of forearm.  Demo/return demo for donning sleeve  There Activity (10 minutes)    -discussed importance of performing exercises daily to improve overall UE strength  -reviewed importance of wearing compression (especially chest/trunk compression area of most complaint)           Assessment: Will measure next week.  Anticipate discharge after 1-2 more sessons.      Goals:   Goals (discussed and planned with patient involvement):      To be met in 10 visits:  1) Decrease swelling chest/trunk by a total of 3 cm to decrease risks of infections   2) Pt to be independent with self MLD, ROM exercises, and donning/doffing compression bandages/garments to facilitate swelling reduction and to be independent at self management once discharged  3) Increase B shoulder AROM flex and abd to 160 degrees, ER to at least 90 degrees when arms are elevated  to improve ease of dressing/bathing/and reaching overhead  4) Increase B UE strength to at least 4+/5  to improve ease of lifting and performing household chores  5) Increase B scap strength to at least 4/5 to allow patient to reach overhead at work without increase in pain         Plan:        Trial compression padding and look into purchasing post mastectomy binder as another option of compression.    Treatment will include: Complete Decongestive Therapy: Manual Therapy, Self-Care/Home Management Training, Therapeutic Exercise, Neuromuscular Re-education, Orthotic Management and Training        Charges:MM3   Total Timed Treatment: 45 min  Total Treatment Time: 45 min

## 2024-10-30 ENCOUNTER — OFFICE VISIT (OUTPATIENT)
Dept: PHYSICAL THERAPY | Facility: HOSPITAL | Age: 39
End: 2024-10-30
Payer: COMMERCIAL

## 2024-10-30 PROCEDURE — 97140 MANUAL THERAPY 1/> REGIONS: CPT

## 2024-10-30 NOTE — PROGRESS NOTES
Referring Provider:  Jesus Manuel  Diagnosis: Malignant neoplasm of upper-outer quadrant of right breast in female, estrogen receptor positive (HCC) (C50.411,Z17.0)      Date of onset: 4/29/2024 Evaluation Date: 5/31/2024       Progress Summary    Assessment:  pt has attended 19 visits in physical therapy s/p B mastectomy and radiation therapy.  Pt has had marked improvement with B UE AROM, decreased pain.  Pt still presents with swelling to B trunk (R>L), tissue sensitivity and pain in R axilla.  Pt wears compression on RUE and chest (foam pad with bra and chest strap).  Pt has been instructed in stretches and strengthening, self massage, self MLD and elevation of limbs to help with sensitivity and swelling.  Pt will trial self management for 2-3 weeks.  She follows up with her oncologist late November.  Pt will contact clinician re: additional appointments as needed.    Plan: Continue with PT 1 x per week as needed up to 10 additional visits over a 90 day period.  LLIS sent to patient via [facility] 10/30/2024    Certification from 10/30/2024 to 1/28/2025        Physical Therapy Lymphedema Daily Note    Precautions:  HTN, R breast cancer, + chemo, will need radiation   Education or treatment limitation: none  Rehab Potential:good    Past Medical History:    Breast cancer in female (HCC)    Right breast lymph node cancer    High blood pressure    Hx of motion sickness    Hypertension    Solitary kidney, congenital         Pain: no pain \"just tight\"    Subjective: Pt continues to wear her compression daily.  She is working on getting back on schedule with Exercising regularly.  \"I felt better when I was focusing on that daily.\"    Objective:    10/30/2024:          10/30/2024     2:00 PM 8/28/2024     7:00 AM 5/28/2024     8:00 PM 4/8/2024     2:00 PM   Lymphedema Calculations   DATE MEASURED 10/30/2024 8/28/2024 5/28/2024 4/8/2024   LOCATION/MEASUREMENTS RUE RUE RUE RUE   PATIENT POSITION  supine 1 pillow supine 1 pillow  supine 1 pillow supine 1 pillow   LIMB POSITION 75 deg abduction 75 deg abduction 75 deg abduction 75 deg abduction   STARTING POINT 17cm from 3rd cuticle 17cm from 3rd cuticle 17cm from 3rd cuticle 17cm from 3rd cuticle   RIGHT HAND VOLUME 19.7cm across palm 19.7cm across palm 19.7cm across palm 19.7cm across palm   LEFT HAND VOLUME 19.5cm across palm 19.5cm across palm 19.5cm across palm 19.5cm across palm   MEASUREMENT A 15.8 15.8 15.5 15.5   MEASUREMENT B 16.5 16.7 16.6 16.7   MEASUREMENT C 19.2 19.4 19.1 19.2   MEASUREMENT D 22.5 22.5 23 23.1   MEASUREMENT E 25.2 25.2 25 25   MEASUREMENT F 24.8 24.7 24.5 24.5   MEASUREMENT G 26 26 25.5 25.3   MEASUREMENT H 27.8 27.8 27.5 27.4   MEASUREMENT I 29.8 29.7 29.6 29.4    TOTAL VOLUME  [id number] 0110.43308 [id number]   DATE MEASURED 10/30/2024 8/28/2024 4/8/2024 4/8/2024   LOCATION/MEASUREMENTS LUE LUE LUE LUE   MEASUREMENT A 15.3 15.3 15.4 15.4   MEASUREMENT B 16.9 17 17.1 17   MEASUREMENT C 20 20 19.6 19.4   MEASUREMENT D 22.7 22.8 22.7 22.8   MEASUREMENT E 25.3 25.3 25.2 25.2   MEASUREMENT F 26.1 26 24.8 24.8   MEASUREMENT G 27 27.1 26.9 26.8   MEASUREMENT H 28.9 28.9 28.6 28.5   MEASUREMENT I 31.2 31.2 33.5 33.4    TOTAL VOLUME  [id number] [phone] [phone] 1843.2805   % DIFFERENCE -5.24004 -5.62764 -6.25522 -6.59276            10/16/2024    - shoulder AROM:    Flex: R 150, L 160   Abd: R 155, L 160    - Swelling B trunk (R more than L) noted posteriorly and anteriorly                Trunk measurement:                             Under arms: 96.5 cm (IE: 96.2)                              Level of mastectomy scars: 92.4 cm (IE: 89.4) (Weight back up as well)  10/2/2024      - shoulder AROM:  (tightness, painful and swollen)  Flex: R 150, L 160   Abd: R 155, L 155    - Swelling B trunk (R more than L) noted posteriorly and anteriorly                Trunk measurement:                             Under arms: 96.5 cm (IE: 96.2)                             Level of mastectomy scars: 92.4 cm (IE: 89.4) (Weight back up as well)      9/18/2024    - shoulder AROM:  (tightness, painful and swollen)  Flex: R 145, L 140    Abd: R 150, L 145   ER: R 85, L 85 (not as painful but on meds)   IR: grossly L 4 B    - Swelling B trunk (R more than L) noted posteriorly and anteriorly                Trunk measurement:                             Under arms: 96.5 cm (IE: 96.2)                            Level of mastectomy scars: 92.0 cm (IE: 89.4)    8/28/2024:  - Swelling B trunk (R more than L) noted posteriorly and anteriorly                Trunk measurement:                             Under arms: 97.2 cm (IE: 96.2)                            Level of mastectomy scars: 92.1 cm (IE: 89.4)  - shoulder AROM:   Flex: R 137, L 140    Abd: R 130, L 140   ER: R 85, L 80 (painful on L)   IR: grossly L 4 B  - shld strength:    R grossly 4/5    L grossly 4+/5 except ER 4/5  - scap strength:   Rhomb: 4/-5 B   Mid trap: L 3/5, R 3+/5  - skin changes R chest and axilla from the radiation, axilla still red/irritated  - posture: protracted scap  - decreased flexibility B pect and lats (tenderness on the left/hypersensitive)  - no visual swelling of UEs      8/28/2024     7:00 AM 5/28/2024     8:00 PM 4/8/2024     2:00 PM   Lymphedema Calculations   DATE MEASURED 8/28/2024 5/28/2024 4/8/2024   LOCATION/MEASUREMENTS RUE [facility]   PATIENT POSITION  supine 1 pillow supine 1 pillow supine 1 pillow   LIMB POSITION 75 deg abduction 75 deg abduction 75 deg abduction   STARTING POINT 17cm from 3rd cuticle 17cm from 3rd cuticle 17cm from 3rd cuticle   RIGHT HAND VOLUME 19.7cm across palm 19.7cm across palm 19.7cm across palm   LEFT HAND VOLUME 19.5cm across palm 19.5cm across palm 19.5cm across palm   MEASUREMENT A 15.8 15.5 15.5   MEASUREMENT B 16.7 16.6 16.7   MEASUREMENT C 19.4 19.1 19.2   MEASUREMENT D 22.5 23 23.1   MEASUREMENT E 25.2 25 25   MEASUREMENT F 24.7 24.5 24.5   MEASUREMENT G 26 25.5 25.3    MEASUREMENT H 27.8 27.5 27.4   MEASUREMENT I 29.7 29.6 29.4    TOTAL VOLUME  [id number] 8087.95900 [phone]   DATE MEASURED 8/28/2024 4/8/2024 4/8/2024   LOCATION/MEASUREMENTS LUE LUE LUE   MEASUREMENT A 15.3 15.4 15.4   MEASUREMENT B 17 17.1 17   MEASUREMENT C 20 19.6 19.4   MEASUREMENT D 22.8 22.7 22.8   MEASUREMENT E 25.3 25.2 25.2   MEASUREMENT F 26 24.8 24.8   MEASUREMENT G 27.1 26.9 26.8   MEASUREMENT H 28.9 28.6 28.5   MEASUREMENT I 31.2 33.5 33.4    TOTAL VOLUME  1028.95493 1851.32875 1843.6533   % DIFFERENCE -5.40057 -6.71819 -6.86107             7/5/2024  - Swelling B trunk (R more than L, especially posteriorly)                 Trunk measurement:                             Under arms: 94.6 cm (IE: 96.2)                            Level of mastectomy scars: 90.2 cm (IE: 89.4)  - shoulder AROM:   Flex: 145 B    Abd: R 160, L 155 (verb cues to \"fully reach up\")     7/2/2024  - new rash L neck (pt seen MD, treating w/ aquaphor)      6/25/2024:  - Swelling B trunk (R more than L, especially posteriorly)               Trunk measurement:                             Under arms: 95.4 cm (IE: 96.2)                            Level of mastectomy scars: 89 cm (IE: 89.4)  - Shoulder AROM:   Flex: 145 B    Abd: R 160, L 155 (verb cues to \"fully reach up\"   - shoulder strength: 4/5 B  - poor abd strength- pt w/ poor stabilization when reaching overhead (increased lumbar extension)        6/20/2024:  AROM/Strength:    Fascial tightness R axilla into arm  Decreased flexibility B pect  Decreased skin flexibility over chest  Swelling L chest (just inf to incision)     5/31/2024  Right AROM 6/6/2024  'R AROM 6/20/2024  R AROM 5/31/2024  Left AROM 6/6/2024  L AROM   6/20/2024   R AROM 5/31/2024  Right Strength 5/31/2024  Left Strength   Shoulder                     Flexion 90 120 145 90 125 145 NT NT        Abduction 90 95 132 90 98 133 NT NT        IR wfl WFL  WFL WFL  NT NT        ER WFL WFL  WFL WFL  NT NT   Elbow                     Flexion WFL   WFL   NT NT        Extension WFL   WFL   NT NT                               6/14/2024:    Edema/Tissue Observations:    - Swelling B chest  - Swelling B trunk (R more than L, especially posteriorly)               Trunk measurement:                             Under arms: 95.4 cm                             Level of mastectomy scars: 89.4 cm - NO CHANGE TODAY  6/6/2024:  Decreased flexibility B pect and lats  Swelling R upper/outer abdomen  Cording R axilla   Cording L abdomen  AROM/Strength:      5/31/2024  Right AROM 6/6/2024  'R AROM 5/31/2024  Left AROM 6/6/2024  L AROM   5/31/2024  Right Strength 5/31/2024  Left Strength   Shoulder                   Flexion 90 120 90 125 NT NT        Abduction 90 95 90 98 NT NT        IR wfl WFL WFL WFL NT NT        ER WFL WFL WFL WFL NT NT   Elbow                  Flexion WFL  WFL  NT NT        Extension WFL  WFL  NT NT                             5/31/2024 (Evaluation):  Sensation:  decreased sensation R axilla/inner upper arm  AROM/Strength:      5/31/2024  Right AROM 5/31/2024  Left AROM 5/31/2024  Right Strength 5/31/2024  Left Strength   Shoulder                 Flexion 90 90 NT NT        Abduction 90 90 NT NT        IR wfl WFL NT NT        ER WFL WFL NT NT   Elbow                Flexion WFL WFL NT NT        Extension WFL WFL NT NT                         Posture: Rounded shld. guarding     Palpation: hypersensitivity L abdomen/inferior to mastectomy incision, decreased sensation R chest/axilla      Edema/Tissue Observations:    - Swelling B chest  - Swelling B trunk (R more than L, especially posteriorly)               Trunk measurement:                             Under arms: 96.2 cm                             Level of mastectomy scars: 89.4 cm   Stemmer's Sign: negative        Arm Volume Measurements:  deferred (done recently at screening)       5/28/2024     8:00 PM 4/8/2024     2:00 PM   Lymphedema Calculations   DATE MEASURED 5/28/2024  4/8/2024   LOCATION/MEASUREMENTS [facility]   PATIENT POSITION  supine 1 pillow supine 1 pillow   LIMB POSITION 75 deg abduction 75 deg abduction   STARTING POINT 17cm from 3rd cuticle 17cm from 3rd cuticle   RIGHT HAND VOLUME 19.7cm across palm 19.7cm across palm   LEFT HAND VOLUME 19.5cm across palm 19.5cm across palm   MEASUREMENT A 15.5 15.5   MEASUREMENT B 16.6 16.7   MEASUREMENT C 19.1 19.2   MEASUREMENT D 23 23.1   MEASUREMENT E 25 25   MEASUREMENT F 24.5 24.5   MEASUREMENT G 25.5 25.3   MEASUREMENT H 27.5 27.4   MEASUREMENT I 29.6 29.4    TOTAL VOLUME  [id number] 1728.35341   DATE MEASURED 4/8/2024 4/8/2024   LOCATION/MEASUREMENTS [facility]   MEASUREMENT A 15.4 15.4   MEASUREMENT B 17.1 17   MEASUREMENT C 19.6 19.4   MEASUREMENT D 22.7 22.8   MEASUREMENT E 25.2 25.2   MEASUREMENT F 24.8 24.8   MEASUREMENT G 26.9 26.8   MEASUREMENT H 28.6 28.5   MEASUREMENT I 33.5 33.4    TOTAL VOLUME  [phone] 1843.2805   % DIFFERENCE -6.71413 -6.57764                           Lymphedema Life Impact Scale: Evaluation Score 5/31/2024: LLIS Score Evaluation: 25 % impaired (0% is no impairment, 100% total impairment)  Lymphedema Life Impact Scale: 7/2/2024: LLIS Score Evaluation: LLIS Score Current: 30 % impaired (0% is no impairment, 100% total impairment)           Today’s Treatment and Response:   Date: 10/23/2024 Date: 10/30/2024   Visit: #18/20  Certification from 8/28/2024 to 11/26/2024 Visit: #19/29  PN written today.  Treatment provided by Smita Mckeon PTA, CLT  Certification from 10/30/2024 to 1/28/2025   Manual therapy (45 minutes)      STM B pects/lats    MLD B axilla, chest, A-I    Compression:  -pt wearing day time compression sleeve and uses \"it stays\" to help if from sliding down her arm.  -she purchased a chest strap which she feels is helping her B trunk swelling. She uses this in the evenings when at home  -she wears her compression bra at night with added foam pads to sleep. Manual therapy (55  minutes)    -measurements taken        STM B pects/lats    MLD B axilla, chest, A-I    Compression:  As previously noted    There Ex (5 minutes)  -reviewed stretches and strengthening exercises.             Assessment: See above      Goals:   Goals (discussed and planned with patient involvement):      To be met in 10 visits:  1) Decrease swelling chest/trunk by a total of 3 cm to decrease risks of infections   2) Pt to be independent with self MLD, ROM exercises, and donning/doffing compression bandages/garments to facilitate swelling reduction and to be independent at self management once discharged  3) Increase B shoulder AROM flex and abd to 160 degrees, ER to at least 90 degrees when arms are elevated  to improve ease of dressing/bathing/and reaching overhead  4) Increase B UE strength to at least 4+/5  to improve ease of lifting and performing household chores  5) Increase B scap strength to at least 4/5 to allow patient to reach overhead at work without increase in pain         Plan:      Pt will trial self management over the next several weeks.    Treatment will include: Complete Decongestive Therapy: Manual Therapy, Self-Care/Home Management Training, Therapeutic Exercise, Neuromuscular Re-education, Orthotic Management and Training        Charges:MM4   Total Timed Treatment: 60 min  Total Treatment Time: 60 min

## 2024-10-31 NOTE — PROGRESS NOTES
Referring Provider:  Jesus Manuel  Diagnosis: Malignant neoplasm of upper-outer quadrant of right breast in female, estrogen receptor positive (HCC) (C50.411,Z17.0)      Date of onset: 4/29/2024 Evaluation Date: 5/31/2024       Progress Summary    Assessment:  pt has attended 19 visits in physical therapy s/p B mastectomy and radiation therapy.  Pt has had marked improvement with B UE AROM, decreased pain.  Pt still presents with swelling to B trunk (R>L), tissue sensitivity and pain in R axilla.  Pt wears compression on RUE and chest (foam pad with bra and chest strap).  Pt has been instructed in stretches and strengthening, self massage, self MLD and elevation of limbs to help with sensitivity and swelling.  Pt will trial self management for 2-3 weeks.  She follows up with her oncologist late November.  Pt will contact clinician re: additional appointments as needed.    Plan: Continue with PT 1 x per week as needed up to 10 additional visits over a 90 day period.  LLIS sent to patient via [employer] 10/30/2024    Alejandra Flores PT, DPT, CLT-KITA      Certification from 10/30/2024 to 1/28/2025        Physical Therapy Lymphedema Daily Note    Precautions:  HTN, R breast cancer, + chemo, will need radiation   Education or treatment limitation: none  Rehab Potential:good    Past Medical History:    Breast cancer in female (HCC)    Right breast lymph node cancer    High blood pressure    Hx of motion sickness    Hypertension    Solitary kidney, congenital         Pain: no pain \"just tight\"    Subjective: Pt continues to wear her compression daily.  She is working on getting back on schedule with Exercising regularly.  \"I felt better when I was focusing on that daily.\"    Objective:    10/30/2024:          10/30/2024     2:00 PM 8/28/2024     7:00 AM 5/28/2024     8:00 PM 4/8/2024     2:00 PM   Lymphedema Calculations   DATE MEASURED 10/30/2024 8/28/2024 5/28/2024 4/8/2024   LOCATION/MEASUREMENTS RUE RUE RUE RUE   PATIENT POSITION   supine 1 pillow supine 1 pillow supine 1 pillow supine 1 pillow   LIMB POSITION 75 deg abduction 75 deg abduction 75 deg abduction 75 deg abduction   STARTING POINT 17cm from 3rd cuticle 17cm from 3rd cuticle 17cm from 3rd cuticle 17cm from 3rd cuticle   RIGHT HAND VOLUME 19.7cm across palm 19.7cm across palm 19.7cm across palm 19.7cm across palm   LEFT HAND VOLUME 19.5cm across palm 19.5cm across palm 19.5cm across palm 19.5cm across palm   MEASUREMENT A 15.8 15.8 15.5 15.5   MEASUREMENT B 16.5 16.7 16.6 16.7   MEASUREMENT C 19.2 19.4 19.1 19.2   MEASUREMENT D 22.5 22.5 23 23.1   MEASUREMENT E 25.2 25.2 25 25   MEASUREMENT F 24.8 24.7 24.5 24.5   MEASUREMENT G 26 26 25.5 25.3   MEASUREMENT H 27.8 27.8 27.5 27.4   MEASUREMENT I 29.8 29.7 29.6 29.4    TOTAL VOLUME  [id number] 1751.28966 1735.88857 [phone]   DATE MEASURED 10/30/2024 8/28/2024 4/8/2024 4/8/2024   LOCATION/MEASUREMENTS LUE LUE LUE LUE   MEASUREMENT A 15.3 15.3 15.4 15.4   MEASUREMENT B 16.9 17 17.1 17   MEASUREMENT C 20 20 19.6 19.4   MEASUREMENT D 22.7 22.8 22.7 22.8   MEASUREMENT E 25.3 25.3 25.2 25.2   MEASUREMENT F 26.1 26 24.8 24.8   MEASUREMENT G 27 27.1 26.9 26.8   MEASUREMENT H 28.9 28.9 28.6 28.5   MEASUREMENT I 31.2 31.2 33.5 33.4    TOTAL VOLUME  [id number] [phone] 2273.4655   % DIFFERENCE -[phone] -[phone] -[phone] -[phone]            10/16/2024    - shoulder AROM:    Flex: R 150, L 160   Abd: R 155, L 160    - Swelling B trunk (R more than L) noted posteriorly and anteriorly                Trunk measurement:                             Under arms: 96.5 cm (IE: 96.2)                              Level of mastectomy scars: 92.4 cm (IE: 89.4) (Weight back up as well)  10/2/2024      - shoulder AROM:  (tightness, painful and swollen)  Flex: R 150, L 160   Abd: R 155, L 155    - Swelling B trunk (R more than L) noted posteriorly and anteriorly                Trunk measurement:                             Under arms: 96.5 cm  (IE: 96.2)                            Level of mastectomy scars: 92.4 cm (IE: 89.4) (Weight back up as well)      9/18/2024    - shoulder AROM:  (tightness, painful and swollen)  Flex: R 145, L 140    Abd: R 150, L 145   ER: R 85, L 85 (not as painful but on meds)   IR: grossly L 4 B    - Swelling B trunk (R more than L) noted posteriorly and anteriorly                Trunk measurement:                             Under arms: 96.5 cm (IE: 96.2)                            Level of mastectomy scars: 92.0 cm (IE: 89.4)    8/28/2024:  - Swelling B trunk (R more than L) noted posteriorly and anteriorly                Trunk measurement:                             Under arms: 97.2 cm (IE: 96.2)                            Level of mastectomy scars: 92.1 cm (IE: 89.4)  - shoulder AROM:   Flex: R 137, L 140    Abd: R 130, L 140   ER: R 85, L 80 (painful on L)   IR: grossly L 4 B  - shld strength:    R grossly 4/5    L grossly 4+/5 except ER 4/5  - scap strength:   Rhomb: 4/-5 B   Mid trap: L 3/5, R 3+/5  - skin changes R chest and axilla from the radiation, axilla still red/irritated  - posture: protracted scap  - decreased flexibility B pect and lats (tenderness on the left/hypersensitive)  - no visual swelling of UEs      10/30/2024     2:00 PM 8/28/2024     7:00 AM 5/28/2024     8:00 PM 4/8/2024     2:00 PM   Lymphedema Calculations   DATE MEASURED 10/30/2024 8/28/2024 5/28/2024 4/8/2024   LOCATION/MEASUREMENTS RUE RUE RUE RUE   PATIENT POSITION  supine 1 pillow supine 1 pillow supine 1 pillow supine 1 pillow   LIMB POSITION 75 deg abduction 75 deg abduction 75 deg abduction 75 deg abduction   STARTING POINT 17cm from 3rd cuticle 17cm from 3rd cuticle 17cm from 3rd cuticle 17cm from 3rd cuticle   RIGHT HAND VOLUME 19.7cm across palm 19.7cm across palm 19.7cm across palm 19.7cm across palm   LEFT HAND VOLUME 19.5cm across palm 19.5cm across palm 19.5cm across palm 19.5cm across palm   MEASUREMENT A 15.8 15.8 15.5 15.5    MEASUREMENT B 16.5 16.7 16.6 16.7   MEASUREMENT C 19.2 19.4 19.1 19.2   MEASUREMENT D 22.5 22.5 23 23.1   MEASUREMENT E 25.2 25.2 25 25   MEASUREMENT F 24.8 24.7 24.5 24.5   MEASUREMENT G 26 26 25.5 25.3   MEASUREMENT H 27.8 27.8 27.5 27.4   MEASUREMENT I 29.8 29.7 29.6 29.4    TOTAL VOLUME  0542.51971 5462.46199 8279.58916 5263.24994   DATE MEASURED 10/30/2024 8/28/2024 4/8/2024 4/8/2024   LOCATION/MEASUREMENTS LUE LUE LUE LUE   MEASUREMENT A 15.3 15.3 15.4 15.4   MEASUREMENT B 16.9 17 17.1 17   MEASUREMENT C 20 20 19.6 19.4   MEASUREMENT D 22.7 22.8 22.7 22.8   MEASUREMENT E 25.3 25.3 25.2 25.2   MEASUREMENT F 26.1 26 24.8 24.8   MEASUREMENT G 27 27.1 26.9 26.8   MEASUREMENT H 28.9 28.9 28.6 28.5   MEASUREMENT I 31.2 31.2 33.5 33.4    TOTAL VOLUME  1863.13233 1866.13620 1851.04560 1843.5835   % DIFFERENCE -5.74438 -5.73178 -6.52814 -6.67684             7/5/2024  - Swelling B trunk (R more than L, especially posteriorly)                 Trunk measurement:                             Under arms: 94.6 cm (IE: 96.2)                            Level of mastectomy scars: 90.2 cm (IE: 89.4)  - shoulder AROM:   Flex: 145 B    Abd: R 160, L 155 (verb cues to \"fully reach up\")     7/2/2024  - new rash L neck (pt seen MD, treating w/ aquaphor)      6/25/2024:  - Swelling B trunk (R more than L, especially posteriorly)               Trunk measurement:                             Under arms: 95.4 cm (IE: 96.2)                            Level of mastectomy scars: 89 cm (IE: 89.4)  - Shoulder AROM:   Flex: 145 B    Abd: R 160, L 155 (verb cues to \"fully reach up\"   - shoulder strength: 4/5 B  - poor abd strength- pt w/ poor stabilization when reaching overhead (increased lumbar extension)        6/20/2024:  AROM/Strength:    Fascial tightness R axilla into arm  Decreased flexibility B pect  Decreased skin flexibility over chest  Swelling L chest (just inf to incision)     5/31/2024  Right AROM 6/6/2024  'R AROM 6/20/2024  R  AROM 5/31/2024  Left AROM 6/6/2024  L AROM   6/20/2024   R AROM 5/31/2024  Right Strength 5/31/2024  Left Strength   Shoulder                     Flexion 90 120 145 90 125 145 NT NT        Abduction 90 95 132 90 98 133 NT NT        IR wfl WFL  WFL WFL  NT NT        ER WFL WFL  WFL WFL  NT NT   Elbow                    Flexion WFL   WFL   NT NT        Extension WFL   WFL   NT NT                               6/14/2024:    Edema/Tissue Observations:    - Swelling B chest  - Swelling B trunk (R more than L, especially posteriorly)               Trunk measurement:                             Under arms: 95.4 cm                             Level of mastectomy scars: 89.4 cm - NO CHANGE TODAY  6/6/2024:  Decreased flexibility B pect and lats  Swelling R upper/outer abdomen  Cording R axilla   Cording L abdomen  AROM/Strength:      5/31/2024  Right AROM 6/6/2024  'R AROM 5/31/2024  Left AROM 6/6/2024  L AROM   5/31/2024  Right Strength 5/31/2024  Left Strength   Shoulder                   Flexion 90 120 90 125 NT NT        Abduction 90 95 90 98 NT NT        IR wfl WFL WFL WFL NT NT        ER WFL WFL WFL WFL NT NT   Elbow                  Flexion WFL  WFL  NT NT        Extension WFL  WFL  NT NT                             5/31/2024 (Evaluation):  Sensation:  decreased sensation R axilla/inner upper arm  AROM/Strength:      5/31/2024  Right AROM 5/31/2024  Left AROM 5/31/2024  Right Strength 5/31/2024  Left Strength   Shoulder                 Flexion 90 90 NT NT        Abduction 90 90 NT NT        IR wfl WFL NT NT        ER WFL WFL NT NT   Elbow                Flexion WFL WFL NT NT        Extension WFL WFL NT NT                         Posture: Rounded shld. guarding     Palpation: hypersensitivity L abdomen/inferior to mastectomy incision, decreased sensation R chest/axilla      Edema/Tissue Observations:    - Swelling B chest  - Swelling B trunk (R more than L, especially posteriorly)               Trunk measurement:                              Under arms: 96.2 cm                             Level of mastectomy scars: 89.4 cm   Stemmer's Sign: negative        Arm Volume Measurements:  deferred (done recently at screening)       5/28/2024     8:00 PM 4/8/2024     2:00 PM   Lymphedema Calculations   DATE MEASURED 5/28/2024 4/8/2024   LOCATION/MEASUREMENTS RUE RUE   PATIENT POSITION  supine 1 pillow supine 1 pillow   LIMB POSITION 75 deg abduction 75 deg abduction   STARTING POINT 17cm from 3rd cuticle 17cm from 3rd cuticle   RIGHT HAND VOLUME 19.7cm across palm 19.7cm across palm   LEFT HAND VOLUME 19.5cm across palm 19.5cm across palm   MEASUREMENT A 15.5 15.5   MEASUREMENT B 16.6 16.7   MEASUREMENT C 19.1 19.2   MEASUREMENT D 23 23.1   MEASUREMENT E 25 25   MEASUREMENT F 24.5 24.5   MEASUREMENT G 25.5 25.3   MEASUREMENT H 27.5 27.4   MEASUREMENT I 29.6 29.4    TOTAL VOLUME  1732.96271 1728.47978   DATE MEASURED 4/8/2024 4/8/2024   LOCATION/MEASUREMENTS LUE LUE   MEASUREMENT A 15.4 15.4   MEASUREMENT B 17.1 17   MEASUREMENT C 19.6 19.4   MEASUREMENT D 22.7 22.8   MEASUREMENT E 25.2 25.2   MEASUREMENT F 24.8 24.8   MEASUREMENT G 26.9 26.8   MEASUREMENT H 28.6 28.5   MEASUREMENT I 33.5 33.4    TOTAL VOLUME  1851.78244 1843.2805   % DIFFERENCE -6.12506 -6.15779                           Lymphedema Life Impact Scale: Evaluation Score 5/31/2024: LLIS Score Evaluation: 25 % impaired (0% is no impairment, 100% total impairment)  Lymphedema Life Impact Scale: 7/2/2024: LLIS Score Evaluation: LLIS Score Current: 30 % impaired (0% is no impairment, 100% total impairment)           Today’s Treatment and Response:   Date: 10/23/2024 Date: 10/30/2024   Visit: #18/20  Certification from 8/28/2024 to 11/26/2024 Visit: #19/29  PN written today.  Treatment provided by Smita Mckeon PTA, CLT  Certification from 10/30/2024 to 1/28/2025   Manual therapy (45 minutes)      STM B pects/lats    MLD B axilla, chest, A-I    Compression:  -pt wearing day time  compression sleeve and uses \"it stays\" to help if from sliding down her arm.  -she purchased a chest strap which she feels is helping her B trunk swelling. She uses this in the evenings when at home  -she wears her compression bra at night with added foam pads to sleep. Manual therapy (55 minutes)    -measurements taken        STM B pects/lats    MLD B axilla, chest, A-I    Compression:  As previously noted    There Ex (5 minutes)  -reviewed stretches and strengthening exercises.             Assessment: See above      Goals:   Goals (discussed and planned with patient involvement):      To be met in 10 visits:  1) Decrease swelling chest/trunk by a total of 3 cm to decrease risks of infections   2) Pt to be independent with self MLD, ROM exercises, and donning/doffing compression bandages/garments to facilitate swelling reduction and to be independent at self management once discharged  3) Increase B shoulder AROM flex and abd to 160 degrees, ER to at least 90 degrees when arms are elevated  to improve ease of dressing/bathing/and reaching overhead  4) Increase B UE strength to at least 4+/5  to improve ease of lifting and performing household chores  5) Increase B scap strength to at least 4/5 to allow patient to reach overhead at work without increase in pain         Plan:      Pt will trial self management over the next several weeks.    Treatment will include: Complete Decongestive Therapy: Manual Therapy, Self-Care/Home Management Training, Therapeutic Exercise, Neuromuscular Re-education, Orthotic Management and Training        Charges:MM4   Total Timed Treatment: 60 min  Total Treatment Time: 60 min

## 2024-11-08 ENCOUNTER — OFFICE VISIT (OUTPATIENT)
Dept: NEPHROLOGY | Facility: CLINIC | Age: 39
End: 2024-11-08
Payer: COMMERCIAL

## 2024-11-08 VITALS
SYSTOLIC BLOOD PRESSURE: 98 MMHG | HEIGHT: 66 IN | WEIGHT: 156 LBS | BODY MASS INDEX: 25.07 KG/M2 | HEART RATE: 84 BPM | DIASTOLIC BLOOD PRESSURE: 54 MMHG

## 2024-11-08 DIAGNOSIS — N17.9 AKI (ACUTE KIDNEY INJURY) (HCC): Primary | ICD-10-CM

## 2024-11-08 PROCEDURE — 3078F DIAST BP <80 MM HG: CPT | Performed by: INTERNAL MEDICINE

## 2024-11-08 PROCEDURE — 3008F BODY MASS INDEX DOCD: CPT | Performed by: INTERNAL MEDICINE

## 2024-11-08 PROCEDURE — 99214 OFFICE O/P EST MOD 30 MIN: CPT | Performed by: INTERNAL MEDICINE

## 2024-11-08 PROCEDURE — 3074F SYST BP LT 130 MM HG: CPT | Performed by: INTERNAL MEDICINE

## 2024-11-09 NOTE — PROGRESS NOTES
11/08/24        Patient: Jocelyn Garza   YOB: 1985   Date of Visit: 11/8/2024       Dear  Dr. Campbell, DO,      Thank you for referring Jocelyn Garza to my practice.  Please find my assessment and plan below.    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  When I last saw her in December 2023 she had a creatinine that was 0.92.  However she was just diagnosed with right breast cancer and was undergoing neoadjuvant therapy which included Adriamycin, Cytoxan and Taxol.  She then had radiation therapy.  Underwent bilateral mastectomies in April 2024.  Was then started on Abemaciclib 150 mg twice daily in August 2024.  On August 23, 2024 creatinine was still good at 0.89.  However on September 13, 2024 creatinine increased to 1.65.  The patient was having severe diarrhea along with nausea and vomiting.  Her medication was decreased to 100 mg twice daily.  GI symptoms have improved.  Her creatinine is better but still not back to baseline.  Most recent labs on October 22, 2024 shows creatinine 1.38 with an estimated GFR of 51 cc/min.  She also has develop leukopenia and anemia.  Did lose some weight.    On physical exam her blood pressure was 98/54 with a pulse of 84 and she weighed under 56 pounds.  Her neck was supple without JVD.  Lungs were clear.  Heart revealed a regular rate and rhythm without gallops or murmurs.  Abdomen was soft, flat, nontender without organomegaly, masses or bruits.  Extremities revealed no edema.    I therefore informed the patient that she did develop acute renal failure.  Suspect this is secondary to nausea, vomiting and diarrhea leading to volume depletion.  Blood pressure is borderline low.  Occasional mild orthostatic dizziness.  Therefore recommended discontinuing lisinopril.   Push p.o. fluids.  Repeat a renal panel and urine studies in 2 weeks.  If creatinine is still up we will repeat a renal ultrasound.  Avoid nonsteroidals.    Thank you again for allowing me to participate in the care of your patient.  If you have any questions please feel free to call.               Sincerely,   Malcolm Patel MD   [facility], [facility], [place]  [address]  [facility] [address]    Document electronically generated by:  Malcolm Patel MD

## 2024-11-09 NOTE — PATIENT INSTRUCTIONS
Stop lisinopril.  Drink plenty of fluids.  Repeat your kidney blood and urine test in 10 to 14 days.  Orders are in the computer.

## 2024-11-12 ENCOUNTER — OFFICE VISIT (OUTPATIENT)
Dept: HEMATOLOGY/ONCOLOGY | Facility: HOSPITAL | Age: 39
End: 2024-11-12
Attending: NURSE PRACTITIONER
Payer: COMMERCIAL

## 2024-11-12 DIAGNOSIS — Z08 ENCOUNTER FOR FOLLOW-UP EXAMINATION AFTER COMPLETED TREATMENT FOR MALIGNANT NEOPLASM: ICD-10-CM

## 2024-11-12 DIAGNOSIS — Z71.9 COUNSELING, UNSPECIFIED: ICD-10-CM

## 2024-11-12 DIAGNOSIS — C50.411 MALIGNANT NEOPLASM OF UPPER-OUTER QUADRANT OF RIGHT BREAST IN FEMALE, ESTROGEN RECEPTOR POSITIVE (HCC): Primary | ICD-10-CM

## 2024-11-12 DIAGNOSIS — Z17.0 MALIGNANT NEOPLASM OF UPPER-OUTER QUADRANT OF RIGHT BREAST IN FEMALE, ESTROGEN RECEPTOR POSITIVE (HCC): Primary | ICD-10-CM

## 2024-11-12 PROCEDURE — 99417 PROLNG OP E/M EACH 15 MIN: CPT | Performed by: NURSE PRACTITIONER

## 2024-11-12 PROCEDURE — 99215 OFFICE O/P EST HI 40 MIN: CPT | Performed by: NURSE PRACTITIONER

## 2024-11-12 RX ORDER — LOPERAMIDE HYDROCHLORIDE 2 MG/1
2 CAPSULE ORAL 4 TIMES DAILY PRN
COMMUNITY

## 2024-11-16 ENCOUNTER — LAB ENCOUNTER (OUTPATIENT)
Dept: LAB | Facility: HOSPITAL | Age: 39
End: 2024-11-16
Attending: INTERNAL MEDICINE
Payer: COMMERCIAL

## 2024-11-16 DIAGNOSIS — N17.9 AKI (ACUTE KIDNEY INJURY) (HCC): ICD-10-CM

## 2024-11-16 DIAGNOSIS — Q60.0 SOLITARY KIDNEY, CONGENITAL: ICD-10-CM

## 2024-11-16 LAB
ALBUMIN SERPL-MCNC: 4.5 G/DL (ref 3.2–4.8)
ANION GAP SERPL CALC-SCNC: 7 MMOL/L (ref 0–18)
BILIRUB UR QL: NEGATIVE
BUN BLD-MCNC: 18 MG/DL (ref 9–23)
BUN/CREAT SERPL: 14.5 (ref 10–20)
CALCIUM BLD-MCNC: 10.2 MG/DL (ref 8.7–10.4)
CHLORIDE SERPL-SCNC: 112 MMOL/L (ref 98–112)
CLARITY UR: CLEAR
CO2 SERPL-SCNC: 25 MMOL/L (ref 21–32)
CREAT BLD-MCNC: 1.24 MG/DL
CREAT UR-SCNC: 92.3 MG/DL
EGFRCR SERPLBLD CKD-EPI 2021: 56 ML/MIN/1.73M2 (ref 60–?)
GLUCOSE BLD-MCNC: 96 MG/DL (ref 70–99)
GLUCOSE UR-MCNC: NORMAL MG/DL
HGB UR QL STRIP.AUTO: NEGATIVE
KETONES UR-MCNC: NEGATIVE MG/DL
LEUKOCYTE ESTERASE UR QL STRIP.AUTO: NEGATIVE
MICROALBUMIN UR-MCNC: 5.5 MG/DL
MICROALBUMIN/CREAT 24H UR-RTO: 59.6 UG/MG (ref ?–30)
NITRITE UR QL STRIP.AUTO: NEGATIVE
OSMOLALITY SERPL CALC.SUM OF ELEC: 300 MOSM/KG (ref 275–295)
PH UR: 5.5 [PH] (ref 5–8)
PHOSPHATE SERPL-MCNC: 3.4 MG/DL (ref 2.4–5.1)
POTASSIUM SERPL-SCNC: 3.7 MMOL/L (ref 3.5–5.1)
SODIUM SERPL-SCNC: 144 MMOL/L (ref 136–145)
SP GR UR STRIP: 1.02 (ref 1–1.03)
UROBILINOGEN UR STRIP-ACNC: NORMAL

## 2024-11-16 PROCEDURE — 81003 URINALYSIS AUTO W/O SCOPE: CPT

## 2024-11-16 PROCEDURE — 36415 COLL VENOUS BLD VENIPUNCTURE: CPT

## 2024-11-16 PROCEDURE — 82570 ASSAY OF URINE CREATININE: CPT

## 2024-11-16 PROCEDURE — 80069 RENAL FUNCTION PANEL: CPT

## 2024-11-16 PROCEDURE — 82043 UR ALBUMIN QUANTITATIVE: CPT

## 2024-11-17 ENCOUNTER — TELEPHONE (OUTPATIENT)
Dept: NEPHROLOGY | Facility: CLINIC | Age: 39
End: 2024-11-17

## 2024-11-17 DIAGNOSIS — N17.9 AKI (ACUTE KIDNEY INJURY) (HCC): Primary | ICD-10-CM

## 2024-11-18 ENCOUNTER — HOSPITAL ENCOUNTER (EMERGENCY)
Facility: HOSPITAL | Age: 39
Discharge: HOME | End: 2024-11-18
Attending: EMERGENCY MEDICINE
Payer: COMMERCIAL

## 2024-11-18 ENCOUNTER — APPOINTMENT (OUTPATIENT)
Dept: CT IMAGING | Facility: HOSPITAL | Age: 39
End: 2024-11-18
Attending: EMERGENCY MEDICINE
Payer: COMMERCIAL

## 2024-11-18 VITALS
SYSTOLIC BLOOD PRESSURE: 114 MMHG | HEIGHT: 66 IN | DIASTOLIC BLOOD PRESSURE: 59 MMHG | RESPIRATION RATE: 17 BRPM | WEIGHT: 156 LBS | TEMPERATURE: 98 F | BODY MASS INDEX: 25.07 KG/M2 | HEART RATE: 59 BPM | OXYGEN SATURATION: 97 %

## 2024-11-18 DIAGNOSIS — G44.209 TENSION HEADACHE: Primary | ICD-10-CM

## 2024-11-18 DIAGNOSIS — Z17.0 MALIGNANT NEOPLASM OF UPPER-OUTER QUADRANT OF RIGHT BREAST IN FEMALE, ESTROGEN RECEPTOR POSITIVE (HCC): Primary | ICD-10-CM

## 2024-11-18 DIAGNOSIS — C50.411 MALIGNANT NEOPLASM OF UPPER-OUTER QUADRANT OF RIGHT BREAST IN FEMALE, ESTROGEN RECEPTOR POSITIVE (HCC): Primary | ICD-10-CM

## 2024-11-18 DIAGNOSIS — R11.0 NAUSEA: ICD-10-CM

## 2024-11-18 LAB
ANION GAP SERPL CALC-SCNC: 12 MMOL/L (ref 0–18)
BASOPHILS # BLD AUTO: 0.06 X10(3) UL (ref 0–0.2)
BASOPHILS NFR BLD AUTO: 1.9 %
BUN BLD-MCNC: 18 MG/DL (ref 9–23)
BUN/CREAT SERPL: 13.8 (ref 10–20)
CALCIUM BLD-MCNC: 10.2 MG/DL (ref 8.7–10.4)
CHLORIDE SERPL-SCNC: 111 MMOL/L (ref 98–112)
CO2 SERPL-SCNC: 20 MMOL/L (ref 21–32)
CREAT BLD-MCNC: 1.3 MG/DL
DEPRECATED RDW RBC AUTO: 53.2 FL (ref 35.1–46.3)
EGFRCR SERPLBLD CKD-EPI 2021: 53 ML/MIN/1.73M2 (ref 60–?)
EOSINOPHIL # BLD AUTO: 0.04 X10(3) UL (ref 0–0.7)
EOSINOPHIL NFR BLD AUTO: 1.3 %
ERYTHROCYTE [DISTWIDTH] IN BLOOD BY AUTOMATED COUNT: 15.6 % (ref 11–15)
GLUCOSE BLD-MCNC: 115 MG/DL (ref 70–99)
HCT VFR BLD AUTO: 29.1 %
HGB BLD-MCNC: 10.5 G/DL
IMM GRANULOCYTES # BLD AUTO: 0.01 X10(3) UL (ref 0–1)
IMM GRANULOCYTES NFR BLD: 0.3 %
LYMPHOCYTES # BLD AUTO: 0.55 X10(3) UL (ref 1–4)
LYMPHOCYTES NFR BLD AUTO: 17.2 %
MCH RBC QN AUTO: 33.9 PG (ref 26–34)
MCHC RBC AUTO-ENTMCNC: 36.1 G/DL (ref 31–37)
MCV RBC AUTO: 93.9 FL
MONOCYTES # BLD AUTO: 0.24 X10(3) UL (ref 0.1–1)
MONOCYTES NFR BLD AUTO: 7.5 %
NEUTROPHILS # BLD AUTO: 2.29 X10 (3) UL (ref 1.5–7.7)
NEUTROPHILS # BLD AUTO: 2.29 X10(3) UL (ref 1.5–7.7)
NEUTROPHILS NFR BLD AUTO: 71.8 %
OSMOLALITY SERPL CALC.SUM OF ELEC: 299 MOSM/KG (ref 275–295)
PLATELET # BLD AUTO: 201 10(3)UL (ref 150–450)
POTASSIUM SERPL-SCNC: 3.3 MMOL/L (ref 3.5–5.1)
RBC # BLD AUTO: 3.1 X10(6)UL
SODIUM SERPL-SCNC: 143 MMOL/L (ref 136–145)
WBC # BLD AUTO: 3.2 X10(3) UL (ref 4–11)

## 2024-11-18 PROCEDURE — 70450 CT HEAD/BRAIN W/O DYE: CPT | Performed by: EMERGENCY MEDICINE

## 2024-11-18 PROCEDURE — 80048 BASIC METABOLIC PNL TOTAL CA: CPT | Performed by: EMERGENCY MEDICINE

## 2024-11-18 PROCEDURE — 85025 COMPLETE CBC W/AUTO DIFF WBC: CPT | Performed by: EMERGENCY MEDICINE

## 2024-11-18 PROCEDURE — 96376 TX/PRO/DX INJ SAME DRUG ADON: CPT

## 2024-11-18 PROCEDURE — 99284 EMERGENCY DEPT VISIT MOD MDM: CPT

## 2024-11-18 PROCEDURE — 96374 THER/PROPH/DIAG INJ IV PUSH: CPT

## 2024-11-18 PROCEDURE — 96375 TX/PRO/DX INJ NEW DRUG ADDON: CPT

## 2024-11-18 PROCEDURE — 99285 EMERGENCY DEPT VISIT HI MDM: CPT

## 2024-11-18 PROCEDURE — 96361 HYDRATE IV INFUSION ADD-ON: CPT

## 2024-11-18 RX ORDER — MORPHINE SULFATE 2 MG/ML
2 INJECTION, SOLUTION INTRAMUSCULAR; INTRAVENOUS ONCE
Status: COMPLETED | OUTPATIENT
Start: 2024-11-18 | End: 2024-11-18

## 2024-11-18 RX ORDER — DIPHENHYDRAMINE HYDROCHLORIDE 50 MG/ML
25 INJECTION INTRAMUSCULAR; INTRAVENOUS ONCE
Status: COMPLETED | OUTPATIENT
Start: 2024-11-18 | End: 2024-11-18

## 2024-11-18 RX ORDER — PROCHLORPERAZINE MALEATE 10 MG
10 TABLET ORAL EVERY 6 HOURS PRN
Qty: 15 TABLET | Refills: 0 | Status: SHIPPED | OUTPATIENT
Start: 2024-11-18

## 2024-11-18 RX ORDER — ONDANSETRON 2 MG/ML
4 INJECTION INTRAMUSCULAR; INTRAVENOUS ONCE
Status: COMPLETED | OUTPATIENT
Start: 2024-11-18 | End: 2024-11-18

## 2024-11-18 RX ORDER — ACETAMINOPHEN AND CODEINE PHOSPHATE 300; 30 MG/1; MG/1
1 TABLET ORAL EVERY 6 HOURS PRN
Qty: 15 TABLET | Refills: 0 | Status: SHIPPED | OUTPATIENT
Start: 2024-11-18 | End: 2024-11-23

## 2024-11-18 NOTE — ED INITIAL ASSESSMENT (HPI)
Pt to ed c/o headache and nausea. Pt states she is on a target therapy for breast cancer and taking a trial drug.

## 2024-11-19 NOTE — TELEPHONE ENCOUNTER
Dr. Patel, did you want patient to have a kidney ultrasound? None ordered at this time.     Spoke with patient and she has stopped lisinopril. She has not been tracking BP at home but will start to and will record. Of note, she went to the ER today and BP was normal in ER.   
Kidney US ordered and patient notified.   
Kidney function is better compared to 3 weeks ago but still but not back to normal.  Do a ultrasound of the kidneys to make sure they look okay.  How are blood pressure readings?  She should be off lisinopril.  
Yes do ultrasound of the kidneys  
The patient is a 18y Male complaining of toe pain.

## 2024-11-19 NOTE — ED PROVIDER NOTES
Patient Seen in: [facility] Emergency Department    History     Chief Complaint   Patient presents with    Headache    Nausea     Stated Complaint: Headache    HPI  Pt c/o a 9/10 headache that began this afternoon.  +nausea and vomiting.  .  This is more intense compated to previous headaches.  Pain is described as throbbing . Headache is associated with nausea.  no fever, no stiff neck, no visual changes and no focal neuro deficit.      Past Medical History:    Breast cancer in female (HCC)    Right breast lymph node cancer    High blood pressure    Hx of motion sickness    Hypertension    Solitary kidney, congenital       Past Surgical History:   Procedure Laterality Date    Appendectomy  01/01/2007    Needle biopsy right Right 10/09/2023    Treat ectopic preg,rmv tube/ovary Left 01/01/2014    Treat ectopic preg,rmv tube/ovary Left 01/01/2015            Family History   Problem Relation Age of Onset    Other (Parkinson's Disease) Father     Prostate Cancer Maternal Grandfather 70    Pancreatic Cancer Paternal Grandmother 80    Cancer Paternal Aunt 80        breast cancer    Pancreatic Cancer Maternal Uncle 60    Pancreatic Cancer Paternal Great-Grandfather     Cancer Maternal Great-Grandmother         gastric ca    Cancer Paternal Uncle 60        brain cancer       Social History     Socioeconomic History    Marital status:    Tobacco Use    Smoking status: Never    Smokeless tobacco: Never   Vaping Use    Vaping status: Never Used   Substance and Sexual Activity    Alcohol use: Not Currently     Comment: 2 -3 drinks per week    Drug use: No     Social Drivers of Health     Food Insecurity: No Food Insecurity (4/30/2024)    Food Insecurity     Food Insecurity: Never true   Transportation Needs: No Transportation Needs (4/30/2024)    Transportation Needs     Lack of Transportation: No   Housing Stability: Low Risk  (4/30/2024)    Housing Stability     Housing Instability: No       Review of  Systems    Positive for stated complaint: Headache  Other systems are as noted in HPI.  Constitutional and vital signs reviewed.      All other systems reviewed and negative except as noted above.    PSFH elements reviewed from today and agreed except as otherwise stated in HPI.    Physical Exam     ED Triage Vitals [11/18/24 1007]   /87   Pulse 78   Resp 20   Temp 97.5 °F (36.4 °C)   Temp src Temporal   SpO2 100 %   O2 Device None (Room air)       Current:/59   Pulse 59   Temp 97.5 °F (36.4 °C) (Temporal)   Resp 17   Ht 167.6 cm (5' 6\")   Wt 70.8 kg   LMP 11/21/2023 (Approximate)   SpO2 97%   BMI 25.18 kg/m²   PULSE OX nl  Constitutional:  No acute distress  HEENT:  Head normocephalic and atraumatic. No scleral icterus or erythema. Pharynx moist without erythema or exudate.  CV:  Regular rate and rhythm. No murmur. Peripheral pulses intact.  Respiratory:  Lungs clear to auscultation bilaterally  Abdomen:  Soft, non-tender, non-distended.  Back:  No CVA or vertebral tenderness  Skin:  Normal color. Warm and Dry  Extremities:  Non-tender. No pedal edema.   Neuro:  Oriented x3.  PERRL, EOMI. CN II - XII grossly intact.  No gross motor deficits.  5/5 strength in all distribution.  Sensation fully intact.      DDX to include tension headache vs. Migraine headache vs. Sinusitis vs. SAH        ED Course     Labs Reviewed   CBC WITH DIFFERENTIAL WITH PLATELET - Abnormal; Notable for the following components:       Result Value    WBC 3.2 (*)     RBC 3.10 (*)     HGB 10.5 (*)     HCT 29.1 (*)     RDW-SD 53.2 (*)     RDW 15.6 (*)     Lymphocyte Absolute 0.55 (*)     All other components within normal limits   BASIC METABOLIC PANEL (8) - Abnormal; Notable for the following components:    Glucose 115 (*)     Potassium 3.3 (*)     CO2 20.0 (*)     Creatinine 1.30 (*)     Calculated Osmolality 299 (*)     eGFR-Cr 53 (*)     All other components within normal limits       MDM     Medical Decision  Making  Improved with meds. Alerted Dr. Calle will give meds for home no zofran due to study drug protocol.     Problems Addressed:  Nausea: acute illness or injury  Tension headache: acute illness or injury    Amount and/or Complexity of Data Reviewed  Labs: ordered. Decision-making details documented in ED Course.  Radiology: ordered and independent interpretation performed. Decision-making details documented in ED Course.  Discussion of management or test interpretation with external provider(s): Tylenol for pain + caffeine prn    Risk  OTC drugs.  Prescription drug management.    CT BRAIN OR HEAD (CPT=70450)    Result Date: 11/18/2024  CONCLUSION:  No acute intracranial process by noncontrast CT technique.    Dictated by (CST): Moisés Jewell MD on 11/18/2024 at 10:56 AM     Finalized by (CST): Moisés Jewell MD on 11/18/2024 at 10:57 AM           I reviewed CT and noted no intracranial bleeding        Re-Exam: some improvement with initial meds, repeat meds doing much better.    Patient presenting with headache.  Patient  with no abnormal symptoms for patient.  Patient was discharged home. Patient advised to return to ER if alteration in mental status, onset of fevers, visual changes, or peripheral weakness/numbness/tingling.  Disposition and Plan     Clinical Impression:  1. Tension headache    2. Nausea        Disposition:  Discharge    Follow-up:  Adolfo Campbell DO  [address] [address]  [phone]    Follow up        Medications Prescribed:  Discharge Medication List as of 11/18/2024 12:52 PM        START taking these medications    Details   prochlorperazine (COMPAZINE) 10 mg tablet Take 1 tablet (10 mg total) by mouth every 6 (six) hours as needed for Nausea., Normal, Disp-15 tablet, R-0      acetaminophen-codeine 300-30 MG Oral Tab Take 1 tablet by mouth every 6 (six) hours as needed., Normal, Disp-15 tablet, R-0

## 2024-11-20 ENCOUNTER — MOBILE ENCOUNTER (OUTPATIENT)
Dept: HEMATOLOGY/ONCOLOGY | Facility: HOSPITAL | Age: 39
End: 2024-11-20

## 2024-11-20 ENCOUNTER — HOSPITAL ENCOUNTER (EMERGENCY)
Facility: HOSPITAL | Age: 39
Discharge: HOME | End: 2024-11-21
Attending: EMERGENCY MEDICINE
Payer: COMMERCIAL

## 2024-11-20 DIAGNOSIS — G43.801 OTHER MIGRAINE WITH STATUS MIGRAINOSUS, NOT INTRACTABLE: Primary | ICD-10-CM

## 2024-11-20 PROCEDURE — 99284 EMERGENCY DEPT VISIT MOD MDM: CPT

## 2024-11-20 RX ORDER — DIPHENHYDRAMINE HYDROCHLORIDE 50 MG/ML
25 INJECTION INTRAMUSCULAR; INTRAVENOUS ONCE
Status: COMPLETED | OUTPATIENT
Start: 2024-11-20 | End: 2024-11-21

## 2024-11-20 RX ORDER — METOCLOPRAMIDE HYDROCHLORIDE 5 MG/ML
10 INJECTION INTRAMUSCULAR; INTRAVENOUS ONCE
Status: COMPLETED | OUTPATIENT
Start: 2024-11-20 | End: 2024-11-21

## 2024-11-20 RX ORDER — KETOROLAC TROMETHAMINE 15 MG/ML
15 INJECTION, SOLUTION INTRAMUSCULAR; INTRAVENOUS ONCE
Status: COMPLETED | OUTPATIENT
Start: 2024-11-20 | End: 2024-11-21

## 2024-11-21 ENCOUNTER — APPOINTMENT (OUTPATIENT)
Dept: CT IMAGING | Facility: HOSPITAL | Age: 39
End: 2024-11-21
Attending: EMERGENCY MEDICINE
Payer: COMMERCIAL

## 2024-11-21 ENCOUNTER — PATIENT MESSAGE (OUTPATIENT)
Dept: NEPHROLOGY | Facility: CLINIC | Age: 39
End: 2024-11-21

## 2024-11-21 VITALS
DIASTOLIC BLOOD PRESSURE: 79 MMHG | RESPIRATION RATE: 16 BRPM | HEART RATE: 64 BPM | BODY MASS INDEX: 25.07 KG/M2 | HEIGHT: 66 IN | TEMPERATURE: 98 F | SYSTOLIC BLOOD PRESSURE: 128 MMHG | OXYGEN SATURATION: 99 % | WEIGHT: 156 LBS

## 2024-11-21 LAB
ANION GAP SERPL CALC-SCNC: 11 MMOL/L (ref 0–18)
BASOPHILS # BLD AUTO: 0.06 X10(3) UL (ref 0–0.2)
BASOPHILS NFR BLD AUTO: 1.8 %
BUN BLD-MCNC: 18 MG/DL (ref 9–23)
BUN/CREAT SERPL: 13.6 (ref 10–20)
CALCIUM BLD-MCNC: 10.2 MG/DL (ref 8.7–10.4)
CHLORIDE SERPL-SCNC: 105 MMOL/L (ref 98–112)
CO2 SERPL-SCNC: 24 MMOL/L (ref 21–32)
CREAT BLD-MCNC: 1.32 MG/DL
DEPRECATED RDW RBC AUTO: 51.6 FL (ref 35.1–46.3)
EGFRCR SERPLBLD CKD-EPI 2021: 52 ML/MIN/1.73M2 (ref 60–?)
EOSINOPHIL # BLD AUTO: 0.06 X10(3) UL (ref 0–0.7)
EOSINOPHIL NFR BLD AUTO: 1.8 %
ERYTHROCYTE [DISTWIDTH] IN BLOOD BY AUTOMATED COUNT: 15.3 % (ref 11–15)
GLUCOSE BLD-MCNC: 114 MG/DL (ref 70–99)
HCT VFR BLD AUTO: 27.1 %
HGB BLD-MCNC: 10 G/DL
IMM GRANULOCYTES # BLD AUTO: 0.02 X10(3) UL (ref 0–1)
IMM GRANULOCYTES NFR BLD: 0.6 %
LYMPHOCYTES # BLD AUTO: 0.76 X10(3) UL (ref 1–4)
LYMPHOCYTES NFR BLD AUTO: 22.2 %
MCH RBC QN AUTO: 34 PG (ref 26–34)
MCHC RBC AUTO-ENTMCNC: 36.9 G/DL (ref 31–37)
MCV RBC AUTO: 92.2 FL
MONOCYTES # BLD AUTO: 0.29 X10(3) UL (ref 0.1–1)
MONOCYTES NFR BLD AUTO: 8.5 %
NEUTROPHILS # BLD AUTO: 2.23 X10 (3) UL (ref 1.5–7.7)
NEUTROPHILS # BLD AUTO: 2.23 X10(3) UL (ref 1.5–7.7)
NEUTROPHILS NFR BLD AUTO: 65.1 %
OSMOLALITY SERPL CALC.SUM OF ELEC: 293 MOSM/KG (ref 275–295)
PLATELET # BLD AUTO: 193 10(3)UL (ref 150–450)
POTASSIUM SERPL-SCNC: 3.6 MMOL/L (ref 3.5–5.1)
RBC # BLD AUTO: 2.94 X10(6)UL
SODIUM SERPL-SCNC: 140 MMOL/L (ref 136–145)
WBC # BLD AUTO: 3.4 X10(3) UL (ref 4–11)

## 2024-11-21 PROCEDURE — 70450 CT HEAD/BRAIN W/O DYE: CPT | Performed by: EMERGENCY MEDICINE

## 2024-11-21 PROCEDURE — 80048 BASIC METABOLIC PNL TOTAL CA: CPT | Performed by: EMERGENCY MEDICINE

## 2024-11-21 PROCEDURE — 85025 COMPLETE CBC W/AUTO DIFF WBC: CPT | Performed by: EMERGENCY MEDICINE

## 2024-11-21 PROCEDURE — 96361 HYDRATE IV INFUSION ADD-ON: CPT

## 2024-11-21 PROCEDURE — 96374 THER/PROPH/DIAG INJ IV PUSH: CPT

## 2024-11-21 PROCEDURE — 96375 TX/PRO/DX INJ NEW DRUG ADDON: CPT

## 2024-11-21 NOTE — ED PROVIDER NOTES
Patient Seen in: [facility] Emergency Department    History   No chief complaint on file.      HPI    39-year-old female with breast cancer presents to the ED for sudden onset of headache that started about 45 minutes prior to ED arrival.  Patient states that she was on the toilet and had an episode of diarrhea when the headache started.  She states that she was feeling shaky and having some nausea with the headache.  Patient states she was seen in the ER 2 days ago for headache as well.  She denies any fever or chills.  Patient states that she had similar symptoms 2 days ago when she also had a bowel movement.    History from [facility]:       External Records Reviewed: Reviewed prior records available in EMR.  Patient spoke with hematologist and there is a telephone encounter from this evening.  Hematologist would like patient to receive migraine cocktail and repeat head CT.  Head CT from 2 days ago does not show any acute abnormalities    History reviewed.   Past Medical History:    Breast cancer in female (HCC)    Right breast lymph node cancer    High blood pressure    Hx of motion sickness    Hypertension    Solitary kidney, congenital       History reviewed.   Past Surgical History:   Procedure Laterality Date    Appendectomy  01/01/2007    Needle biopsy right Right 10/09/2023    Treat ectopic preg,rmv tube/ovary Left 01/01/2014    Treat ectopic preg,rmv tube/ovary Left 01/01/2015         Medications :  Prescriptions Prior to Admission[1]     Family History   Problem Relation Age of Onset    Other (Parkinson's Disease) Father     Prostate Cancer Maternal Grandfather 70    Pancreatic Cancer Paternal Grandmother 80    Cancer Paternal Aunt 80        breast cancer    Pancreatic Cancer Maternal Uncle 60    Pancreatic Cancer Paternal Great-Grandfather     Cancer Maternal Great-Grandmother         gastric ca    Cancer Paternal Uncle 60        brain cancer       Smoking Status:   Social History      Socioeconomic History    Marital status:    Tobacco Use    Smoking status: Never    Smokeless tobacco: Never   Vaping Use    Vaping status: Never Used   Substance and Sexual Activity    Alcohol use: Not Currently     Comment: 2 -3 drinks per week    Drug use: No       Constitutional and vital signs reviewed.      Social History and Family History elements reviewed from today, pertinent positives to the presenting problem noted.    Physical Exam     ED Triage Vitals [11/20/24 2343]   BP (!) 144/95   Pulse 93   Resp 18   Temp (!) 95.5 °F (35.3 °C)   Temp src Temporal   SpO2 100 %   O2 Device None (Room air)       Physical Exam   Constitutional: AAOx3, well nourished, NAD  HEENT: Normocephalic, PERRLA, MMM  CV: s1s2+, RRR, no m/r/g, normal distal pulses  Pulmonary/Chest: CTA b/l with no rales, wheezes.  No chest wall tenderness  Abdominal: Nontender.  Nondistended. Soft. Bowel sounds are normal.   Neck/Back:   :   Musculoskeletal: Normal range of motion. No deformity.   Neurological: Awake, alert. Normal reflexes. No cranial nerve deficit.    Skin: Skin is warm and dry. No rash noted. No erythema.   Psychiatric:      All measures to prevent infection transmission during my interaction with the patient were taken. The patient was already wearing a droplet mask on my arrival to the room. Personal protective equipment was worn throughout the duration of the exam.      ED Course        Labs Reviewed   BASIC METABOLIC PANEL (8) - Abnormal; Notable for the following components:       Result Value    Glucose 114 (*)     Creatinine 1.32 (*)     eGFR-Cr 52 (*)     All other components within normal limits   CBC WITH DIFFERENTIAL WITH PLATELET - Abnormal; Notable for the following components:    WBC 3.4 (*)     RBC 2.94 (*)     HGB 10.0 (*)     HCT 27.1 (*)     RDW-SD 51.6 (*)     RDW 15.3 (*)     Lymphocyte Absolute 0.76 (*)     All other components within normal limits              Monitor Interpretation:   normal  sinus rhythm as interpreted by me.      Imaging Results Available and Reviewed while in ED: No results found.  ED Medications Administered:   Medications   sodium chloride 0.9 % IV bolus 1,000 mL (1,000 mL Intravenous New Bag 11/21/24 0021)   metoclopramide (Reglan) 5 mg/mL injection 10 mg (10 mg Intravenous Given 11/21/24 0020)   ketorolac (Toradol) 15 MG/ML injection 15 mg (15 mg Intravenous Given 11/21/24 0015)   diphenhydrAMINE (Benadryl) 50 mg/mL  injection 25 mg (25 mg Intravenous Given 11/21/24 0018)             MDM     Vitals:    11/21/24 0030 11/21/24 0100 11/21/24 0130 11/21/24 0144   BP: 117/66  123/56    Pulse: 65 57 64    Resp: 14 17 14    Temp:    97.7 °F (36.5 °C)   TempSrc:    Oral   SpO2: 97% 96% 99%    Weight:       Height:         *I personally reviewed and interpreted all ED vitals.  Independent Interpretation of Studies: I have independently reviewed CT head and it is unremarkable    Social Determinants of Health:     Procedures:      Differential/MDM/Shared Decision Making: Differential Diagnosis includes migraine headache, intracranial hemorrhage, tumor, meningitis, others.      The patient already  has a past medical history of Breast cancer in female (HCC) (10/18/2023), High blood pressure, motion sickness, Hypertension, and Solitary kidney, congenital.  to contribute to the complexity of this ED evaluation.           Medications, Diagnostics, or Disposition considered but not done:     CT head does not show any acute abnormalities.  Patient has some lab abnormalities but they are stable from previous lab work available in EMR.  Management of case was discussed with patient and she is feeling much better after migraine cocktail.  Headache has resolved.  Patient is comfortable discharge and follow-up with her doctor.      Condition upon leaving the department: Stable    Disposition and Plan     Clinical Impression:  1. Other migraine with status migrainosus, not intractable         Disposition:  Discharge    Follow-up:  Adolfo Campbell DO  [address] [address]  [phone]    Call in 2 day(s)        Medications Prescribed:  Current Discharge Medication List                   [1] (Not in a hospital admission)

## 2024-11-21 NOTE — PROGRESS NOTES
Patient's  called to report the patient is again having severe headache.  States that the headache is worse than the 1 she had on Monday for which she was evaluated at the ED.  She states the headache is still severe that she feels she wants to die.  The headache started when she went to the bathroom and was straining in the toilet a little bit.  The same thing happened on Monday.  Headache is accompanied by significant nausea and vomiting, assay was on Monday.  There are no vision changes, there is no slurred speech, there is no unifocal weakness of the arms or legs.  She overall feels weak.  Despite having negative CT discussed with the patient's  she should be brought to the emergency room for evaluation, with repeat CT scan of the brain, as well as the migraine cocktail she received with the exception of no ondansetron as it interacts with her study drug camizestrant.  I called to the emergency room and endorsed the patient to Dr. Rice.

## 2024-11-21 NOTE — ED INITIAL ASSESSMENT (HPI)
PT c/o headache, diarrhea, bothering her for the last few hours.  Breast CA patient, last therapy tonight.

## 2024-11-21 NOTE — TELEPHONE ENCOUNTER
Stopping lisinopril should not cause severe headaches unless the blood pressures went very high.  Current blood pressure readings look okay.  If headaches persist she should see neurology.

## 2024-11-25 DIAGNOSIS — Z17.0 MALIGNANT NEOPLASM OF UPPER-OUTER QUADRANT OF RIGHT BREAST IN FEMALE, ESTROGEN RECEPTOR POSITIVE (HCC): Primary | ICD-10-CM

## 2024-11-25 DIAGNOSIS — C50.411 MALIGNANT NEOPLASM OF UPPER-OUTER QUADRANT OF RIGHT BREAST IN FEMALE, ESTROGEN RECEPTOR POSITIVE (HCC): Primary | ICD-10-CM

## 2024-11-26 ENCOUNTER — OFFICE VISIT (OUTPATIENT)
Dept: HEMATOLOGY/ONCOLOGY | Facility: HOSPITAL | Age: 39
End: 2024-11-26
Attending: NURSE PRACTITIONER
Payer: COMMERCIAL

## 2024-11-26 ENCOUNTER — RESEARCH ENCOUNTER (OUTPATIENT)
Dept: HEMATOLOGY/ONCOLOGY | Facility: HOSPITAL | Age: 39
End: 2024-11-26

## 2024-11-26 ENCOUNTER — EKG ENCOUNTER (OUTPATIENT)
Dept: LAB | Facility: HOSPITAL | Age: 39
End: 2024-11-26
Attending: INTERNAL MEDICINE
Payer: COMMERCIAL

## 2024-11-26 VITALS
SYSTOLIC BLOOD PRESSURE: 130 MMHG | OXYGEN SATURATION: 100 % | WEIGHT: 154.81 LBS | DIASTOLIC BLOOD PRESSURE: 73 MMHG | HEIGHT: 65 IN | RESPIRATION RATE: 16 BRPM | TEMPERATURE: 98 F | HEART RATE: 68 BPM | BODY MASS INDEX: 25.79 KG/M2

## 2024-11-26 DIAGNOSIS — Z08 ENCOUNTER FOR FOLLOW-UP SURVEILLANCE OF BREAST CANCER: ICD-10-CM

## 2024-11-26 DIAGNOSIS — C50.411 MALIGNANT NEOPLASM OF UPPER-OUTER QUADRANT OF RIGHT BREAST IN FEMALE, ESTROGEN RECEPTOR POSITIVE (HCC): ICD-10-CM

## 2024-11-26 DIAGNOSIS — Z17.0 MALIGNANT NEOPLASM OF UPPER-OUTER QUADRANT OF RIGHT BREAST IN FEMALE, ESTROGEN RECEPTOR POSITIVE (HCC): Primary | ICD-10-CM

## 2024-11-26 DIAGNOSIS — M54.2 NECK PAIN ON LEFT SIDE: ICD-10-CM

## 2024-11-26 DIAGNOSIS — Z85.3 ENCOUNTER FOR FOLLOW-UP SURVEILLANCE OF BREAST CANCER: ICD-10-CM

## 2024-11-26 DIAGNOSIS — Z51.81 MEDICATION MONITORING ENCOUNTER: ICD-10-CM

## 2024-11-26 DIAGNOSIS — C50.411 MALIGNANT NEOPLASM OF UPPER-OUTER QUADRANT OF RIGHT BREAST IN FEMALE, ESTROGEN RECEPTOR POSITIVE (HCC): Primary | ICD-10-CM

## 2024-11-26 DIAGNOSIS — Z17.0 MALIGNANT NEOPLASM OF UPPER-OUTER QUADRANT OF RIGHT BREAST IN FEMALE, ESTROGEN RECEPTOR POSITIVE (HCC): ICD-10-CM

## 2024-11-26 LAB
ALBUMIN SERPL-MCNC: 4.6 G/DL (ref 3.2–4.8)
ALBUMIN/GLOB SERPL: 2.1 {RATIO} (ref 1–2)
ALP LIVER SERPL-CCNC: 48 U/L
ALT SERPL-CCNC: 9 U/L
ANION GAP SERPL CALC-SCNC: 8 MMOL/L (ref 0–18)
AST SERPL-CCNC: 18 U/L (ref ?–34)
BASOPHILS # BLD AUTO: 0.06 X10(3) UL (ref 0–0.2)
BASOPHILS NFR BLD AUTO: 2.6 %
BILIRUB SERPL-MCNC: 0.4 MG/DL (ref 0.3–1.2)
BUN BLD-MCNC: 19 MG/DL (ref 9–23)
BUN/CREAT SERPL: 13.8 (ref 10–20)
CALCIUM BLD-MCNC: 9.9 MG/DL (ref 8.7–10.4)
CHLORIDE SERPL-SCNC: 108 MMOL/L (ref 98–112)
CO2 SERPL-SCNC: 26 MMOL/L (ref 21–32)
CREAT BLD-MCNC: 1.38 MG/DL
DEPRECATED RDW RBC AUTO: 55.9 FL (ref 35.1–46.3)
EGFRCR SERPLBLD CKD-EPI 2021: 49 ML/MIN/1.73M2 (ref 60–?)
EOSINOPHIL # BLD AUTO: 0.06 X10(3) UL (ref 0–0.7)
EOSINOPHIL NFR BLD AUTO: 2.6 %
ERYTHROCYTE [DISTWIDTH] IN BLOOD BY AUTOMATED COUNT: 15.7 % (ref 11–15)
GLOBULIN PLAS-MCNC: 2.2 G/DL (ref 2–3.5)
GLUCOSE BLD-MCNC: 86 MG/DL (ref 70–99)
HCT VFR BLD AUTO: 27 %
HGB BLD-MCNC: 9.2 G/DL
IMM GRANULOCYTES # BLD AUTO: 0 X10(3) UL (ref 0–1)
IMM GRANULOCYTES NFR BLD: 0 %
LYMPHOCYTES # BLD AUTO: 0.64 X10(3) UL (ref 1–4)
LYMPHOCYTES NFR BLD AUTO: 27.2 %
MAGNESIUM SERPL-MCNC: 2.1 MG/DL (ref 1.6–2.6)
MCH RBC QN AUTO: 33.3 PG (ref 26–34)
MCHC RBC AUTO-ENTMCNC: 34.1 G/DL (ref 31–37)
MCV RBC AUTO: 97.8 FL
MONOCYTES # BLD AUTO: 0.25 X10(3) UL (ref 0.1–1)
MONOCYTES NFR BLD AUTO: 10.6 %
NEUTROPHILS # BLD AUTO: 1.34 X10 (3) UL (ref 1.5–7.7)
NEUTROPHILS # BLD AUTO: 1.34 X10(3) UL (ref 1.5–7.7)
NEUTROPHILS NFR BLD AUTO: 57 %
OSMOLALITY SERPL CALC.SUM OF ELEC: 296 MOSM/KG (ref 275–295)
PLATELET # BLD AUTO: 186 10(3)UL (ref 150–450)
POTASSIUM SERPL-SCNC: 3.8 MMOL/L (ref 3.5–5.1)
PROT SERPL-MCNC: 6.8 G/DL (ref 5.7–8.2)
RBC # BLD AUTO: 2.76 X10(6)UL
SODIUM SERPL-SCNC: 142 MMOL/L (ref 136–145)
WBC # BLD AUTO: 2.4 X10(3) UL (ref 4–11)

## 2024-11-26 PROCEDURE — 36591 DRAW BLOOD OFF VENOUS DEVICE: CPT

## 2024-11-26 PROCEDURE — 93005 ELECTROCARDIOGRAM TRACING: CPT

## 2024-11-26 PROCEDURE — 93010 ELECTROCARDIOGRAM REPORT: CPT | Performed by: INTERNAL MEDICINE

## 2024-11-26 PROCEDURE — 99215 OFFICE O/P EST HI 40 MIN: CPT | Performed by: INTERNAL MEDICINE

## 2024-11-26 PROCEDURE — 83735 ASSAY OF MAGNESIUM: CPT

## 2024-11-26 PROCEDURE — 80053 COMPREHEN METABOLIC PANEL: CPT

## 2024-11-26 PROCEDURE — 96402 CHEMO HORMON ANTINEOPL SQ/IM: CPT

## 2024-11-26 PROCEDURE — 85060 BLOOD SMEAR INTERPRETATION: CPT

## 2024-11-26 PROCEDURE — 85025 COMPLETE CBC W/AUTO DIFF WBC: CPT

## 2024-11-26 RX ORDER — SODIUM CHLORIDE 9 MG/ML
10 INJECTION, SOLUTION INTRAMUSCULAR; INTRAVENOUS; SUBCUTANEOUS ONCE
OUTPATIENT
Start: 2024-12-17

## 2024-11-26 NOTE — PROGRESS NOTES
RESEARCH NOTE    STUDY:  OTIS 2  PATIENT ID: [id number]  TREATMENT:  ARM B:  Camizestrant  TIME POINT: Month 4 Day 1    Patient to clinic today for OTIS 2 Month 4 Day 1 visit (Visit 3).  Visit 3 scheduled for 11/26/24 by Research RN in [facility]  upon pt arrival to clinic.  Visit 3 QOLs not available today on [employer] phone heydi.  Per [facility] , Month 4 QOLs appear to have been completed by patient on 11/21/24.  Research RN to reach out to study regarding unavailability of Vist 3 QOLs and discrepancy in QOL completion dates.           M4D1 local labs, pre-dose PK and ctDNA blood drawn today at 1450.  Pre-dose PK blood specimen brought immediately to lab for processing per protocol.  Frozen specimen to be stored in -80 freezer until shipment at a later date.  Ambient specimen to be shipped out today (UPS tracking [id number]).     After blood draw, pt directed to have 12-lead EKG completed at [facility] lab.  EKG results note bradycardia.  Pt asymptomatic.  Results not clinically significant per Dr. Calle.       Upon completion of EKG, pt returned to clinic for study med collection and dispensation.  Pt returned bottle of camizestrant with 10 pills remaninig (Kit [id number]; Lot [id number]; Exp: 07.2027).  Pt states she had missed a couple of doses of medication.  Research RN took return bottle and pills to pharmacy for accountability and destruction per SOP. Pharmacist then dispensed new bottle of camizestrant study medication (96 tablets; Kit [id number]; Lot [id number]; Exp: 07.2027) to Research RN.  Research RN gave Camizestrant medication to patient and pt took dose with water during appointment at 1520.            Physical exam and other required study activities completed by Dr. Calle during clinic appointment.  Labs reviewed by Dr. Calle.  No clinically significant lab values.  Cytopenias attributed to abemaciclib.  Since last clinic visit, pt has had two ER  visits for increased severity of baseline headaches causing nausea and vomiting.  CT scans of head negative, medication administered and pt discharged with scripts for pain and nausea medications.  Pt was administered ondansetron in first visit to ER.  Pt advised to hold camizestrant dose on 11/18/24 due to contraindication with ondansetron.  Pt aware that she cannot take ondansetron while on study treatment.  Pt has script for compazine.  Dr. Calle recommended Tylenol PRN for headaches due to congenital solitary kidney.      Pt states GI symptoms of n/v and diarrhea since starting abemaciclib.  Pt taking imodium PRN for symptoms.  Also reporting \"racing heart\" when lying down attributed to dehydration from diarrhea and vomiting.  Abemaciclib had subsequently been held then dose reduced on 9/22/24 to 100mg BID due to SE attributed to abemaciclib.  IVF and nausea medication given on 9/13/24.  Pt also starting taking camizestrant at night on 9/12/24 to help with symptoms in morning prior to work.  Pt reports improvement in symptoms after dose reduction.     Pt reports \"sporadic\" visual SE starting \"a month ago\".  Pt reports \"trailing lights of neon color\" that occurs at night when going from light to dark environments.  Pt reports symptoms lasting \"only a couple of seconds\" then resolving.  Symptoms not affecting ADLs.     Pt states recent left neck pain radiating down left arm.  Not related to study treatment per Dr. Calle.  Pt using lidocaine patch for symptom relief.  MRI of neck ordered and pt to schedule.      AE summary:  - Headache (intermittent) - increased from baseline to grade 2 (starting 11/18)  - Diarrhea (intermittent) - grade 1 (starting 9/5/24)  - Vomiting (intermittent) - grade 1 (starting 9/10/24)  - Tachycardia (when lying down) - grade 1 (starting 9/5/24)  - Photopsia - grade 1 (starting 10/26/24)  - Left neck pain - grade 1 (starting ~11/23/24)    Conmed summary:  - NS + 20KCl 1000mL IV x 1 (9/13/24)    - Compazine 5mg IV x 1 (9/13/24)  - abemaciclib 100mg PO BID (dose reduced starting 9/22/24)  - loperamide 2mg PO QID PRN diarrhea  - NS 1000mL IV bolus once (11/18/24 and 11/20/24)  - morphine 2mg IVP x 2 for severe headache 11/18/24  - ondansetron 4mg IVP x 1 (11/18/24)  - diphenhydramine 25mg IV x 1 (11/18/24 and 11/20/24)  - ketorolac 15mg IVP x 1 (11/20/24)  - metoclopramide 10mg IVP x 1 (11/20/24)  - acetaminophen-codeine 300-30mg PO Q6H PRN headache  - compazine 10mg PO Q6H PRN nausea  - lisinopril stopped 11/8/24        Reviewed plan for next visits.  Research RN to schedule Month 7 Day 1 visit with labs and coordinate eye exam with  [facility].  Pt agreeable to viewing scheduled appointments in [facility].  Pt aware that [facility] will reach out to schedule Month 7 appointment.  Encouraged pt to call Research RN directly with any questions or concerns prior to next visit.

## 2024-11-26 NOTE — PROGRESS NOTES
BLAKE   Jocelyn Garza is a 39 year old female for evaluation of   Encounter Diagnoses   Name Primary?    Malignant neoplasm of upper-outer quadrant of right breast in female, estrogen receptor positive (HCC) Yes    Medication monitoring encounter     Encounter for follow-up surveillance of breast cancer        Patient completed course of neoadjuvant dose dense Adriamycin Cytoxan followed by weekly Taxol.  Last dose of treatment on 3/22/2024.    Patient then underwent bilateral mastectomies on 4/29/2024 with a right axillary lymph node dissection.    Patient completed radiation on 8/7/2024. Having lymphedema therapy.  Feels hardness on the R axilla.      LMP November of 2023.    On goserelin since 6/3/24. Having hot flashes.  States a couple of minutes and then improves.  A few times a day, not as much end of the month.      Has gone back to work.  Feels that will be able to start working out again.    Pt screened for OTIS-2 trial.  Randomized to Arm B with Camizestrant started 8/30/24.    She is currently taking study drug camizestrant at hs. Taking daily.     Here today for follow up of starting abemaciclib.  Dose adjustment to 100 mg daily on 9/22/24.    She is s/p cycle 3 and had started cycle 4 and is D4 of abemaciclib 100 mg.  Soft formed stool, with urgency.  Not all the time.  Much improved.  If eats the wrong things like grease, spicy and dairy, gets diarrhea.      She has been to the ED twice for HA with n/v. States that both episodes, she had diarrhea first, then HA, then N/V.  More  the second time.    Each episode occurred when she was sitting in the toilet.  Brain imaging x2 negative.      She was discontinued off the lisinopril and is following with nephrology.  She is having a renal US.       She also has been having pain in the neck with radiation to the L arm.  This has been for 3 days, has improved with salonpas.     Neuropathy intermittent, worse with colder weather. Not too bad today.   Supplements recommended, pt has not yet started.     States has some lymphedema on the B chest.  States arms are swollen too.  States last PT a couple of weeks ago.  Doing HEP, wearing sleeve and wearing pads on the chest.   States PT discharged her as not improving.      ECOG PS  0    Review of Systems:   Review of Systems   Constitutional:  Positive for appetite change (decreased). Negative for fatigue (tired at times at night) and unexpected weight change (weight down 4 lbs).   HENT:   Negative for mouth sores.    Eyes:  Positive for eye problems (issues with adjusting at night with transitioning from light to dark.  States like neon light and one episode of gray.  Lasts a cople of seconds and then goes away.  Notices more now).   Respiratory:  Negative for cough and shortness of breath.    Cardiovascular:  Positive for leg swelling (at ankles at the end of the day.) and palpitations (when lying down.). Negative for chest pain.   Gastrointestinal:  Positive for abdominal pain (cramps when has diarrhea and gassy), diarrhea (soft not diarrhea), nausea (with full dose of imodium) and vomiting.   Endocrine: Positive for hot flashes.   Genitourinary:  Positive for frequency. Negative for menstrual problem.         Urgency, states bubbles.  No odor or change in color.   Musculoskeletal:  Positive for arthralgias (states since a few weeks ago, specially the R knee, or B hips.  Better with activity.) and neck pain. Negative for back pain.   Skin:  Positive for rash (at the neck after wearing a sweater, improved with OTC hydrocortisone, it came back and resolved.).   Neurological:  Positive for dizziness, headaches (as above.), light-headedness and numbness (as above). Negative for extremity weakness.        Feeling out of body with lying down and now improved spacing the drugs.   Hematological:  Negative for adenopathy. Bruises/bleeds easily (states in the legs and from zoladex inj).   Psychiatric/Behavioral:  Positive  for sleep disturbance (due to hot flashes).          Current Outpatient Medications   Medication Sig Dispense Refill    prochlorperazine (COMPAZINE) 10 mg tablet Take 1 tablet (10 mg total) by mouth every 6 (six) hours as needed for Nausea. 15 tablet 0    camizestrant 75 mg Oral Tab (OTIS-2 STUDY DRUG) Take 1 tablet (75 mg total) by mouth daily. Take at the same time each day. 96 tablet 0    loperamide 2 MG Oral Cap Take 1 capsule (2 mg total) by mouth 4 (four) times daily as needed for Diarrhea.      camizestrant 75 mg Oral Tab (OTIS-2 STUDY DRUG) Take 2 tablets (150 mg total) by mouth daily. Take at the same time each day.      GOSERELIN ACETATE SC Inject into the skin. Once a month      Abemaciclib 100 MG Oral Tab Take 1 tablet (100 mg total) by mouth 2 (two) times daily. may take with or without food 60 tablet 11    lidocaine-prilocaine 2.5-2.5 % External Cream Apply to site 1 hour prior to port a cath needle insertion 1 each 1    lisinopril 5 MG Oral Tab Take 1 tablet (5 mg total) by mouth daily. (Patient not taking: Reported on 11/26/2024) 90 tablet 1     Allergies:   No Known Allergies    Past Medical History:    Breast cancer in female (HCC)    Right breast lymph node cancer    High blood pressure    Hx of motion sickness    Hypertension    Solitary kidney, congenital     Past Surgical History:   Procedure Laterality Date    Appendectomy  01/01/2007    Needle biopsy right Right 10/09/2023    Treat ectopic preg,rmv tube/ovary Left 01/01/2014    Treat ectopic preg,rmv tube/ovary Left 01/01/2015     Social History     Socioeconomic History    Marital status:    Tobacco Use    Smoking status: Never    Smokeless tobacco: Never   Vaping Use    Vaping status: Never Used   Substance and Sexual Activity    Alcohol use: Not Currently     Comment: 2 -3 drinks per week    Drug use: No     Social Drivers of Health     Food Insecurity: No Food Insecurity (4/30/2024)    Food Insecurity     Food Insecurity:  Never true   Transportation Needs: No Transportation Needs (4/30/2024)    Transportation Needs     Lack of Transportation: No   Housing Stability: Low Risk  (4/30/2024)    Housing Stability     Housing Instability: No       Family History   Problem Relation Age of Onset    Other (Parkinson's Disease) Father     Prostate Cancer Maternal Grandfather 70    Pancreatic Cancer Paternal Grandmother 80    Cancer Paternal Aunt 80        breast cancer    Pancreatic Cancer Maternal Uncle 60    Pancreatic Cancer Paternal Great-Grandfather     Cancer Maternal Great-Grandmother         gastric ca    Cancer Paternal Uncle 60        brain cancer         PHYSICAL EXAM:    /73 (BP Location: Left arm, Patient Position: Sitting, Cuff Size: adult)   Pulse 68   Temp 98 °F (36.7 °C) (Oral)   Resp 16   Ht 1.651 m (5' 5\")   Wt 70.2 kg (154 lb 12.8 oz)   LMP 11/21/2023 (Approximate)   SpO2 100%   BMI 25.76 kg/m²   Wt Readings from Last 6 Encounters:   11/26/24 70.2 kg (154 lb 12.8 oz)   11/20/24 70.8 kg (156 lb)   11/18/24 70.8 kg (156 lb)   11/08/24 70.8 kg (156 lb)   10/22/24 71.7 kg (158 lb 1.6 oz)   09/24/24 72.1 kg (159 lb)     Physical Exam  General: Patient is alert, not in acute distress.   HEENT: EOMs intact. PERRL. Oropharynx is clear.   Neck: No JVD. No palpable lymphadenopathy. Neck is supple.  Chest: Clear to auscultation. B mastectomies.  Wearing compression sleeve. Mild lymphedema in the B upper back.    Heart: Regular rate and rhythm.   Abdomen: Soft, non tender with good bowel sounds.  Extremities: No edema.  Neurological: Grossly intact.   Lymphatics: There is no palpable lymphadenopathy throughout in the cervical, supraclavicular, axillary, or inguinal regions.  Psych/Depression: nl  Neck, some tenderness at C7.       ASSESSMENT/PLAN:     1. Malignant neoplasm of upper-outer quadrant of right breast in female, estrogen receptor positive (HCC)    2. Medication monitoring encounter    3. Encounter for follow-up  surveillance of breast cancer         Cancer Staging   Malignant neoplasm of upper-outer quadrant of right breast in female, estrogen receptor positive (HCC)  Staging form: Breast, AJCC 8th Edition  - Clinical stage from 10/12/2023: Stage IIA (cT3, cN1(f), cM0, G2, ER+, NY+, HER2-) - Signed by Halie Calle MD on 5/8/2024  - Pathologic stage from 5/8/2024: ypT3, pN2a, cM0, G2, ER+, NY+, HER2- - Signed by Halie Calle MD on 5/8/2024    Patient has completed staging work-up.  Thus far no evidence of metastatic disease.  Findings in the liver evaluated with ultrasound, and the one side is a hemangioma, the other likely a cyst, but a question of metastatic disease and PET scan has been ordered.  I discussed with the patient that most likely this is going to be a cyst.    Discussed that she still has early stage disease, and that her staging has not changed despite the size of the primary tumor.    Given extensive disease in the breast and komal involvement, she will benefit from neoadjuvant chemotherapy, which may help with surgical outcomes by shrinking some of the primary tumor and of the lymph nodes.  I discussed with the patient that the primary tumor will not likely decrease in size significantly to make her a candidate for breast conservation.  I did discuss with the patient that she will need a mastectomy and likely axillary lymph node dissection.  Given the size of the primary tumor, she is a candidate for postmastectomy radiation therapy plus minus radiation to the komal basins pending on surgical management of the axilla.  I discussed with the patient that once she completes treatment with radiation, she will then be initiated on adjuvant endocrine therapy, given the size of the tumor and number of positive lymph nodes, she also will be a candidate for 2 years of endovascular in the initial 2 years of adjuvant endocrine therapy.  Discussed that adjuvant endocrine therapy total duration will be no less  than 5 years but given her high risk features, likely up to 8 to 10 years.  We will also discuss options for ovarian function suppression after completion of chemotherapy.    Patient inquired regarding contralateral prophylactic mastectomy.  I discussed with the patient that we should await results of her genetic testing.  If the genetic testing is positive for a deleterious mutation which will increase her lifetime second primary breast cancer, then she should proceed with prophylactic contralateral mastectomy.  However, if this is negative, discussed with the patient that there is no benefit for proceeding with contralateral mastectomy, as it will not change her disease-free or overall survival.    Discussed with the patient that while she is receiving neoadjuvant treatment, she can have the evaluation by plastic surgery so that when she completes her neoadjuvant course of therapy, she will be able to have her surgery coordinated with Dr. Hutson and a plastic surgeon    She completed course of neoadjuvant DD AC x 4 cycles followed by weekly Taxol x12 weeks    Status post bilateral mastectomy with right-sided axillary lymph node dissection on 4/29/2024.    She completed adjuvant radiation therapy on 8/7/2024.    Discussed with the patient that it is not uncommon for patients with ER/SD positive breast cancer that have neoadjuvant treatment not to have a robust response from therapy.  I did discuss that for her treatment, in terms of systemic disease, after completion of radiation therapy, we will proceed with ovarian function suppression, which can be initiated at this point, this will then be followed by adjuvant endocrine therapy with 2 years of Abemaciclib.  In addition, patient is also a candidate for the [place] 2 clinical trial which is comparing standard of care adjuvant endocrine therapy with Abemaciclib versus camizestrant which is an oral SERD.    Patient received first dose of goserelin 3.6 mg on 6/3/2024  and last on 7/30/2024.    Arthralgias:  secondary to goserelin.  Continue to increase activity.      OTIS-2  Randomized to Arm B with Camizestrant started 8/30/24. No Eye issues   On adjuvant hormonal therapy and abemaciclib.       Follow up per trial protocol.     Continue camizestrant.     Abemaciclib HELD due to GI symptoms requiring IV hydration and IV nausea meds.   Dose reduced to 100 mg   Currently C4 D 4 of 100 mg dose twice daily -- continue tolerating lower dose better.    Cytopenias:  secondary to abemaciclib and stable.    Headaches: Recommend tylenol prn.  Avoid NSAIDS due to renal issues.  Started with goserelin, which is reported to cause HA and abemaciclib can also cause HA.     N/V/D:  secondary to abemaciclib.  Imodium and compazine.    Left neck pain with radiculopathy: MRI of the neck ordered.    Continue to monitor creat levels- pt drinking fluids without issue     If worsening symptoms next cycle may dose reduce to 50 mg     Follow up     Call if GI symptoms resume. PER STUDY.  No ondansetron       Lymphedema:  HEP and compression.           Follow up in 4 weeks         MDM high risk.    No orders of the defined types were placed in this encounter.      Results From Past 48 Hours:  Recent Results (from the past 48 hours)   CBC W Differential W Platelet    Collection Time: 11/26/24  2:33 PM   Result Value Ref Range    WBC 2.4 (L) 4.0 - 11.0 x10(3) uL    RBC 2.76 (L) 3.80 - 5.30 x10(6)uL    HGB 9.2 (L) 12.0 - 16.0 g/dL    HCT 27.0 (L) 35.0 - 48.0 %    MCV 97.8 80.0 - 100.0 fL    MCH 33.3 26.0 - 34.0 pg    MCHC 34.1 31.0 - 37.0 g/dL    RDW-SD 55.9 (H) 35.1 - 46.3 fL    RDW 15.7 (H) 11.0 - 15.0 %    .0 150.0 - 450.0 10(3)uL    Neutrophil Absolute Prelim 1.34 (L) 1.50 - 7.70 x10 (3) uL   Comp Metabolic Panel (14)    Collection Time: 11/26/24  2:33 PM   Result Value Ref Range    Glucose 86 70 - 99 mg/dL    Sodium 142 136 - 145 mmol/L    Potassium 3.8 3.5 - 5.1 mmol/L    Chloride 108 98 -  112 mmol/L    CO2 26.0 21.0 - 32.0 mmol/L    Anion Gap 8 0 - 18 mmol/L    BUN 19 9 - 23 mg/dL    Creatinine 1.38 (H) 0.55 - 1.02 mg/dL    BUN/CREA Ratio 13.8 10.0 - 20.0    Calcium, Total 9.9 8.7 - 10.4 mg/dL    Calculated Osmolality 296 (H) 275 - 295 mOsm/kg    eGFR-Cr 49 (L) >=60 mL/min/1.73m2    ALT 9 (L) 10 - 49 U/L    AST 18 <34 U/L    Alkaline Phosphatase 48 37 - 98 U/L    Bilirubin, Total 0.4 0.3 - 1.2 mg/dL    Total Protein 6.8 5.7 - 8.2 g/dL    Albumin 4.6 3.2 - 4.8 g/dL    Globulin  2.2 2.0 - 3.5 g/dL    A/G Ratio 2.1 (H) 1.0 - 2.0    Patient Fasting for CMP? Patient not present    MAGNESIUM [E]    Collection Time: 11/26/24  2:33 PM   Result Value Ref Range    Magnesium 2.1 1.6 - 2.6 mg/dL   EKG 12 Lead    Collection Time: 11/26/24  3:16 PM   Result Value Ref Range    Ventricular rate 59 BPM    Atrial rate 59 BPM    P-R Interval 126 ms    QRS Duration 94 ms    Q-T Interval 432 ms    QTC Calculation (Bezet) 427 ms    P Axis 38 degrees    R Axis -20 degrees    T Axis 29 degrees       Imaging & Referrals:  None

## 2024-11-26 NOTE — PATIENT INSTRUCTIONS
Neuropathy Supplements    1. Vitamin B 6 100 mg 2 x daily    2. L-Glutamine 10 gms powder dissolved in liquid 3 x a day    3. Alpha lipoic acid 600 mg 2 x daily

## 2024-11-27 LAB
ATRIAL RATE: 59 BPM
P AXIS: 38 DEGREES
P-R INTERVAL: 126 MS
Q-T INTERVAL: 432 MS
QRS DURATION: 94 MS
QTC CALCULATION (BEZET): 427 MS
R AXIS: -20 DEGREES
T AXIS: 29 DEGREES
VENTRICULAR RATE: 59 BPM

## 2024-12-03 ENCOUNTER — HOSPITAL ENCOUNTER (OUTPATIENT)
Dept: MRI IMAGING | Facility: HOSPITAL | Age: 39
Discharge: HOME | End: 2024-12-03
Attending: INTERNAL MEDICINE
Payer: COMMERCIAL

## 2024-12-03 DIAGNOSIS — M54.2 NECK PAIN ON LEFT SIDE: ICD-10-CM

## 2024-12-03 PROCEDURE — 72156 MRI NECK SPINE W/O & W/DYE: CPT | Performed by: INTERNAL MEDICINE

## 2024-12-03 PROCEDURE — A9575 INJ GADOTERATE MEGLUMI 0.1ML: HCPCS | Performed by: INTERNAL MEDICINE

## 2024-12-03 RX ORDER — GADOTERATE MEGLUMINE 376.9 MG/ML
15 INJECTION INTRAVENOUS
Status: COMPLETED | OUTPATIENT
Start: 2024-12-03 | End: 2024-12-03

## 2024-12-03 RX ADMIN — GADOTERATE MEGLUMINE 14 ML: 376.9 INJECTION INTRAVENOUS at 13:05:00

## 2024-12-04 ENCOUNTER — PATIENT MESSAGE (OUTPATIENT)
Dept: HEMATOLOGY/ONCOLOGY | Facility: HOSPITAL | Age: 39
End: 2024-12-04

## 2024-12-04 DIAGNOSIS — G93.2 PSEUDOTUMOR CEREBRI SYNDROME: Primary | ICD-10-CM

## 2024-12-04 NOTE — TELEPHONE ENCOUNTER
Called patient to discuss results of MRI of the cervical spine.  Discussed with patient that she does have areas in the cervical spine in the lower portion with bulging disc, that would not cause the headaches it would cause more of issues with radiating pain down the arms.  Discussed that she did have a 3 mm cerebral tonsillar down the foramen magnum.  Discussed that this is not enough to be in Arnold-Chiari malformation, and I discussed with the neurosurgery and it was felt that patient's symptoms may be more consistent with pseudotumor cerebri.  Recommended the patient be evaluated by neurosurgery and neurology.  Patient given name of neurosurgery and neurologist proxy.  Referrals placed in the system.

## 2024-12-10 DIAGNOSIS — Q60.0 SOLITARY KIDNEY, CONGENITAL: ICD-10-CM

## 2024-12-10 RX ORDER — LISINOPRIL 5 MG/1
5 TABLET ORAL DAILY
Qty: 90 TABLET | Refills: 3 | OUTPATIENT
Start: 2024-12-10

## 2024-12-10 NOTE — TELEPHONE ENCOUNTER
Last office visit:  11/8/2024      Return to office:      Follow up appointment:  No appointment reschedule      Note : called verify with patient she is not in  Lisinopril 5mg any longer.

## 2024-12-13 ENCOUNTER — OFFICE VISIT (OUTPATIENT)
Age: 39
End: 2024-12-13
Payer: COMMERCIAL

## 2024-12-13 VITALS
RESPIRATION RATE: 18 BRPM | DIASTOLIC BLOOD PRESSURE: 70 MMHG | SYSTOLIC BLOOD PRESSURE: 130 MMHG | OXYGEN SATURATION: 93 % | TEMPERATURE: 98 F | HEART RATE: 73 BPM | BODY MASS INDEX: 26 KG/M2 | WEIGHT: 155 LBS

## 2024-12-13 DIAGNOSIS — Z17.0 MALIGNANT NEOPLASM OF UPPER-OUTER QUADRANT OF RIGHT BREAST IN FEMALE, ESTROGEN RECEPTOR POSITIVE (HCC): Primary | ICD-10-CM

## 2024-12-13 DIAGNOSIS — C50.411 MALIGNANT NEOPLASM OF UPPER-OUTER QUADRANT OF RIGHT BREAST IN FEMALE, ESTROGEN RECEPTOR POSITIVE (HCC): Primary | ICD-10-CM

## 2024-12-13 PROCEDURE — 3075F SYST BP GE 130 - 139MM HG: CPT | Performed by: SURGERY

## 2024-12-13 PROCEDURE — 3078F DIAST BP <80 MM HG: CPT | Performed by: SURGERY

## 2024-12-13 PROCEDURE — 99213 OFFICE O/P EST LOW 20 MIN: CPT | Performed by: SURGERY

## 2024-12-16 ENCOUNTER — HOSPITAL ENCOUNTER (OUTPATIENT)
Dept: ULTRASOUND IMAGING | Facility: HOSPITAL | Age: 39
Discharge: HOME | End: 2024-12-16
Attending: INTERNAL MEDICINE
Payer: COMMERCIAL

## 2024-12-16 DIAGNOSIS — N17.9 AKI (ACUTE KIDNEY INJURY) (HCC): ICD-10-CM

## 2024-12-16 PROCEDURE — 76775 US EXAM ABDO BACK WALL LIM: CPT | Performed by: INTERNAL MEDICINE

## 2024-12-17 ENCOUNTER — OFFICE VISIT (OUTPATIENT)
Facility: CLINIC | Age: 39
End: 2024-12-17
Payer: COMMERCIAL

## 2024-12-17 VITALS
OXYGEN SATURATION: 98 % | HEART RATE: 77 BPM | WEIGHT: 155 LBS | DIASTOLIC BLOOD PRESSURE: 62 MMHG | HEIGHT: 65 IN | SYSTOLIC BLOOD PRESSURE: 108 MMHG | BODY MASS INDEX: 25.83 KG/M2

## 2024-12-17 DIAGNOSIS — Z00.00 PHYSICAL EXAM, ANNUAL: Primary | ICD-10-CM

## 2024-12-17 PROCEDURE — 99396 PREV VISIT EST AGE 40-64: CPT | Performed by: FAMILY MEDICINE

## 2024-12-17 PROCEDURE — 3078F DIAST BP <80 MM HG: CPT | Performed by: FAMILY MEDICINE

## 2024-12-17 PROCEDURE — 3008F BODY MASS INDEX DOCD: CPT | Performed by: FAMILY MEDICINE

## 2024-12-17 PROCEDURE — 3074F SYST BP LT 130 MM HG: CPT | Performed by: FAMILY MEDICINE

## 2024-12-17 NOTE — PROGRESS NOTES
Breast Surgery Surveillance Visit    Diagnosis: Invasive lobular carcinoma, right breast, status post bilateral mastectomies with right sentinel lymph node dissection on 4/29/2024    Stage:  Cancer Staging   Malignant neoplasm of upper-outer quadrant of right breast in female, estrogen receptor positive (HCC)  Staging form: Breast, AJCC 8th Edition  - Clinical stage from 10/12/2023: Stage IIA (cT3, cN1(f), cM0, G2, ER+, ND+, HER2-) - Signed by Halie Calle MD on 5/8/2024  - Pathologic stage from 5/8/2024: ypT3, pN2a, cM0, G2, ER+, ND+, HER2- - Signed by Halie Calle MD on 5/8/2024    Disease Status:  Surgical treatment complete, neoadjuvant chemotherapy complete, radiation completed in August 2024.    History of Present Illness:   Ms. Jocelyn Garza is a 39 year old woman who presents with subjective mass of the right breast.  The patient first noticed this in October 2023.  She was diagnosed with lobular carcinoma with spread to the right axillary lymph nodes.  She has started neoadjuvant chemotherapy under the direction of Dr. Calle.  She has a family history of breast cancer and tested negative for genetic mutation with a variant seen in 1 GILLES D gene.  She has no personal prior history of breast disease or biopsies.  She denies any nipple discharge, skin changes or other concerns.  Her MRI demonstrated the enhancement from the area to span more than 9 cm.  Given extent of disease the patient underwent neoadjuvant chemotherapy she has completed this without sequelae.  She had posttreatment MRI on March 23, 2024 that demonstrated interval decrease in the size 2 was 7.3 cm with interval decrease in size of concern of the appearance of the right axillary lymph nodes. She underwent bilateral simple mastectomies with right axillary lymph node dissection, which occurred without complication. She has undergone treatment in the lymphedema clinic.  She overall feels well with no concerns and completed radiation  without sequelae.  She is here today for evaluation and recommendations for further therapy.        Past Medical History:    Breast cancer in female (HCC)    Right breast lymph node cancer    High blood pressure    Hx of motion sickness    Hypertension    Solitary kidney, congenital       Past Surgical History:   Procedure Laterality Date    Appendectomy  2007    Needle biopsy right Right 10/09/2023    Treat ectopic preg,rmv tube/ovary Left 2014    Treat ectopic preg,rmv tube/ovary Left 2015       Gynecological History:  Pt is a   She achieved menarche at age 10 and LMP 2023, currently undergoing chemotherapy  She denies any history of hormone replacement therapy   She has history of oral contraceptive use for 13 years, last in 18 years ago.  She has recieved infertility treatment to achieve pregnancy.    Medications:     prochlorperazine (COMPAZINE) 10 mg tablet Take 1 tablet (10 mg total) by mouth every 6 (six) hours as needed for Nausea. 15 tablet 0    camizestrant 75 mg Oral Tab (OTIS-2 STUDY DRUG) Take 1 tablet (75 mg total) by mouth daily. Take at the same time each day. 96 tablet 0    loperamide 2 MG Oral Cap Take 1 capsule (2 mg total) by mouth 4 (four) times daily as needed for Diarrhea.      camizestrant 75 mg Oral Tab (OTIS-2 STUDY DRUG) Take 2 tablets (150 mg total) by mouth daily. Take at the same time each day.      GOSERELIN ACETATE SC Inject into the skin. Once a month      Abemaciclib 100 MG Oral Tab Take 1 tablet (100 mg total) by mouth 2 (two) times daily. may take with or without food 60 tablet 11    lidocaine-prilocaine 2.5-2.5 % External Cream Apply to site 1 hour prior to port a cath needle insertion 1 each 1       Allergies:    No Known Allergies    Family History:   Family History   Problem Relation Age of Onset    Other (Parkinson's Disease) Father     Prostate Cancer Maternal Grandfather 70    Pancreatic Cancer Paternal Grandmother 80    Cancer Paternal  Aunt 80        breast cancer    Pancreatic Cancer Maternal Uncle 60    Pancreatic Cancer Paternal Great-Grandfather     Cancer Maternal Great-Grandmother         gastric ca    Cancer Paternal Uncle 60        brain cancer       She does not know if she is of Ashkenazi [employer] ancestry.    Social History:  History   Alcohol Use Not Currently     Comment: 2 -3 drinks per week       History   Smoking Status    Never   Smokeless Tobacco    Never     Ms. Jocelyn Garza is  with 2 adopted children. She has 1 siblings. She is currently On Medical Leave    Review of Systems:  General:   The patient denies, fever, chills, +night sweats, +fatigue, +generalized weakness, change in appetite or weight loss.    HEENT:     The patient denies eye irritation, cataracts, redness, glaucoma, yellowing of the eyes, change in vision, color blindness, or wearing contacts/glasses. The patient denies hearing loss, ringing in the ears, ear drainage, earaches, +nasal congestion, nose bleeds, snoring, pain in mouth/throat, hoarseness, change in voice, facial trauma.    Respiratory:  The patient denies chronic cough, +phlegm, hemoptysis, pleurisy/chest pain, pneumonia, asthma, wheezing, difficulty in breathing with exertion, emphysema, chronic bronchitis, +shortness of breath or abnormal sound when breathing.     Cardiovascular:  There is no history of chest pain, chest pressure/discomfort, +palpitations, irregular heartbeat, fainting or near-fainting, difficulty breathing when lying flat, SOB/Coughing at night, swelling of the legs or chest pain while walking.    Breasts:  See history of present illness    Gastrointestinal:     There is no history of difficulty or pain with swallowing, reflux symptoms, vomiting, dark or bloody stools, constipation, yellowing of the skin, indigestion, nausea, change in bowel habits, diarrhea, abdominal pain or vomiting blood.     Genitourinary:  The patient denies frequent urination, needing to get up  at night to urinate, urinary hesitancy or retaining urine, painful urination, urinary incontinence, decreased urine stream, blood in the urine or vaginal/penile discharge.    Skin:    The patient denies rash, itching, skin lesions, dry skin, change in skin color or change in moles.     Hematologic/Lymphatic:  The patient denies easily bruising or bleeding or persistent swollen glands or lymph nodes.     Musculoskeletal:  The patient denies muscle aches/pain, joint pain, +stiff joints, neck pain, back pain or bone pain.    Neuropsychiatric:  There is no history of migraines or severe headaches, seizure/epilepsy, speech problems, coordination problems, trembling/tremors, fainting/black outs, dizziness, memory problems, loss of sensation/numbness, problems walking, weakness, tingling or burning in hands/feet. There is no history of abusive relationship, bipolar disorder, sleep disturbance, anxiety, depression or feeling of despair.    Endocrine:    There is no history of poor/slow wound healing, weight loss/gain, +fertility or hormone problems, cold intolerance, thyroid disease.     Allergic/Immunologic:  There is no history of hives, hay fever, angioedema or anaphylaxis.    /70 (BP Location: Left arm, Patient Position: Sitting, Cuff Size: adult)   Pulse 73   Temp 97.9 °F (36.6 °C) (Temporal)   Resp 18   Wt 70.3 kg (155 lb)   SpO2 93%   BMI 25.79 kg/m²     Physical Exam:  The patient is an alert, oriented, well-nourished and  well-developed woman who appears her stated age. Her speech patterns and movements are normal. Her affect is appropriate.    HEENT: The head is normocephalic. The neck is supple. The thyroid is not enlarged and is without palpable masses/nodules. There are no palpable masses. The trachea is in the midline. Conjunctiva are clear, non-icteric.    Chest: The chest expands symmetrically. The lungs are clear to auscultation.    Heart: The rhythm is regular.  There are no murmurs, rubs,  gallops or thrills.    Breasts:  Her breasts are surgically absent with intact incisions.  There are no palpable chest wall nodules, masses and/or skin changes or axillary adenopathy appreciated bilaterally.    Abdomen:  The abdomen is soft, flat and non tender. The liver is not enlarged. There are no palpable masses.    Lymph Nodes:  The supraclavicular, axillary and cervical regions are free of significant lymphadenopathy.    Back: There is no vertebral column tenderness.    Skin: The skin appears normal. There are no suspicious appearing rashes or lesions.    Extremities: The extremities are without deformity, cyanosis or edema.    Impression:   Ms. Jocelyn Garza is a 39 year old woman presents with right breast cancer status post neoadjuvant chemotherapy and status post bilateral mastectomy with right axillary lymph node dissection.     Recommendations:   I had a discussion with the Patient regarding her breast exam.  She is healing well since surgery with no signs of new or recurrent disease.  She will continue to follow with medical oncology for management of systemic considerations.  She completed radiation without sequelae.  I encouraged her to continue monitoring her ROM and strength and explained that a referral to physical therapy is warranted in light of her restrictions.  She should see me in 12 months for her next clinical exam.  She will need no further breast imaging status post bilateral mastectomies.  She was given ample opportunity for questions and those questions were answered to her satisfaction. She was encouraged to contact the office with any questions or concerns prior to her next scheduled appointment.     This encounter lasted a total of 25 minutes, more than 50% of which was dedicated to the discussion of management options.

## 2024-12-17 NOTE — PROGRESS NOTES
HPI:   Jocelyn Garza is a 39 year old female who presents for a complete physical exam.     Last pap: negative in 03/2023. sees OBGYN Dr. Sal.   Last mammogram: s/p b/l mastectomy.   Previous colonoscopy: Never   History of STD's: No hx or concerns  History of intimate partner violence: None  Family hx of breast, ovarian, cervical or colon CA: See FH  Diet and exercise: Active. Limited formal regular exercise. Appetite is down although slightly imrpoving.   Immunizations:  Tdap: 10/2020, Flu: Completed    REVIEW OF SYSTEMS:   GENERAL: feels well otherwise   SKIN: denies any unusual skin lesions  EYES: no vision problems  BREAST: negative  LUNGS: denies shortness of breath  CARDIOVASCULAR: denies chest pain  GI: denies abdominal pain,  No constipation or diarrhea, no hematochezia or melena  : negative  NEURO: denies headaches  PSYCHE: denies depression or anxiety          Current Outpatient Medications   Medication Sig Dispense Refill    prochlorperazine (COMPAZINE) 10 mg tablet Take 1 tablet (10 mg total) by mouth every 6 (six) hours as needed for Nausea. 15 tablet 0    camizestrant 75 mg Oral Tab (OTIS-2 STUDY DRUG) Take 1 tablet (75 mg total) by mouth daily. Take at the same time each day. 96 tablet 0    loperamide 2 MG Oral Cap Take 1 capsule (2 mg total) by mouth 4 (four) times daily as needed for Diarrhea.      camizestrant 75 mg Oral Tab (OTIS-2 STUDY DRUG) Take 2 tablets (150 mg total) by mouth daily. Take at the same time each day.      GOSERELIN ACETATE SC Inject into the skin. Once a month      Abemaciclib 100 MG Oral Tab Take 1 tablet (100 mg total) by mouth 2 (two) times daily. may take with or without food 60 tablet 11    lidocaine-prilocaine 2.5-2.5 % External Cream Apply to site 1 hour prior to port a cath needle insertion 1 each 1     Allergies[1]   Past Medical History:    Breast cancer in female (HCC)    Right breast lymph node cancer    High blood pressure    Hx of motion sickness     Hypertension    Solitary kidney, congenital      Past Surgical History:   Procedure Laterality Date    Appendectomy  01/01/2007    Needle biopsy right Right 10/09/2023    Treat ectopic preg,rmv tube/ovary Left 01/01/2014    Treat ectopic preg,rmv tube/ovary Left 01/01/2015      Family History   Problem Relation Age of Onset    Other (Parkinson's Disease) Father     Prostate Cancer Maternal Grandfather 70    Pancreatic Cancer Paternal Grandmother 80    Cancer Paternal Aunt 80        breast cancer    Pancreatic Cancer Maternal Uncle 60    Pancreatic Cancer Paternal Great-Grandfather     Cancer Maternal Great-Grandmother         gastric ca    Cancer Paternal Uncle 60        brain cancer      Social History:   Social History     Socioeconomic History    Marital status:    Tobacco Use    Smoking status: Never    Smokeless tobacco: Never   Vaping Use    Vaping status: Never Used   Substance and Sexual Activity    Alcohol use: Not Currently     Comment: 2 -3 drinks per week    Drug use: No     Social Drivers of Health     Food Insecurity: No Food Insecurity (4/30/2024)    Food Insecurity     Food Insecurity: Never true   Transportation Needs: No Transportation Needs (4/30/2024)    Transportation Needs     Lack of Transportation: No   Housing Stability: Low Risk  (4/30/2024)    Housing Stability     Housing Instability: No            EXAM:     Wt Readings from Last 6 Encounters:   12/17/24 155 lb (70.3 kg)   12/13/24 155 lb (70.3 kg)   11/26/24 154 lb 12.8 oz (70.2 kg)   11/20/24 156 lb (70.8 kg)   11/18/24 156 lb (70.8 kg)   11/08/24 156 lb (70.8 kg)     Body mass index is 25.79 kg/m².   /62   Pulse 77   Ht 5' 5\" (1.651 m)   Wt 155 lb (70.3 kg)   SpO2 98%   BMI 25.79 kg/m²     GENERAL: well developed, well nourished, in no apparent distress   SKIN: no rashes, no suspicious lesions  HEENT: atraumatic, normocephalic, throat clear; normal dentition. MMM. TM & EAC normal b/l.   EYES: PERRLA, EOMI, conjunctiva  are clear  NECK: supple, no adenopathy or thyroid masses   LUNGS: clear to auscultation  CARDIO: RRR without murmur  GI: good bowel sounds, no masses, HSM or tenderness  EXTREMITIES: no edema  NEURO: Alert & oriented, motor & sensation grossly intact and symmetric    Cholesterol, Total (mg/dL)   Date Value   10/12/2024 173   10/23/2023 140   08/03/2022 155     HDL Cholesterol (mg/dL)   Date Value   10/12/2024 81 (H)   10/23/2023 61 (H)   08/03/2022 61 (H)     LDL Cholesterol (mg/dL)   Date Value   10/12/2024 79   10/23/2023 68   08/03/2022 83      ASSESSMENT AND PLAN:   Jocelyn Garza is a 39 year old female who presents for a complete physical exam.  Encounter Diagnosis   Name Primary?    Physical exam, annual Yes     No orders of the defined types were placed in this encounter.      -Continues to see Marlena Calle & [facility].   -Reviewed recent labs. Normal lipids & A1c last month.   -Seeing nephrology as well.     Discussed with patient the following:  -Breast cancer screening/mammograms and clinical breast exams  -Cervical cancer screening/pap smears  -Colon cancer screening/colonoscopy  -Adequate calcium and Vitamin D intake to prevent osteoporosis  -Bone density screening for osteopenia/osteoporosis  -Healthy diet including adequate intake of vegetables and fruits, appropriate portion sizes, minimizing highly concentrated carbohydrate foods  -Exercising 30 minutes a day most days of the week   -Diabetes screening    -Cholesterol screening   -Recommendation for yearly influenza vaccine  -Need for Tdap once as an adult and Td booster every 10 years    All questions were answered during the visit and the patient verbalizes understanding. She will return in one year for next WWE or sooner as needed.    Meds & Refills for this Visit:  Requested Prescriptions      No prescriptions requested or ordered in this encounter       Imaging & Consults:  None    Adolfo Campbell DO  12/17/2024  4:09 PM         [1] No Known  Allergies

## 2024-12-18 ENCOUNTER — TELEPHONE (OUTPATIENT)
Dept: NEPHROLOGY | Facility: CLINIC | Age: 39
End: 2024-12-18

## 2024-12-18 DIAGNOSIS — N17.9 AKI (ACUTE KIDNEY INJURY) (HCC): Primary | ICD-10-CM

## 2024-12-18 DIAGNOSIS — Q60.0 SOLITARY KIDNEY, CONGENITAL: ICD-10-CM

## 2024-12-18 NOTE — TELEPHONE ENCOUNTER
The ultrasound of the kidneys looked okay.  There is congenital absence of her right kidney but the left kidney appears to be normal.  If she done with chemo?  Repeat CBC, renal panel, urine for microalbumin and urinalysis in January.  Make standing order.

## 2024-12-19 NOTE — TELEPHONE ENCOUNTER
Dr. Patel- spoke with patient verified name and date of birth. Went over test results and recommendations below.Standing orders placed. Patient informed to have completed in January. Patient states she has finished chemotherapy, she is currently taking targeted therapy drug that can be hard on kidneys called abemaciclib for another year and a half.

## 2024-12-24 ENCOUNTER — OFFICE VISIT (OUTPATIENT)
Age: 39
End: 2024-12-24
Attending: INTERNAL MEDICINE
Payer: COMMERCIAL

## 2024-12-24 ENCOUNTER — APPOINTMENT (OUTPATIENT)
Age: 39
End: 2024-12-24
Attending: INTERNAL MEDICINE
Payer: COMMERCIAL

## 2024-12-24 ENCOUNTER — NURSE ONLY (OUTPATIENT)
Age: 39
End: 2024-12-24
Attending: INTERNAL MEDICINE
Payer: COMMERCIAL

## 2024-12-24 VITALS
WEIGHT: 157.81 LBS | SYSTOLIC BLOOD PRESSURE: 132 MMHG | OXYGEN SATURATION: 100 % | RESPIRATION RATE: 18 BRPM | HEART RATE: 76 BPM | DIASTOLIC BLOOD PRESSURE: 84 MMHG | BODY MASS INDEX: 26.29 KG/M2 | HEIGHT: 65 IN | TEMPERATURE: 98 F

## 2024-12-24 DIAGNOSIS — Z51.81 MEDICATION MONITORING ENCOUNTER: ICD-10-CM

## 2024-12-24 DIAGNOSIS — Z17.0 MALIGNANT NEOPLASM OF UPPER-OUTER QUADRANT OF RIGHT BREAST IN FEMALE, ESTROGEN RECEPTOR POSITIVE (HCC): Primary | ICD-10-CM

## 2024-12-24 DIAGNOSIS — D64.81 ANEMIA ASSOCIATED WITH CHEMOTHERAPY: ICD-10-CM

## 2024-12-24 DIAGNOSIS — D70.8 OTHER NEUTROPENIA (HCC): ICD-10-CM

## 2024-12-24 DIAGNOSIS — R11.0 NAUSEA: ICD-10-CM

## 2024-12-24 DIAGNOSIS — T45.1X5A ANEMIA ASSOCIATED WITH CHEMOTHERAPY: ICD-10-CM

## 2024-12-24 DIAGNOSIS — C50.411 MALIGNANT NEOPLASM OF UPPER-OUTER QUADRANT OF RIGHT BREAST IN FEMALE, ESTROGEN RECEPTOR POSITIVE (HCC): Primary | ICD-10-CM

## 2024-12-24 DIAGNOSIS — Z45.2 ENCOUNTER FOR CENTRAL LINE CARE: ICD-10-CM

## 2024-12-24 DIAGNOSIS — Z51.11 CHEMOTHERAPY MANAGEMENT, ENCOUNTER FOR: ICD-10-CM

## 2024-12-24 PROBLEM — D70.1 CHEMOTHERAPY-INDUCED NEUTROPENIA (HCC): Status: ACTIVE | Noted: 2024-12-24

## 2024-12-24 PROBLEM — D70.1 CHEMOTHERAPY-INDUCED NEUTROPENIA: Status: ACTIVE | Noted: 2024-12-24

## 2024-12-24 LAB
ALBUMIN SERPL-MCNC: 4.3 G/DL (ref 3.2–4.8)
ALBUMIN/GLOB SERPL: 2 {RATIO} (ref 1–2)
ALP LIVER SERPL-CCNC: 51 U/L
ALT SERPL-CCNC: 13 U/L
ANION GAP SERPL CALC-SCNC: 8 MMOL/L (ref 0–18)
AST SERPL-CCNC: 17 U/L (ref ?–34)
BASOPHILS # BLD AUTO: 0.04 X10(3) UL (ref 0–0.2)
BASOPHILS NFR BLD AUTO: 2.7 %
BILIRUB SERPL-MCNC: 0.4 MG/DL (ref 0.3–1.2)
BUN BLD-MCNC: 12 MG/DL (ref 9–23)
BUN/CREAT SERPL: 9.7 (ref 10–20)
CALCIUM BLD-MCNC: 9.6 MG/DL (ref 8.7–10.4)
CHLORIDE SERPL-SCNC: 110 MMOL/L (ref 98–112)
CO2 SERPL-SCNC: 24 MMOL/L (ref 21–32)
CREAT BLD-MCNC: 1.24 MG/DL
DEPRECATED RDW RBC AUTO: 48.2 FL (ref 35.1–46.3)
EGFRCR SERPLBLD CKD-EPI 2021: 56 ML/MIN/1.73M2 (ref 60–?)
EOSINOPHIL # BLD AUTO: 0.05 X10(3) UL (ref 0–0.7)
EOSINOPHIL NFR BLD AUTO: 3.4 %
ERYTHROCYTE [DISTWIDTH] IN BLOOD BY AUTOMATED COUNT: 13.3 % (ref 11–15)
FASTING STATUS PATIENT QL REPORTED: NO
GLOBULIN PLAS-MCNC: 2.2 G/DL (ref 2–3.5)
GLUCOSE BLD-MCNC: 90 MG/DL (ref 70–99)
HCT VFR BLD AUTO: 27.6 %
HGB BLD-MCNC: 9.5 G/DL
IMM GRANULOCYTES # BLD AUTO: 0.01 X10(3) UL (ref 0–1)
IMM GRANULOCYTES NFR BLD: 0.7 %
LYMPHOCYTES # BLD AUTO: 0.28 X10(3) UL (ref 1–4)
LYMPHOCYTES NFR BLD AUTO: 19.2 %
MAGNESIUM SERPL-MCNC: 1.8 MG/DL (ref 1.6–2.6)
MCH RBC QN AUTO: 34.2 PG (ref 26–34)
MCHC RBC AUTO-ENTMCNC: 34.4 G/DL (ref 31–37)
MCV RBC AUTO: 99.3 FL
MONOCYTES # BLD AUTO: 0.19 X10(3) UL (ref 0.1–1)
MONOCYTES NFR BLD AUTO: 13 %
NEUTROPHILS # BLD AUTO: 0.89 X10 (3) UL (ref 1.5–7.7)
NEUTROPHILS # BLD AUTO: 0.89 X10(3) UL (ref 1.5–7.7)
NEUTROPHILS NFR BLD AUTO: 61 %
OSMOLALITY SERPL CALC.SUM OF ELEC: 293 MOSM/KG (ref 275–295)
PLATELET # BLD AUTO: 160 10(3)UL (ref 150–450)
POTASSIUM SERPL-SCNC: 3.7 MMOL/L (ref 3.5–5.1)
PROT SERPL-MCNC: 6.5 G/DL (ref 5.7–8.2)
RBC # BLD AUTO: 2.78 X10(6)UL
SODIUM SERPL-SCNC: 142 MMOL/L (ref 136–145)
WBC # BLD AUTO: 1.5 X10(3) UL (ref 4–11)

## 2024-12-24 PROCEDURE — 85060 BLOOD SMEAR INTERPRETATION: CPT

## 2024-12-24 RX ORDER — SODIUM CHLORIDE 9 MG/ML
10 INJECTION, SOLUTION INTRAMUSCULAR; INTRAVENOUS; SUBCUTANEOUS ONCE
OUTPATIENT
Start: 2025-01-21

## 2024-12-24 RX ORDER — SODIUM CHLORIDE 9 MG/ML
10 INJECTION, SOLUTION INTRAMUSCULAR; INTRAVENOUS; SUBCUTANEOUS ONCE
Status: DISCONTINUED | OUTPATIENT
Start: 2024-12-24 | End: 2024-12-24

## 2024-12-24 NOTE — PROGRESS NOTES
12/24/24  Attending: Dr. Calle  Visit APRN: Tammyeleonora Zuniga, APRN  Date 12/24/24    HPI   Jocelyn Garza is a 39 year old female for evaluation of   Encounter Diagnoses   Name Primary?    Malignant neoplasm of upper-outer quadrant of right breast in female, estrogen receptor positive (HCC) Yes    Medication monitoring encounter     Chemotherapy management, encounter for     Anemia associated with chemotherapy     Other neutropenia (HCC)        Patient completed course of neoadjuvant dose dense Adriamycin Cytoxan followed by weekly Taxol.  Last dose of treatment on 3/22/2024.    Patient then underwent bilateral mastectomies on 4/29/2024 with a right axillary lymph node dissection.    Patient completed radiation on 8/7/2024. Having lymphedema therapy.  Feels hardness on the R axilla.      LMP November of 2023.    On goserelin since 6/3/24. Having hot flashes.  States a couple of minutes and then improves.  A few times a day, not as much end of the month.      Has gone back to work.  Feels that will be able to start working out again.    Pt screened for OTIS-2 trial.  Randomized to Arm B with Camizestrant started 8/30/24.    She is currently taking study drug camizestrant at . Taking daily.     Started C1D1 8/24/24 abemaciclib.    Dose adjustment to 100 mg daily on 9/22/24.    Completed cycle 3 and had started cycle 4 and is D4 of abemaciclib 100 mg.  Soft formed stool, with urgency.  Not all the time.  Much improved.  If eats the wrong things like grease, spicy and dairy, gets diarrhea.      To  ED twice for HA with n/v. States that both episodes, she had diarrhea first, then HA, then N/V.  More  the second time.    Each episode occurred when she was sitting in the toilet.    Brain imaging x2 negative.     She was discontinued off the lisinopril and is following with nephrology.  She is having a renal US.          12/24/24 Received gosserelin today, tolerating well. Patient on Abemaciclib 100 mg bid, and  camizestrant 75 mg daily. She reports clear runny nose, dry cough and feeling fatigue. Denies fever, diarrhea, chills, rash, sob, chest pain, dizziness or bleeding. Reports minimal neuropathy, not on supplements. She is going to [place] 12/27-1/5/25.  ANC 0.89, to hold Abemaciclib 1 week and recheck labs, she is unable to get labs while in [place] so will hold and recheck labs 1/6/25. Continue camizestrant. Patient to call if fever over 100.4 F and go to urgent care if cold/cough worsens.   Reports has consult with neurology in January. Continues right arm sleeve, minimal lymphedema.     ECOG PS  0    Review of Systems:   Review of Systems   Constitutional:  Positive for appetite change (decreased). Negative for fatigue (tired at times at night) and unexpected weight change (weight down 4 lbs).   HENT:   Negative for mouth sores.    Eyes:  Positive for eye problems (issues with adjusting at night with transitioning from light to dark.  States like neon light and one episode of gray.  Lasts a cople of seconds and then goes away.  Notices more now).   Respiratory:  Negative for cough and shortness of breath.    Cardiovascular:  Positive for leg swelling (at ankles at the end of the day.) and palpitations (when lying down.). Negative for chest pain.   Gastrointestinal:  Positive for abdominal pain (cramps when has diarrhea and gassy), diarrhea (soft not diarrhea), nausea (with full dose of imodium) and vomiting.   Endocrine: Positive for hot flashes.   Genitourinary:  Positive for frequency. Negative for menstrual problem.         Urgency, states bubbles.  No odor or change in color.   Musculoskeletal:  Positive for arthralgias (states since a few weeks ago, specially the R knee, or B hips.  Better with activity.) and neck pain. Negative for back pain.   Skin:  Positive for rash (at the neck after wearing a sweater, improved with OTC hydrocortisone, it came back and resolved.).   Neurological:  Positive for dizziness, headaches  (as above.), light-headedness and numbness (as above). Negative for extremity weakness.        Feeling out of body with lying down and now improved spacing the drugs.   Hematological:  Negative for adenopathy. Bruises/bleeds easily (states in the legs and from zoladex inj).   Psychiatric/Behavioral:  Positive for sleep disturbance (due to hot flashes).          Current Outpatient Medications   Medication Sig Dispense Refill    prochlorperazine (COMPAZINE) 10 mg tablet Take 1 tablet (10 mg total) by mouth every 6 (six) hours as needed for Nausea. 15 tablet 0    camizestrant 75 mg Oral Tab (OTIS-2 STUDY DRUG) Take 1 tablet (75 mg total) by mouth daily. Take at the same time each day. 96 tablet 0    loperamide 2 MG Oral Cap Take 1 capsule (2 mg total) by mouth 4 (four) times daily as needed for Diarrhea.      camizestrant 75 mg Oral Tab (OTIS-2 STUDY DRUG) Take 2 tablets (150 mg total) by mouth daily. Take at the same time each day.      GOSERELIN ACETATE SC Inject into the skin. Once a month      Abemaciclib 100 MG Oral Tab Take 1 tablet (100 mg total) by mouth 2 (two) times daily. may take with or without food 60 tablet 11    lidocaine-prilocaine 2.5-2.5 % External Cream Apply to site 1 hour prior to port a cath needle insertion 1 each 1     Allergies:   No Known Allergies    Past Medical History:    Breast cancer in female (HCC)    Right breast lymph node cancer    High blood pressure    Hx of motion sickness    Hypertension    Solitary kidney, congenital     Past Surgical History:   Procedure Laterality Date    Appendectomy  01/01/2007    Needle biopsy right Right 10/09/2023    Treat ectopic preg,rmv tube/ovary Left 01/01/2014    Treat ectopic preg,rmv tube/ovary Left 01/01/2015     Social History     Socioeconomic History    Marital status:    Tobacco Use    Smoking status: Never    Smokeless tobacco: Never   Vaping Use    Vaping status: Never Used   Substance and Sexual Activity    Alcohol use: Not  Currently     Comment: 2 -3 drinks per week    Drug use: No     Social Drivers of Health     Food Insecurity: No Food Insecurity (4/30/2024)    Food Insecurity     Food Insecurity: Never true   Transportation Needs: No Transportation Needs (4/30/2024)    Transportation Needs     Lack of Transportation: No   Housing Stability: Low Risk  (4/30/2024)    Housing Stability     Housing Instability: No       Family History   Problem Relation Age of Onset    Other (Parkinson's Disease) Father     Prostate Cancer Maternal Grandfather 70    Pancreatic Cancer Paternal Grandmother 80    Cancer Paternal Aunt 80        breast cancer    Pancreatic Cancer Maternal Uncle 60    Pancreatic Cancer Paternal Great-Grandfather     Cancer Maternal Great-Grandmother         gastric ca    Cancer Paternal Uncle 60        brain cancer         PHYSICAL EXAM:    /84 (BP Location: Left arm, Patient Position: Sitting, Cuff Size: adult)   Pulse 76   Temp 98.2 °F (36.8 °C) (Oral)   Resp 18   Ht 1.651 m (5' 5\")   Wt 71.6 kg (157 lb 12.8 oz)   SpO2 100%   BMI 26.26 kg/m²   Wt Readings from Last 6 Encounters:   12/24/24 71.6 kg (157 lb 12.8 oz)   12/17/24 70.3 kg (155 lb)   12/13/24 70.3 kg (155 lb)   11/26/24 70.2 kg (154 lb 12.8 oz)   11/20/24 70.8 kg (156 lb)   11/18/24 70.8 kg (156 lb)     Physical Exam  General: Patient is alert, not in acute distress.   HEENT: EOMs intact. PERRL. Oropharynx is clear.   Neck: No JVD. No palpable lymphadenopathy. Neck is supple.  Chest: Clear to auscultation. B mastectomies well healed.  Wearing compression sleeve. No lymphedema noted.    Heart: Regular rate and rhythm.   Abdomen: Soft, non tender with good bowel sounds.  Extremities: No edema.  Neurological: Grossly intact.   Lymphatics: There is no palpable lymphadenopathy throughout in the cervical, supraclavicular, axillary, or inguinal regions.  Psych/Depression: nl  Neck, supple      ASSESSMENT/PLAN:     1. Malignant neoplasm of upper-outer  quadrant of right breast in female, estrogen receptor positive (HCC)    2. Medication monitoring encounter    3. Chemotherapy management, encounter for    4. Anemia associated with chemotherapy    5. Other neutropenia (HCC)         Cancer Staging   Malignant neoplasm of upper-outer quadrant of right breast in female, estrogen receptor positive (HCC)  Staging form: Breast, AJCC 8th Edition  - Clinical stage from 10/12/2023: Stage IIA (cT3, cN1(f), cM0, G2, ER+, DC+, HER2-) - Signed by Halie Calle MD on 5/8/2024  - Pathologic stage from 5/8/2024: ypT3, pN2a, cM0, G2, ER+, DC+, HER2- - Signed by Halie Calle MD on 5/8/2024    Patient has completed staging work-up.  Thus far no evidence of metastatic disease.  Findings in the liver evaluated with ultrasound, and the one side is a hemangioma, the other likely a cyst, but a question of metastatic disease and PET scan has been ordered.  I discussed with the patient that most likely this is going to be a cyst.    Discussed that she still has early stage disease, and that her staging has not changed despite the size of the primary tumor.    Given extensive disease in the breast and komal involvement, she will benefit from neoadjuvant chemotherapy, which may help with surgical outcomes by shrinking some of the primary tumor and of the lymph nodes.  I discussed with the patient that the primary tumor will not likely decrease in size significantly to make her a candidate for breast conservation.  I did discuss with the patient that she will need a mastectomy and likely axillary lymph node dissection.  Given the size of the primary tumor, she is a candidate for postmastectomy radiation therapy plus minus radiation to the komal basins pending on surgical management of the axilla.  I discussed with the patient that once she completes treatment with radiation, she will then be initiated on adjuvant endocrine therapy, given the size of the tumor and number of positive lymph  nodes, she also will be a candidate for 2 years of endovascular in the initial 2 years of adjuvant endocrine therapy.  Discussed that adjuvant endocrine therapy total duration will be no less than 5 years but given her high risk features, likely up to 8 to 10 years.  We will also discuss options for ovarian function suppression after completion of chemotherapy.    Patient inquired regarding contralateral prophylactic mastectomy.  I discussed with the patient that we should await results of her genetic testing.  If the genetic testing is positive for a deleterious mutation which will increase her lifetime second primary breast cancer, then she should proceed with prophylactic contralateral mastectomy.  However, if this is negative, discussed with the patient that there is no benefit for proceeding with contralateral mastectomy, as it will not change her disease-free or overall survival.    Discussed with the patient that while she is receiving neoadjuvant treatment, she can have the evaluation by plastic surgery so that when she completes her neoadjuvant course of therapy, she will be able to have her surgery coordinated with Dr. Hutson and a plastic surgeon    She completed course of neoadjuvant DD AC x 4 cycles followed by weekly Taxol x12 weeks    Status post bilateral mastectomy with right-sided axillary lymph node dissection on 4/29/2024.    She completed adjuvant radiation therapy on 8/7/2024.    Discussed with the patient that it is not uncommon for patients with ER/GA positive breast cancer that have neoadjuvant treatment not to have a robust response from therapy.  I did discuss that for her treatment, in terms of systemic disease, after completion of radiation therapy, we will proceed with ovarian function suppression, which can be initiated at this point, this will then be followed by adjuvant endocrine therapy with 2 years of Abemaciclib.  In addition, patient is also a candidate for the [place] 2 clinical  trial which is comparing standard of care adjuvant endocrine therapy with Abemaciclib versus camizestrant which is an oral SERD.    Patient received first dose of goserelin 3.6 mg on 6/3/2024 and last on 7/30/2024.    Arthralgias:  secondary to goserelin.  Continue to increase activity.    -pt report minimal arthralgias    OTIS-2  Randomized to Arm B with Camizestrant started 8/30/24. No Eye issues   On adjuvant hormonal therapy and abemaciclib.       Follow up per trial protocol.     Continue camizestrant.     Abemaciclib HELD due to GI symptoms requiring IV hydration and IV nausea meds.   Dose reduced to 100 mg   Currently C4 D 4 of 100 mg dose twice daily -- continue tolerating lower dose better.    C4 11/17/24-12/14/24, C5 12/15/24-  Hold Abemacicilv for ANC 0.89, requested repeat labs in 1 week to resume but patient traveling to [place] agreed to check lab 1/6/25 with follow up to resume    Cytopenias:  secondary to abemaciclib and stable.  Neutropenia ANC 0.89,     Headaches: Recommend tylenol prn.  Avoid NSAIDS due to renal issues.  Started with goserelin, which is reported to cause HA and abemaciclib can also cause HA.   Denies headache    N/V/D:  secondary to abemaciclib.    Reports no diarrhea today. Prn Imodium and compazine.    Left neck pain with radiculopathy: MRI of the neck ordered.    Continue to monitor creat levels- pt drinking fluids without issue     If worsening symptoms next cycle may dose reduce to 50 mg   -decreased pain    Call if GI symptoms resume. PER STUDY.  No ondansetron       Lymphedema:  HEP and compression.           Follow up in 4 weeks         MDM high risk.    CRYS [facility]      No orders of the defined types were placed in this encounter.      Results From Past 48 Hours:  Recent Results (from the past 48 hours)   CBC W/DIFF [E]    Collection Time: 12/24/24  9:29 AM   Result Value Ref Range    WBC 1.5 (L) 4.0 - 11.0 x10(3) uL    RBC 2.78 (L) 3.80 - 5.30 x10(6)uL    HGB  9.5 (L) 12.0 - 16.0 g/dL    HCT 27.6 (L) 35.0 - 48.0 %    MCV 99.3 80.0 - 100.0 fL    MCH 34.2 (H) 26.0 - 34.0 pg    MCHC 34.4 31.0 - 37.0 g/dL    RDW-SD 48.2 (H) 35.1 - 46.3 fL    RDW 13.3 11.0 - 15.0 %    .0 150.0 - 450.0 10(3)uL    Neutrophil Absolute Prelim 0.89 (L) 1.50 - 7.70 x10 (3) uL    Neutrophil Absolute 0.89 (L) 1.50 - 7.70 x10(3) uL    Lymphocyte Absolute 0.28 (L) 1.00 - 4.00 x10(3) uL    Monocyte Absolute 0.19 0.10 - 1.00 x10(3) uL    Eosinophil Absolute 0.05 0.00 - 0.70 x10(3) uL    Basophil Absolute 0.04 0.00 - 0.20 x10(3) uL    Immature Granulocyte Absolute 0.01 0.00 - 1.00 x10(3) uL    Neutrophil % 61.0 %    Lymphocyte % 19.2 %    Monocyte % 13.0 %    Eosinophil % 3.4 %    Basophil % 2.7 %    Immature Granulocyte % 0.7 %   COMP METABOLIC PANEL [E]    Collection Time: 12/24/24  9:29 AM   Result Value Ref Range    Glucose 90 70 - 99 mg/dL    Sodium 142 136 - 145 mmol/L    Potassium 3.7 3.5 - 5.1 mmol/L    Chloride 110 98 - 112 mmol/L    CO2 24.0 21.0 - 32.0 mmol/L    Anion Gap 8 0 - 18 mmol/L    BUN 12 9 - 23 mg/dL    Creatinine 1.24 (H) 0.55 - 1.02 mg/dL    BUN/CREA Ratio 9.7 (L) 10.0 - 20.0    Calcium, Total 9.6 8.7 - 10.4 mg/dL    Calculated Osmolality 293 275 - 295 mOsm/kg    eGFR-Cr 56 (L) >=60 mL/min/1.73m2    ALT 13 10 - 49 U/L    AST 17 <34 U/L    Alkaline Phosphatase 51 37 - 98 U/L    Bilirubin, Total 0.4 0.3 - 1.2 mg/dL    Total Protein 6.5 5.7 - 8.2 g/dL    Albumin 4.3 3.2 - 4.8 g/dL    Globulin  2.2 2.0 - 3.5 g/dL    A/G Ratio 2.0 1.0 - 2.0    Patient Fasting for CMP? No    MAGNESIUM [E]    Collection Time: 12/24/24  9:29 AM   Result Value Ref Range    Magnesium 1.8 1.6 - 2.6 mg/dL       Imaging & Referrals:  None

## 2024-12-24 NOTE — PATIENT INSTRUCTIONS
Hold Verzenio starting 12/24/24 evening until lab recheck on 1/6/25.  Call if temp 100.4 F or higher, if have increased cold symptoms cough, please go to urgent care.

## 2025-01-06 ENCOUNTER — NURSE ONLY (OUTPATIENT)
Age: 40
End: 2025-01-06
Attending: INTERNAL MEDICINE
Payer: COMMERCIAL

## 2025-01-06 ENCOUNTER — OFFICE VISIT (OUTPATIENT)
Age: 40
End: 2025-01-06
Attending: INTERNAL MEDICINE
Payer: COMMERCIAL

## 2025-01-06 VITALS
HEART RATE: 76 BPM | TEMPERATURE: 98 F | SYSTOLIC BLOOD PRESSURE: 134 MMHG | HEIGHT: 65 IN | WEIGHT: 161 LBS | DIASTOLIC BLOOD PRESSURE: 75 MMHG | RESPIRATION RATE: 18 BRPM | OXYGEN SATURATION: 99 % | BODY MASS INDEX: 26.82 KG/M2

## 2025-01-06 DIAGNOSIS — C50.411 MALIGNANT NEOPLASM OF UPPER-OUTER QUADRANT OF RIGHT BREAST IN FEMALE, ESTROGEN RECEPTOR POSITIVE (HCC): Primary | ICD-10-CM

## 2025-01-06 DIAGNOSIS — Z51.81 MEDICATION MONITORING ENCOUNTER: ICD-10-CM

## 2025-01-06 DIAGNOSIS — Z17.0 MALIGNANT NEOPLASM OF UPPER-OUTER QUADRANT OF RIGHT BREAST IN FEMALE, ESTROGEN RECEPTOR POSITIVE (HCC): Primary | ICD-10-CM

## 2025-01-06 DIAGNOSIS — C50.411 MALIGNANT NEOPLASM OF UPPER-OUTER QUADRANT OF RIGHT BREAST IN FEMALE, ESTROGEN RECEPTOR POSITIVE (HCC): ICD-10-CM

## 2025-01-06 DIAGNOSIS — Z17.0 MALIGNANT NEOPLASM OF UPPER-OUTER QUADRANT OF RIGHT BREAST IN FEMALE, ESTROGEN RECEPTOR POSITIVE (HCC): ICD-10-CM

## 2025-01-06 LAB
ALBUMIN SERPL-MCNC: 4.1 G/DL (ref 3.2–4.8)
ALBUMIN/GLOB SERPL: 1.6 {RATIO} (ref 1–2)
ALP LIVER SERPL-CCNC: 70 U/L
ALT SERPL-CCNC: 86 U/L
ANION GAP SERPL CALC-SCNC: 9 MMOL/L (ref 0–18)
AST SERPL-CCNC: 70 U/L (ref ?–34)
BASOPHILS # BLD AUTO: 0.08 X10(3) UL (ref 0–0.2)
BASOPHILS NFR BLD AUTO: 1.4 %
BILIRUB SERPL-MCNC: 0.3 MG/DL (ref 0.3–1.2)
BUN BLD-MCNC: 18 MG/DL (ref 9–23)
BUN/CREAT SERPL: 15.1 (ref 10–20)
CALCIUM BLD-MCNC: 9.9 MG/DL (ref 8.7–10.4)
CHLORIDE SERPL-SCNC: 108 MMOL/L (ref 98–112)
CO2 SERPL-SCNC: 28 MMOL/L (ref 21–32)
CREAT BLD-MCNC: 1.19 MG/DL
DEPRECATED RDW RBC AUTO: 45.6 FL (ref 35.1–46.3)
EGFRCR SERPLBLD CKD-EPI 2021: 59 ML/MIN/1.73M2 (ref 60–?)
EOSINOPHIL # BLD AUTO: 0.13 X10(3) UL (ref 0–0.7)
EOSINOPHIL NFR BLD AUTO: 2.2 %
ERYTHROCYTE [DISTWIDTH] IN BLOOD BY AUTOMATED COUNT: 12.4 % (ref 11–15)
GLOBULIN PLAS-MCNC: 2.6 G/DL (ref 2–3.5)
GLUCOSE BLD-MCNC: 74 MG/DL (ref 70–99)
HCT VFR BLD AUTO: 29.5 %
HGB BLD-MCNC: 10 G/DL
IMM GRANULOCYTES # BLD AUTO: 0.03 X10(3) UL (ref 0–1)
IMM GRANULOCYTES NFR BLD: 0.5 %
LYMPHOCYTES # BLD AUTO: 0.85 X10(3) UL (ref 1–4)
LYMPHOCYTES NFR BLD AUTO: 14.7 %
MAGNESIUM SERPL-MCNC: 1.8 MG/DL (ref 1.6–2.6)
MCH RBC QN AUTO: 34.6 PG (ref 26–34)
MCHC RBC AUTO-ENTMCNC: 33.9 G/DL (ref 31–37)
MCV RBC AUTO: 102.1 FL
MONOCYTES # BLD AUTO: 0.81 X10(3) UL (ref 0.1–1)
MONOCYTES NFR BLD AUTO: 14 %
NEUTROPHILS # BLD AUTO: 3.88 X10 (3) UL (ref 1.5–7.7)
NEUTROPHILS # BLD AUTO: 3.88 X10(3) UL (ref 1.5–7.7)
NEUTROPHILS NFR BLD AUTO: 67.2 %
OSMOLALITY SERPL CALC.SUM OF ELEC: 301 MOSM/KG (ref 275–295)
PLATELET # BLD AUTO: 283 10(3)UL (ref 150–450)
POTASSIUM SERPL-SCNC: 4.2 MMOL/L (ref 3.5–5.1)
PROT SERPL-MCNC: 6.7 G/DL (ref 5.7–8.2)
RBC # BLD AUTO: 2.89 X10(6)UL
SODIUM SERPL-SCNC: 145 MMOL/L (ref 136–145)
WBC # BLD AUTO: 5.8 X10(3) UL (ref 4–11)

## 2025-01-06 NOTE — PROGRESS NOTES
HPI   Jocelyn Garza is a 39 year old female for evaluation of   Encounter Diagnoses   Name Primary?    Malignant neoplasm of upper-outer quadrant of right breast in female, estrogen receptor positive (HCC) Yes    Medication monitoring encounter        Patient completed course of neoadjuvant dose dense Adriamycin Cytoxan followed by weekly Taxol.  Last dose of treatment on 3/22/2024.    Patient then underwent bilateral mastectomies on 4/29/2024 with a right axillary lymph node dissection.    Patient completed radiation on 8/7/2024. Having lymphedema therapy.  Feels hardness on the R axilla.      LMP November of 2023.    On goserelin since 6/3/24. Having hot flashes.  States a couple of minutes and then improves.  A few times a day, not as much end of the month.      Has gone back to work.  Feels that will be able to start working out again.    Pt screened for OTIS-2 trial.  Randomized to Arm B with Camizestrant started 8/30/24.    She is currently taking study drug camizestrant at hs. Taking daily.     Started C1D1 8/24/24 abemaciclib.    Dose adjustment to 100 mg daily on 9/22/24.    Freq formed BM     Completed cycle 4 abemaciclib 100 mg.   Soft formed stool, with urgency.  Not all the time.  Much improved.  If eats the wrong things like grease, spicy and dairy, gets diarrhea.      To  ED twice for HA with n/v. States that both episodes, she had diarrhea first, then HA, then N/V.  More  the second time.    Each episode occurred when she was sitting in the toilet.    Brain imaging x2 negative. Will follow with neurology.     She was discontinued off the lisinopril and is following with nephrology.  She is having a renal US.          Patient on Abemaciclib 100 mg bid, and camizestrant 75 mg daily.     Hold  abemaciclib x 1 week for anc 0.89 and URI symptoms. Here today for evaluation for next cycle C5. No fevers since 12/23.  Had been held x 2 weeks for RSV and Zpack. Still coughing, getting better.  Neutropenia resolved.     Continues right arm sleeve, minimal lymphedema.         ECOG PS  0    Review of Systems:   Review of Systems   Constitutional:  Positive for appetite change (improved). Negative for fatigue (tired at times at night) and unexpected weight change (weight up 4 lbs).   HENT:   Negative for mouth sores.    Eyes:  Positive for eye problems (issues with adjusting at night with transitioning from light to dark.  States like neon light and one episode of gray.  Lasts a cople of seconds and then goes away. no new complaints).   Respiratory:  Positive for cough. Negative for shortness of breath.    Cardiovascular:  Negative for chest pain, leg swelling and palpitations.   Gastrointestinal:  Negative for abdominal pain, diarrhea (soft not diarrhea), nausea and vomiting.   Endocrine: Positive for hot flashes.   Genitourinary:  Positive for frequency. Negative for menstrual problem.         Urgency, states bubbles.  No odor or change in color.   Musculoskeletal:  Positive for arthralgias (states since a few weeks ago, specially the R knee, or B hips.  Better with activity.) and neck pain. Negative for back pain.   Skin:  Negative for rash.   Neurological:  Positive for headaches (as above.) and numbness (as above). Negative for dizziness, extremity weakness and light-headedness.        Feeling out of body with lying down and now improved spacing the drugs.   Hematological:  Negative for adenopathy. Bruises/bleeds easily (states in the legs and from zoladex inj).   Psychiatric/Behavioral:  Positive for sleep disturbance (due to hot flashes).          Current Outpatient Medications   Medication Sig Dispense Refill    prochlorperazine (COMPAZINE) 10 mg tablet Take 1 tablet (10 mg total) by mouth every 6 (six) hours as needed for Nausea. 15 tablet 0    camizestrant 75 mg Oral Tab (OTIS-2 STUDY DRUG) Take 1 tablet (75 mg total) by mouth daily. Take at the same time each day. 96 tablet 0    loperamide 2 MG  Oral Cap Take 1 capsule (2 mg total) by mouth 4 (four) times daily as needed for Diarrhea.      camizestrant 75 mg Oral Tab (OTIS-2 STUDY DRUG) Take 2 tablets (150 mg total) by mouth daily. Take at the same time each day.      GOSERELIN ACETATE SC Inject into the skin. Once a month      Abemaciclib 100 MG Oral Tab Take 1 tablet (100 mg total) by mouth 2 (two) times daily. may take with or without food 60 tablet 11    lidocaine-prilocaine 2.5-2.5 % External Cream Apply to site 1 hour prior to port a cath needle insertion 1 each 1     Allergies:   No Known Allergies    Past Medical History:    Breast cancer in female (HCC)    Right breast lymph node cancer    High blood pressure    Hx of motion sickness    Hypertension    Solitary kidney, congenital     Past Surgical History:   Procedure Laterality Date    Appendectomy  01/01/2007    Needle biopsy right Right 10/09/2023    Treat ectopic preg,rmv tube/ovary Left 01/01/2014    Treat ectopic preg,rmv tube/ovary Left 01/01/2015     Social History     Socioeconomic History    Marital status:    Tobacco Use    Smoking status: Never    Smokeless tobacco: Never   Vaping Use    Vaping status: Never Used   Substance and Sexual Activity    Alcohol use: Not Currently     Comment: 2 -3 drinks per week    Drug use: No     Social Drivers of Health     Food Insecurity: No Food Insecurity (4/30/2024)    Food Insecurity     Food Insecurity: Never true   Transportation Needs: No Transportation Needs (4/30/2024)    Transportation Needs     Lack of Transportation: No   Housing Stability: Low Risk  (4/30/2024)    Housing Stability     Housing Instability: No       Family History   Problem Relation Age of Onset    Other (Parkinson's Disease) Father     Prostate Cancer Maternal Grandfather 70    Pancreatic Cancer Paternal Grandmother 80    Cancer Paternal Aunt 80        breast cancer    Pancreatic Cancer Maternal Uncle 60    Pancreatic Cancer Paternal Great-Grandfather     Cancer  Maternal Great-Grandmother         gastric ca    Cancer Paternal Uncle 60        brain cancer         PHYSICAL EXAM:    /75 (BP Location: Left arm, Patient Position: Sitting, Cuff Size: adult)   Pulse 76   Temp 98.2 °F (36.8 °C) (Oral)   Resp 18   Ht 1.651 m (5' 5\")   Wt 73 kg (161 lb)   SpO2 99%   BMI 26.79 kg/m²   Wt Readings from Last 6 Encounters:   12/24/24 71.6 kg (157 lb 12.8 oz)   12/17/24 70.3 kg (155 lb)   12/13/24 70.3 kg (155 lb)   11/26/24 70.2 kg (154 lb 12.8 oz)   11/20/24 70.8 kg (156 lb)   11/18/24 70.8 kg (156 lb)     Physical Exam  General: Patient is alert, not in acute distress.   HEENT: EOMs intact. PERRL. Oropharynx is clear.   Neck: No JVD. No palpable lymphadenopathy. Neck is supple.  Chest: Clear to auscultation. Still cough. B mastectomies well healed.  Wearing compression sleeve. No lymphedema noted.    Heart: Regular rate and rhythm.   Abdomen: Soft, non tender with good bowel sounds.  Extremities: No edema.  Neurological: Grossly intact.   Lymphatics: There is no palpable lymphadenopathy throughout in the cervical, supraclavicular, axillary, or inguinal regions.  Psych/Depression: nl  Neck, supple      ASSESSMENT/PLAN:     1. Malignant neoplasm of upper-outer quadrant of right breast in female, estrogen receptor positive (HCC)    2. Medication monitoring encounter         Cancer Staging   Malignant neoplasm of upper-outer quadrant of right breast in female, estrogen receptor positive (HCC)  Staging form: Breast, AJCC 8th Edition  - Clinical stage from 10/12/2023: Stage IIA (cT3, cN1(f), cM0, G2, ER+, VA+, HER2-) - Signed by Halie Calle MD on 5/8/2024  - Pathologic stage from 5/8/2024: ypT3, pN2a, cM0, G2, ER+, VA+, HER2- - Signed by Halie Calle MD on 5/8/2024    Patient has completed staging work-up.  Thus far no evidence of metastatic disease.  Findings in the liver evaluated with ultrasound, and the one side is a hemangioma, the other likely a cyst, but a question  of metastatic disease and PET scan has been ordered.  I discussed with the patient that most likely this is going to be a cyst.    Discussed that she still has early stage disease, and that her staging has not changed despite the size of the primary tumor.    Given extensive disease in the breast and komal involvement, she will benefit from neoadjuvant chemotherapy, which may help with surgical outcomes by shrinking some of the primary tumor and of the lymph nodes.  I discussed with the patient that the primary tumor will not likely decrease in size significantly to make her a candidate for breast conservation.  I did discuss with the patient that she will need a mastectomy and likely axillary lymph node dissection.  Given the size of the primary tumor, she is a candidate for postmastectomy radiation therapy plus minus radiation to the komal basins pending on surgical management of the axilla.  I discussed with the patient that once she completes treatment with radiation, she will then be initiated on adjuvant endocrine therapy, given the size of the tumor and number of positive lymph nodes, she also will be a candidate for 2 years of endovascular in the initial 2 years of adjuvant endocrine therapy.  Discussed that adjuvant endocrine therapy total duration will be no less than 5 years but given her high risk features, likely up to 8 to 10 years.  We will also discuss options for ovarian function suppression after completion of chemotherapy.    Patient inquired regarding contralateral prophylactic mastectomy.  I discussed with the patient that we should await results of her genetic testing.  If the genetic testing is positive for a deleterious mutation which will increase her lifetime second primary breast cancer, then she should proceed with prophylactic contralateral mastectomy.  However, if this is negative, discussed with the patient that there is no benefit for proceeding with contralateral mastectomy, as it  will not change her disease-free or overall survival.    Discussed with the patient that while she is receiving neoadjuvant treatment, she can have the evaluation by plastic surgery so that when she completes her neoadjuvant course of therapy, she will be able to have her surgery coordinated with Dr. Hutson and a plastic surgeon    She completed course of neoadjuvant DD AC x 4 cycles followed by weekly Taxol x12 weeks    Status post bilateral mastectomy with right-sided axillary lymph node dissection on 4/29/2024.    She completed adjuvant radiation therapy on 8/7/2024.    Discussed with the patient that it is not uncommon for patients with ER/SD positive breast cancer that have neoadjuvant treatment not to have a robust response from therapy.  I did discuss that for her treatment, in terms of systemic disease, after completion of radiation therapy, we will proceed with ovarian function suppression, which can be initiated at this point, this will then be followed by adjuvant endocrine therapy with 2 years of Abemaciclib.  In addition, patient is also a candidate for the [place] 2 clinical trial which is comparing standard of care adjuvant endocrine therapy with Abemaciclib versus camizestrant which is an oral SERD.    Patient received first dose of goserelin 3.6 mg on 6/3/2024 and last on 7/30/2024.    Arthralgias:  secondary to goserelin.  Continue to increase activity.    -pt report minimal arthralgias    OTIS-2  Randomized to Arm B with Camizestrant started 8/30/24. No Eye issues   On adjuvant hormonal therapy and abemaciclib.       Follow up per trial protocol.     Continue camizestrant.     Abemaciclib HELD due to GI symptoms requiring IV hydration and IV nausea meds.   Dose reduced to 100 mg   Currently completed C4 100 mg dose twice daily -- continue tolerating lower dose better.    C4 11/17/24-12/14/24, C5 12/15/24-  Hold Abemacicilv for ANC 0.89, productive cough  antibiotics    HOLD x 1 more week. Plan  to restart next Monday 1/13/25. Neutropenia resolved.   Cough continue Mucinex and add Robitussin for cough at hs prn    Cytopenias:  secondary to abemaciclib and stable.  Neutropenia resolved    Headaches: Recommend tylenol prn.  Avoid NSAIDS due to renal issues.  Started with goserelin, which is reported to cause HA and abemaciclib can also cause HA.   Denies headache    N/V/D:  secondary to abemaciclib.    Reports no diarrhea today. Prn Imodium and compazine.    Left neck pain with radiculopathy: MRI of the neck ordered.    Continue to monitor creat levels- following nephrology, pt drinking fluids without issue     If worsening symptoms next cycle may dose reduce to 50 mg   -decreased pain    Call if GI symptoms resume. PER STUDY.  No ondansetron       Lymphedema:  HEP and compression.           Follow up in 4 weeks         MDM high risk.          No orders of the defined types were placed in this encounter.      Results From Past 48 Hours:  Recent Results (from the past 48 hours)   CBC W/DIFF [E]    Collection Time: 01/06/25 12:34 PM   Result Value Ref Range    WBC 5.8 4.0 - 11.0 x10(3) uL    RBC 2.89 (L) 3.80 - 5.30 x10(6)uL    HGB 10.0 (L) 12.0 - 16.0 g/dL    HCT 29.5 (L) 35.0 - 48.0 %    .1 (H) 80.0 - 100.0 fL    MCH 34.6 (H) 26.0 - 34.0 pg    MCHC 33.9 31.0 - 37.0 g/dL    RDW-SD 45.6 35.1 - 46.3 fL    RDW 12.4 11.0 - 15.0 %    .0 150.0 - 450.0 10(3)uL    Neutrophil Absolute Prelim 3.88 1.50 - 7.70 x10 (3) uL    Neutrophil Absolute 3.88 1.50 - 7.70 x10(3) uL    Lymphocyte Absolute 0.85 (L) 1.00 - 4.00 x10(3) uL    Monocyte Absolute 0.81 0.10 - 1.00 x10(3) uL    Eosinophil Absolute 0.13 0.00 - 0.70 x10(3) uL    Basophil Absolute 0.08 0.00 - 0.20 x10(3) uL    Immature Granulocyte Absolute 0.03 0.00 - 1.00 x10(3) uL    Neutrophil % 67.2 %    Lymphocyte % 14.7 %    Monocyte % 14.0 %    Eosinophil % 2.2 %    Basophil % 1.4 %    Immature Granulocyte % 0.5 %   COMP METABOLIC PANEL [E]    Collection  Time: 01/06/25 12:34 PM   Result Value Ref Range    Glucose 74 70 - 99 mg/dL    Sodium 145 136 - 145 mmol/L    Potassium 4.2 3.5 - 5.1 mmol/L    Chloride 108 98 - 112 mmol/L    CO2 28.0 21.0 - 32.0 mmol/L    Anion Gap 9 0 - 18 mmol/L    BUN 18 9 - 23 mg/dL    Creatinine 1.19 (H) 0.55 - 1.02 mg/dL    BUN/CREA Ratio 15.1 10.0 - 20.0    Calcium, Total 9.9 8.7 - 10.4 mg/dL    Calculated Osmolality 301 (H) 275 - 295 mOsm/kg    eGFR-Cr 59 (L) >=60 mL/min/1.73m2    ALT 86 (H) 10 - 49 U/L    AST 70 (H) <34 U/L    Alkaline Phosphatase 70 37 - 98 U/L    Bilirubin, Total 0.3 0.3 - 1.2 mg/dL    Total Protein 6.7 5.7 - 8.2 g/dL    Albumin 4.1 3.2 - 4.8 g/dL    Globulin  2.6 2.0 - 3.5 g/dL    A/G Ratio 1.6 1.0 - 2.0    Patient Fasting for CMP? Patient not present    MAGNESIUM [E]    Collection Time: 01/06/25 12:34 PM   Result Value Ref Range    Magnesium 1.8 1.6 - 2.6 mg/dL         Imaging & Referrals:  None

## 2025-01-14 ENCOUNTER — RESEARCH ENCOUNTER (OUTPATIENT)
Age: 40
End: 2025-01-14

## 2025-01-14 NOTE — PROGRESS NOTES
RESEARCH SHIPPING NOTE    M4D1 pre-dose PK frozen specimen collected on 11/26/24 and maintained in -80 freezer until shipment per protocol shipped frozen today via UPS on dry ice to [facility] (tracking #: [id number]).

## 2025-01-15 SDOH — ECONOMIC STABILITY: FOOD INSECURITY: WITHIN THE PAST 12 MONTHS, THE FOOD YOU BOUGHT JUST DIDN'T LAST AND YOU DIDN'T HAVE MONEY TO GET MORE.: NEVER TRUE

## 2025-01-15 SDOH — ECONOMIC STABILITY: HOUSING INSECURITY: DO YOU HAVE PROBLEMS WITH ANY OF THE FOLLOWING?: NONE OF THE ABOVE

## 2025-01-15 SDOH — ECONOMIC STABILITY: GENERAL: WOULD YOU LIKE HELP WITH ANY OF THE FOLLOWING NEEDS?: I DON'T WANT HELP WITH ANY OF THESE

## 2025-01-15 SDOH — ECONOMIC STABILITY: TRANSPORTATION INSECURITY
IN THE PAST 12 MONTHS, HAS LACK OF RELIABLE TRANSPORTATION KEPT YOU FROM MEDICAL APPOINTMENTS, MEETINGS, WORK OR FROM GETTING THINGS NEEDED FOR DAILY LIVING?: NO

## 2025-01-15 SDOH — ECONOMIC STABILITY: HOUSING INSECURITY: WHAT IS YOUR LIVING SITUATION TODAY?: I HAVE A STEADY PLACE TO LIVE

## 2025-01-15 ASSESSMENT — SOCIAL DETERMINANTS OF HEALTH (SDOH): IN THE PAST 12 MONTHS, HAS THE ELECTRIC, GAS, OIL, OR WATER COMPANY THREATENED TO SHUT OFF SERVICE IN YOUR HOME?: NO

## 2025-01-18 ENCOUNTER — PATIENT MESSAGE (OUTPATIENT)
Dept: NEPHROLOGY | Facility: CLINIC | Age: 40
End: 2025-01-18

## 2025-01-20 NOTE — TELEPHONE ENCOUNTER
Patient contacting office for standing order-     TE test result message 12/18/24- Dr. Patel placed written order for patient labs to be repeated as standing order: cbc, renal panel, urine for microalbumin and urinalysis as soon as January 2025.    signed written standing orders that were pended on 12/19/24 by RN

## 2025-01-20 NOTE — TELEPHONE ENCOUNTER
Patient contacting office for standing order- signed written standing orders that were pended on 12/19/24 and not signed.

## 2025-01-21 ENCOUNTER — APPOINTMENT (OUTPATIENT)
Dept: OBGYN | Age: 40
End: 2025-01-21

## 2025-01-21 VITALS
BODY MASS INDEX: 25.16 KG/M2 | TEMPERATURE: 98.3 F | HEIGHT: 66 IN | WEIGHT: 156.53 LBS | SYSTOLIC BLOOD PRESSURE: 134 MMHG | HEART RATE: 86 BPM | OXYGEN SATURATION: 99 % | DIASTOLIC BLOOD PRESSURE: 76 MMHG

## 2025-01-21 DIAGNOSIS — Z12.31 ENCOUNTER FOR SCREENING MAMMOGRAM FOR MALIGNANT NEOPLASM OF BREAST: ICD-10-CM

## 2025-01-21 DIAGNOSIS — N94.10 DYSPAREUNIA, FEMALE: ICD-10-CM

## 2025-01-21 DIAGNOSIS — Z01.419 ROUTINE GYNECOLOGICAL EXAMINATION: Primary | ICD-10-CM

## 2025-01-21 PROBLEM — C50.919 BREAST CANCER  (CMD): Status: ACTIVE | Noted: 2025-01-21

## 2025-01-21 PROBLEM — N64.4 BREAST PAIN: Status: RESOLVED | Noted: 2022-11-11 | Resolved: 2025-01-21

## 2025-01-21 PROCEDURE — 99396 PREV VISIT EST AGE 40-64: CPT | Performed by: OBSTETRICS & GYNECOLOGY

## 2025-01-21 RX ORDER — ABEMACICLIB 100 MG/1
TABLET ORAL
COMMUNITY
Start: 2024-09-13

## 2025-01-21 RX ORDER — LOPERAMIDE HYDROCHLORIDE 2 MG/1
2 CAPSULE ORAL
COMMUNITY

## 2025-01-21 RX ORDER — PROCHLORPERAZINE MALEATE 10 MG
10 TABLET ORAL
COMMUNITY
Start: 2024-11-18

## 2025-01-21 RX ORDER — LIDOCAINE/PRILOCAINE 2.5 %-2.5%
CREAM (GRAM) TOPICAL
COMMUNITY
Start: 2025-01-06

## 2025-01-21 ASSESSMENT — ENCOUNTER SYMPTOMS
ALLERGIC/IMMUNOLOGIC NEGATIVE: 1
CONSTITUTIONAL NEGATIVE: 1

## 2025-01-21 ASSESSMENT — PATIENT HEALTH QUESTIONNAIRE - PHQ9
CLINICAL INTERPRETATION OF PHQ2 SCORE: NO FURTHER SCREENING NEEDED
SUM OF ALL RESPONSES TO PHQ9 QUESTIONS 1 AND 2: 0
SUM OF ALL RESPONSES TO PHQ9 QUESTIONS 1 AND 2: 0
1. LITTLE INTEREST OR PLEASURE IN DOING THINGS: NOT AT ALL
2. FEELING DOWN, DEPRESSED OR HOPELESS: NOT AT ALL

## 2025-01-23 ENCOUNTER — APPOINTMENT (OUTPATIENT)
Age: 40
End: 2025-01-23
Attending: NURSE PRACTITIONER
Payer: COMMERCIAL

## 2025-01-28 ENCOUNTER — NURSE ONLY (OUTPATIENT)
Age: 40
End: 2025-01-28
Attending: INTERNAL MEDICINE
Payer: COMMERCIAL

## 2025-01-28 ENCOUNTER — OFFICE VISIT (OUTPATIENT)
Age: 40
End: 2025-01-28
Attending: INTERNAL MEDICINE
Payer: COMMERCIAL

## 2025-01-28 VITALS
BODY MASS INDEX: 25.83 KG/M2 | SYSTOLIC BLOOD PRESSURE: 137 MMHG | TEMPERATURE: 98 F | OXYGEN SATURATION: 100 % | RESPIRATION RATE: 16 BRPM | HEART RATE: 64 BPM | DIASTOLIC BLOOD PRESSURE: 87 MMHG | WEIGHT: 155 LBS | HEIGHT: 65 IN

## 2025-01-28 DIAGNOSIS — C50.411 MALIGNANT NEOPLASM OF UPPER-OUTER QUADRANT OF RIGHT BREAST IN FEMALE, ESTROGEN RECEPTOR POSITIVE (HCC): Primary | ICD-10-CM

## 2025-01-28 DIAGNOSIS — Z17.0 MALIGNANT NEOPLASM OF UPPER-OUTER QUADRANT OF RIGHT BREAST IN FEMALE, ESTROGEN RECEPTOR POSITIVE (HCC): Primary | ICD-10-CM

## 2025-01-28 DIAGNOSIS — Z51.81 MEDICATION MONITORING ENCOUNTER: ICD-10-CM

## 2025-01-28 LAB
ALBUMIN SERPL-MCNC: 4.3 G/DL (ref 3.2–4.8)
ALBUMIN/GLOB SERPL: 1.8 {RATIO} (ref 1–2)
ALP LIVER SERPL-CCNC: 69 U/L
ALT SERPL-CCNC: 15 U/L
ANION GAP SERPL CALC-SCNC: 9 MMOL/L (ref 0–18)
AST SERPL-CCNC: 21 U/L (ref ?–34)
BASOPHILS # BLD AUTO: 0.03 X10(3) UL (ref 0–0.2)
BASOPHILS NFR BLD AUTO: 0.7 %
BILIRUB SERPL-MCNC: 0.3 MG/DL (ref 0.3–1.2)
BUN BLD-MCNC: 20 MG/DL (ref 9–23)
BUN/CREAT SERPL: 14.5 (ref 10–20)
CALCIUM BLD-MCNC: 10 MG/DL (ref 8.7–10.4)
CHLORIDE SERPL-SCNC: 105 MMOL/L (ref 98–112)
CO2 SERPL-SCNC: 27 MMOL/L (ref 21–32)
CREAT BLD-MCNC: 1.38 MG/DL
DEPRECATED RDW RBC AUTO: 38.6 FL (ref 35.1–46.3)
EGFRCR SERPLBLD CKD-EPI 2021: 49 ML/MIN/1.73M2 (ref 60–?)
EOSINOPHIL # BLD AUTO: 0.28 X10(3) UL (ref 0–0.7)
EOSINOPHIL NFR BLD AUTO: 6.5 %
ERYTHROCYTE [DISTWIDTH] IN BLOOD BY AUTOMATED COUNT: 10.8 % (ref 11–15)
FASTING STATUS PATIENT QL REPORTED: NO
GLOBULIN PLAS-MCNC: 2.4 G/DL (ref 2–3.5)
GLUCOSE BLD-MCNC: 102 MG/DL (ref 70–99)
HCT VFR BLD AUTO: 29.9 %
HGB BLD-MCNC: 10.4 G/DL
IMM GRANULOCYTES # BLD AUTO: 0.01 X10(3) UL (ref 0–1)
IMM GRANULOCYTES NFR BLD: 0.2 %
LYMPHOCYTES # BLD AUTO: 1.07 X10(3) UL (ref 1–4)
LYMPHOCYTES NFR BLD AUTO: 24.7 %
MAGNESIUM SERPL-MCNC: 2 MG/DL (ref 1.6–2.6)
MCH RBC QN AUTO: 33.8 PG (ref 26–34)
MCHC RBC AUTO-ENTMCNC: 34.8 G/DL (ref 31–37)
MCV RBC AUTO: 97.1 FL
MONOCYTES # BLD AUTO: 0.26 X10(3) UL (ref 0.1–1)
MONOCYTES NFR BLD AUTO: 6 %
NEUTROPHILS # BLD AUTO: 2.68 X10 (3) UL (ref 1.5–7.7)
NEUTROPHILS # BLD AUTO: 2.68 X10(3) UL (ref 1.5–7.7)
NEUTROPHILS NFR BLD AUTO: 61.9 %
OSMOLALITY SERPL CALC.SUM OF ELEC: 295 MOSM/KG (ref 275–295)
PLATELET # BLD AUTO: 176 10(3)UL (ref 150–450)
POTASSIUM SERPL-SCNC: 3.7 MMOL/L (ref 3.5–5.1)
PROT SERPL-MCNC: 6.7 G/DL (ref 5.7–8.2)
RBC # BLD AUTO: 3.08 X10(6)UL
SODIUM SERPL-SCNC: 141 MMOL/L (ref 136–145)
WBC # BLD AUTO: 4.3 X10(3) UL (ref 4–11)

## 2025-01-28 RX ORDER — SODIUM CHLORIDE 9 MG/ML
10 INJECTION, SOLUTION INTRAMUSCULAR; INTRAVENOUS; SUBCUTANEOUS ONCE
OUTPATIENT
Start: 2025-02-18

## 2025-01-28 NOTE — PROGRESS NOTES
HPI   Jocelyn Garza is a 39 year old female for evaluation of   Encounter Diagnoses   Name Primary?    Malignant neoplasm of upper-outer quadrant of right breast in female, estrogen receptor positive (HCC) Yes    Medication monitoring encounter        Patient completed course of neoadjuvant dose dense Adriamycin Cytoxan followed by weekly Taxol.  Last dose of treatment on 3/22/2024.    Patient then underwent bilateral mastectomies on 4/29/2024 with a right axillary lymph node dissection.    Patient completed radiation on 8/7/2024. Having lymphedema therapy.  Feels hardness on the R axilla.      LMP November of 2023.    On goserelin since 6/3/24. Having hot flashes.  States a couple of minutes and then improves.  A few times a day, not as much end of the month.      Has gone back to work.  Feels that will be able to start working out again.    Pt screened for OTIS-2 trial.  Randomized to Arm B with Camizestrant started 8/30/24.    She is currently taking study drug camizestrant at hs. Taking daily.     Started C1D1 8/24/24 abemaciclib.    Dose adjustment to 100 mg daily on 9/22/24.    Freq formed BM     Completed cycle 4 abemaciclib 100 mg.   Soft formed stool, with urgency.  Not all the time.  Much improved.  If eats the wrong things like grease, spicy and dairy, gets diarrhea.      To  ED twice for HA with n/v. States that both episodes, she had diarrhea first, then HA, then N/V.  More  the second time.    Each episode occurred when she was sitting in the toilet.    Brain imaging x2 negative. Will follow with neurology.     Mild headache R sided \"pings\" recently, following with neurology.    She was discontinued off the lisinopril and is following with nephrology.  She is having a renal US.          Patient on Abemaciclib 100 mg bid, and camizestrant 75 mg daily.     Hold  abemaciclib x 1 week for anc 0.89 and URI symptoms. No fevers since 12/23.  Had been held x 2 weeks for RSV and Zpack. Still coughing,  getting better. Neutropenia resolved.     Resumed C 6 on 1/13/25.abemaciclib. States feeling well. More diarrhea since starting again. Reminded to use Imodium - takes 1/2 tab as needed.      L sided pain is better. Chest feels different above her incision.    Continues right arm sleeve, minimal lymphedema.         ECOG PS  0    Review of Systems:   Review of Systems   Constitutional:  Positive for appetite change (improved). Negative for fatigue (tired at times at night) and unexpected weight change (weight down 6 lbs, states appettie is good).   HENT:   Negative for mouth sores.    Eyes:  Positive for eye problems (issues with adjusting at night with transitioning from light to dark.  States like neon light and one episode of gray.  Lasts a cople of seconds and then goes away. no new complaints. Better lately).   Respiratory:  Positive for cough. Negative for shortness of breath.    Cardiovascular:  Negative for chest pain, leg swelling and palpitations.   Gastrointestinal:  Negative for abdominal pain, diarrhea (soft not diarrhea), nausea and vomiting.   Endocrine: Positive for hot flashes (less severe).   Genitourinary:  Positive for frequency. Negative for menstrual problem.         Urgency, states bubbles.  No odor or change in color.   Musculoskeletal:  Positive for arthralgias (states since a few weeks ago, specially the R knee, or B hips.  Better with activity.) and neck pain. Negative for back pain.   Skin:  Negative for rash.   Neurological:  Positive for dizziness (when laying down), headaches (as above.) and numbness (as above). Negative for extremity weakness and light-headedness.        Feeling out of body with lying down and now improved spacing the drugs.   Hematological:  Negative for adenopathy. Bruises/bleeds easily (states in the legs and from zoladex inj).   Psychiatric/Behavioral:  Positive for sleep disturbance (due to hot flashes).          Current Outpatient Medications   Medication Sig  Dispense Refill    prochlorperazine (COMPAZINE) 10 mg tablet Take 1 tablet (10 mg total) by mouth every 6 (six) hours as needed for Nausea. 15 tablet 0    camizestrant 75 mg Oral Tab (OTIS-2 STUDY DRUG) Take 1 tablet (75 mg total) by mouth daily. Take at the same time each day. 96 tablet 0    loperamide 2 MG Oral Cap Take 1 capsule (2 mg total) by mouth 4 (four) times daily as needed for Diarrhea.      camizestrant 75 mg Oral Tab (OTIS-2 STUDY DRUG) Take 2 tablets (150 mg total) by mouth daily. Take at the same time each day.      GOSERELIN ACETATE SC Inject into the skin. Once a month      Abemaciclib 100 MG Oral Tab Take 1 tablet (100 mg total) by mouth 2 (two) times daily. may take with or without food 60 tablet 11    lidocaine-prilocaine 2.5-2.5 % External Cream Apply to site 1 hour prior to port a cath needle insertion 1 each 1     Allergies:   No Known Allergies    Past Medical History:    Breast cancer in female (HCC)    Right breast lymph node cancer    High blood pressure    Hx of motion sickness    Hypertension    Solitary kidney, congenital     Past Surgical History:   Procedure Laterality Date    Appendectomy  01/01/2007    Needle biopsy right Right 10/09/2023    Treat ectopic preg,rmv tube/ovary Left 01/01/2014    Treat ectopic preg,rmv tube/ovary Left 01/01/2015     Social History     Socioeconomic History    Marital status:    Tobacco Use    Smoking status: Never    Smokeless tobacco: Never   Vaping Use    Vaping status: Never Used   Substance and Sexual Activity    Alcohol use: Not Currently     Comment: 2 -3 drinks per week    Drug use: No     Social Drivers of Health     Food Insecurity: Low Risk  (1/15/2025)    Received from [facility]    Food Insecurity     Within the past 12 months, you worried that your food would run out before you got money to buy more.  : Never true     Within the past 12 months, the food you bought just didn't last and you didn't have money to get  more. : Never true   Transportation Needs: Not At Risk (1/15/2025)    Received from [facility]    Transportation Needs     In the past 12 months, has lack of reliable transportation kept you from medical appointments, meetings, work or from getting things needed for daily living? : No   Housing Stability: Low Risk  (4/30/2024)    Housing Stability     Housing Instability: No       Family History   Problem Relation Age of Onset    Other (Parkinson's Disease) Father     Prostate Cancer Maternal Grandfather 70    Pancreatic Cancer Paternal Grandmother 80    Cancer Paternal Aunt 80        breast cancer    Pancreatic Cancer Maternal Uncle 60    Pancreatic Cancer Paternal Great-Grandfather     Cancer Maternal Great-Grandmother         gastric ca    Cancer Paternal Uncle 60        brain cancer         PHYSICAL EXAM:    /87 (BP Location: Left arm, Patient Position: Sitting, Cuff Size: adult)   Pulse 64   Temp 98 °F (36.7 °C) (Oral)   Resp 16   Ht 1.651 m (5' 5\")   Wt 70.3 kg (155 lb)   SpO2 100%   BMI 25.79 kg/m²   Wt Readings from Last 6 Encounters:   01/06/25 73 kg (161 lb)   12/24/24 71.6 kg (157 lb 12.8 oz)   12/17/24 70.3 kg (155 lb)   12/13/24 70.3 kg (155 lb)   11/26/24 70.2 kg (154 lb 12.8 oz)   11/20/24 70.8 kg (156 lb)     Physical Exam  General: Patient is alert, not in acute distress.   HEENT: EOMs intact. PERRL. Oropharynx is clear.   Neck: No JVD. No palpable lymphadenopathy. Neck is supple.  Chest: Clear to auscultation. Still cough. B mastectomies well healed.  Wearing compression sleeve. No lymphedema noted.  Slight fullness to R arm and upper back, sleeve in place. L chest wall with no masses identified- will monitor   Heart: Regular rate and rhythm.   Abdomen: Soft, non tender with good bowel sounds.  Extremities: No edema.  Neurological: Grossly intact.   Lymphatics: There is no palpable lymphadenopathy throughout in the cervical, supraclavicular, axillary, or inguinal  regions.  Psych/Depression: nl  Neck, supple      ASSESSMENT/PLAN:     1. Malignant neoplasm of upper-outer quadrant of right breast in female, estrogen receptor positive (HCC)    2. Medication monitoring encounter         Cancer Staging   Malignant neoplasm of upper-outer quadrant of right breast in female, estrogen receptor positive (HCC)  Staging form: Breast, AJCC 8th Edition  - Clinical stage from 10/12/2023: Stage IIA (cT3, cN1(f), cM0, G2, ER+, AL+, HER2-) - Signed by Halie Calle MD on 5/8/2024  - Pathologic stage from 5/8/2024: ypT3, pN2a, cM0, G2, ER+, AL+, HER2- - Signed by Halie Calle MD on 5/8/2024    Patient has completed staging work-up.  Thus far no evidence of metastatic disease.  Findings in the liver evaluated with ultrasound, and the one side is a hemangioma, the other likely a cyst, but a question of metastatic disease and PET scan has been ordered.  I discussed with the patient that most likely this is going to be a cyst.    Discussed that she still has early stage disease, and that her staging has not changed despite the size of the primary tumor.    Given extensive disease in the breast and komal involvement, she will benefit from neoadjuvant chemotherapy, which may help with surgical outcomes by shrinking some of the primary tumor and of the lymph nodes.  I discussed with the patient that the primary tumor will not likely decrease in size significantly to make her a candidate for breast conservation.  I did discuss with the patient that she will need a mastectomy and likely axillary lymph node dissection.  Given the size of the primary tumor, she is a candidate for postmastectomy radiation therapy plus minus radiation to the komal basins pending on surgical management of the axilla.  I discussed with the patient that once she completes treatment with radiation, she will then be initiated on adjuvant endocrine therapy, given the size of the tumor and number of positive lymph nodes, she  also will be a candidate for 2 years of endovascular in the initial 2 years of adjuvant endocrine therapy.  Discussed that adjuvant endocrine therapy total duration will be no less than 5 years but given her high risk features, likely up to 8 to 10 years.  We will also discuss options for ovarian function suppression after completion of chemotherapy.    Patient inquired regarding contralateral prophylactic mastectomy.  I discussed with the patient that we should await results of her genetic testing.  If the genetic testing is positive for a deleterious mutation which will increase her lifetime second primary breast cancer, then she should proceed with prophylactic contralateral mastectomy.  However, if this is negative, discussed with the patient that there is no benefit for proceeding with contralateral mastectomy, as it will not change her disease-free or overall survival.    Discussed with the patient that while she is receiving neoadjuvant treatment, she can have the evaluation by plastic surgery so that when she completes her neoadjuvant course of therapy, she will be able to have her surgery coordinated with Dr. Hutson and a plastic surgeon    She completed course of neoadjuvant DD AC x 4 cycles followed by weekly Taxol x12 weeks    Status post bilateral mastectomy with right-sided axillary lymph node dissection on 4/29/2024.    She completed adjuvant radiation therapy on 8/7/2024.    Discussed with the patient that it is not uncommon for patients with ER/NE positive breast cancer that have neoadjuvant treatment not to have a robust response from therapy.  I did discuss that for her treatment, in terms of systemic disease, after completion of radiation therapy, we will proceed with ovarian function suppression, which can be initiated at this point, this will then be followed by adjuvant endocrine therapy with 2 years of Abemaciclib.  In addition, patient is also a candidate for the [place] 2 clinical trial  which is comparing standard of care adjuvant endocrine therapy with Abemaciclib versus camizestrant which is an oral SERD.    Patient received first dose of goserelin 3.6 mg on 6/3/2024 and last on 7/30/2024.    Arthralgias:  secondary to goserelin.  Continue to increase activity.    -pt report minimal arthralgias    OTIS-2  Randomized to Arm B with Camizestrant started 8/30/24. No Eye issues   On adjuvant hormonal therapy and abemaciclib.       Follow up per trial protocol.     Continue camizestrant 75 mg daily.     Abemaciclib HELD due to GI symptoms requiring IV hydration and IV nausea meds.   Dose reduced to 100 mg   Currently completed C4 100 mg dose twice daily -- continue tolerating lower dose better.    C4 11/17/24-12/14/24, C5 12/15/24-  Hold Abemacicilv for ANC 0.89, productive cough  antibiotics  HOLD x 1 more week. Cough continue Mucinex and add Robitussin for cough at hs prn    Restarted abemaciclib 1/13/25. Neutropenia resolved. Cycle 6    Cytopenias:  secondary to abemaciclib and stable.  Neutropenia resolved    Headaches: Recommend tylenol prn.  Avoid NSAIDS due to renal issues.  Started with goserelin, which is reported to cause HA and abemaciclib can also cause HA. Following with neurology  Denies headache    N/V/D:  secondary to abemaciclib.    Reports no diarrhea today. Prn Imodium and compazine.    Left neck pain with radiculopathy: MRI of the neck ordered.    Continue to monitor creat levels- following nephrology, pt drinking fluids without issue     If worsening symptoms next cycle may dose reduce to 50 mg   -decreased pain    Call if GI symptoms resume. PER STUDY.  No ondansetron       Lymphedema:  HEP and compression.           Follow up in 4 weeks         MDM high risk.          No orders of the defined types were placed in this encounter.      Results From Past 48 Hours:  Recent Results (from the past 48 hours)   CBC W/DIFF [E]    Collection Time: 01/28/25  3:53 PM   Result Value Ref  Range    WBC 4.3 4.0 - 11.0 x10(3) uL    RBC 3.08 (L) 3.80 - 5.30 x10(6)uL    HGB 10.4 (L) 12.0 - 16.0 g/dL    HCT 29.9 (L) 35.0 - 48.0 %    MCV 97.1 80.0 - 100.0 fL    MCH 33.8 26.0 - 34.0 pg    MCHC 34.8 31.0 - 37.0 g/dL    RDW-SD 38.6 35.1 - 46.3 fL    RDW 10.8 (L) 11.0 - 15.0 %    .0 150.0 - 450.0 10(3)uL    Neutrophil Absolute Prelim 2.68 1.50 - 7.70 x10 (3) uL    Neutrophil Absolute 2.68 1.50 - 7.70 x10(3) uL    Lymphocyte Absolute 1.07 1.00 - 4.00 x10(3) uL    Monocyte Absolute 0.26 0.10 - 1.00 x10(3) uL    Eosinophil Absolute 0.28 0.00 - 0.70 x10(3) uL    Basophil Absolute 0.03 0.00 - 0.20 x10(3) uL    Immature Granulocyte Absolute 0.01 0.00 - 1.00 x10(3) uL    Neutrophil % 61.9 %    Lymphocyte % 24.7 %    Monocyte % 6.0 %    Eosinophil % 6.5 %    Basophil % 0.7 %    Immature Granulocyte % 0.2 %   COMP METABOLIC PANEL [E]    Collection Time: 01/28/25  3:53 PM   Result Value Ref Range    Glucose 102 (H) 70 - 99 mg/dL    Sodium 141 136 - 145 mmol/L    Potassium 3.7 3.5 - 5.1 mmol/L    Chloride 105 98 - 112 mmol/L    CO2 27.0 21.0 - 32.0 mmol/L    Anion Gap 9 0 - 18 mmol/L    BUN 20 9 - 23 mg/dL    Creatinine 1.38 (H) 0.55 - 1.02 mg/dL    BUN/CREA Ratio 14.5 10.0 - 20.0    Calcium, Total 10.0 8.7 - 10.4 mg/dL    Calculated Osmolality 295 275 - 295 mOsm/kg    eGFR-Cr 49 (L) >=60 mL/min/1.73m2    ALT 15 10 - 49 U/L    AST 21 <34 U/L    Alkaline Phosphatase 69 37 - 98 U/L    Bilirubin, Total 0.3 0.3 - 1.2 mg/dL    Total Protein 6.7 5.7 - 8.2 g/dL    Albumin 4.3 3.2 - 4.8 g/dL    Globulin  2.4 2.0 - 3.5 g/dL    A/G Ratio 1.8 1.0 - 2.0    Patient Fasting for CMP? No    MAGNESIUM [E]    Collection Time: 01/28/25  3:53 PM   Result Value Ref Range    Magnesium 2.0 1.6 - 2.6 mg/dL           Imaging & Referrals:  None

## 2025-02-03 ENCOUNTER — OFFICE VISIT (OUTPATIENT)
Dept: NEUROLOGY | Facility: CLINIC | Age: 40
End: 2025-02-03
Payer: COMMERCIAL

## 2025-02-03 ENCOUNTER — LAB ENCOUNTER (OUTPATIENT)
Dept: LAB | Age: 40
End: 2025-02-03
Attending: INTERNAL MEDICINE
Payer: COMMERCIAL

## 2025-02-03 VITALS — WEIGHT: 155 LBS | HEIGHT: 66 IN | BODY MASS INDEX: 24.91 KG/M2

## 2025-02-03 DIAGNOSIS — R41.3 MEMORY LOSS: ICD-10-CM

## 2025-02-03 DIAGNOSIS — G44.53 THUNDERCLAP HEADACHE: ICD-10-CM

## 2025-02-03 DIAGNOSIS — Q60.0 SOLITARY KIDNEY, CONGENITAL: ICD-10-CM

## 2025-02-03 DIAGNOSIS — N17.9 AKI (ACUTE KIDNEY INJURY): ICD-10-CM

## 2025-02-03 DIAGNOSIS — G44.53 THUNDERCLAP HEADACHE: Primary | ICD-10-CM

## 2025-02-03 LAB
ALBUMIN SERPL-MCNC: 4.5 G/DL (ref 3.2–4.8)
ANION GAP SERPL CALC-SCNC: 7 MMOL/L (ref 0–18)
BASOPHILS # BLD AUTO: 0.05 X10(3) UL (ref 0–0.2)
BASOPHILS NFR BLD AUTO: 1.3 %
BILIRUB UR QL: NEGATIVE
BUN BLD-MCNC: 18 MG/DL (ref 9–23)
BUN/CREAT SERPL: 13.8 (ref 10–20)
CALCIUM BLD-MCNC: 9.7 MG/DL (ref 8.7–10.4)
CHLORIDE SERPL-SCNC: 107 MMOL/L (ref 98–112)
CLARITY UR: CLEAR
CO2 SERPL-SCNC: 27 MMOL/L (ref 21–32)
CREAT BLD-MCNC: 1.3 MG/DL
CREAT UR-SCNC: 100.8 MG/DL
DEPRECATED RDW RBC AUTO: 38.5 FL (ref 35.1–46.3)
EGFRCR SERPLBLD CKD-EPI 2021: 53 ML/MIN/1.73M2 (ref 60–?)
EOSINOPHIL # BLD AUTO: 0.14 X10(3) UL (ref 0–0.7)
EOSINOPHIL NFR BLD AUTO: 3.8 %
ERYTHROCYTE [DISTWIDTH] IN BLOOD BY AUTOMATED COUNT: 10.8 % (ref 11–15)
FOLATE SERPL-MCNC: 21.7 NG/ML (ref 5.4–?)
GLUCOSE BLD-MCNC: 90 MG/DL (ref 70–99)
GLUCOSE UR-MCNC: NORMAL MG/DL
HCT VFR BLD AUTO: 33.3 %
HGB BLD-MCNC: 11.4 G/DL
HGB UR QL STRIP.AUTO: NEGATIVE
IMM GRANULOCYTES # BLD AUTO: 0.01 X10(3) UL (ref 0–1)
IMM GRANULOCYTES NFR BLD: 0.3 %
KETONES UR-MCNC: NEGATIVE MG/DL
LEUKOCYTE ESTERASE UR QL STRIP.AUTO: 75
LYMPHOCYTES # BLD AUTO: 0.96 X10(3) UL (ref 1–4)
LYMPHOCYTES NFR BLD AUTO: 25.7 %
MCH RBC QN AUTO: 34 PG (ref 26–34)
MCHC RBC AUTO-ENTMCNC: 34.2 G/DL (ref 31–37)
MCV RBC AUTO: 99.4 FL
MICROALBUMIN UR-MCNC: 9.5 MG/DL
MICROALBUMIN/CREAT 24H UR-RTO: 94.2 UG/MG (ref ?–30)
MONOCYTES # BLD AUTO: 0.3 X10(3) UL (ref 0.1–1)
MONOCYTES NFR BLD AUTO: 8 %
NEUTROPHILS # BLD AUTO: 2.27 X10 (3) UL (ref 1.5–7.7)
NEUTROPHILS # BLD AUTO: 2.27 X10(3) UL (ref 1.5–7.7)
NEUTROPHILS NFR BLD AUTO: 60.9 %
NITRITE UR QL STRIP.AUTO: NEGATIVE
OSMOLALITY SERPL CALC.SUM OF ELEC: 293 MOSM/KG (ref 275–295)
PH UR: 5.5 [PH] (ref 5–8)
PHOSPHATE SERPL-MCNC: 3.5 MG/DL (ref 2.4–5.1)
PLATELET # BLD AUTO: 168 10(3)UL (ref 150–450)
POTASSIUM SERPL-SCNC: 4.7 MMOL/L (ref 3.5–5.1)
RBC # BLD AUTO: 3.35 X10(6)UL
SODIUM SERPL-SCNC: 141 MMOL/L (ref 136–145)
SP GR UR STRIP: 1.02 (ref 1–1.03)
T PALLIDUM AB SER QL IA: NONREACTIVE
TSI SER-ACNC: 3.56 UIU/ML (ref 0.55–4.78)
UROBILINOGEN UR STRIP-ACNC: NORMAL
VIT B12 SERPL-MCNC: 624 PG/ML (ref 211–911)
WBC # BLD AUTO: 3.7 X10(3) UL (ref 4–11)

## 2025-02-03 PROCEDURE — 3008F BODY MASS INDEX DOCD: CPT | Performed by: OTHER

## 2025-02-03 PROCEDURE — 82607 VITAMIN B-12: CPT | Performed by: OTHER

## 2025-02-03 PROCEDURE — 86780 TREPONEMA PALLIDUM: CPT | Performed by: OTHER

## 2025-02-03 PROCEDURE — 84443 ASSAY THYROID STIM HORMONE: CPT | Performed by: OTHER

## 2025-02-03 PROCEDURE — 82746 ASSAY OF FOLIC ACID SERUM: CPT | Performed by: OTHER

## 2025-02-03 PROCEDURE — 99204 OFFICE O/P NEW MOD 45 MIN: CPT | Performed by: OTHER

## 2025-02-03 RX ORDER — INDOMETHACIN 75 MG/1
75 CAPSULE, EXTENDED RELEASE ORAL DAILY PRN
Qty: 30 CAPSULE | Refills: 5 | Status: SHIPPED | OUTPATIENT
Start: 2025-02-03

## 2025-02-03 NOTE — PROGRESS NOTES
[facility] [address], [address]  [facility] [address]  [phone]            Neurology Initial Visit     Referred By: Dr. Bearden ref. provider found    Chief Complaint:   Chief Complaint   Patient presents with    Neurologic Problem     Patient presents here today to establish care, patient was referred by Dr Calle to evaluate and treat pseudotumor cerebri, patient c/o 3 episode of thunder clapped headaches and dizziness. Patient has difficulty with memory. MRI was done 12/3/24 and wants to discuss.        HPI:     Jocelyn Garza is a 39 year old female, who presents for headaches and memory problems.  39-year-old female with breast cancer undergoing treatment presents with a history of severe headaches. In November and December, she experienced intense headaches while using the bathroom, lasting about 2 hours, leading to two ER visits. These were thought to be thunderclap headaches. She was given medication, and the pain subsided. Currently, she experiences occasional pulsating headaches. 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 She also reports dizziness, feeling car sick at times.  She denied any other neurological symptoms.    The patient complains of memory issues, noting she forgets things easily, which is unusual for her. She manages well at work as a  but struggles with memory at home. She had a brain MRI last year in August due to headaches during radiation. The patient has a history of high blood pressure.    The patient reports diarrhea as a side effect of her medication. She is currently on hormonal-repressant medication for her breast cancer treatment.    Medical History  - Breast cancer  - Hypertension  - History of severe headaches (November-December, resulting in ER visits)  - Brain MRI in August of previous year    Social History  - Occupation: employed as a   - Stress management: sees a therapist      Past  Medical History:    Breast cancer in female (HCC)    Right breast lymph node cancer    High blood pressure    Hx of motion sickness    Hypertension    Solitary kidney, congenital       Past Surgical History:   Procedure Laterality Date    Appendectomy  01/01/2007    Needle biopsy right Right 10/09/2023    Treat ectopic preg,rmv tube/ovary Left 01/01/2014    Treat ectopic preg,rmv tube/ovary Left 01/01/2015       Social history:  History   Smoking Status    Never   Smokeless Tobacco    Never     History   Alcohol Use Not Currently     Comment: 2 -3 drinks per week     History   Drug Use No       Family History   Problem Relation Age of Onset    Other (Parkinson's Disease) Father     Prostate Cancer Maternal Grandfather 70    Pancreatic Cancer Paternal Grandmother 80    Cancer Paternal Aunt 80        breast cancer    Pancreatic Cancer Maternal Uncle 60    Pancreatic Cancer Paternal Great-Grandfather     Cancer Maternal Great-Grandmother         gastric ca    Cancer Paternal Uncle 60        brain cancer         Current Outpatient Medications:     indomethacin ER 75 MG Oral Cap CR, Take 1 capsule (75 mg total) by mouth daily as needed., Disp: 30 capsule, Rfl: 5    prochlorperazine (COMPAZINE) 10 mg tablet, Take 1 tablet (10 mg total) by mouth every 6 (six) hours as needed for Nausea., Disp: 15 tablet, Rfl: 0    camizestrant 75 mg Oral Tab (OTIS-2 STUDY DRUG), Take 1 tablet (75 mg total) by mouth daily. Take at the same time each day., Disp: 96 tablet, Rfl: 0    loperamide 2 MG Oral Cap, Take 1 capsule (2 mg total) by mouth 4 (four) times daily as needed for Diarrhea., Disp: , Rfl:     GOSERELIN ACETATE SC, Inject into the skin. Once a month, Disp: , Rfl:     Abemaciclib 100 MG Oral Tab, Take 1 tablet (100 mg total) by mouth 2 (two) times daily. may take with or without food, Disp: 60 tablet, Rfl: 11    lidocaine-prilocaine 2.5-2.5 % External Cream, Apply to site 1 hour prior to port a cath needle insertion, Disp: 1  each, Rfl: 1    Allergies[1]    ROS:   As in HPI, the rest of the 14 system review was done and was negative      Physical Exam:  Vitals:    02/03/25 0728   Weight: 155 lb (70.3 kg)   Height: 66\"       General: No apparent distress, well nourished, well groomed.  Head- Normocephalic, atraumatic  Eyes- No redness or swelling  ENT- Hearing intake, normal glutition  Neck- No masses or adenopathy  Cv: pulses were palpable and normal, no cyanosis or edema     Neurological:     Mental Status- Alert and oriented x3.  Normal attention span and concentration  Thought process intact  Memory intact- recent and remote  Mood intact  Fund of knowledge appropriate for education and age    Language intact including: comprehension, naming, repetition, vocabulary    Cranial Nerves:    VII. Face symmetric, no facial weakness  VIII. Hearing intact to whisper.  IX. Pallet elevates symmetrically.  XI. Shoulder shrug is intact  XII. Tongue is midline    Motor Exam:  Muscle tone normal  No atrophy or fasciculations  Strength- upper extremities 5/5 proximally and distally                  - lower  extremities 5/5 proximally and distally    Sensory Exam:  Light touch sensation- intact in all 4 extremities      Coordination:  Finger to nose intact  Rapid alternating movements intact    Gait:  Normal posture  Normal physiologic      Labs:    Lab Results   Component Value Date    TSH 2.170 10/15/2021     Lab Results   Component Value Date    HDL 81 (H) 10/12/2024    LDL 79 10/12/2024    TRIG 67 10/12/2024     Lab Results   Component Value Date    HGB 10.4 (L) 01/28/2025    HCT 29.9 (L) 01/28/2025    MCV 97.1 01/28/2025    WBC 4.3 01/28/2025    .0 01/28/2025      Lab Results   Component Value Date    GLUCOSE 85 03/07/2013    BUN 20 01/28/2025    CA 10.0 01/28/2025    ALT 15 01/28/2025    AST 21 01/28/2025    ALKPHOS 50 07/02/2016    ALB 4.3 01/28/2025     01/28/2025    K 3.7 01/28/2025     01/28/2025    CO2 27.0 01/28/2025       I have reviewed labs.    Imaging Studies:  I have independently reviewed imaging.  MRI of the brain and cervical spine was independent reviewed as above.  We discussed nonspecific finding in the left cerebellar peduncle        Assessment   1. Thunderclap headache  - CTA to rule out aneurysm.  - Indomethacin (NSAID) prescription for straining headaches if they persist.  - Maintain proper hydration.  - Monitor for severe neurological symptoms (passing out, weakness, numbness).  - Follow-up in 3 months if symptoms persist.    - CTA BRAIN (CPT=70496); Future  - Vitamin B12; Future  - TSH W Reflex To Free T4; Future  - T Pallidum Screening Cascade; Future  - Folic Acid Serum (Folate); Future    2. Memory loss  - Blood tests to check for other etiology of memory problems, s.  - Consider neuropsychological testing for baseline cognitive function if memory issues persist.  - Note: Cognitive issues, including memory problems, may be related to chemotherapy, stress    - Vitamin B12; Future  - TSH W Reflex To Free T4; Future  - T Pallidum Screening Cascade; Future  - Folic Acid Serum (Folate); Future           Education and counseling provided to patient. Instructed patient to call my office or seek medical attention immediately if symptoms worsen.  Patient verbalized understanding of information given. All questions were answered. All side effects of drugs were discussed.     Return to clinic in: Return in about 3 months (around 5/3/2025).    Sergey Farmer MD           [1] No Known Allergies

## 2025-02-04 ENCOUNTER — PATIENT MESSAGE (OUTPATIENT)
Dept: NEUROLOGY | Facility: CLINIC | Age: 40
End: 2025-02-04

## 2025-02-04 ENCOUNTER — TELEPHONE (OUTPATIENT)
Dept: NEPHROLOGY | Facility: CLINIC | Age: 40
End: 2025-02-04

## 2025-02-04 NOTE — TELEPHONE ENCOUNTER
Indomethacin is also bad for her kidneys and she should not take this.  Have oncology send me copies of her monthly labs.

## 2025-02-04 NOTE — TELEPHONE ENCOUNTER
Patient sent a my chart message regarding results.  Responding to her via my chart as she preferred.

## 2025-02-04 NOTE — TELEPHONE ENCOUNTER
Kidney function remains abnormal but stable.  She is tolerating her cancer medications without any side effects.  She was concerned about the leukocyte Estrace in the urine.  However I am not concerned as the white blood count in her urine was normal and no bacteria was seen. Does she have any urinary symptoms such as dysuria or frequency.  I also see that indomethacin is on her medication list.  Is she taking winnie?t.  She should not take this as this will make her kidney function worse.  How often is oncology checking her blood test??

## 2025-02-04 NOTE — TELEPHONE ENCOUNTER
My chart response: \"not taking Indomethacin as it conflicted with my trial drug\"    oncology checking your blood - Monthly

## 2025-02-04 NOTE — TELEPHONE ENCOUNTER
Spoke with patient and let her know Dr. Patel's does not want her to to take indomethacin- let her know she should let her neurologist know so maybe he can prescribe something else.  Tried calling Dr. Calle's office to get copies of labs at [phone], sat on hold for 10 min but no answer please try again later.

## 2025-02-05 NOTE — TELEPHONE ENCOUNTER
Dr. Patel, Patient is not taking Indomethacin. Dr. Calle checks her labs monthly. Results are in her chart. Thank you.

## 2025-02-06 ENCOUNTER — PATIENT MESSAGE (OUTPATIENT)
Dept: NEUROLOGY | Facility: CLINIC | Age: 40
End: 2025-02-06

## 2025-02-10 NOTE — TELEPHONE ENCOUNTER
Is she asking about preventative therapy or just a rescue therapy?  If it is just rescue therapy I will suggest to try Tylenol next since it would not affect her kidneys.

## 2025-02-21 ENCOUNTER — TELEPHONE (OUTPATIENT)
Age: 40
End: 2025-02-21

## 2025-02-21 ENCOUNTER — HOSPITAL ENCOUNTER (OUTPATIENT)
Dept: ULTRASOUND IMAGING | Facility: HOSPITAL | Age: 40
Discharge: HOME | End: 2025-02-21
Attending: NURSE PRACTITIONER
Payer: COMMERCIAL

## 2025-02-21 ENCOUNTER — OFFICE VISIT (OUTPATIENT)
Age: 40
End: 2025-02-21
Attending: INTERNAL MEDICINE
Payer: COMMERCIAL

## 2025-02-21 VITALS
BODY MASS INDEX: 26.22 KG/M2 | DIASTOLIC BLOOD PRESSURE: 83 MMHG | OXYGEN SATURATION: 100 % | TEMPERATURE: 99 F | HEART RATE: 72 BPM | HEIGHT: 65 IN | SYSTOLIC BLOOD PRESSURE: 124 MMHG | RESPIRATION RATE: 18 BRPM | WEIGHT: 157.38 LBS

## 2025-02-21 DIAGNOSIS — M79.621 PAIN IN RIGHT UPPER ARM: ICD-10-CM

## 2025-02-21 DIAGNOSIS — Z51.81 MEDICATION MONITORING ENCOUNTER: ICD-10-CM

## 2025-02-21 DIAGNOSIS — C50.411 MALIGNANT NEOPLASM OF UPPER-OUTER QUADRANT OF RIGHT BREAST IN FEMALE, ESTROGEN RECEPTOR POSITIVE (HCC): ICD-10-CM

## 2025-02-21 DIAGNOSIS — Z17.0 MALIGNANT NEOPLASM OF UPPER-OUTER QUADRANT OF RIGHT BREAST IN FEMALE, ESTROGEN RECEPTOR POSITIVE (HCC): ICD-10-CM

## 2025-02-21 DIAGNOSIS — M79.601 PAIN IN RIGHT ARM: ICD-10-CM

## 2025-02-21 DIAGNOSIS — M79.621 PAIN IN RIGHT UPPER ARM: Primary | ICD-10-CM

## 2025-02-21 PROCEDURE — 93971 EXTREMITY STUDY: CPT | Performed by: NURSE PRACTITIONER

## 2025-02-21 NOTE — TELEPHONE ENCOUNTER
Pt calling with discomfort to R arm. Red swollen to R elbow area, inside of arm. No swelling to hand. Tight forearm. Feels like cording, but different. Discussed with PT. Following PT instructions and still not improving. Asking to be seen. Will see today at 2pm.

## 2025-02-21 NOTE — TELEPHONE ENCOUNTER
Patient have a lump in her right cease of elbow, it's swollen and painful for 2 weeks, she is also in P.T. Dr. Calle pt.

## 2025-02-21 NOTE — TELEPHONE ENCOUNTER
1/28/25 - Zoladex - Study Patient    Patient complaining of lump, swelling and shooting pain in inner side of right arm for two weeks.  Thought may be related to need for stretching but not helping.  Spoke to Alejandra and instructed to reach out to MD.  Only other issues she has noted is increase in night sweats.    Please advise.

## 2025-02-21 NOTE — PROGRESS NOTES
HPI   Jocelyn Garza is a 39 year old female for evaluation of   Encounter Diagnoses   Name Primary?    Pain in right upper arm Yes    Malignant neoplasm of upper-outer quadrant of right breast in female, estrogen receptor positive (HCC)     Medication monitoring encounter     Pain in right arm          Patient completed course of neoadjuvant dose dense Adriamycin Cytoxan followed by weekly Taxol.  Last dose of treatment on 3/22/2024.    Patient then underwent bilateral mastectomies on 4/29/2024 with a right axillary lymph node dissection.    Patient completed radiation on 8/7/2024. Having lymphedema therapy.  Feels hardness on the R axilla.      LMP November of 2023.    On goserelin since 6/3/24. Having hot flashes.  States a couple of minutes and then improves.  A few times a day, not as much end of the month.      Has gone back to work.  Feels that will be able to start working out again.    Pt screened for OTIS-2 trial.  Randomized to Arm B with Camizestrant started 8/30/24.    She is currently taking study drug camizestrant at hs. Taking daily.     Started C1D1 8/24/24 abemaciclib.    Dose adjustment to 100 mg daily on 9/22/24.    Freq formed BM     Completed cycle 4 abemaciclib 100 mg.   Soft formed stool, with urgency.  Not all the time.  Much improved.  If eats the wrong things like grease, spicy and dairy, gets diarrhea.      To  ED twice for HA with n/v. States that both episodes, she had diarrhea first, then HA, then N/V.  More  the second time.    Each episode occurred when she was sitting in the toilet.    Brain imaging x2 negative. Will follow with neurology.     Mild headache R sided \"pings\" recently, following with neurology.    She was discontinued off the lisinopril and is following with nephrology.  She is having a renal US.          Patient on Abemaciclib 100 mg bid, and camizestrant 75 mg daily.     Hold  abemaciclib x 1 week for anc 0.89 and URI symptoms. No fevers since 12/23.  Had been  held x 2 weeks for RSV and Zpack. Still coughing, getting better. Neutropenia resolved.     Resumed C 6 on 1/13/25.abemaciclib. States feeling well. More diarrhea since starting again. Reminded to use Imodium - takes 1/2 tab as needed.      L sided pain is better. Chest feels different above her incision.    Continues right arm sleeve, minimal lymphedema.     Here today for acute care visit- c/o R arm tender for 2 weeks , denies injury or extra activities with arms. Has been following directions from physical therapy for warm compresses and arm exercises, feeling no improvement.   Denies fevers or redness to arm. Feeling cold and tingling at times to R hand. Some fullness noted at R inner aspect of elbow and upper arm near top of her compression sleeve. Pt concerned because not improving.      ECOG PS  0    Review of Systems:   Review of Systems   Constitutional:  Positive for appetite change (improved). Negative for fatigue (tired at times at night) and unexpected weight change (weight down 6 lbs, states appettie is good).   HENT:   Negative for mouth sores.    Eyes:  Positive for eye problems (issues with adjusting at night with transitioning from light to dark.  States like neon light and one episode of gray.  Lasts a cople of seconds and then goes away. no new complaints. Better lately).   Respiratory:  Positive for cough. Negative for shortness of breath.    Cardiovascular:  Negative for chest pain, leg swelling and palpitations.   Gastrointestinal:  Negative for abdominal pain, diarrhea (soft not diarrhea), nausea and vomiting.   Endocrine: Positive for hot flashes (less severe).   Genitourinary:  Positive for frequency. Negative for menstrual problem.         Urgency, states bubbles.  No odor or change in color.   Musculoskeletal:  Positive for arthralgias (states since a few weeks ago, specially the R knee, or B hips.  Better with activity.) and neck pain. Negative for back pain.   Skin:  Negative for rash.    Neurological:  Positive for dizziness (when laying down), headaches (as above.) and numbness (as above). Negative for extremity weakness and light-headedness.        Feeling out of body with lying down and now improved spacing the drugs.   Hematological:  Negative for adenopathy. Bruises/bleeds easily (states in the legs and from zoladex inj).   Psychiatric/Behavioral:  Positive for sleep disturbance (due to hot flashes).          Current Outpatient Medications   Medication Sig Dispense Refill    prochlorperazine (COMPAZINE) 10 mg tablet Take 1 tablet (10 mg total) by mouth every 6 (six) hours as needed for Nausea. 15 tablet 0    camizestrant 75 mg Oral Tab (OTIS-2 STUDY DRUG) Take 1 tablet (75 mg total) by mouth daily. Take at the same time each day. 96 tablet 0    loperamide 2 MG Oral Cap Take 1 capsule (2 mg total) by mouth 4 (four) times daily as needed for Diarrhea.      GOSERELIN ACETATE SC Inject into the skin. Once a month      Abemaciclib 100 MG Oral Tab Take 1 tablet (100 mg total) by mouth 2 (two) times daily. may take with or without food 60 tablet 11    lidocaine-prilocaine 2.5-2.5 % External Cream Apply to site 1 hour prior to port a cath needle insertion 1 each 1     Allergies:   No Known Allergies    Past Medical History:    Breast cancer in female (HCC)    Right breast lymph node cancer    High blood pressure    Hx of motion sickness    Hypertension    Solitary kidney, congenital     Past Surgical History:   Procedure Laterality Date    Appendectomy  01/01/2007    Needle biopsy right Right 10/09/2023    Treat ectopic preg,rmv tube/ovary Left 01/01/2014    Treat ectopic preg,rmv tube/ovary Left 01/01/2015     Social History     Socioeconomic History    Marital status:    Tobacco Use    Smoking status: Never    Smokeless tobacco: Never   Vaping Use    Vaping status: Never Used   Substance and Sexual Activity    Alcohol use: Not Currently     Comment: 2 -3 drinks per week    Drug use: No      Social Drivers of Health     Food Insecurity: Low Risk  (1/15/2025)    Received from [facility]    Food Insecurity     Within the past 12 months, you worried that your food would run out before you got money to buy more.  : Never true     Within the past 12 months, the food you bought just didn't last and you didn't have money to get more. : Never true   Transportation Needs: Not At Risk (1/15/2025)    Received from [facility]    Transportation Needs     In the past 12 months, has lack of reliable transportation kept you from medical appointments, meetings, work or from getting things needed for daily living? : No   Housing Stability: Low Risk  (4/30/2024)    Housing Stability     Housing Instability: No       Family History   Problem Relation Age of Onset    Other (Parkinson's Disease) Father     Prostate Cancer Maternal Grandfather 70    Pancreatic Cancer Paternal Grandmother 80    Cancer Paternal Aunt 80        breast cancer    Pancreatic Cancer Maternal Uncle 60    Pancreatic Cancer Paternal Great-Grandfather     Cancer Maternal Great-Grandmother         gastric ca    Cancer Paternal Uncle 60        brain cancer         PHYSICAL EXAM:    /83 (BP Location: Left arm, Patient Position: Sitting, Cuff Size: adult)   Pulse 72   Temp 98.7 °F (37.1 °C) (Oral)   Resp 18   Ht 1.651 m (5' 5\")   Wt 71.4 kg (157 lb 6.4 oz)   SpO2 100%   BMI 26.19 kg/m²   Wt Readings from Last 6 Encounters:   02/21/25 71.4 kg (157 lb 6.4 oz)   02/03/25 70.3 kg (155 lb)   01/28/25 70.3 kg (155 lb)   01/06/25 73 kg (161 lb)   12/24/24 71.6 kg (157 lb 12.8 oz)   12/17/24 70.3 kg (155 lb)     Physical Exam  General: Patient is alert, not in acute distress.   HEENT: EOMs intact. PERRL. Oropharynx is clear.   Neck: No JVD. No palpable lymphadenopathy. Neck is supple.  Chest: Clear to auscultation. Still cough. B mastectomies well healed. Slight fullness to R upper arm and upper back,tenderness at R elbow. L  chest wall with no masses identified- will monitor   Heart: Regular rate and rhythm.   Abdomen: Soft, non tender with good bowel sounds.  Extremities: No edema.  Neurological: Grossly intact.   Lymphatics: There is no palpable lymphadenopathy throughout in the cervical, supraclavicular, axillary, or inguinal regions.  Psych/Depression: nl  Neck, supple      ASSESSMENT/PLAN:     1. Pain in right upper arm    2. Malignant neoplasm of upper-outer quadrant of right breast in female, estrogen receptor positive (HCC)    3. Medication monitoring encounter    4. Pain in right arm           Cancer Staging   Malignant neoplasm of upper-outer quadrant of right breast in female, estrogen receptor positive (HCC)  Staging form: Breast, AJCC 8th Edition  - Clinical stage from 10/12/2023: Stage IIA (cT3, cN1(f), cM0, G2, ER+, ID+, HER2-) - Signed by Halie Calle MD on 5/8/2024  - Pathologic stage from 5/8/2024: ypT3, pN2a, cM0, G2, ER+, ID+, HER2- - Signed by Halie Calle MD on 5/8/2024    Patient has completed staging work-up.  Thus far no evidence of metastatic disease.  Findings in the liver evaluated with ultrasound, and the one side is a hemangioma, the other likely a cyst, but a question of metastatic disease and PET scan has been ordered.  I discussed with the patient that most likely this is going to be a cyst.    Discussed that she still has early stage disease, and that her staging has not changed despite the size of the primary tumor.    Given extensive disease in the breast and komal involvement, she will benefit from neoadjuvant chemotherapy, which may help with surgical outcomes by shrinking some of the primary tumor and of the lymph nodes.  I discussed with the patient that the primary tumor will not likely decrease in size significantly to make her a candidate for breast conservation.  I did discuss with the patient that she will need a mastectomy and likely axillary lymph node dissection.  Given the size of  the primary tumor, she is a candidate for postmastectomy radiation therapy plus minus radiation to the komal basins pending on surgical management of the axilla.  I discussed with the patient that once she completes treatment with radiation, she will then be initiated on adjuvant endocrine therapy, given the size of the tumor and number of positive lymph nodes, she also will be a candidate for 2 years of endovascular in the initial 2 years of adjuvant endocrine therapy.  Discussed that adjuvant endocrine therapy total duration will be no less than 5 years but given her high risk features, likely up to 8 to 10 years.  We will also discuss options for ovarian function suppression after completion of chemotherapy.    Patient inquired regarding contralateral prophylactic mastectomy.  I discussed with the patient that we should await results of her genetic testing.  If the genetic testing is positive for a deleterious mutation which will increase her lifetime second primary breast cancer, then she should proceed with prophylactic contralateral mastectomy.  However, if this is negative, discussed with the patient that there is no benefit for proceeding with contralateral mastectomy, as it will not change her disease-free or overall survival.    Discussed with the patient that while she is receiving neoadjuvant treatment, she can have the evaluation by plastic surgery so that when she completes her neoadjuvant course of therapy, she will be able to have her surgery coordinated with Dr. Hutson and a plastic surgeon    She completed course of neoadjuvant DD AC x 4 cycles followed by weekly Taxol x12 weeks    Status post bilateral mastectomy with right-sided axillary lymph node dissection on 4/29/2024.    She completed adjuvant radiation therapy on 8/7/2024.    Discussed with the patient that it is not uncommon for patients with ER/PA positive breast cancer that have neoadjuvant treatment not to have a robust response from  therapy.  I did discuss that for her treatment, in terms of systemic disease, after completion of radiation therapy, we will proceed with ovarian function suppression, which can be initiated at this point, this will then be followed by adjuvant endocrine therapy with 2 years of Abemaciclib.  In addition, patient is also a candidate for the Otis 2 clinical trial which is comparing standard of care adjuvant endocrine therapy with Abemaciclib versus camizestrant which is an oral SERD.    Patient received first dose of goserelin 3.6 mg on 6/3/2024 and last on 7/30/2024.    Arthralgias:  secondary to goserelin.  Continue to increase activity.    -pt report minimal arthralgias    OTIS-2  Randomized to Arm B with Camizestrant started 8/30/24. No Eye issues   On adjuvant hormonal therapy and abemaciclib.       Follow up per trial protocol.     Continue camizestrant 75 mg daily.     Abemaciclib HELD due to GI symptoms requiring IV hydration and IV nausea meds.   Dose reduced to 100 mg   Currently completed C4 100 mg dose twice daily -- continue tolerating lower dose better.    C4 11/17/24-12/14/24, C5 12/15/24-  Hold Abemacicilv for ANC 0.89, productive cough  antibiotics  HOLD x 1 more week. Cough continue Mucinex and add Robitussin for cough at hs prn    Restarted abemaciclib 1/13/25. Neutropenia resolved. Cycle 6    Cytopenias:  secondary to abemaciclib and stable.  Neutropenia resolved    Headaches: Recommend tylenol prn.  Avoid NSAIDS due to renal issues.  Started with goserelin, which is reported to cause HA and abemaciclib can also cause HA. Following with neurology  Denies headache    N/V/D:  secondary to abemaciclib.    Reports no diarrhea today. Prn Imodium and compazine.    Left neck pain with radiculopathy: MRI of the neck ordered.    Continue to monitor creat levels- following nephrology, pt drinking fluids without issue     If worsening symptoms next cycle may dose reduce to 50 mg   -decreased  pain    Call if GI symptoms resume. PER STUDY.  No ondansetron       Lymphedema:  HEP and compression.      R arm pain - R UE US r/o DVT- STAT- today 6 pm. If negative for DVT refer to lymphedema for further therapy.     Lymphedema referral              Follow up as scheduled        MDM high risk.          No orders of the defined types were placed in this encounter.      Results From Past 48 Hours:  No results found for this or any previous visit (from the past 48 hours).          Imaging & Referrals:  None

## 2025-02-26 ENCOUNTER — TELEPHONE (OUTPATIENT)
Dept: PHYSICAL THERAPY | Facility: HOSPITAL | Age: 40
End: 2025-02-26

## 2025-02-26 ENCOUNTER — OFFICE VISIT (OUTPATIENT)
Age: 40
End: 2025-02-26
Attending: INTERNAL MEDICINE
Payer: COMMERCIAL

## 2025-02-26 ENCOUNTER — RESEARCH ENCOUNTER (OUTPATIENT)
Age: 40
End: 2025-02-26

## 2025-02-26 ENCOUNTER — NURSE ONLY (OUTPATIENT)
Age: 40
End: 2025-02-26
Attending: INTERNAL MEDICINE
Payer: COMMERCIAL

## 2025-02-26 VITALS
WEIGHT: 159.63 LBS | DIASTOLIC BLOOD PRESSURE: 75 MMHG | TEMPERATURE: 98 F | OXYGEN SATURATION: 100 % | HEART RATE: 71 BPM | RESPIRATION RATE: 16 BRPM | HEIGHT: 65 IN | SYSTOLIC BLOOD PRESSURE: 111 MMHG | BODY MASS INDEX: 26.6 KG/M2

## 2025-02-26 DIAGNOSIS — Z17.0 MALIGNANT NEOPLASM OF UPPER-OUTER QUADRANT OF RIGHT BREAST IN FEMALE, ESTROGEN RECEPTOR POSITIVE (HCC): Primary | ICD-10-CM

## 2025-02-26 DIAGNOSIS — C50.411 MALIGNANT NEOPLASM OF UPPER-OUTER QUADRANT OF RIGHT BREAST IN FEMALE, ESTROGEN RECEPTOR POSITIVE (HCC): Primary | ICD-10-CM

## 2025-02-26 DIAGNOSIS — Z51.81 MEDICATION MONITORING ENCOUNTER: ICD-10-CM

## 2025-02-26 LAB
ALBUMIN SERPL-MCNC: 4.3 G/DL (ref 3.2–4.8)
ALBUMIN/GLOB SERPL: 1.7 {RATIO} (ref 1–2)
ALP LIVER SERPL-CCNC: 62 U/L
ALT SERPL-CCNC: 13 U/L
ANION GAP SERPL CALC-SCNC: 9 MMOL/L (ref 0–18)
AST SERPL-CCNC: 19 U/L (ref ?–34)
BASOPHILS # BLD AUTO: 0.05 X10(3) UL (ref 0–0.2)
BASOPHILS NFR BLD AUTO: 1.7 %
BILIRUB SERPL-MCNC: 0.4 MG/DL (ref 0.3–1.2)
BUN BLD-MCNC: 18 MG/DL (ref 9–23)
BUN/CREAT SERPL: 14.1 (ref 10–20)
CALCIUM BLD-MCNC: 9.6 MG/DL (ref 8.7–10.4)
CHLORIDE SERPL-SCNC: 108 MMOL/L (ref 98–112)
CO2 SERPL-SCNC: 25 MMOL/L (ref 21–32)
CREAT BLD-MCNC: 1.28 MG/DL
DEPRECATED RDW RBC AUTO: 42.7 FL (ref 35.1–46.3)
EGFRCR SERPLBLD CKD-EPI 2021: 54 ML/MIN/1.73M2 (ref 60–?)
EOSINOPHIL # BLD AUTO: 0.06 X10(3) UL (ref 0–0.7)
EOSINOPHIL NFR BLD AUTO: 2 %
ERYTHROCYTE [DISTWIDTH] IN BLOOD BY AUTOMATED COUNT: 12.6 % (ref 11–15)
GLOBULIN PLAS-MCNC: 2.6 G/DL (ref 2–3.5)
GLUCOSE BLD-MCNC: 118 MG/DL (ref 70–99)
HCT VFR BLD AUTO: 29.1 %
HGB BLD-MCNC: 10 G/DL
IMM GRANULOCYTES # BLD AUTO: 0.02 X10(3) UL (ref 0–1)
IMM GRANULOCYTES NFR BLD: 0.7 %
LYMPHOCYTES # BLD AUTO: 0.9 X10(3) UL (ref 1–4)
LYMPHOCYTES NFR BLD AUTO: 30 %
MAGNESIUM SERPL-MCNC: 1.7 MG/DL (ref 1.6–2.6)
MCH RBC QN AUTO: 32.8 PG (ref 26–34)
MCHC RBC AUTO-ENTMCNC: 34.4 G/DL (ref 31–37)
MCV RBC AUTO: 95.4 FL
MONOCYTES # BLD AUTO: 0.2 X10(3) UL (ref 0.1–1)
MONOCYTES NFR BLD AUTO: 6.7 %
NEUTROPHILS # BLD AUTO: 1.77 X10 (3) UL (ref 1.5–7.7)
NEUTROPHILS # BLD AUTO: 1.77 X10(3) UL (ref 1.5–7.7)
NEUTROPHILS NFR BLD AUTO: 58.9 %
OSMOLALITY SERPL CALC.SUM OF ELEC: 297 MOSM/KG (ref 275–295)
PLATELET # BLD AUTO: 196 10(3)UL (ref 150–450)
POTASSIUM SERPL-SCNC: 3.6 MMOL/L (ref 3.5–5.1)
PROT SERPL-MCNC: 6.9 G/DL (ref 5.7–8.2)
RBC # BLD AUTO: 3.05 X10(6)UL
SODIUM SERPL-SCNC: 142 MMOL/L (ref 136–145)
WBC # BLD AUTO: 3 X10(3) UL (ref 4–11)

## 2025-02-26 RX ORDER — SODIUM CHLORIDE 9 MG/ML
10 INJECTION, SOLUTION INTRAMUSCULAR; INTRAVENOUS; SUBCUTANEOUS ONCE
OUTPATIENT
Start: 2025-03-26

## 2025-02-26 NOTE — PROGRESS NOTES
HPI   Jocelyn Garza is a 39 year old female for evaluation of   Encounter Diagnoses   Name Primary?    Malignant neoplasm of upper-outer quadrant of right breast in female, estrogen receptor positive (HCC) Yes    Medication monitoring encounter        Patient completed course of neoadjuvant dose dense Adriamycin Cytoxan followed by weekly Taxol.  Last dose of treatment on 3/22/2024.    Patient then underwent bilateral mastectomies on 4/29/2024 with a right axillary lymph node dissection.    Patient completed radiation on 8/7/2024. Having lymphedema therapy.  Feels hardness on the R arm.    LMP November of 2023.    On goserelin since 6/3/24. Having hot flashes.  States a couple of minutes and then improves.  A few times a day, not as much end of the month.      Has gone back to work.      Pt screened for OTIS-2 trial.  Randomized to Arm B with Camizestrant started 8/30/24.    She is currently taking study drug camizestrant at hs. Taking daily.     Started C1D1 8/24/24 abemaciclib.    Dose adjustment to 100 mg daily on 9/22/24.    Freq formed BM after eating.     Completed cycle 4 abemaciclib 100 mg.   Soft formed stool, with urgency.  Not all the time.  Much improved with lower dose. If eats the wrong things like grease, spicy and dairy, gets diarrhea.      To  ED twice for HA with n/v. States that both episodes, she had diarrhea first, then HA, then N/V.  More  the second time. Each episode occurred when she was sitting in the toilet. Brain imaging x2 negative. Will follow with neurology.     Mild headache R sided \"pings\" recently, following with neurology.    She was discontinued off the lisinopril and is following with nephrology.  She is having a renal US.          Patient on Abemaciclib 100 mg bid, and camizestrant 75 mg daily.      Resumed C 6 on 1/13/25.abemaciclib. States feeling well. More diarrhea since starting again. Reminded to use Imodium - takes 1/2 tab as needed. Soft freq stools after eating.  Cycle 7 started , freq stools after eating, no diarrhea.      L sided pain is better. Chest feels different above her incision.    Continues right arm sleeve, minimal lymphedema.   Seen recently with c/o swelling and pain to R arm. US negative for DVT. Will refer to lymphedema therapy.     ECOG PS  0    Review of Systems:   Review of Systems   Constitutional:  Positive for appetite change (improved). Negative for fatigue (tired at times at night) and unexpected weight change (weight down 6 lbs, states appettie is good).   HENT:   Negative for mouth sores.    Eyes:  Positive for eye problems (issues with adjusting at night with transitioning from light to dark, intermittent mild.  no new complaints. Better lately).   Respiratory:  Positive for cough. Negative for shortness of breath.    Cardiovascular:  Negative for chest pain, leg swelling and palpitations.   Gastrointestinal:  Negative for abdominal pain, diarrhea (soft not diarrhea), nausea and vomiting.   Endocrine: Positive for hot flashes (less severe).   Genitourinary:  Positive for frequency. Negative for menstrual problem.         Urgency, states bubbles.  No odor or change in color.   Musculoskeletal:  Positive for arthralgias (states since a few weeks ago, specially the R knee, or B hips.  Better with activity.). Negative for back pain and neck pain.        R arm pain    Skin:  Negative for rash.   Neurological:  Positive for headaches (intermittent mild) and numbness (as above). Negative for dizziness, extremity weakness and light-headedness.   Hematological:  Negative for adenopathy. Bruises/bleeds easily (states in the legs and from zoladex inj).   Psychiatric/Behavioral:  Negative for sleep disturbance.          Current Outpatient Medications   Medication Sig Dispense Refill    prochlorperazine (COMPAZINE) 10 mg tablet Take 1 tablet (10 mg total) by mouth every 6 (six) hours as needed for Nausea. 15 tablet 0    camizestrant 75 mg Oral Tab (OTIS-2  STUDY DRUG) Take 1 tablet (75 mg total) by mouth daily. Take at the same time each day. 96 tablet 0    loperamide 2 MG Oral Cap Take 1 capsule (2 mg total) by mouth 4 (four) times daily as needed for Diarrhea.      GOSERELIN ACETATE SC Inject into the skin. Once a month      Abemaciclib 100 MG Oral Tab Take 1 tablet (100 mg total) by mouth 2 (two) times daily. may take with or without food 60 tablet 11    lidocaine-prilocaine 2.5-2.5 % External Cream Apply to site 1 hour prior to port a cath needle insertion 1 each 1     Allergies:   No Known Allergies    Past Medical History:    Breast cancer in female (HCC)    Right breast lymph node cancer    High blood pressure    Hx of motion sickness    Hypertension    Solitary kidney, congenital     Past Surgical History:   Procedure Laterality Date    Appendectomy  01/01/2007    Needle biopsy right Right 10/09/2023    Treat ectopic preg,rmv tube/ovary Left 01/01/2014    Treat ectopic preg,rmv tube/ovary Left 01/01/2015     Social History     Socioeconomic History    Marital status:    Tobacco Use    Smoking status: Never    Smokeless tobacco: Never   Vaping Use    Vaping status: Never Used   Substance and Sexual Activity    Alcohol use: Not Currently     Comment: 2 -3 drinks per week    Drug use: No     Social Drivers of Health     Food Insecurity: Low Risk  (1/15/2025)    Received from [facility]    Food Insecurity     Within the past 12 months, you worried that your food would run out before you got money to buy more.  : Never true     Within the past 12 months, the food you bought just didn't last and you didn't have money to get more. : Never true   Transportation Needs: Not At Risk (1/15/2025)    Received from [facility]    Transportation Needs     In the past 12 months, has lack of reliable transportation kept you from medical appointments, meetings, work or from getting things needed for daily living? : No   Housing Stability: Low Risk   (4/30/2024)    Housing Stability     Housing Instability: No       Family History   Problem Relation Age of Onset    Other (Parkinson's Disease) Father     Prostate Cancer Maternal Grandfather 70    Pancreatic Cancer Paternal Grandmother 80    Cancer Paternal Aunt 80        breast cancer    Pancreatic Cancer Maternal Uncle 60    Pancreatic Cancer Paternal Great-Grandfather     Cancer Maternal Great-Grandmother         gastric ca    Cancer Paternal Uncle 60        brain cancer         PHYSICAL EXAM:    /75 (BP Location: Left arm, Patient Position: Sitting, Cuff Size: adult)   Pulse 71   Temp 97.7 °F (36.5 °C) (Oral)   Resp 16   Ht 1.651 m (5' 5\")   Wt 72.4 kg (159 lb 9.6 oz)   SpO2 100%   BMI 26.56 kg/m²   Wt Readings from Last 6 Encounters:   02/21/25 71.4 kg (157 lb 6.4 oz)   02/03/25 70.3 kg (155 lb)   01/28/25 70.3 kg (155 lb)   01/06/25 73 kg (161 lb)   12/24/24 71.6 kg (157 lb 12.8 oz)   12/17/24 70.3 kg (155 lb)     Physical Exam  General: Patient is alert, not in acute distress.   HEENT: EOMs intact. PERRL. Oropharynx is clear.   Neck: No JVD. No palpable lymphadenopathy. Neck is supple.  Chest: Clear to auscultation. Still cough. B mastectomies well healed.  No lymphedema noted to L.  Slight fullness to R arm and upper back, Cording to R arm outer aspect at elbow. L chest wall with no masses identified- will monitor, some fullness above scar  Heart: Regular rate and rhythm.   Abdomen: Soft, non tender with good bowel sounds.  Extremities: No edema.  Neurological: Grossly intact.   Lymphatics: There is no palpable lymphadenopathy throughout in the cervical, supraclavicular, axillary, or inguinal regions.  Psych/Depression: nl  Neck, supple      ASSESSMENT/PLAN:     1. Malignant neoplasm of upper-outer quadrant of right breast in female, estrogen receptor positive (HCC)    2. Medication monitoring encounter         Cancer Staging   Malignant neoplasm of upper-outer quadrant of right breast in  female, estrogen receptor positive (HCC)  Staging form: Breast, AJCC 8th Edition  - Clinical stage from 10/12/2023: Stage IIA (cT3, cN1(f), cM0, G2, ER+, NC+, HER2-) - Signed by Halie Calle MD on 5/8/2024  - Pathologic stage from 5/8/2024: ypT3, pN2a, cM0, G2, ER+, NC+, HER2- - Signed by Halie Calle MD on 5/8/2024    Patient has completed staging work-up.  Thus far no evidence of metastatic disease.  Findings in the liver evaluated with ultrasound, and the one side is a hemangioma, the other likely a cyst, but a question of metastatic disease and PET scan has been ordered.  I discussed with the patient that most likely this is going to be a cyst.    Discussed that she still has early stage disease, and that her staging has not changed despite the size of the primary tumor.    Given extensive disease in the breast and komal involvement, she will benefit from neoadjuvant chemotherapy, which may help with surgical outcomes by shrinking some of the primary tumor and of the lymph nodes.  I discussed with the patient that the primary tumor will not likely decrease in size significantly to make her a candidate for breast conservation.  I did discuss with the patient that she will need a mastectomy and likely axillary lymph node dissection.  Given the size of the primary tumor, she is a candidate for postmastectomy radiation therapy plus minus radiation to the komal basins pending on surgical management of the axilla.  I discussed with the patient that once she completes treatment with radiation, she will then be initiated on adjuvant endocrine therapy, given the size of the tumor and number of positive lymph nodes, she also will be a candidate for 2 years of endovascular in the initial 2 years of adjuvant endocrine therapy.  Discussed that adjuvant endocrine therapy total duration will be no less than 5 years but given her high risk features, likely up to 8 to 10 years.  We will also discuss options for ovarian  function suppression after completion of chemotherapy.    Patient inquired regarding contralateral prophylactic mastectomy.  I discussed with the patient that we should await results of her genetic testing.  If the genetic testing is positive for a deleterious mutation which will increase her lifetime second primary breast cancer, then she should proceed with prophylactic contralateral mastectomy.  However, if this is negative, discussed with the patient that there is no benefit for proceeding with contralateral mastectomy, as it will not change her disease-free or overall survival.    Discussed with the patient that while she is receiving neoadjuvant treatment, she can have the evaluation by plastic surgery so that when she completes her neoadjuvant course of therapy, she will be able to have her surgery coordinated with Dr. Hutson and a plastic surgeon    She completed course of neoadjuvant DD AC x 4 cycles followed by weekly Taxol x12 weeks    Status post bilateral mastectomy with right-sided axillary lymph node dissection on 4/29/2024.    She completed adjuvant radiation therapy on 8/7/2024.    Discussed with the patient that it is not uncommon for patients with ER/NM positive breast cancer that have neoadjuvant treatment not to have a robust response from therapy.  I did discuss that for her treatment, in terms of systemic disease, after completion of radiation therapy, we will proceed with ovarian function suppression, which can be initiated at this point, this will then be followed by adjuvant endocrine therapy with 2 years of Abemaciclib.  In addition, patient is also a candidate for the [place] 2 clinical trial which is comparing standard of care adjuvant endocrine therapy with Abemaciclib versus camizestrant which is an oral SERD.    Patient received first dose of goserelin 3.6 mg on 6/3/2024 and last on 7/30/2024.    Arthralgias:  secondary to goserelin.  Continue to increase activity.    -pt report  minimal arthralgias    OTIS-2  Randomized to Arm B with Camizestrant started 8/30/24. No Eye issues   On adjuvant hormonal therapy and abemaciclib.       Follow up per trial protocol.     Continue camizestrant 75 mg daily.     Abemaciclib HELD due to GI symptoms requiring IV hydration and IV nausea meds.   Dose reduced to 100 mg   Currently Cycle 7 abemaciclib 100 mg dose twice daily -- continue tolerating lower dose better      Cytopenias:  secondary to abemaciclib and stable.  Neutropenia resolved    Headaches: Recommend tylenol prn.  Avoid NSAIDS due to renal issues.  Started with goserelin, which is reported to cause HA and abemaciclib can also cause HA. Following with neurology  Denies headache today    N/V/D:  secondary to abemaciclib.    Reports no diarrhea today. Prn Imodium and compazine.    Left neck pain with radiculopathy: resolved    Continue to monitor creat levels- following nephrology, pt drinking fluids without issue     If worsening symptoms next cycle may dose reduce to 50 mg   -decreased pain    Call if GI symptoms resume. PER STUDY.  No ondansetron       Lymphedema:  HEP and compression. Referral placed for therapy. R UE US negative for clot          Follow up in 4 weeks         MDM high risk.          No orders of the defined types were placed in this encounter.      Results From Past 48 Hours:  Recent Results (from the past 48 hours)   CBC W Differential W Platelet    Collection Time: 02/26/25  1:30 PM   Result Value Ref Range    WBC 3.0 (L) 4.0 - 11.0 x10(3) uL    RBC 3.05 (L) 3.80 - 5.30 x10(6)uL    HGB 10.0 (L) 12.0 - 16.0 g/dL    HCT 29.1 (L) 35.0 - 48.0 %    MCV 95.4 80.0 - 100.0 fL    MCH 32.8 26.0 - 34.0 pg    MCHC 34.4 31.0 - 37.0 g/dL    RDW-SD 42.7 35.1 - 46.3 fL    RDW 12.6 11.0 - 15.0 %    .0 150.0 - 450.0 10(3)uL    Neutrophil Absolute Prelim 1.77 1.50 - 7.70 x10 (3) uL    Neutrophil Absolute 1.77 1.50 - 7.70 x10(3) uL    Lymphocyte Absolute 0.90 (L) 1.00 - 4.00 x10(3)  uL    Monocyte Absolute 0.20 0.10 - 1.00 x10(3) uL    Eosinophil Absolute 0.06 0.00 - 0.70 x10(3) uL    Basophil Absolute 0.05 0.00 - 0.20 x10(3) uL    Immature Granulocyte Absolute 0.02 0.00 - 1.00 x10(3) uL    Neutrophil % 58.9 %    Lymphocyte % 30.0 %    Monocyte % 6.7 %    Eosinophil % 2.0 %    Basophil % 1.7 %    Immature Granulocyte % 0.7 %   Comp Metabolic Panel (14)    Collection Time: 02/26/25  1:30 PM   Result Value Ref Range    Glucose 118 (H) 70 - 99 mg/dL    Sodium 142 136 - 145 mmol/L    Potassium 3.6 3.5 - 5.1 mmol/L    Chloride 108 98 - 112 mmol/L    CO2 25.0 21.0 - 32.0 mmol/L    Anion Gap 9 0 - 18 mmol/L    BUN 18 9 - 23 mg/dL    Creatinine 1.28 (H) 0.55 - 1.02 mg/dL    BUN/CREA Ratio 14.1 10.0 - 20.0    Calcium, Total 9.6 8.7 - 10.4 mg/dL    Calculated Osmolality 297 (H) 275 - 295 mOsm/kg    eGFR-Cr 54 (L) >=60 mL/min/1.73m2    ALT 13 10 - 49 U/L    AST 19 <34 U/L    Alkaline Phosphatase 62 37 - 98 U/L    Bilirubin, Total 0.4 0.3 - 1.2 mg/dL    Total Protein 6.9 5.7 - 8.2 g/dL    Albumin 4.3 3.2 - 4.8 g/dL    Globulin  2.6 2.0 - 3.5 g/dL    A/G Ratio 1.7 1.0 - 2.0    Patient Fasting for CMP? Patient not present    MAGNESIUM [E]    Collection Time: 02/26/25  1:30 PM   Result Value Ref Range    Magnesium 1.7 1.6 - 2.6 mg/dL           Imaging & Referrals:  None

## 2025-02-26 NOTE — PROGRESS NOTES
RESEARCH NOTE    STUDY:  OTIS 2  PATIENT ID: [id number]  TREATMENT:  ARM B:  Camizestrant  TIME POINT: Month 7 Day 1    Patient to clinic today for OTIS 2 Month 7 Day 1 visit (Visit 4).  Visit 4 scheduled for 2/26/25 by Research RN in [facility]  upon pt arrival to clinic.  Per [facility] , Month 7 QOLs appear to have been completed by patient on 2/15/25.      M7D1 local labs, pre-dose PK and ctDNA blood drawn today at 1340.  Pre-dose PK blood specimen brought immediately to lab for processing per protocol.  Frozen specimen to be stored in -80 freezer until shipment at a later date.  Ambient specimen to be shipped out today (UPS tracking [id number]).     Pt returned bottle of camizestrant with 4 pills remaninig (Kit [id number]; Lot [id number]; Exp: 07.2027).  Pt states she had missed a dose of medication and had lost 2 pills down the sink drain.  Otherwise, states she has been compliant with daily dosing.  Research RN took return bottle and pills to pharmacy for accountability and destruction per SOP. Pharmacist then dispensed two new bottles of camizestrant study medication for 6 month supply (bottle 1:  96 tablets; Kit [id number]; Exp: 07.2027 and bottle 2:  96 tablets; Kit [id number]; Exp:  07.2027) to Research RN.  Research RN gave Camizestrant medication to patient and pt took dose with water during appointment at 1430.            Physical exam and other required study activities completed by AJ Brewster during clinic appointment.  Labs reviewed by JOEL ROCHA.  No clinically significant lab values.      Pt states she is feeling well.  Continues to work and remains active.    Pt following with neurology for severe headaches noted at last study visit.  Reports that severe headaches have resolved and she only states intermittent mild headaches.  She has not had to use any medication for symptoms.      Abemaciclib held from 12/24/24 - 1/13/25 due to decreased neutrophil  count and URI.  Pt states she continued to take daily camizestrant.     Pt states improvement in GI symptoms.  Nausea and vomiting have resolved.  Diarrhea is intermittent.  She has not had to use any imodium for symptoms.       Pt continues to report visual disturbances of \"floaters and trailing images\".  Symptoms are stable and only lasting a \"couple seconds\" then resolving. Symptoms not affecting ADLs.     Pt states left neck pain radiating down left arm has resolved since last visit.    Only new complaint today is pain in right arm due to cording from mild lymphedema.   Pt reports pain at 6/10.  Not taking any pain medication at this time.  US of right arm on 2/21/25 negative.  Pt referred to lymphedema clinic for management.         AE summary:  - Headache (intermittent) - decreased to baseline grade 1 (~12/4/24)  - Diarrhea (intermittent) - grade 1   - Vomiting (intermittent) - resolved (~12/4/24)  - Tachycardia (when lying down) - resolved (~12/2/24)  - Peripheral neuropathy - grade 1   - Photopsia - grade 1   - Left neck pain - resolved (~12/4/24)  - Right arm pain - grade 1 (starting ~2/7/25)    Conmed summary:  - daily women's multivitamin 1 tablet oral daily (starting ~1/6/25)  - acetaminophen 1000mg oral PRN headaches (starting ~2/4/25)          Reviewed plan for next visits.  Research RN to schedule Month 13 Day 1 visit with labs and coordinate eye exam with [facility].  [facility]  Pt aware that [facility] will reach out to schedule Month 13 appointment.  Encouraged pt to call Research RN directly with any questions or concerns prior to next visit.

## 2025-02-27 ENCOUNTER — TELEPHONE (OUTPATIENT)
Dept: PHYSICAL THERAPY | Facility: HOSPITAL | Age: 40
End: 2025-02-27

## 2025-03-05 ENCOUNTER — RESEARCH ENCOUNTER (OUTPATIENT)
Dept: HEMATOLOGY/ONCOLOGY | Facility: HOSPITAL | Age: 40
End: 2025-03-05

## 2025-03-05 NOTE — PROGRESS NOTES
RESEARCH NOTE     STUDY:  OTIS 2  PATIENT ID: [id number]  TREATMENT:  ARM B:  Camizestrant  Research Sample Shipment    Month 7 PK collected on 2/26/25 at 1:40PM. Samples processed and stored in the -80 freezer. Today, samples were retrieved, packaged, and shipped per protocol on dry ice. Sent to [facility] in [place] via UPS: [id number]

## 2025-03-06 ENCOUNTER — TELEPHONE (OUTPATIENT)
Dept: PHYSICAL THERAPY | Facility: HOSPITAL | Age: 40
End: 2025-03-06

## 2025-03-06 ENCOUNTER — OFFICE VISIT (OUTPATIENT)
Dept: PHYSICAL THERAPY | Facility: HOSPITAL | Age: 40
End: 2025-03-06
Attending: NURSE PRACTITIONER
Payer: COMMERCIAL

## 2025-03-06 DIAGNOSIS — Z17.0 MALIGNANT NEOPLASM OF UPPER-OUTER QUADRANT OF RIGHT BREAST IN FEMALE, ESTROGEN RECEPTOR POSITIVE (HCC): Primary | ICD-10-CM

## 2025-03-06 DIAGNOSIS — C50.411 MALIGNANT NEOPLASM OF UPPER-OUTER QUADRANT OF RIGHT BREAST IN FEMALE, ESTROGEN RECEPTOR POSITIVE (HCC): Primary | ICD-10-CM

## 2025-03-06 PROCEDURE — 97140 MANUAL THERAPY 1/> REGIONS: CPT | Performed by: PHYSICAL THERAPIST

## 2025-03-06 PROCEDURE — 97162 PT EVAL MOD COMPLEX 30 MIN: CPT | Performed by: PHYSICAL THERAPIST

## 2025-03-06 NOTE — PROGRESS NOTES
LYMPHEDEMA EVALUATION     Diagnosis:   lymphedema I89.0 Patient:  Jocelyn Garza (39 year old, female)        Referring Provider: Dary Wallis  Today's Date   3/6/2025    Precautions:  Cancer   Date of Evaluation: 03/06/25  Date of Surgery: 4/29/2024     PATIENT SUMMARY   Summary of chief complaints: Onset of pain R inner uper arm, \"the pain started to shoot down my arm\"  History of current condition: Right breast cancer, 4/29/2024 B mastectomy w/ ALND (6+/6).   Pain level: current 6 /10 (upper arm shoots down to forearm), at best 0 /10, at worst 7 /10  Description of symptoms:     Occupation:    Leisure activities/Hobbies: enjoys reading, walking dog   Prior level of function: independent w/ ADLs  Current limitations: difficulty reaching overhead, difficulty lifting  Pt goals: Help with cording and swelling and arm movement  Hand Dominance: right  Do you live with someone who would be able to assist you:    Self-Reported Weight: 159 lb  Are you following the recommended diet from your physician: Yes    Past medical history was reviewed with Jocelyn.  Significant findings include:    Jocelyn  has a past medical history of Breast cancer in female (HCC) (10/18/2023), High blood pressure, motion sickness, Hypertension, and Solitary kidney, congenital.  She  has a past surgical history that includes appendectomy (01/01/2007); treat ectopic preg,rmv tube/ovary (Left, 01/01/2014); treat ectopic preg,rmv tube/ovary (Left, 01/01/2015); and needle biopsy right (Right, 10/09/2023).    ASSESSMENT  Jocelyn presents to physical therapy evaluation with primary c/o Onset of pain R inner uper arm, \"the pain started to shoot down my arm\". The results of the objective tests and measures show swelling R chest/trunk/UE, cording R UE, decreased ROM and strength R shld, decreased ROM right elbow, and + neural tension RUE.. Functional deficits include but are not limited to difficulty reaching overhead, difficulty  lifting. Signs and symptoms are consistent with a rehabilitation diagnosis of axillary web syndrome, lymphedema, and impaired functional mobility. Pt and PT discussed evaluation findings, pathology, and POC.  Pt voiced understanding of plan of care and agrees to participate in self-care including HEP and progression towards self-management. Skilled Physical Therapy is medically necessary to address the above impairments and reach functional goals.    Reason for lymphedema: Secondary Lymphedema Cause: breast cancer  Stage of lymphedema: 2  Phase of treatment: Phase 1: Reduction Phase    OBJECTIVE:        ROM and Strength:  (* denotes performed with pain)  Shoulder   ROM MMT (-/5)    R L R L     Flex 130 150 4 4+     Ext             Abd (C5) 150 170 4- 4+     IR T12 T7 4 4     ER 90 90 4- 4     Low Trap n/a         Mid Trap n/a         SA n/a         ,   Elbow   ROM MMT (-/5)    R L R L     Flex (C6) WFL           Ext (C7) 10 (unable to fully extend elbow due to cording)           Pronation             Supination               Flexibility:    UE Flexibility R L     Upper Trap         Levator Scap         Pec Major mod restricted min restricted     Scalenes         Latissimus mod restricted min restricted     Bicep           Swelling       3/6/2025   Self Care   Self MLD daily self MLD since 10/30/2024   Compression Wears custom compression sleeve CCL2 mmHg daily since 10/30/2024   Elevation Elevates arm nightly since 10/30/2024   Exercise Does HEP from therapy daily since 10/30/2024   Recommended compression cont wearing custom compression sleeve and trunk compression   Recommended home pump pt would benefit from advanced home pump   Clinical rationale for recommendations Pt has been self managing her swelling since last seen in Oct 2024 but swelling has returned. Patient diagnosed with I89.0 lymphedema secondary to cancer. Pt has lymphedema of RUE and extends into the right chest and trunk, Patient should not use a  basic pump due to swelling since the swelling includes the chest and trunk. I am recommending an advanced pump (Flexitouch) as it treats with therapeutic pressures, mimics MLD, and provides proximal clearance. The basic pump treats the limb only leaving the chest/breast/abdomen area prone to additional fluid buildup. The basic pump treats with high, static pressure that may cause pain to sensitive skin and damage poor skin integrity. The basic pump is not appropriate for this patient as they present with chest and trunk lymphedema.     Vendor messages Message sent to [facility] to check benefits for a home pump   Are you following the recommended diet from your physician? Yes   Lymph Class Secondary Lymphedema   Stage of Lymphedema 2   2nd Class Cause breast cancer   Cause breast cancer   Phase of treatment Phase 1: Reduction Phase         3/6/2025   Edema/Tissue Observations   RUE Locations  Right Axilla;Right Upper Arm;Right Medial Upper Arm;Right Forearm;Right Anterior Forearm;Right Antecubital;Right Wrist   Edema/Tissue Observations: Rt Axilla swelling;pitting;hyperpigmentation   Edema/Tissue Observations: Right upper arm swelling   Edema/Tissue Observations: Right medial upper arm swelling;cording   Edema/Tissue Observations: Right antecubital swelling;cording   Edema/Tissue Observations: Right forearm swelling   Edema/Tissue Observations: Right anterior forearm swelling;cording   Edema/Tissue Observations: Right wrist cording   Trunk Locations Right Upper Back;Right Upper Lateral Trunk;Right Lateral Chest;Right Medial Chest   Edema/Tissue Observations: Right upper lateral trunk swelling;pitting   Edema/Tissue Observations: Right lateral chest swelling;hyperpigmentation;pitting   Edema/Tissue Observations: Right medial chest hyperpigmentation;swelling;pitting   Edema/Tissue Observations: Right upper back swelling   # of Trunk Measurements 3   Trunk Measurement 1 13 cm inf to sternal notch: 99.7 cm   Trunk  Measurement 2 16 cm inf to sternal notch: 95.7   Trunk Measurement 3 19 cm inf to sternal notch: 89.5        Stemmer Sign:      Trunk Measurements       3/6/2025   Trunk Measurements   Trunk Measurement 1 13 cm inf to sternal notch: 99.7 cm   Trunk Measurement 2 16 cm inf to sternal notch: 95.7   Trunk Measurement 3 19 cm inf to sternal notch: 89.5          Lymphedema Measurements       3/6/2025   Lymphedema Calculations   DATE MEASURED 3/6/2025   LOCATION/MEASUREMENTS RUE   PATIENT POSITION  supine 1 pillow   LIMB POSITION 75 deg abduction   STARTING POINT 17cm from 3rd cuticle   RIGHT HAND VOLUME 19.7cm across palm   LEFT HAND VOLUME 19.5cm across palm   MEASUREMENT A 15.1   MEASUREMENT B 16.8   MEASUREMENT C 19.7   MEASUREMENT D 23.3   MEASUREMENT E 24.9   MEASUREMENT F 24.6   MEASUREMENT G 26.6   MEASUREMENT H 27.3   MEASUREMENT I 30.3    TOTAL VOLUME  [id number]   DATE MEASURED 3/6/2025   LOCATION/MEASUREMENTS LUE   MEASUREMENT A 15.3   MEASUREMENT B 17   MEASUREMENT C 19.8   MEASUREMENT D 22.9   MEASUREMENT E 25   MEASUREMENT F 25.6   MEASUREMENT G 26.5   MEASUREMENT H 28.2   MEASUREMENT I 29.6    TOTAL VOLUME  [phone]   % DIFFERENCE [id number]        Neurological:  Sensation: + neural tension RUE, occasional numbness and tingling RUE       Today's Treatment and Response:   Pt education was provided on exam findings, treatment diagnosis, treatment plan, expectations, and prognosis.  Today's Treatment       3/6/2025   PT Treatment   Insurance Auth # and Certification Dates Certification From: 3/6/2025  To:6/4/2025   # of Visits Used/Approved 1/10   Therapeutic Exercise Ulnar nerve glides    Manual Therapy STM R axilla/arm, areas of cording. Several cavitations noted    MLD:  Neck sequence, abd sequence, left axillary, A-A, R inguinal, R A-I, Right axillary, Right chest/trunk, RUE.    Compression:  -pt to cont to wear her compression garments (arm and trunk     Therapeutic Exercise Min 5   Manual Therapy Min 35    Evaluation Min 30   Total of Timed Procedures 40   Total of Service Based 30   Total Treatment Time 70         Charges:  PT EVAL: Moderate Complexity, mm3  In agreement with evaluation findings and clinical rationale, this evaluation involved MODERATE COMPLEXITY decision making due to 1-2 personal factors/comorbidities, 3 or more body structures involved/activity limitations, and evolving symptoms as documented in the evaluation.                                                                   PLAN OF CARE:    Goals: (to be met in 10 visits)         Not Met Progressing Toward Partially Met Met   Decrease swelling R trunk by at least 2 cm to decrease risks of infection       Pt to be independent with self MLD, ROM exercises, and donning/doffing compression bandages/garments to facilitate swelling reduction and to be independent at self-management once discharged.       Increase R elb ext to 0 and shoulder AROM flex 150, abd 165 and IR to T8 to improve ease of dressing/bathing/and reaching overhead.       Increase R UE strength to at least 4/5 to improve ease of lifting and performing household chores.                              Frequency / Duration: Patient will be seen 1-2x/week or a total of 10 visits over a 90 day period. Treatment will include: MLD; Compression; Skin care; Remedial exercise; Manual therapy; Self-care home management; Therapeutic activities; Therapeutic exercises; Home exercise program instructions    Education or treatment limitation: None   Rehab Potential: good     LLIS SCORES    Q1-Q17 Score #of Q's Answered Raw Score Percent Impairment      14    17    0.82    0.82       Patient/Family/Caregiver was advised of these findings, precautions, and treatment options and has agreed to actively participate in planning and for this course of care.    Thank you for your referral. Please co-sign or sign and return this letter via fax as soon as possible to [phone]. If you have any questions,  please contact me at Dept: [phone]    Sincerely,  Electronically signed by therapist: Alejandra Flores PT  Physician's certification required: Yes  I certify the need for these services furnished under this plan of treatment and while under my care.    X___________________________________________________ Date____________________    Certification From: 3/6/2025  To:6/4/2025

## 2025-03-10 ENCOUNTER — OFFICE VISIT (OUTPATIENT)
Dept: PHYSICAL THERAPY | Facility: HOSPITAL | Age: 40
End: 2025-03-10
Attending: NURSE PRACTITIONER
Payer: COMMERCIAL

## 2025-03-10 PROCEDURE — 97110 THERAPEUTIC EXERCISES: CPT

## 2025-03-10 PROCEDURE — 97140 MANUAL THERAPY 1/> REGIONS: CPT

## 2025-03-10 PROCEDURE — 97530 THERAPEUTIC ACTIVITIES: CPT

## 2025-03-10 NOTE — PROGRESS NOTES
Patient: Jocelyn Garza (39 year old, female) Referring Provider:  Insurance:   Diagnosis: lymphedema I89.0 Dary Wallis  BCBS OUT OF STATE   Date of Surgery: 4/29/2024  N/A   Precautions:  Cancer  Referral Information:    Date of Evaluation: Req: 0, Auth: 0, Exp:     03/06/25 POC Auth Visits:          Today's Date   3/10/2025    Subjective          Pain: 6/10 (At worst 10/10 at best 0/10.  R upper extremity and R abdomen)     Objective  Cording present R UE.  Decrease R shoulder mobility.  Guarding with overuse of upper trap and pect R.     Assessment  One cavitation at elbow today with some relief.  Pt continues to have pain down RUE with limited ROM.  Pt needs encouragement to stretch through her tightness in RUE with overhead shoulder movement RUE.    Goals (to be met in 10 visits)           Not Met Progressing Toward Partially Met Met   Decrease swelling R trunk by at least 2 cm to decrease risks of infection           Pt to be independent with self MLD, ROM exercises, and donning/doffing compression bandages/garments to facilitate swelling reduction and to be independent at self-management once discharged.           Increase R elb ext to 0 and shoulder AROM flex 150, abd 165 and IR to T8 to improve ease of dressing/bathing/and reaching overhead.           Increase R UE strength to at least 4/5 to improve ease of lifting and performing household chores.                                        Plan  Cont as planned    Treatment Last 4 Visits       3/6/2025 3/10/2025   PT Treatment   Insurance Auth # and Certification Dates Certification From: 3/6/2025  To:6/4/2025 Certification From: 3/6/2025  To:6/4/2025   # of Visits Used/Approved 1/10 2/10   Therapeutic Exercise Ulnar nerve glides  PROM R shoulder in supine (overhead flexion and abduction)  -W slides RUE in supine  -Side lying shoulder abduction x 10  -Ulnar nerve glides     Manual Therapy STM R axilla/arm, areas of cording. Several cavitations  noted    MLD:  Neck sequence, abd sequence, left axillary, A-A, R inguinal, R A-I, Right axillary, Right chest/trunk, RUE.    Compression:  -pt to cont to wear her compression garments (arm and trunk   STM R axilla/arm, areas of cording. Several cavitations noted    MLD:  Neck sequence, abd sequence, left axillary, A-A, R inguinal, R A-I, Right axillary, Right chest/trunk, RUE. (Performed in supine and sidelying)    Compression:  -pt to cont to wear her compression garments (arm and trunk     Therapeutic Activity  Pt asked how she can reduce her risk of increased swelling, cording and tightness in RUE.  Discussed with patient that she will need to work on complete decongestive therapy which includes self massage and/or use of home pneumatic compression pump, skin care (applying lotion daily), exercises and stretches and compression.     Therapeutic Exercise Min 5 10   Manual Therapy Min 35 35   Therapeutic Activity Min  10   Evaluation Min 30    Total of Timed Procedures 40 55   Total of Service Based 30 0   Total Treatment Time 70 55         HEP  Reviewed    Charges     MM2, There Ex1, There act1

## 2025-03-14 ENCOUNTER — OFFICE VISIT (OUTPATIENT)
Dept: PHYSICAL THERAPY | Facility: HOSPITAL | Age: 40
End: 2025-03-14
Attending: NURSE PRACTITIONER
Payer: COMMERCIAL

## 2025-03-14 PROCEDURE — 97140 MANUAL THERAPY 1/> REGIONS: CPT | Performed by: PHYSICAL THERAPIST

## 2025-03-14 PROCEDURE — 97110 THERAPEUTIC EXERCISES: CPT | Performed by: PHYSICAL THERAPIST

## 2025-03-14 NOTE — PROGRESS NOTES
Patient: Jocelyn Garza (39 year old, female) Referring Provider:  Insurance:   Diagnosis: lymphedema I89.0 Dary Wallis  [employer] OUT OF STATE   Date of Surgery: 4/29/2024  N/A   Precautions:  Cancer  Referral Information:    Date of Evaluation: Req: 0, Auth: 0, Exp:     03/06/25 POC Auth Visits:          Today's Date   3/14/2025    Subjective  \"It's feeling better\"       Pain: 5/10 (R axilla down arm)     Objective  Swelling RUE, chest,trunk. Cording, decreased R shld ROM   Shoulder       3/6/2025 3/14/2025   Shoulder ROM/MMT   Rt Flexion Shoulder 130 140   Lt Flexion Shoulder 150 150   Rt Flexion Shoulder MMT 4    Lt Flexion Shoulder MMT 4+    Rt Abduction Shoulder 150 155   Lt Abduction Shoulder 170 170   Rt Abduction Shoulder MMT (C5) 4-    Lt Abduction Shoulder MMT (C5) 4+    Rt IR Shoulder T12 T12   Lt IR Shoulder T7 T7   Rt IR Shoulder MMT 4    Lt IR Shoulder MMT 4    Rt ER Shoulder 90 90   Lt ER Shoulder 90 90   Rt ER Shoulder MMT 4-    Lt ER Shoulder MMT 4    , Elbow       3/6/2025 3/14/2025   Elbow ROM/MMT   Rt Flexion Elbow WFL WFL   Rt Extension Elbow 10       unable to fully extend elbow due to cording WFL   , UE Flexibility       3/6/2025 3/14/2025   UE Flexibility   Rt Pec Major mod restricted mod restricted   Lt Pec Major min restricted min restricted   Rt Latissimus mod restricted mod restricted   Lt Latissimus min restricted min restricted    Self Care       3/6/2025   Self Care   Self MLD daily self MLD since 10/30/2024   Compression Wears custom compression sleeve CCL2 mmHg daily since 10/30/2024   Elevation Elevates arm nightly since 10/30/2024   Exercise Does HEP from therapy daily since 10/30/2024   Recommended compression cont wearing custom compression sleeve and trunk compression   Recommended home pump pt would benefit from advanced home pump   Clinical rationale for recommendations Pt has been self managing her swelling since last seen in Oct 2024 but swelling has returned. Patient  diagnosed with I89.0 lymphedema secondary to cancer. Pt has lymphedema of RUE and extends into the right chest and trunk, Patient should not use a basic pump due to swelling since the swelling includes the chest and trunk. I am recommending an advanced pump (Flexitouch) as it treats with therapeutic pressures, mimics MLD, and provides proximal clearance. The basic pump treats the limb only leaving the chest/breast/abdomen area prone to additional fluid buildup. The basic pump treats with high, static pressure that may cause pain to sensitive skin and damage poor skin integrity. The basic pump is not appropriate for this patient as they present with chest and trunk lymphedema.     Vendor messages Message sent to [facility] to check benefits for a home pump   Are you following the recommended diet from your physician? Yes   Lymph Class Secondary Lymphedema   Stage of Lymphedema 2   2nd Class Cause breast cancer   Cause breast cancer   Phase of treatment Phase 1: Reduction Phase    Edema/Tissue       3/6/2025 3/14/2025   Edema/Tissue Observations   RUE Locations  Right Axilla;Right Upper Arm;Right Medial Upper Arm;Right Forearm;Right Anterior Forearm;Right Antecubital;Right Wrist    Edema/Tissue Observations: Rt Axilla swelling;pitting;hyperpigmentation    Edema/Tissue Observations: Right upper arm swelling    Edema/Tissue Observations: Right medial upper arm swelling;cording    Edema/Tissue Observations: Right antecubital swelling;cording    Edema/Tissue Observations: Right forearm swelling    Edema/Tissue Observations: Right anterior forearm swelling;cording    Edema/Tissue Observations: Right wrist cording    Trunk Locations Right Upper Back;Right Upper Lateral Trunk;Right Lateral Chest;Right Medial Chest Right Upper Back;Right Upper Lateral Trunk;Right Lateral Chest;Right Medial Chest   Edema/Tissue Observations: Right upper lateral trunk swelling;pitting swelling;pitting;hyperpigmentation   Edema/Tissue  Observations: Right lateral chest swelling;hyperpigmentation;pitting swelling;hyperpigmentation;pitting   Edema/Tissue Observations: Right medial chest hyperpigmentation;swelling;pitting hyperpigmentation;swelling;pitting   Edema/Tissue Observations: Right upper back swelling swelling   # of Trunk Measurements 3    Trunk Measurement 1 13 cm inf to sternal notch: 99.7 cm    Trunk Measurement 2 16 cm inf to sternal notch: 95.7    Trunk Measurement 3 19 cm inf to sternal notch: 89.5       Assessment  ROM of shoulder and elbow improving.    Goals (to be met in 10 visits)     Not Met Progressing Toward Partially Met Met   Decrease swelling R trunk by at least 2 cm to decrease risks of infection       Pt to be independent with self MLD, ROM exercises, and donning/doffing compression bandages/garments to facilitate swelling reduction and to be independent at self-management once discharged.       Increase R elb ext to 0 and shoulder AROM flex 150, abd 165 and IR to T8 to improve ease of dressing/bathing/and reaching overhead.       Increase R UE strength to at least 4/5 to improve ease of lifting and performing household chores.                                  Plan  Cont as planned    Treatment Last 4 Visits       3/6/2025 3/10/2025 3/14/2025   PT Treatment   Insurance Auth # and Certification Dates Certification From: 3/6/2025  To:6/4/2025 Certification From: 3/6/2025  To:6/4/2025 Certification From: 3/6/2025  To:6/4/2025   # of Visits Used/Approved 1/10 2/10 3/10   Therapeutic Exercise Ulnar nerve glides  PROM R shoulder in supine (overhead flexion and abduction)  -W slides RUE in supine  -Side lying shoulder abduction x 10  -Ulnar nerve glides   Manual stretching R shld in supine by therapist    Prone:  [place], mid trap, W squeezes, shld ext, snow angels, and snow angels w/ ER    Discussed continued stretching of R pect and lat   Manual Therapy STM R axilla/arm, areas of cording. Several cavitations noted    MLD:  Neck  sequence, abd sequence, left axillary, A-A, R inguinal, R A-I, Right axillary, Right chest/trunk, RUE.    Compression:  -pt to cont to wear her compression garments (arm and trunk   STM R axilla/arm, areas of cording. Several cavitations noted    MLD:  Neck sequence, abd sequence, left axillary, A-A, R inguinal, R A-I, Right axillary, Right chest/trunk, RUE. (Performed in supine and sidelying)    Compression:  -pt to cont to wear her compression garments (arm and trunk   STM R pect and lat  STM and stretching R UE cording    MLD: neck sequence, R inguinal, R A-I, L axilla, A-A, R axilla, RUE, R trunk    Compression  - pt wearing compression sleeve  - encouraged pt to look for compression tank top or bra (discussed [employer] may have something in their store she can try on)   Therapeutic Activity  Pt asked how she can reduce her risk of increased swelling, cording and tightness in RUE.  Discussed with patient that she will need to work on complete decongestive therapy which includes self massage and/or use of home pneumatic compression pump, skin care (applying lotion daily), exercises and stretches and compression.      Therapeutic Exercise Min 5 10 15   Manual Therapy Min 35 35 30   Therapeutic Activity Min  10    Evaluation Min 30     Total of Timed Procedures 40 55 45   Total of Service Based 30 0 0   Total Treatment Time 70 55 45         HEP       Charges     mm2, ex1

## 2025-03-17 ENCOUNTER — OFFICE VISIT (OUTPATIENT)
Dept: PHYSICAL THERAPY | Facility: HOSPITAL | Age: 40
End: 2025-03-17
Attending: NURSE PRACTITIONER
Payer: COMMERCIAL

## 2025-03-17 PROCEDURE — 97140 MANUAL THERAPY 1/> REGIONS: CPT | Performed by: PHYSICAL THERAPIST

## 2025-03-17 NOTE — PROGRESS NOTES
Patient: Jocelyn Garza (39 year old, female) Referring Provider:  Insurance:   Diagnosis: lymphedema I89.0 Dary Wallis  BCBS OUT OF STATE   Date of Surgery: 4/29/2024  N/A   Precautions:  Cancer  Referral Information:    Date of Evaluation: Req: 0, Auth: 0, Exp:     03/06/25 POC Auth Visits:          Today's Date   3/17/2025    Subjective  Pt reporting cording still bothers her but getting better       Pain: 3/10     Objective      Trunk Measurements       3/6/2025 3/17/2025   Trunk Measurements   Trunk Measurement 1 13 cm inf to sternal notch: 99.7 cm 13 cm inf to sternal notch: 98.5 (IE: 99.7 cm)   Trunk Measurement 2 16 cm inf to sternal notch: 95.7 16 cm inf to sternal notch: 95.2 (IE: 95.7)   Trunk Measurement 3 19 cm inf to sternal notch: 89.5 19 cm inf to sternal notch: 89 (IE 89.5)    Self Care       3/6/2025 3/17/2025   Self Care   Self MLD daily self MLD since 10/30/2024 daily self MLD since 10/30/2024   Compression Wears custom compression sleeve CCL2 mmHg daily since 10/30/2024 Wears custom compression sleeve CCL2 mmHg daily since 10/30/2024   Elevation Elevates arm nightly since 10/30/2024 Elevates arm nightly since 10/30/2024   Exercise Does HEP from therapy daily since 10/30/2024 Does HEP from therapy daily since 10/30/2024   Recommended compression cont wearing custom compression sleeve and trunk compression cont wearing custom compression sleeve and trunk compression. Recommended pt to get replacement arm sleeves- recommend to be re-measured   Recommended home pump pt would benefit from advanced home pump pt would benefit from advanced home pump   Clinical rationale for recommendations Pt has been self managing her swelling since last seen in Oct 2024 but swelling has returned. Patient diagnosed with I89.0 lymphedema secondary to cancer. Pt has lymphedema of RUE and extends into the right chest and trunk, Patient should not use a basic pump due to swelling since the swelling includes the  chest and trunk. I am recommending an advanced pump (Flexitouch) as it treats with therapeutic pressures, mimics MLD, and provides proximal clearance. The basic pump treats the limb only leaving the chest/breast/abdomen area prone to additional fluid buildup. The basic pump treats with high, static pressure that may cause pain to sensitive skin and damage poor skin integrity. The basic pump is not appropriate for this patient as they present with chest and trunk lymphedema.   Pt has been self managing her swelling since last seen in Oct 2024 but swelling has returned. Patient diagnosed with I89.0 lymphedema secondary to cancer. Pt has lymphedema of RUE and extends into the right chest and trunk, Patient should not use a basic pump due to swelling since the swelling includes the chest and trunk. I am recommending an advanced pump (Flexitouch) as it treats with therapeutic pressures, mimics MLD, and provides proximal clearance. The basic pump treats the limb only leaving the chest/breast/abdomen area prone to additional fluid buildup. The basic pump treats with high, static pressure that may cause pain to sensitive skin and damage poor skin integrity. The basic pump is not appropriate for this patient as they present with chest and trunk lymphedema.     Vendor messages Message sent to [facility] to check benefits for a home pump Home pump [facility] for next week   Are you following the recommended diet from your physician? Yes    Lymph Class Secondary Lymphedema    Stage of Lymphedema 2    2nd Class Cause breast cancer    Cause breast cancer    Phase of treatment Phase 1: Reduction Phase     Edema/Tissue       3/6/2025 3/14/2025 3/17/2025   Edema/Tissue Observations   RUE Locations  Right Axilla;Right Upper Arm;Right Medial Upper Arm;Right Forearm;Right Anterior Forearm;Right Antecubital;Right Wrist  Right Axilla;Right Upper Arm;Right Medial Upper Arm;Right Forearm;Right Anterior Forearm;Right Antecubital;Right  Wrist   Edema/Tissue Observations: Rt Axilla swelling;pitting;hyperpigmentation  swelling;hyperpigmentation   Edema/Tissue Observations: Right upper arm swelling  swelling   Edema/Tissue Observations: Right medial upper arm swelling;cording  swelling;cording   Edema/Tissue Observations: Right antecubital swelling;cording  swelling;cording   Edema/Tissue Observations: Right forearm swelling  swelling   Edema/Tissue Observations: Right anterior forearm swelling;cording     Edema/Tissue Observations: Right wrist cording     Trunk Locations Right Upper Back;Right Upper Lateral Trunk;Right Lateral Chest;Right Medial Chest Right Upper Back;Right Upper Lateral Trunk;Right Lateral Chest;Right Medial Chest    Edema/Tissue Observations: Right upper lateral trunk swelling;pitting swelling;pitting;hyperpigmentation    Edema/Tissue Observations: Right lateral chest swelling;hyperpigmentation;pitting swelling;hyperpigmentation;pitting    Edema/Tissue Observations: Right medial chest hyperpigmentation;swelling;pitting hyperpigmentation;swelling;pitting    Edema/Tissue Observations: Right upper back swelling swelling    # of Trunk Measurements 3     Trunk Measurement 1 13 cm inf to sternal notch: 99.7 cm  13 cm inf to sternal notch: 98.5 (IE: 99.7 cm)   Trunk Measurement 2 16 cm inf to sternal notch: 95.7  16 cm inf to sternal notch: 95.2 (IE: 95.7)   Trunk Measurement 3 19 cm inf to sternal notch: 89.5  19 cm inf to sternal notch: 89 (IE 89.5)          3/6/2025     3:00 PM 10/30/2024     2:00 PM 8/28/2024     7:00 AM 5/28/2024     8:00 PM 4/8/2024     2:00 PM   Lymphedema Calculations   DATE MEASURED 3/6/2025 10/30/2024 8/28/2024 5/28/2024 4/8/2024   LOCATION/MEASUREMENTS RUE RUE RUE RUE RUE   PATIENT POSITION  supine 1 pillow supine 1 pillow supine 1 pillow supine 1 pillow supine 1 pillow   LIMB POSITION 75 deg abduction 75 deg abduction 75 deg abduction 75 deg abduction 75 deg abduction   STARTING POINT 17cm from 3rd cuticle  17cm from 3rd cuticle 17cm from 3rd cuticle 17cm from 3rd cuticle 17cm from 3rd cuticle   RIGHT HAND VOLUME 19.7cm across palm 19.7cm across palm 19.7cm across palm 19.7cm across palm 19.7cm across palm   LEFT HAND VOLUME 19.5cm across palm 19.5cm across palm 19.5cm across palm 19.5cm across palm 19.5cm across palm   MEASUREMENT A 15.1 15.8 15.8 15.5 15.5   MEASUREMENT B 16.8 16.5 16.7 16.6 16.7   MEASUREMENT C 19.7 19.2 19.4 19.1 19.2   MEASUREMENT D 23.3 22.5 22.5 23 23.1   MEASUREMENT E 24.9 25.2 25.2 25 25   MEASUREMENT F 24.6 24.8 24.7 24.5 24.5   MEASUREMENT G 26.6 26 26 25.5 25.3   MEASUREMENT H 27.3 27.8 27.8 27.5 27.4   MEASUREMENT I 30.3 29.8 29.7 29.6 29.4    TOTAL VOLUME  [id number] 1754.06717 1756.31094 1732.83178 1728.66475   DATE MEASURED 3/6/2025 10/30/2024 8/28/2024 4/8/2024 4/8/2024   LOCATION/MEASUREMENTS LUE LUE LUE LUE LUE   MEASUREMENT A 15.3 15.3 15.3 15.4 15.4   MEASUREMENT B 17 16.9 17 17.1 17   MEASUREMENT C 19.8 20 20 19.6 19.4   MEASUREMENT D 22.9 22.7 22.8 22.7 22.8   MEASUREMENT E 25 25.3 25.3 25.2 25.2   MEASUREMENT F 25.6 26.1 26 24.8 24.8   MEASUREMENT G 26.5 27 27.1 26.9 26.8   MEASUREMENT H 28.2 28.9 28.9 28.6 28.5   MEASUREMENT I 29.6 31.2 31.2 33.5 33.4    TOTAL VOLUME  [id number] [id number] [phone] 1855.81969 1841.3666   % DIFFERENCE -1.00488 -5.33088 -5.87858 -6.55701 -[phone]          Assessment  Trunk swelling decreasing, still w/ swelling upper/lateral trunk, still has cording and arm swelling    Goals (to be met in 10 visits)     Not Met Progressing Toward Partially Met Met   Decrease swelling R trunk by at least 2 cm to decrease risks of infection    X (but still has swelling)   Pt to be independent with self MLD, ROM exercises, and donning/doffing compression bandages/garments to facilitate swelling reduction and to be independent at self-management once discharged.       Increase R elb ext to 0 and shoulder AROM flex 150, abd 165 and IR to T8 to improve ease of  dressing/bathing/and reaching overhead.       Increase R UE strength to at least 4/5 to improve ease of lifting and performing household chores.                                      Plan  Cont as planned, pump demo kirill next visit    Treatment Last 4 Visits       3/6/2025 3/10/2025 3/14/2025 3/17/2025   PT Treatment   Insurance Auth # and Certification Dates Certification From: 3/6/2025  To:6/4/2025 Certification From: 3/6/2025  To:6/4/2025 Certification From: 3/6/2025  To:6/4/2025 Certification From: 3/6/2025  To:6/4/2025    Certification From: 3/6/2025  To:6/4/2025   # of Visits Used/Approved 1/10 2/10 3/10 4/10    4/10   Therapeutic Exercise Ulnar nerve glides  PROM R shoulder in supine (overhead flexion and abduction)  -W slides RUE in supine  -Side lying shoulder abduction x 10  -Ulnar nerve glides   Manual stretching R shld in supine by therapist    Prone:  Rhomb, mid trap, W squeezes, shld ext, snow angels, and snow angels w/ ER    Discussed continued stretching of R pect and lat    Manual Therapy STM R axilla/arm, areas of cording. Several cavitations noted    MLD:  Neck sequence, abd sequence, left axillary, A-A, R inguinal, R A-I, Right axillary, Right chest/trunk, RUE.    Compression:  -pt to cont to wear her compression garments (arm and trunk   STM R axilla/arm, areas of cording. Several cavitations noted    MLD:  Neck sequence, abd sequence, left axillary, A-A, R inguinal, R A-I, Right axillary, Right chest/trunk, RUE. (Performed in supine and sidelying)    Compression:  -pt to cont to wear her compression garments (arm and trunk   STM R pect and lat  STM and stretching R UE cording    MLD: neck sequence, R inguinal, R A-I, L axilla, A-A, R axilla, RUE, R trunk    Compression  - pt wearing compression sleeve  - encouraged pt to look for compression tank top or bra (discussed [employer] may have something in their store she can try on) Measured trunk swelling    STM R axilla/arm, areas of cording. Once  cavitation noted    MLD:  Neck sequence, abd sequence, left axillary, A-A, R inguinal, R A-I, Right axillary, Right chest/trunk, RUE. Increased focus R upper arm and trunk.         Therapeutic Activity  Pt asked how she can reduce her risk of increased swelling, cording and tightness in RUE.  Discussed with patient that she will need to work on complete decongestive therapy which includes self massage and/or use of home pneumatic compression pump, skin care (applying lotion daily), exercises and stretches and compression.       Therapeutic Exercise Min 5 10 15    Manual Therapy Min 35 35 30 45   Therapeutic Activity Min  10     Evaluation Min 30      Total of Timed Procedures 40 55 45 45   Total of Service Based 30 0 0 0   Total Treatment Time 70 55 45 45       Multiple values from one day are sorted in reverse-chronological order         HEP       Charges     mm3

## 2025-03-20 ENCOUNTER — APPOINTMENT (OUTPATIENT)
Dept: PHYSICAL THERAPY | Facility: HOSPITAL | Age: 40
End: 2025-03-20
Attending: NURSE PRACTITIONER
Payer: COMMERCIAL

## 2025-03-21 ENCOUNTER — TELEPHONE (OUTPATIENT)
Facility: CLINIC | Age: 40
End: 2025-03-21

## 2025-03-21 NOTE — TELEPHONE ENCOUNTER
Dr Campbell, please advise  Clinical staff, please assist    [employer] faxed an order form for a compression device for the patient today. Did the office receive this form?    If not, please contact Sharlene so she can send it again.

## 2025-03-24 ENCOUNTER — HOSPITAL ENCOUNTER (OUTPATIENT)
Dept: CT IMAGING | Facility: HOSPITAL | Age: 40
Discharge: HOME | End: 2025-03-24
Attending: Other
Payer: COMMERCIAL

## 2025-03-24 ENCOUNTER — APPOINTMENT (OUTPATIENT)
Dept: PHYSICAL THERAPY | Facility: HOSPITAL | Age: 40
End: 2025-03-24
Attending: NURSE PRACTITIONER
Payer: COMMERCIAL

## 2025-03-24 DIAGNOSIS — G44.53 THUNDERCLAP HEADACHE: ICD-10-CM

## 2025-03-24 PROCEDURE — 70496 CT ANGIOGRAPHY HEAD: CPT | Performed by: OTHER

## 2025-03-25 ENCOUNTER — OFFICE VISIT (OUTPATIENT)
Dept: PHYSICAL THERAPY | Facility: HOSPITAL | Age: 40
End: 2025-03-25
Attending: NURSE PRACTITIONER
Payer: COMMERCIAL

## 2025-03-25 PROCEDURE — 97530 THERAPEUTIC ACTIVITIES: CPT | Performed by: PHYSICAL THERAPIST

## 2025-03-25 NOTE — PROGRESS NOTES
Patient: Jocelyn Garza (39 year old, female) Referring Provider:  Insurance:   Diagnosis: lymphedema I89.0 Dary Wallis  [employer] OUT OF STATE   Date of Surgery: 4/29/2024  N/A   Precautions:  Cancer  Referral Information:    Date of Evaluation: Req: 0, Auth: 0, Exp:     03/06/25 POC Auth Visits:          Today's Date   3/25/2025    Subjective  \"It still really tight on the right\"       Pain:       Objective  Swelling RUE, chest,trunk. Cording, decreased R shld ROM     Assessment  Pt reporting relief w/ the home pump (demo) \"it hits a lot of the spots I can't get on my own\"    Goals (to be met in 10 visits)     Not Met Progressing Toward Partially Met Met   Decrease swelling R trunk by at least 2 cm to decrease risks of infection    X (but still has swelling)   Pt to be independent with self MLD, ROM exercises, and donning/doffing compression bandages/garments to facilitate swelling reduction and to be independent at self-management once discharged.       Increase R elb ext to 0 and shoulder AROM flex 150, abd 165 and IR to T8 to improve ease of dressing/bathing/and reaching overhead.       Increase R UE strength to at least 4/5 to improve ease of lifting and performing household chores.                                          Plan  Cont as planned    Treatment Last 4 Visits       3/10/2025 3/14/2025 3/17/2025 3/25/2025   PT Treatment   Insurance Auth # and Certification Dates Certification From: 3/6/2025  To:6/4/2025 Certification From: 3/6/2025  To:6/4/2025 Certification From: 3/6/2025  To:6/4/2025    Certification From: 3/6/2025  To:6/4/2025 Certification From: 3/6/2025  To:6/4/2025   # of Visits Used/Approved 2/10 3/10 4/10    4/10 5/10   Therapeutic Exercise PROM R shoulder in supine (overhead flexion and abduction)  -W slides RUE in supine  -Side lying shoulder abduction x 10  -Ulnar nerve glides   Manual stretching R shld in supine by therapist    Prone:  Rhomb, mid trap, W squeezes, shld ext, snow  shagufta, and snow angels w/ ER    Discussed continued stretching of R pect and lat     Manual Therapy STM R axilla/arm, areas of cording. Several cavitations noted    MLD:  Neck sequence, abd sequence, left axillary, A-A, R inguinal, R A-I, Right axillary, Right chest/trunk, RUE. (Performed in supine and sidelying)    Compression:  -pt to cont to wear her compression garments (arm and trunk   STM R pect and lat  STM and stretching R UE cording    MLD: neck sequence, R inguinal, R A-I, L axilla, A-A, R axilla, RUE, R trunk    Compression  - pt wearing compression sleeve  - encouraged pt to look for compression tank top or bra (discussed [employer] may have something in their store she can try on) Measured trunk swelling    STM R axilla/arm, areas of cording. Once cavitation noted    MLD:  Neck sequence, abd sequence, left axillary, A-A, R inguinal, R A-I, Right axillary, Right chest/trunk, RUE. Increased focus R upper arm and trunk.          Therapeutic Activity Pt asked how she can reduce her risk of increased swelling, cording and tightness in RUE.  Discussed with patient that she will need to work on complete decongestive therapy which includes self massage and/or use of home pneumatic compression pump, skin care (applying lotion daily), exercises and stretches and compression.     Home pump demo in-clinic ([facility]), attended demo to explain the effects/purpose of the pump, discuss use of the pump and self management.      Therapeutic Exercise Min 10 15     Manual Therapy Min 35 30 45    Therapeutic Activity Min 10   45   Other Therapy Min    15   Total of Timed Procedures 55 45 45 45   Total of Service Based 0 0 0 15   Total Treatment Time 55 45 45 60       Multiple values from one day are sorted in reverse-chronological order         HEP       Charges     act3

## 2025-03-26 ENCOUNTER — NURSE ONLY (OUTPATIENT)
Age: 40
End: 2025-03-26
Attending: NURSE PRACTITIONER
Payer: COMMERCIAL

## 2025-03-26 ENCOUNTER — OFFICE VISIT (OUTPATIENT)
Age: 40
End: 2025-03-26
Attending: INTERNAL MEDICINE
Payer: COMMERCIAL

## 2025-03-26 VITALS
TEMPERATURE: 98 F | HEIGHT: 65 IN | BODY MASS INDEX: 26.35 KG/M2 | WEIGHT: 158.19 LBS | DIASTOLIC BLOOD PRESSURE: 84 MMHG | RESPIRATION RATE: 16 BRPM | SYSTOLIC BLOOD PRESSURE: 124 MMHG | OXYGEN SATURATION: 100 % | HEART RATE: 63 BPM

## 2025-03-26 DIAGNOSIS — Z51.81 MEDICATION MONITORING ENCOUNTER: ICD-10-CM

## 2025-03-26 DIAGNOSIS — Z17.0 MALIGNANT NEOPLASM OF UPPER-OUTER QUADRANT OF RIGHT BREAST IN FEMALE, ESTROGEN RECEPTOR POSITIVE (HCC): Primary | ICD-10-CM

## 2025-03-26 DIAGNOSIS — Z79.811 ENCOUNTER FOR MONITORING AROMATASE INHIBITOR THERAPY: ICD-10-CM

## 2025-03-26 DIAGNOSIS — C50.411 MALIGNANT NEOPLASM OF UPPER-OUTER QUADRANT OF RIGHT BREAST IN FEMALE, ESTROGEN RECEPTOR POSITIVE (HCC): Primary | ICD-10-CM

## 2025-03-26 DIAGNOSIS — Z85.3 ENCOUNTER FOR FOLLOW-UP SURVEILLANCE OF BREAST CANCER: ICD-10-CM

## 2025-03-26 DIAGNOSIS — D69.6 THROMBOCYTOPENIA: ICD-10-CM

## 2025-03-26 DIAGNOSIS — R19.7 DIARRHEA, UNSPECIFIED TYPE: ICD-10-CM

## 2025-03-26 DIAGNOSIS — D70.8 OTHER NEUTROPENIA: ICD-10-CM

## 2025-03-26 DIAGNOSIS — Z09 FOLLOW-UP EXAMINATION FOLLOWING TREATMENT WITH HIGH-RISK MEDICATION: ICD-10-CM

## 2025-03-26 DIAGNOSIS — Z08 ENCOUNTER FOR FOLLOW-UP SURVEILLANCE OF BREAST CANCER: ICD-10-CM

## 2025-03-26 DIAGNOSIS — Z51.81 ENCOUNTER FOR MONITORING AROMATASE INHIBITOR THERAPY: ICD-10-CM

## 2025-03-26 LAB
ALBUMIN SERPL-MCNC: 4.5 G/DL (ref 3.2–4.8)
ALBUMIN/GLOB SERPL: 1.9 {RATIO} (ref 1–2)
ALP LIVER SERPL-CCNC: 62 U/L
ALT SERPL-CCNC: 12 U/L
ANION GAP SERPL CALC-SCNC: 9 MMOL/L (ref 0–18)
AST SERPL-CCNC: 20 U/L (ref ?–34)
BASOPHILS # BLD AUTO: 0.05 X10(3) UL (ref 0–0.2)
BASOPHILS NFR BLD AUTO: 1.6 %
BILIRUB SERPL-MCNC: 0.3 MG/DL (ref 0.3–1.2)
BUN BLD-MCNC: 18 MG/DL (ref 9–23)
BUN/CREAT SERPL: 14.5 (ref 10–20)
CALCIUM BLD-MCNC: 9.9 MG/DL (ref 8.7–10.4)
CHLORIDE SERPL-SCNC: 106 MMOL/L (ref 98–112)
CO2 SERPL-SCNC: 25 MMOL/L (ref 21–32)
CREAT BLD-MCNC: 1.24 MG/DL
DEPRECATED RDW RBC AUTO: 46.7 FL (ref 35.1–46.3)
EGFRCR SERPLBLD CKD-EPI 2021: 56 ML/MIN/1.73M2 (ref 60–?)
EOSINOPHIL # BLD AUTO: 0.09 X10(3) UL (ref 0–0.7)
EOSINOPHIL NFR BLD AUTO: 2.9 %
ERYTHROCYTE [DISTWIDTH] IN BLOOD BY AUTOMATED COUNT: 13.3 % (ref 11–15)
GLOBULIN PLAS-MCNC: 2.4 G/DL (ref 2–3.5)
GLUCOSE BLD-MCNC: 87 MG/DL (ref 70–99)
HCT VFR BLD AUTO: 32.3 %
HGB BLD-MCNC: 11 G/DL
IMM GRANULOCYTES # BLD AUTO: 0.01 X10(3) UL (ref 0–1)
IMM GRANULOCYTES NFR BLD: 0.3 %
LYMPHOCYTES # BLD AUTO: 0.71 X10(3) UL (ref 1–4)
LYMPHOCYTES NFR BLD AUTO: 22.8 %
MAGNESIUM SERPL-MCNC: 1.8 MG/DL (ref 1.6–2.6)
MCH RBC QN AUTO: 32.6 PG (ref 26–34)
MCHC RBC AUTO-ENTMCNC: 34.1 G/DL (ref 31–37)
MCV RBC AUTO: 95.8 FL
MONOCYTES # BLD AUTO: 0.24 X10(3) UL (ref 0.1–1)
MONOCYTES NFR BLD AUTO: 7.7 %
NEUTROPHILS # BLD AUTO: 2.02 X10 (3) UL (ref 1.5–7.7)
NEUTROPHILS # BLD AUTO: 2.02 X10(3) UL (ref 1.5–7.7)
NEUTROPHILS NFR BLD AUTO: 64.7 %
OSMOLALITY SERPL CALC.SUM OF ELEC: 291 MOSM/KG (ref 275–295)
PLATELET # BLD AUTO: 211 10(3)UL (ref 150–450)
POTASSIUM SERPL-SCNC: 4.5 MMOL/L (ref 3.5–5.1)
PROT SERPL-MCNC: 6.9 G/DL (ref 5.7–8.2)
RBC # BLD AUTO: 3.37 X10(6)UL
SODIUM SERPL-SCNC: 140 MMOL/L (ref 136–145)
WBC # BLD AUTO: 3.1 X10(3) UL (ref 4–11)

## 2025-03-26 RX ORDER — SODIUM CHLORIDE 9 MG/ML
10 INJECTION, SOLUTION INTRAMUSCULAR; INTRAVENOUS; SUBCUTANEOUS ONCE
OUTPATIENT
Start: 2025-04-23

## 2025-03-26 NOTE — PROGRESS NOTES
HPI   Jocelyn Garza is a 39 year old female for evaluation of   Encounter Diagnoses   Name Primary?    Malignant neoplasm of upper-outer quadrant of right breast in female, estrogen receptor positive (HCC) Yes    Medication monitoring encounter     Encounter for monitoring aromatase inhibitor therapy     Encounter for follow-up surveillance of breast cancer     Thrombocytopenia     Other neutropenia     Follow-up examination following treatment with high-risk medication     Diarrhea, unspecified type        Patient completed course of neoadjuvant dose dense Adriamycin Cytoxan followed by weekly Taxol.  Last dose of treatment on 3/22/2024.    Patient then underwent bilateral mastectomies on 4/29/2024 with a right axillary lymph node dissection.    Patient completed radiation on 8/7/2024. Having lymphedema therapy.  Feels hardness on the R arm.    LMP November of 2023.    On goserelin since 6/3/24. Having hot flashes.  States a couple of minutes and then improves.  A few times a day, not as much end of the month.      Has gone back to work.      Pt screened for OTIS-2 trial.  Randomized to Arm B with Camizestrant 75 mg daily, started 8/30/24.    She is currently taking study drug camizestrant at hs. Taking daily.     Started C1D1 8/24/24 abemaciclib.    Dose adjustment to 100 mg daily on 9/22/24.    Freq formed BM after eating. Sometimes liquid and takes imodium.  Does get nausea at times at night  that she attributes to imodium.     States that when sleeping at night and sleeping on the R side gets dizzy.  She has been keeping hydrated but more dizzy this month. Has not checked BP or pulse during these episodes.      Completed cycle 8 abemaciclib 100 mg.   Soft formed stool, with urgency.  Not all the time.  Much improved with lower dose. If eats the wrong things like grease, spicy and dairy, gets diarrhea.      Following with neurology for headaches.  Had CTA of the brain on 3/24/2025 not reported  yet.    She is following with nephrology.    States feeling well.      L sided pain resolved.     Continues right arm sleeve, minimal lymphedema. Will be getting the lymphedema pump.       ECOG PS  0    Review of Systems:   Review of Systems   Constitutional:  Positive for appetite change (taste changes.). Negative for fatigue (has been tired this month) and unexpected weight change.   HENT:   Negative for mouth sores.    Eyes:  Positive for eye problems (issues with adjusting at night with transitioning from light to dark, intermittent mild.  no new complaints. Improved.  Recent eye exam, told was good.).   Respiratory:  Negative for cough and shortness of breath.    Cardiovascular:  Negative for chest pain, leg swelling and palpitations.   Gastrointestinal:  Positive for abdominal pain (at times has cramps), diarrhea (soft not diarrhea) and nausea. Negative for constipation and vomiting.   Endocrine: Positive for hot flashes (states have been worse with more sweating at night.).   Genitourinary:  Negative for frequency.         Urgency, states bubbles.     Musculoskeletal:  Positive for arthralgias (R knee and hips, harder to get up from the floor and first thing in am.  Better with activity.  R knee worse yesterday walking her dog.). Negative for back pain and neck pain.        R arm pain    Skin:  Positive for itching (in the chest at times from lymphedema). Negative for rash.   Neurological:  Positive for dizziness, headaches (intermittent mild) and numbness (at hands states numbness and cold.). Negative for extremity weakness.        Tremor R arm at times for a couple of weeks.   Hematological:  Negative for adenopathy. Does not bruise/bleed easily.   Psychiatric/Behavioral:  Positive for sleep disturbance.          Current Outpatient Medications   Medication Sig Dispense Refill    prochlorperazine (COMPAZINE) 10 mg tablet Take 1 tablet (10 mg total) by mouth every 6 (six) hours as needed for Nausea. 15 tablet  0    camizestrant 75 mg Oral Tab (OTIS-2 STUDY DRUG) Take 1 tablet (75 mg total) by mouth daily. Take at the same time each day. 96 tablet 0    loperamide 2 MG Oral Cap Take 1 capsule (2 mg total) by mouth 4 (four) times daily as needed for Diarrhea.      GOSERELIN ACETATE SC Inject into the skin. Once a month      Abemaciclib 100 MG Oral Tab Take 1 tablet (100 mg total) by mouth 2 (two) times daily. may take with or without food 60 tablet 11    lidocaine-prilocaine 2.5-2.5 % External Cream Apply to site 1 hour prior to port a cath needle insertion 1 each 1     Allergies:   No Known Allergies    Past Medical History:    Breast cancer in female (HCC)    Right breast lymph node cancer    High blood pressure    Hx of motion sickness    Hypertension    Solitary kidney, congenital     Past Surgical History:   Procedure Laterality Date    Appendectomy  01/01/2007    Needle biopsy right Right 10/09/2023    Treat ectopic preg,rmv tube/ovary Left 01/01/2014    Treat ectopic preg,rmv tube/ovary Left 01/01/2015     Social History     Socioeconomic History    Marital status:    Tobacco Use    Smoking status: Never    Smokeless tobacco: Never   Vaping Use    Vaping status: Never Used   Substance and Sexual Activity    Alcohol use: Not Currently     Comment: 2 -3 drinks per week    Drug use: No     Social Drivers of Health     Food Insecurity: Low Risk  (1/15/2025)    Received from [facility]    Food Insecurity     Within the past 12 months, you worried that your food would run out before you got money to buy more.  : Never true     Within the past 12 months, the food you bought just didn't last and you didn't have money to get more. : Never true   Transportation Needs: Not At Risk (1/15/2025)    Received from [facility]    Transportation Needs     In the past 12 months, has lack of reliable transportation kept you from medical appointments, meetings, work or from getting things needed for daily  living? : No   Housing Stability: Low Risk  (4/30/2024)    Housing Stability     Housing Instability: No       Family History   Problem Relation Age of Onset    Other (Parkinson's Disease) Father     Prostate Cancer Maternal Grandfather 70    Pancreatic Cancer Paternal Grandmother 80    Cancer Paternal Aunt 80        breast cancer    Pancreatic Cancer Maternal Uncle 60    Pancreatic Cancer Paternal Great-Grandfather     Cancer Maternal Great-Grandmother         gastric ca    Cancer Paternal Uncle 60        brain cancer         PHYSICAL EXAM:    /84 (BP Location: Left arm, Patient Position: Sitting, Cuff Size: adult)   Pulse 63   Temp 98.3 °F (36.8 °C) (Oral)   Resp 16   Ht 1.651 m (5' 5\")   Wt 71.8 kg (158 lb 3.2 oz)   SpO2 100%   BMI 26.33 kg/m²   Wt Readings from Last 6 Encounters:   03/26/25 71.8 kg (158 lb 3.2 oz)   02/26/25 72.4 kg (159 lb 9.6 oz)   02/21/25 71.4 kg (157 lb 6.4 oz)   02/03/25 70.3 kg (155 lb)   01/28/25 70.3 kg (155 lb)   01/06/25 73 kg (161 lb)     Physical Exam  General: Patient is alert, not in acute distress.   HEENT: EOMs intact. PERRL. Oropharynx is clear.   Neck: No JVD. No palpable lymphadenopathy. Neck is supple.  Chest: Clear to auscultation. B mastectomies well healed.  Lymphedema improved.   Heart: Regular rate and rhythm.   Abdomen: Soft, non tender with good bowel sounds.  Extremities: No edema.  Neurological: Grossly intact.   Lymphatics: There is no palpable lymphadenopathy throughout in the cervical, supraclavicular, axillary, or inguinal regions.  Psych/Depression: nl  Neck, supple      ASSESSMENT/PLAN:     1. Malignant neoplasm of upper-outer quadrant of right breast in female, estrogen receptor positive (HCC)    2. Medication monitoring encounter    3. Encounter for monitoring aromatase inhibitor therapy    4. Encounter for follow-up surveillance of breast cancer    5. Thrombocytopenia    6. Other neutropenia    7. Follow-up examination following treatment with  high-risk medication    8. Diarrhea, unspecified type         Cancer Staging   Malignant neoplasm of upper-outer quadrant of right breast in female, estrogen receptor positive (HCC)  Staging form: Breast, AJCC 8th Edition  - Clinical stage from 10/12/2023: Stage IIA (cT3, cN1(f), cM0, G2, ER+, MO+, HER2-) - Signed by Halie Calle MD on 5/8/2024  - Pathologic stage from 5/8/2024: ypT3, pN2a, cM0, G2, ER+, MO+, HER2- - Signed by Halie Calle MD on 5/8/2024    Patient has completed staging work-up.  Thus far no evidence of metastatic disease.  Findings in the liver evaluated with ultrasound, and the one side is a hemangioma, the other likely a cyst, but a question of metastatic disease and PET scan has been ordered.  I discussed with the patient that most likely this is going to be a cyst.    Discussed that she still has early stage disease, and that her staging has not changed despite the size of the primary tumor.    Given extensive disease in the breast and komal involvement, she will benefit from neoadjuvant chemotherapy, which may help with surgical outcomes by shrinking some of the primary tumor and of the lymph nodes.  I discussed with the patient that the primary tumor will not likely decrease in size significantly to make her a candidate for breast conservation.  I did discuss with the patient that she will need a mastectomy and likely axillary lymph node dissection.  Given the size of the primary tumor, she is a candidate for postmastectomy radiation therapy plus minus radiation to the komal basins pending on surgical management of the axilla.  I discussed with the patient that once she completes treatment with radiation, she will then be initiated on adjuvant endocrine therapy, given the size of the tumor and number of positive lymph nodes, she also will be a candidate for 2 years of endovascular in the initial 2 years of adjuvant endocrine therapy.  Discussed that adjuvant endocrine therapy total  duration will be no less than 5 years but given her high risk features, likely up to 8 to 10 years.  We will also discuss options for ovarian function suppression after completion of chemotherapy.    Patient inquired regarding contralateral prophylactic mastectomy.  I discussed with the patient that we should await results of her genetic testing.  If the genetic testing is positive for a deleterious mutation which will increase her lifetime second primary breast cancer, then she should proceed with prophylactic contralateral mastectomy.  However, if this is negative, discussed with the patient that there is no benefit for proceeding with contralateral mastectomy, as it will not change her disease-free or overall survival.    Discussed with the patient that while she is receiving neoadjuvant treatment, she can have the evaluation by plastic surgery so that when she completes her neoadjuvant course of therapy, she will be able to have her surgery coordinated with Dr. Hutson and a plastic surgeon    She completed course of neoadjuvant DD AC x 4 cycles followed by weekly Taxol x12 weeks    Status post bilateral mastectomy with right-sided axillary lymph node dissection on 4/29/2024.    She completed adjuvant radiation therapy on 8/7/2024.    Discussed with the patient that it is not uncommon for patients with ER/DC positive breast cancer that have neoadjuvant treatment not to have a robust response from therapy.  I did discuss that for her treatment, in terms of systemic disease, after completion of radiation therapy, we will proceed with ovarian function suppression, which can be initiated at this point, this will then be followed by adjuvant endocrine therapy with 2 years of Abemaciclib.  In addition, patient is also a candidate for the [place] 2 clinical trial which is comparing standard of care adjuvant endocrine therapy with Abemaciclib versus camizestrant which is an oral SERD.    Patient received first dose of  goserelin 3.6 mg on 6/3/2024 and last on 7/30/2024.  Considering BSO during the summer.    Arthralgias:  secondary to goserelin.  Continue to increase activity.    -pt report minimal arthralgias    OTIS-2  Randomized to Arm B with Camizestrant started 8/30/24. No Eye issues   On adjuvant hormonal therapy and abemaciclib.       Follow up per trial protocol.     Continue camizestrant 75 mg daily.     Abemaciclib had been held due to GI symptoms requiring IV hydration and IV nausea meds.   Dose reduced to 100 mg   Currently Cycle 8 abemaciclib 100 mg dose twice daily -- continue tolerating lower dose better      Cytopenias:  secondary to abemaciclib and stable.  Neutropenia resolved    Headaches: Recommend tylenol prn.  Avoid NSAIDS due to renal issues.  Started with goserelin, which is reported to cause HA and abemaciclib can also cause HA. Following with neurology.      N/V/D:  secondary to abemaciclib.  Discussed imodium if stool watery, gasX from cramps and antiemetic for nausea.     Dizziness:  monitor BP and pulse during those episodes.      Continue to monitor creat levels- following nephrology, pt drinking fluids without issue       Lymphedema:  HEP and compression. Referral placed for therapy. R UE US negative for clot          Follow up in 4 weeks         MDM high risk.          No orders of the defined types were placed in this encounter.      Results From Past 48 Hours:  Recent Results (from the past 48 hours)   CBC W/DIFF [E]    Collection Time: 03/26/25 11:32 AM   Result Value Ref Range    WBC 3.1 (L) 4.0 - 11.0 x10(3) uL    RBC 3.37 (L) 3.80 - 5.30 x10(6)uL    HGB 11.0 (L) 12.0 - 16.0 g/dL    HCT 32.3 (L) 35.0 - 48.0 %    MCV 95.8 80.0 - 100.0 fL    MCH 32.6 26.0 - 34.0 pg    MCHC 34.1 31.0 - 37.0 g/dL    RDW-SD 46.7 (H) 35.1 - 46.3 fL    RDW 13.3 11.0 - 15.0 %    .0 150.0 - 450.0 10(3)uL    Neutrophil Absolute Prelim 2.02 1.50 - 7.70 x10 (3) uL    Neutrophil Absolute 2.02 1.50 - 7.70 x10(3) uL     Lymphocyte Absolute 0.71 (L) 1.00 - 4.00 x10(3) uL    Monocyte Absolute 0.24 0.10 - 1.00 x10(3) uL    Eosinophil Absolute 0.09 0.00 - 0.70 x10(3) uL    Basophil Absolute 0.05 0.00 - 0.20 x10(3) uL    Immature Granulocyte Absolute 0.01 0.00 - 1.00 x10(3) uL    Neutrophil % 64.7 %    Lymphocyte % 22.8 %    Monocyte % 7.7 %    Eosinophil % 2.9 %    Basophil % 1.6 %    Immature Granulocyte % 0.3 %   COMP METABOLIC PANEL [E]    Collection Time: 03/26/25 11:32 AM   Result Value Ref Range    Glucose 87 70 - 99 mg/dL    Sodium 140 136 - 145 mmol/L    Potassium 4.5 3.5 - 5.1 mmol/L    Chloride 106 98 - 112 mmol/L    CO2 25.0 21.0 - 32.0 mmol/L    Anion Gap 9 0 - 18 mmol/L    BUN 18 9 - 23 mg/dL    Creatinine 1.24 (H) 0.55 - 1.02 mg/dL    BUN/CREA Ratio 14.5 10.0 - 20.0    Calcium, Total 9.9 8.7 - 10.4 mg/dL    Calculated Osmolality 291 275 - 295 mOsm/kg    eGFR-Cr 56 (L) >=60 mL/min/1.73m2    ALT 12 10 - 49 U/L    AST 20 <34 U/L    Alkaline Phosphatase 62 37 - 98 U/L    Bilirubin, Total 0.3 0.3 - 1.2 mg/dL    Total Protein 6.9 5.7 - 8.2 g/dL    Albumin 4.5 3.2 - 4.8 g/dL    Globulin  2.4 2.0 - 3.5 g/dL    A/G Ratio 1.9 1.0 - 2.0    Patient Fasting for CMP? Patient not present    MAGNESIUM [E]    Collection Time: 03/26/25 11:32 AM   Result Value Ref Range    Magnesium 1.8 1.6 - 2.6 mg/dL           Imaging & Referrals:  None    No

## 2025-04-01 ENCOUNTER — OFFICE VISIT (OUTPATIENT)
Dept: PHYSICAL THERAPY | Facility: HOSPITAL | Age: 40
End: 2025-04-01
Attending: NURSE PRACTITIONER
Payer: COMMERCIAL

## 2025-04-01 PROCEDURE — 97140 MANUAL THERAPY 1/> REGIONS: CPT | Performed by: PHYSICAL THERAPIST

## 2025-04-01 NOTE — PROGRESS NOTES
Patient: Jocelyn Garza (39 year old, female) Referring Provider:  Insurance:   Diagnosis: lymphedema I89.0 Dary Wallis  BCBS OUT OF STATE   Date of Surgery: 4/29/2024  N/A   Precautions:  Cancer  Referral Information:    Date of Evaluation: Req: 0, Auth: 0, Exp:     03/06/25 POC Auth Visits:          Today's Date   4/1/2025    Subjective  \"After the pump demo I felt so much better, it last a a day or two, but now the swelling is back\". Pt also reporting tremors and odd sensations R hand over the past few months       Pain: 0/10 (\"no pain, just swelling\")     Objective  Swelling RUE/chest/trunk. Cording, decreased R shld ROM, slight swelling R hand.   Trunk Measurements       3/6/2025 3/17/2025 4/1/2025   Trunk Measurements   Trunk Measurement 1 13 cm inf to sternal notch: 99.7 cm 13 cm inf to sternal notch: 98.5 (IE: 99.7 cm) 13 cm inf to sternal notch: 98.5 (IE: 99.7 cm)   Trunk Measurement 2 16 cm inf to sternal notch: 95.7 16 cm inf to sternal notch: 95.2 (IE: 95.7) 16 cm inf to sternal notch: 95. (IE: 95.7)   Trunk Measurement 3 19 cm inf to sternal notch: 89.5 19 cm inf to sternal notch: 89 (IE 89.5) 19 cm inf to sternal notch: 88.8 (IE 89.5)      Assessment  Swelling slightly improved since last measurements    Goals (to be met in 10 visits)     Not Met Progressing Toward Partially Met Met   Decrease swelling R trunk by at least 2 cm to decrease risks of infection    X (but still has swelling)   Pt to be independent with self MLD, ROM exercises, and donning/doffing compression bandages/garments to facilitate swelling reduction and to be independent at self-management once discharged.       Increase R elb ext to 0 and shoulder AROM flex 150, abd 165 and IR to T8 to improve ease of dressing/bathing/and reaching overhead.       Increase R UE strength to at least 4/5 to improve ease of lifting and performing household chores.                                              Plan  Cont as planned, adv scap  strengthening as tolerated    Treatment Last 4 Visits  Treatment Day: 6       3/14/2025 3/17/2025 3/25/2025 4/1/2025   PT Treatment   Insurance Auth # and Certification Dates Certification From: 3/6/2025  To:6/4/2025 Certification From: 3/6/2025  To:6/4/2025    Certification From: 3/6/2025  To:6/4/2025 Certification From: 3/6/2025  To:6/4/2025 Certification From: 3/6/2025  To:6/4/2025   # of Visits Used/Approved 3/10 4/10    4/10 5/10 6/10   Therapeutic Exercise Manual stretching R shld in supine by therapist    Prone:  Rhomb, mid trap, W squeezes, shld ext, snow angels, and snow angels w/ ER    Discussed continued stretching of R pect and lat      Manual Therapy STM R pect and lat  STM and stretching R UE cording    MLD: neck sequence, R inguinal, R A-I, L axilla, A-A, R axilla, RUE, R trunk    Compression  - pt wearing compression sleeve  - encouraged pt to look for compression tank top or bra (discussed [employer] may have something in their store she can try on) Measured trunk swelling    STM R axilla/arm, areas of cording. Once cavitation noted    MLD:  Neck sequence, abd sequence, left axillary, A-A, R inguinal, R A-I, Right axillary, Right chest/trunk, RUE. Increased focus R upper arm and trunk.        Trunk measurements     STM R pect and RUE (areas of cording), lg cavitation noted at elbow    MLD: Neck sequence, abd sequence, left axillary, A-A, R inguinal, R A-I, Right axillary, Right chest, RUE.      Compression:  - encouraged pt to wear gauntlet when wearing compression sleeve   - cont w/ trunk compression   Therapeutic Activity   Home pump demo in-clinic ([facility] [employer]), attended demo to explain the effects/purpose of the pump, discuss use of the pump and self management.       Therapeutic Exercise Min 15      Manual Therapy Min 30 45  40   Therapeutic Activity Min   45    Other Therapy Min   15    Total of Timed Procedures 45 45 45 40   Total of Service Based 0 0 15 0   Total Treatment Time  45 45 60 40       Multiple values from one day are sorted in reverse-chronological order        HEP       Charges     mm3

## 2025-04-03 ENCOUNTER — OFFICE VISIT (OUTPATIENT)
Dept: PHYSICAL THERAPY | Facility: HOSPITAL | Age: 40
End: 2025-04-03
Attending: NURSE PRACTITIONER
Payer: COMMERCIAL

## 2025-04-03 PROCEDURE — 97140 MANUAL THERAPY 1/> REGIONS: CPT

## 2025-04-03 NOTE — PROGRESS NOTES
Patient: Jocelyn Garza (39 year old, female) Referring Provider:  Insurance:   Diagnosis: lymphedema I89.0 Dary Wallis  BCBS OUT OF STATE   Date of Surgery: 4/29/2024  N/A   Precautions:  Cancer  Referral Information:    Date of Evaluation: Req: 0, Auth: 0, Exp:     03/06/25 POC Auth Visits:          Today's Date   4/3/2025    Subjective  Pt states that she really liked the pump.  \"I haven't heard anything yet about insurance approving it.\"       Pain: 0/10     Objective  Swelling RUE/chest/trunk. Cording extends down RUE.     Assessment  One cavitation noted during STM over bicep and forearm.    Goals (to be met in 10 visits)           Not Met Progressing Toward Partially Met Met   Decrease swelling R trunk by at least 2 cm to decrease risks of infection           Pt to be independent with self MLD, ROM exercises, and donning/doffing compression bandages/garments to facilitate swelling reduction and to be independent at self-management once discharged.           Increase R elb ext to 0 and shoulder AROM flex 150, abd 165 and IR to T8 to improve ease of dressing/bathing/and reaching overhead.           Increase R UE strength to at least 4/5 to improve ease of lifting and performing household chores.                                            Plan  Cont as planned, adv scap strengthening as tolerated    Treatment Last 4 Visits  Treatment Day: 7       3/17/2025 3/25/2025 4/1/2025 4/3/2025   PT Treatment   Insurance Auth # and Certification Dates Certification From: 3/6/2025  To:6/4/2025    Certification From: 3/6/2025  To:6/4/2025 Certification From: 3/6/2025  To:6/4/2025 Certification From: 3/6/2025  To:6/4/2025 Certification From: 3/6/2025  To:6/4/2025   # of Visits Used/Approved 4/10    4/10 5/10 6/10 7/10   Manual Therapy Measured trunk swelling    STM R axilla/arm, areas of cording. Once cavitation noted    MLD:  Neck sequence, abd sequence, left axillary, A-A, R inguinal, R A-I, Right axillary, Right  chest/trunk, RUE. Increased focus R upper arm and trunk.        Trunk measurements     STM R pect and RUE (areas of cording), lg cavitation noted at elbow    MLD: Neck sequence, abd sequence, left axillary, A-A, R inguinal, R A-I, Right axillary, Right chest, RUE.      Compression:  - encouraged pt to wear gauntlet when wearing compression sleeve   - cont w/ trunk compression STM R pect and RUE (areas of cording), lg cavitation noted at elbow    MLD: Neck sequence, abd sequence, left axillary, A-A, R inguinal, R A-I, Right axillary, Right chest, RUE.      Compression:  -pt meeting compression fitter tomorrow to get measured for new compression.  -encouraged pt to wear gauntlet when wearing compression sleeve   - cont w/ trunk compression     Therapeutic Activity  Home pump demo in-clinic ([employer]), attended demo to explain the effects/purpose of the pump, discuss use of the pump and self management.        Manual Therapy Min 45  40 42   Therapeutic Activity Min  45     Other Therapy Min  15     Total of Timed Procedures 45 45 40 42   Total of Service Based 0 15 0 0   Total Treatment Time 45 60 40 42       Multiple values from one day are sorted in reverse-chronological order        HEP       Charges     MM3

## 2025-04-08 ENCOUNTER — OFFICE VISIT (OUTPATIENT)
Dept: PHYSICAL THERAPY | Facility: HOSPITAL | Age: 40
End: 2025-04-08
Attending: NURSE PRACTITIONER
Payer: COMMERCIAL

## 2025-04-08 PROCEDURE — 97110 THERAPEUTIC EXERCISES: CPT

## 2025-04-08 PROCEDURE — 97140 MANUAL THERAPY 1/> REGIONS: CPT

## 2025-04-08 NOTE — PROGRESS NOTES
Patient: Jocelyn Garza (39 year old, female) Referring Provider:  Insurance:   Diagnosis: lymphedema I89.0 Dary Wallis  BCBS OUT OF STATE   Date of Surgery: 4/29/2024  N/A   Precautions:  Cancer  Referral Information:    Date of Evaluation: Req: 0, Auth: 0, Exp:     03/06/25 POC Auth Visits:          Today's Date   4/8/2025    Subjective  Pt states that she was measured for her new compression sleeves.  And she is working with the home pump vendor to get the paperwork signed by MD.       Pain: 3/10 (Tightness in R forearm/elbow area of cording.)     Objective  Cording present R forearm/elbow/upper arm     Assessment  No cavitations noted today but cording does stretch out after MLD/STM.    Goals (to be met in 10 visits)           Not Met Progressing Toward Partially Met Met   Decrease swelling R trunk by at least 2 cm to decrease risks of infection           Pt to be independent with self MLD, ROM exercises, and donning/doffing compression bandages/garments to facilitate swelling reduction and to be independent at self-management once discharged.           Increase R elb ext to 0 and shoulder AROM flex 150, abd 165 and IR to T8 to improve ease of dressing/bathing/and reaching overhead.           Increase R UE strength to at least 4/5 to improve ease of lifting and performing household chores.                                                Plan  Cont as planned, adv scap strengthening as tolerated    Treatment Last 4 Visits  Treatment Day: 8       3/25/2025 4/1/2025 4/3/2025 4/8/2025   PT Treatment   Insurance Auth # and Certification Dates Certification From: 3/6/2025  To:6/4/2025 Certification From: 3/6/2025  To:6/4/2025 Certification From: 3/6/2025  To:6/4/2025 Certification From: 3/6/2025  To:6/4/2025   # of Visits Used/Approved 5/10 6/10 7/10 8/10   Therapeutic Exercise    Prone scap exercises x 10 reps each   Manual Therapy  Trunk measurements     STM R pect and RUE (areas of cording), lg cavitation  noted at elbow    MLD: Neck sequence, abd sequence, left axillary, A-A, R inguinal, R A-I, Right axillary, Right chest, RUE.      Compression:  - encouraged pt to wear gauntlet when wearing compression sleeve   - cont w/ trunk compression STM R pect and RUE (areas of cording), lg cavitation noted at elbow    MLD: Neck sequence, abd sequence, left axillary, A-A, R inguinal, R A-I, Right axillary, Right chest, RUE.      Compression:  -pt meeting compression fitter tomorrow to get measured for new compression.  -encouraged pt to wear gauntlet when wearing compression sleeve   - cont w/ trunk compression   STM R pect and RUE (areas of cording), lg cavitation noted at elbow    MLD: Neck sequence, abd sequence, left axillary, A-A, R inguinal, R A-I, Right axillary, Right chest, RUE.     Therapeutic Activity Home pump demo in-clinic ([facility]), attended demo to explain the effects/purpose of the pump, discuss use of the pump and self management.         Therapeutic Exercise Min    10   Manual Therapy Min  40 42 35   Therapeutic Activity Min 45      Other Therapy Min 15      Total of Timed Procedures 45 40 42 45   Total of Service Based 15 0 0 0   Total Treatment Time 60 40 42 45        HEP       Charges     MM2, there ex1

## 2025-04-10 ENCOUNTER — OFFICE VISIT (OUTPATIENT)
Dept: PHYSICAL THERAPY | Facility: HOSPITAL | Age: 40
End: 2025-04-10
Attending: NURSE PRACTITIONER
Payer: COMMERCIAL

## 2025-04-10 PROCEDURE — 97140 MANUAL THERAPY 1/> REGIONS: CPT

## 2025-04-10 PROCEDURE — 97110 THERAPEUTIC EXERCISES: CPT

## 2025-04-10 NOTE — PROGRESS NOTES
11/21/20 1609   Oxygen Therapy/Pulse Ox   O2 Device High flow system  (optiflow)   O2 Flow Rate (L/min) 60 L/min   FiO2 (%) 100 %     Pt currently on above Optiflow settings. Flow increased from 50L for WOB. No other changes made this shift.    Patient: Jocelyn Garza (39 year old, female) Referring Provider:  Insurance:   Diagnosis: lymphedema I89.0 Dary Wallis  BCBS OUT OF STATE   Date of Surgery: 4/29/2024  N/A   Precautions:  Cancer  Referral Information:    Date of Evaluation: Req: 0, Auth: 0, Exp:     03/06/25 POC Auth Visits:          Today's Date   4/10/2025    Subjective  Pt states that she is feeling about the same. \"my arm is a little less tight.\"       Pain: 3/10     Objective  MEASUREMENTS TAKEN TODAY   Shoulder       3/6/2025 3/14/2025 4/10/2025   Shoulder ROM/MMT   Rt Flexion Shoulder 130 140 145   Lt Flexion Shoulder 150 150 155   Rt Flexion Shoulder MMT 4  4+   Lt Flexion Shoulder MMT 4+  4+   Rt Abduction Shoulder 150 155 160   Lt Abduction Shoulder 170 170 170   Rt Abduction Shoulder MMT (C5) 4-  4+   Lt Abduction Shoulder MMT (C5) 4+  4+   Rt IR Shoulder T12 T12 T12   Lt IR Shoulder T7 T7 T7   Rt IR Shoulder MMT 4  4+   Lt IR Shoulder MMT 4  4+   Rt ER Shoulder 90 90 90   Lt ER Shoulder 90 90 90   Rt ER Shoulder MMT 4-  4+   Lt ER Shoulder MMT 4  4+   , Elbow       3/6/2025 3/14/2025 4/10/2025   Elbow ROM/MMT   Rt Flexion Elbow WFL WFL WFL   Rt Extension Elbow 10       unable to fully extend elbow due to cording WFL WFL    Trunk Measurements       3/17/2025 4/1/2025 4/10/2025   Trunk Measurements   Trunk Measurement 1 13 cm inf to sternal notch: 98.5 (IE: 99.7 cm) 13 cm inf to sternal notch: 98.5 (IE: 99.7 cm) 13 cm inf to sternal notch: 98.5 (IE: 99.7 cm)   Trunk Measurement 2 16 cm inf to sternal notch: 95.2 (IE: 95.7) 16 cm inf to sternal notch: 95. (IE: 95.7) 16 cm inf to sternal notch: 94.7. (IE: 95.7)   Trunk Measurement 3 19 cm inf to sternal notch: 89 (IE 89.5) 19 cm inf to sternal notch: 88.8 (IE 89.5) 19 cm inf to sternal notch: 88.5 (IE 89.5)    Edema/Tissue       3/17/2025 4/1/2025 4/10/2025   Edema/Tissue Observations   RUE Locations  Right Axilla;Right Upper Arm;Right Medial Upper Arm;Right Forearm;Right Anterior  Forearm;Right Antecubital;Right Wrist     Edema/Tissue Observations: Rt Axilla swelling;hyperpigmentation     Edema/Tissue Observations: Right upper arm swelling     Edema/Tissue Observations: Right medial upper arm swelling;cording     Edema/Tissue Observations: Right antecubital swelling;cording     Edema/Tissue Observations: Right forearm swelling     Trunk Locations   Right Upper Back;Right Upper Lateral Trunk;Right Lateral Chest;Right Medial Chest   Edema/Tissue Observations: Right upper lateral trunk   swelling;pitting;hyperpigmentation   Edema/Tissue Observations: Right lateral chest   swelling;hyperpigmentation;pitting   Edema/Tissue Observations: Right medial chest   hyperpigmentation;swelling;pitting   Edema/Tissue Observations: Right upper back   swelling   # of Trunk Measurements   3   Trunk Measurement 1 13 cm inf to sternal notch: 98.5 (IE: 99.7 cm) 13 cm inf to sternal notch: 98.5 (IE: 99.7 cm) 13 cm inf to sternal notch: 98.5 (IE: 99.7 cm)   Trunk Measurement 2 16 cm inf to sternal notch: 95.2 (IE: 95.7) 16 cm inf to sternal notch: 95. (IE: 95.7) 16 cm inf to sternal notch: 94.7. (IE: 95.7)   Trunk Measurement 3 19 cm inf to sternal notch: 89 (IE 89.5) 19 cm inf to sternal notch: 88.8 (IE 89.5) 19 cm inf to sternal notch: 88.5 (IE 89.5)       Assessment  No cavitation.  STM around cording causing fingers and hand to tingle.  Will assess next session.  Pt has been measured for new custom compression sleeves and working with vendor to get proper paperwork for home compresison pump.    Goals (to be met in 10 visits)           Not Met Progressing Toward Partially Met Met   Decrease swelling R trunk by at least 2 cm to decrease risks of infection      x     Pt to be independent with self MLD, ROM exercises, and donning/doffing compression bandages/garments to facilitate swelling reduction and to be independent at self-management once discharged.      x     Increase R elb ext to 0 and shoulder AROM flex  150, abd 165 and IR to T8 to improve ease of dressing/bathing/and reaching overhead.      x     Increase R UE strength to at least 4/5 to improve ease of lifting and performing household chores.        x                                            Plan  Cont as planned, adv scap strengthening as tolerated    Treatment Last 4 Visits  Treatment Day: 9       4/1/2025 4/3/2025 4/8/2025 4/10/2025   PT Treatment   Insurance Auth # and Certification Dates Certification From: 3/6/2025  To:6/4/2025 Certification From: 3/6/2025  To:6/4/2025 Certification From: 3/6/2025  To:6/4/2025 Certification From: 3/6/2025  To:6/4/2025   # of Visits Used/Approved 6/10 7/10 8/10 9/10   Therapeutic Exercise   Prone scap exercises x 10 reps each Prone scap exercises: T's, W's, T's thumbs up, Snow angels x 10 reps each  -PROM R shoulder in supine   Manual Therapy Trunk measurements     STM R pect and RUE (areas of cording), lg cavitation noted at elbow    MLD: Neck sequence, abd sequence, left axillary, A-A, R inguinal, R A-I, Right axillary, Right chest, RUE.      Compression:  - encouraged pt to wear gauntlet when wearing compression sleeve   - cont w/ trunk compression STM R pect and RUE (areas of cording), lg cavitation noted at elbow    MLD: Neck sequence, abd sequence, left axillary, A-A, R inguinal, R A-I, Right axillary, Right chest, RUE.      Compression:  -pt meeting compression fitter tomorrow to get measured for new compression.  -encouraged pt to wear gauntlet when wearing compression sleeve   - cont w/ trunk compression   STM R pect and RUE (areas of cording), lg cavitation noted at elbow    MLD: Neck sequence, abd sequence, left axillary, A-A, R inguinal, R A-I, Right axillary, Right chest, RUE.   MLD: Neck sequence, abd sequence, left axillary, A-A, R inguinal, R A-I, Right axillary, Right chest, RUE.      Compression:  -pt measured for new compression   Therapeutic Exercise Min   10 10   Manual Therapy Min 40 42 35 35   Total  of Timed Procedures 40 42 45 45   Total of Service Based 0 0 0 0   Total Treatment Time 40 42 45 45        HEP  Reviewed prone scap exercises.    Charges     MM2, There Ex1

## 2025-04-15 ENCOUNTER — OFFICE VISIT (OUTPATIENT)
Dept: PHYSICAL THERAPY | Facility: HOSPITAL | Age: 40
End: 2025-04-15
Attending: NURSE PRACTITIONER
Payer: COMMERCIAL

## 2025-04-15 PROCEDURE — 97140 MANUAL THERAPY 1/> REGIONS: CPT

## 2025-04-15 NOTE — PROGRESS NOTES
Patient: Jocelyn Garza (39 year old, female) Referring Provider:  Insurance:   Diagnosis: lymphedema I89.0 Dary Wallis  BCBS OUT OF STATE   Date of Surgery: 4/29/2024  N/A   Precautions:  Cancer  Referral Information:    Date of Evaluation: Req: 0, Auth: 0, Exp:     03/06/25 POC Auth Visits:          Today's Date   4/15/2025      Progress Summary    Assessment:  Pt has attended 10 visits in PT for RUE, R chest/trunk/axilla lymphedema.  Pt presents to clinic with cording in RUE which limits her R arm mobility.  Pt has custom compression sleeve and it was recommended that patient order new custom compression as current sleeve was received almost 1 year ago.  Pt has been instructed in self massage and strengthening exercises as well as stretching.  It was also recommended that patient get home pneumatic compression pump to help manage with her ongoing symptoms s/p B mastecomy in 2024.    Plan: Continue with PT up to 10 additional visits over a 90 day period to address remaining deficits 1-2 times per week.      Certification from: 4/15/2025 to 7/14/2025      Subjective  Pt reports that she can still feel the cording but wearing the compression helps with the pain.       Pain: 3/10     Objective  MEASUREMENTS TAKEN TODAY   Trunk Measurements       4/1/2025 4/10/2025 4/15/2025   Trunk Measurements   Trunk Measurement 1 13 cm inf to sternal notch: 98.5 (IE: 99.7 cm) 13 cm inf to sternal notch: 98.5 (IE: 99.7 cm) 13 cm inf to sternal notch: 98.5 (IE: 99.7 cm)   Trunk Measurement 2 16 cm inf to sternal notch: 95. (IE: 95.7) 16 cm inf to sternal notch: 94.7. (IE: 95.7) 16 cm inf to sternal notch: 94.7. (IE: 95.7)   Trunk Measurement 3 19 cm inf to sternal notch: 88.8 (IE 89.5) 19 cm inf to sternal notch: 88.5 (IE 89.5) 19 cm inf to sternal notch: 88.5 (IE 89.5)    Self Care       3/6/2025 3/17/2025 4/15/2025   Self Care   Self MLD daily self MLD since 10/30/2024 daily self MLD since 10/30/2024 daily self MLD since  10/30/2024   Compression Wears custom compression sleeve CCL2 mmHg daily since 10/30/2024 Wears custom compression sleeve CCL2 mmHg daily since 10/30/2024 Wears custom compression sleeve CCL2 mmHg daily since 10/30/2024   Elevation Elevates arm nightly since 10/30/2024 Elevates arm nightly since 10/30/2024 Elevates arm nightly since 10/30/2024   Exercise Does HEP from therapy daily since 10/30/2024 Does HEP from therapy daily since 10/30/2024 Does HEP from therapy daily since 10/30/2024   Recommended compression cont wearing custom compression sleeve and trunk compression cont wearing custom compression sleeve and trunk compression. Recommended pt to get replacement arm sleeves- recommend to be re-measured cont wearing custom compression sleeve and trunk compression. Recommended pt to get replacement arm sleeves- recommend to be re-measured   Recommended home pump pt would benefit from advanced home pump pt would benefit from advanced home pump pt would benefit from advanced home pump   Clinical rationale for recommendations Pt has been self managing her swelling since last seen in Oct 2024 but swelling has returned. Patient diagnosed with I89.0 lymphedema secondary to cancer. Pt has lymphedema of RUE and extends into the right chest and trunk, Patient should not use a basic pump due to swelling since the swelling includes the chest and trunk. I am recommending an advanced pump (Flexitouch) as it treats with therapeutic pressures, mimics MLD, and provides proximal clearance. The basic pump treats the limb only leaving the chest/breast/abdomen area prone to additional fluid buildup. The basic pump treats with high, static pressure that may cause pain to sensitive skin and damage poor skin integrity. The basic pump is not appropriate for this patient as they present with chest and trunk lymphedema.   Pt has been self managing her swelling since last seen in Oct 2024 but swelling has returned. Patient diagnosed with  I89.0 lymphedema secondary to cancer. Pt has lymphedema of RUE and extends into the right chest and trunk, Patient should not use a basic pump due to swelling since the swelling includes the chest and trunk. I am recommending an advanced pump (Flexitouch) as it treats with therapeutic pressures, mimics MLD, and provides proximal clearance. The basic pump treats the limb only leaving the chest/breast/abdomen area prone to additional fluid buildup. The basic pump treats with high, static pressure that may cause pain to sensitive skin and damage poor skin integrity. The basic pump is not appropriate for this patient as they present with chest and trunk lymphedema.      Vendor messages Message sent to [facility] to check benefits for a home pump [facility] for next week NOTES SENT TO [facility] TODAY (Eval and today's PN note)   Are you following the recommended diet from your physician? Yes     Lymph Class Secondary Lymphedema     Stage of Lymphedema 2     2nd Class Cause breast cancer     Cause breast cancer     Phase of treatment Phase 1: Reduction Phase           4/15/2025     5:00 PM 3/6/2025     3:00 PM 10/30/2024     2:00 PM 8/28/2024     7:00 AM 5/28/2024     8:00 PM 4/8/2024     2:00 PM   Lymphedema Calculations   DATE MEASURED 3/6/2025 3/6/2025 10/30/2024 8/28/2024 5/28/2024 4/8/2024   LOCATION/MEASUREMENTS RUE RUE RUE RUE RUE RUE   PATIENT POSITION  supine 1 pillow supine 1 pillow supine 1 pillow supine 1 pillow supine 1 pillow supine 1 pillow   LIMB POSITION 75 deg abduction 75 deg abduction 75 deg abduction 75 deg abduction 75 deg abduction 75 deg abduction   STARTING POINT 17cm from 3rd cuticle 17cm from 3rd cuticle 17cm from 3rd cuticle 17cm from 3rd cuticle 17cm from 3rd cuticle 17cm from 3rd cuticle   RIGHT HAND VOLUME 19.7cm across palm 19.7cm across palm 19.7cm across palm 19.7cm across palm 19.7cm across palm 19.7cm across palm   LEFT HAND VOLUME 19.5cm across palm 19.5cm across  palm 19.5cm across palm 19.5cm across palm 19.5cm across palm 19.5cm across palm   MEASUREMENT A 15.1 15.1 15.8 15.8 15.5 15.5   MEASUREMENT B 16.8 16.8 16.5 16.7 16.6 16.7   MEASUREMENT C 19.7 19.7 19.2 19.4 19.1 19.2   MEASUREMENT D 23.3 23.3 22.5 22.5 23 23.1   MEASUREMENT E 24.9 24.9 25.2 25.2 25 25   MEASUREMENT F 24.6 24.6 24.8 24.7 24.5 24.5   MEASUREMENT G 26.6 26.6 26 26 25.5 25.3   MEASUREMENT H 27.3 27.3 27.8 27.8 27.5 27.4   MEASUREMENT I 30.3 30.3 29.8 29.7 29.6 29.4    TOTAL VOLUME  [id number] 1773.05810 9995.09560 [id number] 2049.07597 1728.70085   DATE MEASURED 3/6/2025 3/6/2025 10/30/2024 8/28/2024 4/8/2024 4/8/2024   LOCATION/MEASUREMENTS LUE LUE LUE LUE LUE LUE   MEASUREMENT A 15.3 15.3 15.3 15.3 15.4 15.4   MEASUREMENT B 17 17 16.9 17 17.1 17   MEASUREMENT C 19.8 19.8 20 20 19.6 19.4   MEASUREMENT D 22.9 22.9 22.7 22.8 22.7 22.8   MEASUREMENT E 25 25 25.3 25.3 25.2 25.2   MEASUREMENT F 25.6 25.6 26.1 26 24.8 24.8   MEASUREMENT G 26.5 26.5 27 27.1 26.9 26.8   MEASUREMENT H 28.2 28.2 28.9 28.9 28.6 28.5   MEASUREMENT I 29.6 29.6 31.2 31.2 33.5 33.4    TOTAL VOLUME  [id number] 1809.67562 7564.05793 [phone] 1852.32047 4947.0485   % DIFFERENCE -1.45089 -1.26113 -5.28614 -5.79962 -6.05766 -[phone]          Assessment  No cavitation.  STM around cording but no cavitation noted.  Pain relief is reported after treatment. Patient working with vendors to get home pump and compression garments.  Measured for compression on 4/4/2025.    Goals (to be met in 10 visits)           Not Met Progressing Toward Partially Met Met   Decrease swelling R trunk by at least 2 cm to decrease risks of infection      x     Pt to be independent with self MLD, ROM exercises, and donning/doffing compression bandages/garments to facilitate swelling reduction and to be independent at self-management once discharged.      x     Increase R elb ext to 0 and shoulder AROM flex 150, abd 165 and IR to T8 to improve ease of  dressing/bathing/and reaching overhead.      x     Increase R UE strength to at least 4/5 to improve ease of lifting and performing household chores.        x                                                Plan  Cont as planned, adv scap strengthening as tolerated    Treatment Last 4 Visits  Treatment Day: 10       4/3/2025 4/8/2025 4/10/2025 4/15/2025   PT Treatment   Insurance Auth # and Certification Dates Certification From: 3/6/2025  To:6/4/2025 Certification From: 3/6/2025  To:6/4/2025 Certification From: 3/6/2025  To:6/4/2025 Certification From: 4/15/2025 to 7/14/2025   # of Visits Used/Approved 7/10 8/10 9/10 10/10 - PN WRITTEN TODAY, Treatment provided by Ita Mckeon PTA, CLT   Therapeutic Exercise  Prone scap exercises x 10 reps each Prone scap exercises: T's, W's, T's thumbs up, Snow angels x 10 reps each  -PROM R shoulder in supine -PROM R shoulder    Encouraged to continue with stretching and strengthening    Manual Therapy STM R pect and RUE (areas of cording), lg cavitation noted at elbow    MLD: Neck sequence, abd sequence, left axillary, A-A, R inguinal, R A-I, Right axillary, Right chest, RUE.      Compression:  -pt meeting compression fitter tomorrow to get measured for new compression.  -encouraged pt to wear gauntlet when wearing compression sleeve   - cont w/ trunk compression   STM R pect and RUE (areas of cording), lg cavitation noted at elbow    MLD: Neck sequence, abd sequence, left axillary, A-A, R inguinal, R A-I, Right axillary, Right chest, RUE.   MLD: Neck sequence, abd sequence, left axillary, A-A, R inguinal, R A-I, Right axillary, Right chest, RUE.      Compression:  -pt measured for new compression MEASUREMENTS TAKEN TODAY: BUE arm volume, trunk circumference    MLD: Neck sequence, abd sequence, left axillary, A-A, R inguinal, R A-I, Right axillary, Right chest, RUE.  *working on getting home compression pump  Compression:  -pt measured for new compression on 4/4.  Working with  vendor to get new compression   Therapeutic Exercise Min  10 10 5   Manual Therapy Min 42 35 35 40   Total of Timed Procedures 42 45 45 45   Total of Service Based 0 0 0 0   Total Treatment Time 42 45 45 45        HEP       Charges     MM3

## 2025-04-16 NOTE — PROGRESS NOTES
Patient: Jocelyn Garza (39 year old, female) Referring Provider:  Insurance:   Diagnosis: lymphedema I89.0 Dary Wallis  BCBS OUT OF STATE   Date of Surgery: 4/29/2024  N/A   Precautions:  Cancer  Referral Information:    Date of Evaluation: Req: 0, Auth: 0, Exp:     03/06/25 POC Auth Visits:          Today's Date   4/15/2025      Progress Summary    Assessment:  Pt has attended 10 visits in PT for RUE, R chest/trunk/axilla lymphedema.  Pt presents to clinic with cording in RUE which limits her R arm mobility.  Pt has custom compression sleeve and it was recommended that patient order new custom compression as current sleeve was received almost 1 year ago.  Pt has been instructed in self massage and strengthening exercises as well as stretching.  It was also recommended that patient get home pneumatic compression pump to help manage with her ongoing symptoms s/p B mastecomy in 2024.    Plan: Continue with PT up to 10 additional visits over a 90 day period to address remaining deficits 1-2 times per week.      Certification from: 4/15/2025 to 7/14/2025    Alejandra Flores PT, DPT, CLT-KITA      Subjective  Pt reports that she can still feel the cording but wearing the compression helps with the pain.       Pain: 3/10     Objective  MEASUREMENTS TAKEN TODAY   Trunk Measurements       4/1/2025 4/10/2025 4/15/2025   Trunk Measurements   Trunk Measurement 1 13 cm inf to sternal notch: 98.5 (IE: 99.7 cm) 13 cm inf to sternal notch: 98.5 (IE: 99.7 cm) 13 cm inf to sternal notch: 98.5 (IE: 99.7 cm)   Trunk Measurement 2 16 cm inf to sternal notch: 95. (IE: 95.7) 16 cm inf to sternal notch: 94.7. (IE: 95.7) 16 cm inf to sternal notch: 94.7. (IE: 95.7)   Trunk Measurement 3 19 cm inf to sternal notch: 88.8 (IE 89.5) 19 cm inf to sternal notch: 88.5 (IE 89.5) 19 cm inf to sternal notch: 88.5 (IE 89.5)    Self Care       3/6/2025 3/17/2025 4/15/2025   Self Care   Self MLD daily self MLD since 10/30/2024 daily self MLD  since 10/30/2024 daily self MLD since 10/30/2024   Compression Wears custom compression sleeve CCL2 mmHg daily since 10/30/2024 Wears custom compression sleeve CCL2 mmHg daily since 10/30/2024 Wears custom compression sleeve CCL2 mmHg daily since 10/30/2024   Elevation Elevates arm nightly since 10/30/2024 Elevates arm nightly since 10/30/2024 Elevates arm nightly since 10/30/2024   Exercise Does HEP from therapy daily since 10/30/2024 Does HEP from therapy daily since 10/30/2024 Does HEP from therapy daily since 10/30/2024   Recommended compression cont wearing custom compression sleeve and trunk compression cont wearing custom compression sleeve and trunk compression. Recommended pt to get replacement arm sleeves- recommend to be re-measured cont wearing custom compression sleeve and trunk compression. Recommended pt to get replacement arm sleeves- recommend to be re-measured   Recommended home pump pt would benefit from advanced home pump pt would benefit from advanced home pump pt would benefit from advanced home pump   Clinical rationale for recommendations Pt has been self managing her swelling since last seen in Oct 2024 but swelling has returned. Patient diagnosed with I89.0 lymphedema secondary to cancer. Pt has lymphedema of RUE and extends into the right chest and trunk, Patient should not use a basic pump due to swelling since the swelling includes the chest and trunk. I am recommending an advanced pump (Flexitouch) as it treats with therapeutic pressures, mimics MLD, and provides proximal clearance. The basic pump treats the limb only leaving the chest/breast/abdomen area prone to additional fluid buildup. The basic pump treats with high, static pressure that may cause pain to sensitive skin and damage poor skin integrity. The basic pump is not appropriate for this patient as they present with chest and trunk lymphedema.   Pt has been self managing her swelling since last seen in Oct 2024 but swelling  has returned. Patient diagnosed with I89.0 lymphedema secondary to cancer. Pt has lymphedema of RUE and extends into the right chest and trunk, Patient should not use a basic pump due to swelling since the swelling includes the chest and trunk. I am recommending an advanced pump (Flexitouch) as it treats with therapeutic pressures, mimics MLD, and provides proximal clearance. The basic pump treats the limb only leaving the chest/breast/abdomen area prone to additional fluid buildup. The basic pump treats with high, static pressure that may cause pain to sensitive skin and damage poor skin integrity. The basic pump is not appropriate for this patient as they present with chest and trunk lymphedema.      Vendor messages Message sent to [facility] to check benefits for a home pump Home pump [facility] for next week NOTES SENT TO [facility] TODAY (Eval and today's PN note)   Are you following the recommended diet from your physician? Yes     Lymph Class Secondary Lymphedema     Stage of Lymphedema 2     2nd Class Cause breast cancer     Cause breast cancer     Phase of treatment Phase 1: Reduction Phase           4/15/2025     5:00 PM 3/6/2025     3:00 PM 10/30/2024     2:00 PM 8/28/2024     7:00 AM 5/28/2024     8:00 PM 4/8/2024     2:00 PM   Lymphedema Calculations   DATE MEASURED 3/6/2025 3/6/2025 10/30/2024 8/28/2024 5/28/2024 4/8/2024   LOCATION/MEASUREMENTS RUE RUE RUE RUE RUE RUKOMAL   PATIENT POSITION  supine 1 pillow supine 1 pillow supine 1 pillow supine 1 pillow supine 1 pillow supine 1 pillow   LIMB POSITION 75 deg abduction 75 deg abduction 75 deg abduction 75 deg abduction 75 deg abduction 75 deg abduction   STARTING POINT 17cm from 3rd cuticle 17cm from 3rd cuticle 17cm from 3rd cuticle 17cm from 3rd cuticle 17cm from 3rd cuticle 17cm from 3rd cuticle   RIGHT HAND VOLUME 19.7cm across palm 19.7cm across palm 19.7cm across palm 19.7cm across palm 19.7cm across palm 19.7cm across palm   LEFT HAND  VOLUME 19.5cm across palm 19.5cm across palm 19.5cm across palm 19.5cm across palm 19.5cm across palm 19.5cm across palm   MEASUREMENT A 15.1 15.1 15.8 15.8 15.5 15.5   MEASUREMENT B 16.8 16.8 16.5 16.7 16.6 16.7   MEASUREMENT C 19.7 19.7 19.2 19.4 19.1 19.2   MEASUREMENT D 23.3 23.3 22.5 22.5 23 23.1   MEASUREMENT E 24.9 24.9 25.2 25.2 25 25   MEASUREMENT F 24.6 24.6 24.8 24.7 24.5 24.5   MEASUREMENT G 26.6 26.6 26 26 25.5 25.3   MEASUREMENT H 27.3 27.3 27.8 27.8 27.5 27.4   MEASUREMENT I 30.3 30.3 29.8 29.7 29.6 29.4    TOTAL VOLUME  [id number] 1773.10069 [id number] 2036.39909 [phone] [id number]   DATE MEASURED 3/6/2025 3/6/2025 10/30/2024 8/28/2024 4/8/2024 4/8/2024   LOCATION/MEASUREMENTS LUE LUE LUE LUE LUE LUE   MEASUREMENT A 15.3 15.3 15.3 15.3 15.4 15.4   MEASUREMENT B 17 17 16.9 17 17.1 17   MEASUREMENT C 19.8 19.8 20 20 19.6 19.4   MEASUREMENT D 22.9 22.9 22.7 22.8 22.7 22.8   MEASUREMENT E 25 25 25.3 25.3 25.2 25.2   MEASUREMENT F 25.6 25.6 26.1 26 24.8 24.8   MEASUREMENT G 26.5 26.5 27 27.1 26.9 26.8   MEASUREMENT H 28.2 28.2 28.9 28.9 28.6 28.5   MEASUREMENT I 29.6 29.6 31.2 31.2 33.5 33.4    TOTAL VOLUME  [phone] 1856.99295 [phone]   % DIFFERENCE -1.54842 -1.71393 -5.35357 -5.57256 -[phone] -[phone]          Assessment  No cavitation.  STM around cording but no cavitation noted.  Pain relief is reported after treatment. Patient working with vendors to get home pump and compression garments.  Measured for compression on 4/4/2025.    Goals (to be met in 10 visits)       Not Met Progressing Toward Partially Met Met   Decrease swelling R trunk by at least 2 cm to decrease risks of infection    X (but still has swelling)   Pt to be independent with self MLD, ROM exercises, and donning/doffing compression bandages/garments to facilitate swelling reduction and to be independent at self-management once discharged.       Increase R elb ext to 0 and shoulder AROM flex  150, abd 165 and IR to T8 to improve ease of dressing/bathing/and reaching overhead.       Increase R UE strength to at least 4/5 to improve ease of lifting and performing household chores.                                                  Plan  Cont as planned, adv scap strengthening as tolerated    Treatment Last 4 Visits  Treatment Day: 10       4/3/2025 4/8/2025 4/10/2025 4/15/2025   PT Treatment   Insurance Auth # and Certification Dates Certification From: 3/6/2025  To:6/4/2025 Certification From: 3/6/2025  To:6/4/2025 Certification From: 3/6/2025  To:6/4/2025 Certification From: 4/15/2025 to 7/14/2025   # of Visits Used/Approved 7/10 8/10 9/10 10/10 - PN WRITTEN TODAY, Treatment provided by Ita Mckeon PTA, CLT   Therapeutic Exercise  Prone scap exercises x 10 reps each Prone scap exercises: T's, W's, T's thumbs up, Snow angels x 10 reps each  -PROM R shoulder in supine -PROM R shoulder    Encouraged to continue with stretching and strengthening    Manual Therapy STM R pect and RUE (areas of cording), lg cavitation noted at elbow    MLD: Neck sequence, abd sequence, left axillary, A-A, R inguinal, R A-I, Right axillary, Right chest, RUE.      Compression:  -pt meeting compression fitter tomorrow to get measured for new compression.  -encouraged pt to wear gauntlet when wearing compression sleeve   - cont w/ trunk compression   STM R pect and RUE (areas of cording), lg cavitation noted at elbow    MLD: Neck sequence, abd sequence, left axillary, A-A, R inguinal, R A-I, Right axillary, Right chest, RUE.   MLD: Neck sequence, abd sequence, left axillary, A-A, R inguinal, R A-I, Right axillary, Right chest, RUE.      Compression:  -pt measured for new compression MEASUREMENTS TAKEN TODAY: BUE arm volume, trunk circumference    MLD: Neck sequence, abd sequence, left axillary, A-A, R inguinal, R A-I, Right axillary, Right chest, RUE.  *working on getting home compression pump  Compression:  -pt measured for new  compression on 4/4.  Working with vendor to get new compression   Therapeutic Exercise Min  10 10 5   Manual Therapy Min 42 35 35 40   Total of Timed Procedures 42 45 45 45   Total of Service Based 0 0 0 0   Total Treatment Time 42 45 45 45        HEP       Charges     MM3

## 2025-04-17 ENCOUNTER — APPOINTMENT (OUTPATIENT)
Dept: PHYSICAL THERAPY | Facility: HOSPITAL | Age: 40
End: 2025-04-17
Attending: NURSE PRACTITIONER
Payer: COMMERCIAL

## 2025-04-22 ENCOUNTER — HOSPITAL ENCOUNTER (OUTPATIENT)
Dept: ULTRASOUND IMAGING | Facility: HOSPITAL | Age: 40
Discharge: HOME | End: 2025-04-22
Attending: NURSE PRACTITIONER
Payer: COMMERCIAL

## 2025-04-22 ENCOUNTER — OFFICE VISIT (OUTPATIENT)
Dept: PHYSICAL THERAPY | Facility: HOSPITAL | Age: 40
End: 2025-04-22
Attending: NURSE PRACTITIONER
Payer: COMMERCIAL

## 2025-04-22 DIAGNOSIS — C50.411 MALIGNANT NEOPLASM OF UPPER-OUTER QUADRANT OF RIGHT BREAST IN FEMALE, ESTROGEN RECEPTOR POSITIVE (HCC): ICD-10-CM

## 2025-04-22 DIAGNOSIS — M79.621 PAIN IN RIGHT UPPER ARM: ICD-10-CM

## 2025-04-22 DIAGNOSIS — Z17.0 MALIGNANT NEOPLASM OF UPPER-OUTER QUADRANT OF RIGHT BREAST IN FEMALE, ESTROGEN RECEPTOR POSITIVE (HCC): Primary | ICD-10-CM

## 2025-04-22 DIAGNOSIS — C50.411 MALIGNANT NEOPLASM OF UPPER-OUTER QUADRANT OF RIGHT BREAST IN FEMALE, ESTROGEN RECEPTOR POSITIVE (HCC): Primary | ICD-10-CM

## 2025-04-22 DIAGNOSIS — Z17.0 MALIGNANT NEOPLASM OF UPPER-OUTER QUADRANT OF RIGHT BREAST IN FEMALE, ESTROGEN RECEPTOR POSITIVE (HCC): ICD-10-CM

## 2025-04-22 PROCEDURE — 97140 MANUAL THERAPY 1/> REGIONS: CPT | Performed by: PHYSICAL THERAPIST

## 2025-04-22 PROCEDURE — 93971 EXTREMITY STUDY: CPT | Performed by: NURSE PRACTITIONER

## 2025-04-22 NOTE — PROGRESS NOTES
Patient: Jocelyn Garza (39 year old, female) Referring Provider:  Insurance:   Diagnosis: lymphedema I89.0 Dary Wallis  BCBS OUT OF STATE   Date of Surgery: 4/29/2024  N/A   Precautions:  Cancer  Referral Information:    Date of Evaluation: Req: 0, Auth: 0, Exp:     03/06/25 POC Auth Visits:          Today's Date   4/22/2025    Subjective  \"It's not good. Within the last few days it's gotten bad\"       Pain: 7/10 (R axilla down arm)     Objective  Swelling R upper arm w/ marked tenderness inner/upper arm (palpable dense tissue noted) but no palpable or visible cording. Tenderness extends to forearm. Pt unable to fully extend elbow, ROM of shoulder limited.     Assessment  Defer treatment- contact oncologist office due to severe pain, swelling of arm but no palpable cording. Stat US ordered by oncologist    Goals (to be met in 10 visits)       Not Met Progressing Toward Partially Met Met   Decrease swelling R trunk by at least 2 cm to decrease risks of infection    X (but still has swelling)   Pt to be independent with self MLD, ROM exercises, and donning/doffing compression bandages/garments to facilitate swelling reduction and to be independent at self-management once discharged.       Increase R elb ext to 0 and shoulder AROM flex 150, abd 165 and IR to T8 to improve ease of dressing/bathing/and reaching overhead.       Increase R UE strength to at least 4/5 to improve ease of lifting and performing household chores.                                                      Plan  Pt to have US, f/u w/ oncologist tomorrow    Treatment Last 4 Visits  Treatment Day: 11       4/8/2025 4/10/2025 4/15/2025 4/22/2025   PT Treatment   Insurance Auth # and Certification Dates Certification From: 3/6/2025  To:6/4/2025 Certification From: 3/6/2025  To:6/4/2025 Certification From: 4/15/2025 to 7/14/2025 Certification From: 4/15/2025 to 7/14/2025   # of Visits Used/Approved 8/10 9/10 10/10 - PN WRITTEN TODAY, Treatment  provided by Ita Mckeon PTA, CLT 11/20   Therapeutic Exercise Prone scap exercises x 10 reps each Prone scap exercises: T's, W's, T's thumbs up, Snow angels x 10 reps each  -PROM R shoulder in supine -PROM R shoulder    Encouraged to continue with stretching and strengthening     Manual Therapy STM R pect and RUE (areas of cording), lg cavitation noted at elbow    MLD: Neck sequence, abd sequence, left axillary, A-A, R inguinal, R A-I, Right axillary, Right chest, RUE.   MLD: Neck sequence, abd sequence, left axillary, A-A, R inguinal, R A-I, Right axillary, Right chest, RUE.      Compression:  -pt measured for new compression MEASUREMENTS TAKEN TODAY: BUE arm volume, trunk circumference    MLD: Neck sequence, abd sequence, left axillary, A-A, R inguinal, R A-I, Right axillary, Right chest, RUE.  *working on getting home compression pump  Compression:  -pt measured for new compression on 4/4.  Working with vendor to get new compression Palpated and stretching of tissue RUE, no palpable cording.     Contacted oncology office   Therapeutic Exercise Min 10 10 5    Manual Therapy Min 35 35 40 25   Total of Timed Procedures 45 45 45 25   Total of Service Based 0 0 0 0   Total Treatment Time 45 45 45 25        HEP       Charges     mm2

## 2025-04-23 ENCOUNTER — NURSE ONLY (OUTPATIENT)
Age: 40
End: 2025-04-23
Attending: INTERNAL MEDICINE
Payer: COMMERCIAL

## 2025-04-23 ENCOUNTER — OFFICE VISIT (OUTPATIENT)
Age: 40
End: 2025-04-23
Attending: NURSE PRACTITIONER
Payer: COMMERCIAL

## 2025-04-23 VITALS
BODY MASS INDEX: 26.82 KG/M2 | DIASTOLIC BLOOD PRESSURE: 64 MMHG | SYSTOLIC BLOOD PRESSURE: 118 MMHG | OXYGEN SATURATION: 100 % | RESPIRATION RATE: 16 BRPM | HEART RATE: 72 BPM | HEIGHT: 65 IN | WEIGHT: 161 LBS | TEMPERATURE: 99 F

## 2025-04-23 DIAGNOSIS — Z17.0 MALIGNANT NEOPLASM OF UPPER-OUTER QUADRANT OF RIGHT BREAST IN FEMALE, ESTROGEN RECEPTOR POSITIVE (HCC): Primary | ICD-10-CM

## 2025-04-23 DIAGNOSIS — Z51.81 MEDICATION MONITORING ENCOUNTER: ICD-10-CM

## 2025-04-23 DIAGNOSIS — C50.411 MALIGNANT NEOPLASM OF UPPER-OUTER QUADRANT OF RIGHT BREAST IN FEMALE, ESTROGEN RECEPTOR POSITIVE (HCC): Primary | ICD-10-CM

## 2025-04-23 DIAGNOSIS — I89.0 LYMPHEDEMA OF RIGHT ARM: ICD-10-CM

## 2025-04-23 LAB
ALBUMIN SERPL-MCNC: 4.5 G/DL (ref 3.2–4.8)
ALBUMIN/GLOB SERPL: 1.9 {RATIO} (ref 1–2)
ALP LIVER SERPL-CCNC: 64 U/L (ref 37–98)
ALT SERPL-CCNC: 13 U/L (ref 10–49)
ANION GAP SERPL CALC-SCNC: 10 MMOL/L (ref 0–18)
AST SERPL-CCNC: 21 U/L (ref ?–34)
BASOPHILS # BLD: 0.03 X10(3) UL (ref 0–0.2)
BASOPHILS NFR BLD: 1 %
BILIRUB SERPL-MCNC: 0.4 MG/DL (ref 0.3–1.2)
BUN BLD-MCNC: 15 MG/DL (ref 9–23)
BUN/CREAT SERPL: 11.5 (ref 10–20)
CALCIUM BLD-MCNC: 9.6 MG/DL (ref 8.7–10.4)
CHLORIDE SERPL-SCNC: 108 MMOL/L (ref 98–112)
CO2 SERPL-SCNC: 25 MMOL/L (ref 21–32)
CREAT BLD-MCNC: 1.31 MG/DL (ref 0.55–1.02)
DEPRECATED RDW RBC AUTO: 47.5 FL (ref 35.1–46.3)
EGFRCR SERPLBLD CKD-EPI 2021: 52 ML/MIN/1.73M2 (ref 60–?)
EOSINOPHIL # BLD: 0.1 X10(3) UL (ref 0–0.7)
EOSINOPHIL NFR BLD: 3 %
ERYTHROCYTE [DISTWIDTH] IN BLOOD BY AUTOMATED COUNT: 13.2 % (ref 11–15)
GLOBULIN PLAS-MCNC: 2.4 G/DL (ref 2–3.5)
GLUCOSE BLD-MCNC: 107 MG/DL (ref 70–99)
HCT VFR BLD AUTO: 31.2 % (ref 35–48)
HGB BLD-MCNC: 10.6 G/DL (ref 12–16)
LYMPHOCYTES NFR BLD: 1.02 X10(3) UL (ref 1–4)
LYMPHOCYTES NFR BLD: 29 %
MAGNESIUM SERPL-MCNC: 1.8 MG/DL (ref 1.6–2.6)
MCH RBC QN AUTO: 33.2 PG (ref 26–34)
MCHC RBC AUTO-ENTMCNC: 34 G/DL (ref 31–37)
MCV RBC AUTO: 97.8 FL (ref 80–100)
MONOCYTES # BLD: 0.26 X10(3) UL (ref 0.1–1)
MONOCYTES NFR BLD: 8 %
NEUTROPHILS # BLD AUTO: 1.84 X10 (3) UL (ref 1.5–7.7)
NEUTROPHILS NFR BLD: 57 %
NEUTS HYPERSEG # BLD: 1.88 X10(3) UL (ref 1.5–7.7)
OSMOLALITY SERPL CALC.SUM OF ELEC: 297 MOSM/KG (ref 275–295)
PLATELET # BLD AUTO: 190 10(3)UL (ref 150–450)
PLATELET MORPHOLOGY: NORMAL
POTASSIUM SERPL-SCNC: 3.7 MMOL/L (ref 3.5–5.1)
PROT SERPL-MCNC: 6.9 G/DL (ref 5.7–8.2)
RBC # BLD AUTO: 3.19 X10(6)UL (ref 3.8–5.3)
SODIUM SERPL-SCNC: 143 MMOL/L (ref 136–145)
TOTAL CELLS COUNTED BLD: 100
VARIANT LYMPHS NFR BLD MANUAL: 2 % (ref ?–4)
WBC # BLD AUTO: 3.3 X10(3) UL (ref 4–11)

## 2025-04-23 RX ORDER — SODIUM CHLORIDE 9 MG/ML
10 INJECTION, SOLUTION INTRAMUSCULAR; INTRAVENOUS; SUBCUTANEOUS ONCE
OUTPATIENT
Start: 2025-04-23

## 2025-04-23 NOTE — PROGRESS NOTES
HPI   Jocelyn Garza is a 39 year old female for evaluation of   Encounter Diagnosis   Name Primary?    Malignant neoplasm of upper-outer quadrant of right breast in female, estrogen receptor positive (HCC) Yes       Patient completed course of neoadjuvant dose dense Adriamycin Cytoxan followed by weekly Taxol.  Last dose of treatment on 3/22/2024.    Patient then underwent bilateral mastectomies on 4/29/2024 with a right axillary lymph node dissection.    Patient completed radiation on 8/7/2024. Having lymphedema therapy.  Feels hardness on the R arm.    LMP November of 2023.    On goserelin since 6/3/24. Having hot flashes.  States a couple of minutes and then improves.  A few times a day, not as much end of the month.      Has gone back to work.      Pt screened for OTIS-2 trial.  Randomized to Arm B with Camizestrant 75 mg daily, started 8/30/24.    She is currently taking study drug camizestrant at hs. Taking daily.     Started C1D1 8/24/24 abemaciclib.    Dose adjustment to 100 mg daily on 9/22/24.    Freq formed BM after eating. Sometimes liquid and takes imodium.  Does get nausea at times at night  that she attributes to imodium.     States that when sleeping at night and sleeping on the R side gets dizzy.  She has been keeping hydrated but more dizzy this month. Has not checked BP or pulse during these episodes.      Completed cycle 9 abemaciclib 100 mg.   Soft formed stool, with urgency.  Not all the time.  Much improved with lower dose. If eats the wrong things like grease, spicy and dairy, gets diarrhea. C/o some stomach bloating - Gas X seems to help, stools loose and formed     Following with neurology for headaches. Had CTA of the brain on 3/24/2025 no issues identified. Takes tylenol  500 mg for headaches with good results.     She is following with nephrology.    States feeling well.      L sided pain resolved.     Continues right arm sleeve, increased lymphedema following with therapist. US  yesterday to r/o DVT- negative. Will be getting the lymphedema pump.     C/o Hot flashes.            ECOG PS  0    Review of Systems:   Review of Systems   Constitutional:  Positive for appetite change (taste changes.). Negative for fatigue (has been tired this month) and unexpected weight change.   HENT:   Negative for mouth sores.    Eyes:  Positive for eye problems (issues with adjusting at night with transitioning from light to dark, intermittent mild.  no new complaints. Improved.  Recent eye exam, told was good.).   Respiratory:  Negative for cough and shortness of breath.    Cardiovascular:  Negative for chest pain, leg swelling and palpitations.   Gastrointestinal:  Positive for abdominal pain (at times has cramps), diarrhea (soft not diarrhea) and nausea. Negative for constipation and vomiting.   Endocrine: Positive for hot flashes (states have been worse with more sweating at night.).   Genitourinary:  Negative for frequency.         Urgency, states bubbles.     Musculoskeletal:  Positive for arthralgias (R knee and hips, harder to get up from the floor and first thing in am.  Better with activity.  R knee worse yesterday walking her dog.). Negative for back pain and neck pain.        R arm pain    Skin:  Positive for itching (in the chest at times from lymphedema). Negative for rash.   Neurological:  Positive for dizziness, headaches (intermittent mild) and numbness (at hands states numbness and cold.). Negative for extremity weakness.        Tremor R arm at times for a couple of weeks.   Hematological:  Negative for adenopathy. Does not bruise/bleed easily.   Psychiatric/Behavioral:  Positive for sleep disturbance.          Current Outpatient Medications   Medication Sig Dispense Refill    prochlorperazine (COMPAZINE) 10 mg tablet Take 1 tablet (10 mg total) by mouth every 6 (six) hours as needed for Nausea. 15 tablet 0    camizestrant 75 mg Oral Tab (OTIS-2 STUDY DRUG) Take 1 tablet (75 mg total) by  mouth daily. Take at the same time each day. 96 tablet 0    loperamide 2 MG Oral Cap Take 1 capsule (2 mg total) by mouth as needed in the morning and 1 capsule (2 mg total) as needed at noon and 1 capsule (2 mg total) as needed in the evening and 1 capsule (2 mg total) as needed before bedtime for Diarrhea.      GOSERELIN ACETATE SC Inject into the skin. Once a month      Abemaciclib 100 MG Oral Tab Take 1 tablet (100 mg total) by mouth 2 (two) times daily. may take with or without food 60 tablet 11    lidocaine-prilocaine 2.5-2.5 % External Cream Apply to site 1 hour prior to port a cath needle insertion 1 each 1     Allergies:   No Known Allergies    Past Medical History:    Breast cancer in female (HCC)    Right breast lymph node cancer    High blood pressure    Hx of motion sickness    Hypertension    Solitary kidney, congenital     Past Surgical History:   Procedure Laterality Date    Appendectomy  01/01/2007    Needle biopsy right Right 10/09/2023    Treat ectopic preg,rmv tube/ovary Left 01/01/2014    Treat ectopic preg,rmv tube/ovary Left 01/01/2015     Social History     Socioeconomic History    Marital status:    Tobacco Use    Smoking status: Never    Smokeless tobacco: Never   Vaping Use    Vaping status: Never Used   Substance and Sexual Activity    Alcohol use: Not Currently     Comment: 2 -3 drinks per week    Drug use: No     Social Drivers of Health     Food Insecurity: Low Risk  (1/15/2025)    Received from [facility]    Food Insecurity     Within the past 12 months, you worried that your food would run out before you got money to buy more.  : Never true     Within the past 12 months, the food you bought just didn't last and you didn't have money to get more. : Never true   Transportation Needs: Not At Risk (1/15/2025)    Received from [facility]    Transportation Needs     In the past 12 months, has lack of reliable transportation kept you from medical appointments,  meetings, work or from getting things needed for daily living? : No   Housing Stability: Low Risk  (4/30/2024)    Housing Stability     Housing Instability: No       Family History   Problem Relation Age of Onset    Other (Parkinson's Disease) Father     Prostate Cancer Maternal Grandfather 70    Pancreatic Cancer Paternal Grandmother 80    Cancer Paternal Aunt 80        breast cancer    Pancreatic Cancer Maternal Uncle 60    Pancreatic Cancer Paternal Great-Grandfather     Cancer Maternal Great-Grandmother         gastric ca    Cancer Paternal Uncle 60        brain cancer         PHYSICAL EXAM:    /64 (BP Location: Left arm, Patient Position: Sitting, Cuff Size: adult)   Pulse 72   Temp 98.6 °F (37 °C) (Oral)   Resp 16   Ht 1.651 m (5' 5\")   Wt 73 kg (161 lb)   SpO2 100%   BMI 26.79 kg/m²   Wt Readings from Last 6 Encounters:   04/23/25 73 kg (161 lb)   03/26/25 71.8 kg (158 lb 3.2 oz)   02/26/25 72.4 kg (159 lb 9.6 oz)   02/21/25 71.4 kg (157 lb 6.4 oz)   02/03/25 70.3 kg (155 lb)   01/28/25 70.3 kg (155 lb)     Physical Exam  General: Patient is alert, not in acute distress.   HEENT: EOMs intact. PERRL. Oropharynx is clear.   Neck: No JVD. No palpable lymphadenopathy. Neck is supple.  Chest: Clear to auscultation. B mastectomies well healed.  Lymphedema to R arm and upper back.    Heart: Regular rate and rhythm.   Abdomen: Soft, non tender with good bowel sounds.  Extremities: No edema.  Neurological: Grossly intact.   Lymphatics: There is no palpable lymphadenopathy throughout in the cervical, supraclavicular, axillary, or inguinal regions.  Psych/Depression: nl  Neck, supple      ASSESSMENT/PLAN:     1. Malignant neoplasm of upper-outer quadrant of right breast in female, estrogen receptor positive (HCC)         Cancer Staging   Malignant neoplasm of upper-outer quadrant of right breast in female, estrogen receptor positive (HCC)  Staging form: Breast, AJCC 8th Edition  - Clinical stage from  10/12/2023: Stage IIA (cT3, cN1(f), cM0, G2, ER+, RI+, HER2-) - Signed by Halie Calle MD on 5/8/2024  - Pathologic stage from 5/8/2024: ypT3, ypN2a, cM0, G2, ER+, RI+, HER2- - Signed by Halie Calle MD on 5/8/2024    Patient has completed staging work-up.  Thus far no evidence of metastatic disease.  Findings in the liver evaluated with ultrasound, and the one side is a hemangioma, the other likely a cyst, but a question of metastatic disease and PET scan has been ordered.  I discussed with the patient that most likely this is going to be a cyst.    Discussed that she still has early stage disease, and that her staging has not changed despite the size of the primary tumor.    Given extensive disease in the breast and komal involvement, she will benefit from neoadjuvant chemotherapy, which may help with surgical outcomes by shrinking some of the primary tumor and of the lymph nodes.  I discussed with the patient that the primary tumor will not likely decrease in size significantly to make her a candidate for breast conservation.  I did discuss with the patient that she will need a mastectomy and likely axillary lymph node dissection.  Given the size of the primary tumor, she is a candidate for postmastectomy radiation therapy plus minus radiation to the komal basins pending on surgical management of the axilla.  I discussed with the patient that once she completes treatment with radiation, she will then be initiated on adjuvant endocrine therapy, given the size of the tumor and number of positive lymph nodes, she also will be a candidate for 2 years of endovascular in the initial 2 years of adjuvant endocrine therapy.  Discussed that adjuvant endocrine therapy total duration will be no less than 5 years but given her high risk features, likely up to 8 to 10 years.  We will also discuss options for ovarian function suppression after completion of chemotherapy.    Patient inquired regarding contralateral  prophylactic mastectomy.  I discussed with the patient that we should await results of her genetic testing.  If the genetic testing is positive for a deleterious mutation which will increase her lifetime second primary breast cancer, then she should proceed with prophylactic contralateral mastectomy.  However, if this is negative, discussed with the patient that there is no benefit for proceeding with contralateral mastectomy, as it will not change her disease-free or overall survival.    Discussed with the patient that while she is receiving neoadjuvant treatment, she can have the evaluation by plastic surgery so that when she completes her neoadjuvant course of therapy, she will be able to have her surgery coordinated with Dr. Hutson and a plastic surgeon    She completed course of neoadjuvant DD AC x 4 cycles followed by weekly Taxol x12 weeks    Status post bilateral mastectomy with right-sided axillary lymph node dissection on 4/29/2024.    She completed adjuvant radiation therapy on 8/7/2024.    Discussed with the patient that it is not uncommon for patients with ER/CT positive breast cancer that have neoadjuvant treatment not to have a robust response from therapy.  I did discuss that for her treatment, in terms of systemic disease, after completion of radiation therapy, we will proceed with ovarian function suppression, which can be initiated at this point, this will then be followed by adjuvant endocrine therapy with 2 years of Abemaciclib.  In addition, patient is also a candidate for the Bradley 2 clinical trial which is comparing standard of care adjuvant endocrine therapy with Abemaciclib versus camizestrant which is an oral SERD.    Patient received first dose of goserelin 3.6 mg on 6/3/2024 and last on 7/30/2024.  Considering BSO during the summer.    Arthralgias:  secondary to goserelin.  Continue to increase activity.    -pt report minimal arthralgias    OTIS-2  Randomized to Arm B with  Camizestrant started 8/30/24. No Eye issues   On adjuvant hormonal therapy and abemaciclib.       Follow up per trial protocol.     Continue camizestrant 75 mg daily. No new complaints     Abemaciclib had been held due to GI symptoms requiring IV hydration and IV nausea meds.   Dose reduced to 100 mg   Currently Cycle 9 abemaciclib 100 mg dose twice daily -- continue tolerating lower dose better      Cytopenias:  secondary to abemaciclib and stable.  Neutropenia resolved    Headaches: Recommend tylenol prn.  Avoid NSAIDS due to renal issues.  Started with goserelin, which is reported to cause HA and abemaciclib can also cause HA. Following with neurology.      N/V/D:  secondary to abemaciclib.  Discussed imodium if stool watery, gasX from cramps and antiemetic for nausea.     Dizziness:  monitor BP and pulse during those episodes.      Continue to monitor creat levels- following nephrology, pt drinking fluids without issue       Asking re oopherectomy - wants to plan for the summer while off of work. Currently sees [facility] Dr Sal.     Lymphedema:  HEP and compression. Referral placed for therapy. R UE US negative for DVT          Follow up in 4 weeks         MDM high risk.          No orders of the defined types were placed in this encounter.      Results From Past 48 Hours:  Recent Results (from the past 48 hours)   CBC W/DIFF [E]    Collection Time: 04/23/25  1:36 PM   Result Value Ref Range    WBC 3.3 (L) 4.0 - 11.0 x10(3) uL    RBC 3.19 (L) 3.80 - 5.30 x10(6)uL    HGB 10.6 (L) 12.0 - 16.0 g/dL    HCT 31.2 (L) 35.0 - 48.0 %    MCV 97.8 80.0 - 100.0 fL    MCH 33.2 26.0 - 34.0 pg    MCHC 34.0 31.0 - 37.0 g/dL    RDW-SD 47.5 (H) 35.1 - 46.3 fL    RDW 13.2 11.0 - 15.0 %    .0 150.0 - 450.0 10(3)uL    Neutrophil Absolute Prelim 1.84 1.50 - 7.70 x10 (3) uL   COMP METABOLIC PANEL [E]    Collection Time: 04/23/25  1:36 PM   Result Value Ref Range    Glucose 107 (H) 70 - 99 mg/dL    Sodium 143 136 - 145 mmol/L     Potassium 3.7 3.5 - 5.1 mmol/L    Chloride 108 98 - 112 mmol/L    CO2 25.0 21.0 - 32.0 mmol/L    Anion Gap 10 0 - 18 mmol/L    BUN 15 9 - 23 mg/dL    Creatinine 1.31 (H) 0.55 - 1.02 mg/dL    BUN/CREA Ratio 11.5 10.0 - 20.0    Calcium, Total 9.6 8.7 - 10.4 mg/dL    Calculated Osmolality 297 (H) 275 - 295 mOsm/kg    eGFR-Cr 52 (L) >=60 mL/min/1.73m2    ALT 13 10 - 49 U/L    AST 21 <34 U/L    Alkaline Phosphatase 64 37 - 98 U/L    Bilirubin, Total 0.4 0.3 - 1.2 mg/dL    Total Protein 6.9 5.7 - 8.2 g/dL    Albumin 4.5 3.2 - 4.8 g/dL    Globulin  2.4 2.0 - 3.5 g/dL    A/G Ratio 1.9 1.0 - 2.0    Patient Fasting for CMP? Patient not present    MAGNESIUM [E]    Collection Time: 04/23/25  1:36 PM   Result Value Ref Range    Magnesium 1.8 1.6 - 2.6 mg/dL   Scan slide    Collection Time: 04/23/25  1:36 PM   Result Value Ref Range    Slide Review Slide reviewed, manual differential added    Manual differential    Collection Time: 04/23/25  1:36 PM   Result Value Ref Range    Neutrophil Absolute Manual 1.88 1.50 - 7.70 x10(3) uL    Lymphocyte Absolute Manual 1.02 1.00 - 4.00 x10(3) uL    Monocyte Absolute Manual 0.26 0.10 - 1.00 x10(3) uL    Eosinophil Absolute Manual 0.10 0.00 - 0.70 x10(3) uL    Basophil Absolute Manual 0.03 0.00 - 0.20 x10(3) uL    Neutrophils % Manual 57 %    Lymphocyte % Manual 29 %    Monocyte % Manual 8 %    Eosinophil % Manual 3 %    Basophil % Manual 1 %    Atypical Lymphocyte % 2 0-4 % %    Total Cells Counted 100     RBC Morphology See morphology below (A) Normal, Slide reviewed, see previous RBC morphology.    Platelet Morphology Normal Normal    Hypochromia 1+      Ovalocytes 1+      Tear Drop Cells 1+             Imaging & Referrals:  None

## 2025-04-24 ENCOUNTER — PATIENT MESSAGE (OUTPATIENT)
Facility: CLINIC | Age: 40
End: 2025-04-24

## 2025-04-24 ENCOUNTER — TELEPHONE (OUTPATIENT)
Facility: CLINIC | Age: 40
End: 2025-04-24

## 2025-04-24 ENCOUNTER — OFFICE VISIT (OUTPATIENT)
Dept: PHYSICAL THERAPY | Facility: HOSPITAL | Age: 40
End: 2025-04-24
Attending: NURSE PRACTITIONER
Payer: COMMERCIAL

## 2025-04-24 PROCEDURE — 97140 MANUAL THERAPY 1/> REGIONS: CPT | Performed by: PHYSICAL THERAPIST

## 2025-04-24 RX ORDER — VENLAFAXINE HYDROCHLORIDE 37.5 MG/1
37.5 CAPSULE, EXTENDED RELEASE ORAL DAILY
Qty: 30 CAPSULE | Refills: 5 | Status: SHIPPED | OUTPATIENT
Start: 2025-04-24 | End: 2025-10-21

## 2025-04-24 NOTE — PROGRESS NOTES
Patient: Jocelyn Garza (39 year old, female) Referring Provider:  Insurance:   Diagnosis: lymphedema I89.0 Dary Wallis  BCBS OUT OF STATE   Date of Surgery: 4/29/2024  N/A   Precautions:  Cancer  Referral Information:    Date of Evaluation: Req: 0, Auth: 0, Exp:     03/06/25 POC Auth Visits:          Today's Date   4/24/2025    Subjective          Pain: 7/10     Objective  Decreased ROM R elbow (unable to extend), decreased ROM R shld, marked tenderness/pain medial upper arm and forearm, palpable dense tissue these areas but no visible/palpable cording.     Assessment  Pt able to fully extend elbow with arm at her side after treatment.    Goals (to be met in 10 visits)       Not Met Progressing Toward Partially Met Met   Decrease swelling R trunk by at least 2 cm to decrease risks of infection    X (but still has swelling)   Pt to be independent with self MLD, ROM exercises, and donning/doffing compression bandages/garments to facilitate swelling reduction and to be independent at self-management once discharged.       Increase R elb ext to 0 and shoulder AROM flex 150, abd 165 and IR to T8 to improve ease of dressing/bathing/and reaching overhead.       Increase R UE strength to at least 4/5 to improve ease of lifting and performing household chores.                                                          Plan  Cont w/ stretching, STM, CDT    Treatment Last 4 Visits  Treatment Day: 12       4/10/2025 4/15/2025 4/22/2025 4/24/2025   PT Treatment   Insurance Auth # and Certification Dates Certification From: 3/6/2025  To:6/4/2025 Certification From: 4/15/2025 to 7/14/2025 Certification From: 4/15/2025 to 7/14/2025 Certification From: 4/15/2025 to 7/14/2025   # of Visits Used/Approved 9/10 10/10 - PN WRITTEN TODAY, Treatment provided by Ita Mckeon PTA, JAKUBT 11/20 12/20   Therapeutic Exercise Prone scap exercises: T's, W's, T's thumbs up, Snow angels x 10 reps each  -PROM R shoulder in supine -PROM R  shoulder    Encouraged to continue with stretching and strengthening      Manual Therapy MLD: Neck sequence, abd sequence, left axillary, A-A, R inguinal, R A-I, Right axillary, Right chest, RUE.      Compression:  -pt measured for new compression MEASUREMENTS TAKEN TODAY: BUE arm volume, trunk circumference    MLD: Neck sequence, abd sequence, left axillary, A-A, R inguinal, R A-I, Right axillary, Right chest, RUE.  *working on getting home compression pump  Compression:  -pt measured for new compression on 4/4.  Working with vendor to get new compression Palpated and stretching of tissue RUE, no palpable cording.     Contacted oncology office STM and manual stretching of tissue LUE. Several cavitation noted in forearm which afterwards cording became palpable and visible. Encouraged pt to STM these areas    MLD: Neck sequence, abd sequence, left axillary, A-A, R inguinal, R A-I, Right axillary, Right chest, RUE (arm greatest focus)    Compression:  - pt wearing compression sleeve, encouraged her to cont wearing as swelling may worsen due to severity of her cording and the more aggressive treatment to improve her arm mobility.         Therapeutic Activity    Discussed while not ideal for lymphedema, if pain persists she can try cool pack or gently warm pack for pain relief   Therapeutic Exercise Min 10 5     Manual Therapy Min 35 40 25 38   Total of Timed Procedures 45 45 25 38   Total of Service Based 0 0 0 0   Total Treatment Time 45 45 25 38        HEP       Charges     mm3

## 2025-04-24 NOTE — TELEPHONE ENCOUNTER
Vivien WILBURN called, verified patient's Name and . Informing office that patient's order for the pneumatic compressor machine along with the appliance for the arm has been approved. However, appliance order for the chest and trunk was denied, states it is not medically necessary as there is not enough information to support the need for the appliance.    If provider wish to proceed with option for peer to peer review, should go to [email or web address] for information.     [facility] OP Staff kindly assist. Thank you.

## 2025-04-25 NOTE — TELEPHONE ENCOUNTER
Do we know why specifically it was denied? Also, can we confirm that my letter was sent & received as this letter details the medical necessity? Thank you for any insight.

## 2025-04-29 NOTE — TELEPHONE ENCOUNTER
Tried calling Vivien from [facility]-not available. Voicemail is not private.  Tried calling [employer]-no answer.  Will try calling again.  Order form found in scanning dated 4/1/25.  Letter noted 4/11/25

## 2025-04-29 NOTE — TELEPHONE ENCOUNTER
Tried calling [employer] and [facility].  Unable to leave a voicemail as does not appear confidential.    Please try calling again.

## 2025-05-01 ENCOUNTER — OFFICE VISIT (OUTPATIENT)
Dept: PHYSICAL THERAPY | Facility: HOSPITAL | Age: 40
End: 2025-05-01
Attending: NURSE PRACTITIONER
Payer: COMMERCIAL

## 2025-05-01 PROCEDURE — 97140 MANUAL THERAPY 1/> REGIONS: CPT

## 2025-05-01 NOTE — PROGRESS NOTES
Patient: Jocelyn Garza (39 year old, female) Referring Provider:  Insurance:   Diagnosis: lymphedema I89.0 Dary Wallis  BCBS OUT OF STATE   Date of Surgery: 4/29/2024  N/A   Precautions:  Cancer  Referral Information:    Date of Evaluation: Req: 0, Auth: 0, Exp:     03/06/25 POC Auth Visits:          Today's Date   5/1/2025    Subjective  Pt reports that she is feeling somewhat better, but still having a lot of tenderness in elbow and forearm.  Pt has received the home pump and has now used it for the last 3 nights.  \"it has helped.\"       Pain: 6/10     Objective  Decreased ROM R elbow (unable to extend), decreased ROM R shld, marked tenderness/pain medial upper arm and forearm, palpable dense tissue these areas but no visible/palpable cording.     Assessment  Pt able to fully extend elbow with arm at her side after treatment. 2 small cavitations felt with STM.    Goals (to be met in 10 visits)           Not Met Progressing Toward Partially Met Met   Decrease swelling R trunk by at least 2 cm to decrease risks of infection      x     Pt to be independent with self MLD, ROM exercises, and donning/doffing compression bandages/garments to facilitate swelling reduction and to be independent at self-management once discharged.      x     Increase R elb ext to 0 and shoulder AROM flex 150, abd 165 and IR to T8 to improve ease of dressing/bathing/and reaching overhead.      x     Increase R UE strength to at least 4/5 to improve ease of lifting and performing household chores.        x                                                    Plan  Cont w/ stretching, STM, CDT    Treatment Last 4 Visits  Treatment Day: 13       4/15/2025 4/22/2025 4/24/2025 5/1/2025   PT Treatment   Insurance Auth # and Certification Dates Certification From: 4/15/2025 to 7/14/2025 Certification From: 4/15/2025 to 7/14/2025 Certification From: 4/15/2025 to 7/14/2025 Certification From: 4/15/2025 to 7/14/2025   # of Visits Used/Approved  10/10 - PN WRITTEN TODAY, Treatment provided by Ita Mckeon PTA, ARTEM 11/20 12/20 13/20   Therapeutic Exercise -PROM R shoulder    Encouraged to continue with stretching and strengthening       Manual Therapy MEASUREMENTS TAKEN TODAY: BUE arm volume, trunk circumference    MLD: Neck sequence, abd sequence, left axillary, A-A, R inguinal, R A-I, Right axillary, Right chest, RUE.  *working on getting home compression pump  Compression:  -pt measured for new compression on 4/4.  Working with vendor to get new compression Palpated and stretching of tissue RUE, no palpable cording.     Contacted oncology office STM and manual stretching of tissue LUE. Several cavitation noted in forearm which afterwards cording became palpable and visible. Encouraged pt to STM these areas    MLD: Neck sequence, abd sequence, left axillary, A-A, R inguinal, R A-I, Right axillary, Right chest, RUE (arm greatest focus)    Compression:  - pt wearing compression sleeve, encouraged her to cont wearing as swelling may worsen due to severity of her cording and the more aggressive treatment to improve her arm mobility.       STM and manual stretching of tissue LUE. Couple cavitation noted in forearm.    Encouraged pt to STM these areas    MLD: Neck sequence, abd sequence, left axillary, A-A, R inguinal, R A-I, Right axillary, Right chest, RUE (arm greatest focus)  Pt now has home compression pump and is using daily.    Compression:  Pt wearing compression sleeve.  Encouraged to continue to wear sleeve to help with cording pain.     Therapeutic Activity   Discussed while not ideal for lymphedema, if pain persists she can try cool pack or gently warm pack for pain relief    Therapeutic Exercise Min 5      Manual Therapy Min 40 25 38 38   Total of Timed Procedures 45 25 38 38   Total of Service Based 0 0 0 0   Total Treatment Time 45 25 38 38        HEP       Charges     MM3 (pt had to leave session a few minutes early to get to work)

## 2025-05-06 ENCOUNTER — OFFICE VISIT (OUTPATIENT)
Dept: PHYSICAL THERAPY | Facility: HOSPITAL | Age: 40
End: 2025-05-06
Attending: NURSE PRACTITIONER
Payer: COMMERCIAL

## 2025-05-06 PROCEDURE — 97140 MANUAL THERAPY 1/> REGIONS: CPT

## 2025-05-06 PROCEDURE — 97530 THERAPEUTIC ACTIVITIES: CPT

## 2025-05-07 NOTE — PROGRESS NOTES
Patient: Jocelyn Garza (39 year old, female) Referring Provider:  Insurance:   Diagnosis: lymphedema I89.0 Dary Wallis  BCBS OUT OF STATE   Date of Surgery: 4/29/24  N/A   Precautions:  Cancer  Referral Information:    Date of Evaluation: Req: 0, Auth: 0, Exp:     03/06/25 POC Auth Visits:          Today's Date   5/6/2025    Subjective  Pt reports that the home compression pump seems to be helping.  \"I am trying to use it daily.\"       Pain: 6/10     Objective  Cording RUE extends down to forearm.  Limited BUE AROM RUE>LUE.  Custom compression sleeve worn daily.   Shoulder       4/10/2025 4/15/2025 5/6/2025   Shoulder ROM/MMT   Rt Flexion Shoulder 145 145 150/160 at end of session   Lt Flexion Shoulder 155 155 155   Rt Flexion Shoulder MMT 4+ 4+    Lt Flexion Shoulder MMT 4+ 4+    Rt Abduction Shoulder 160 160 160/165 at end of session   Lt Abduction Shoulder 170 170 170   Rt Abduction Shoulder MMT (C5) 4+ 4+    Lt Abduction Shoulder MMT (C5) 4+ 4+    Rt IR Shoulder T12 T12 T12   Lt IR Shoulder T7 T7 T7   Rt IR Shoulder MMT 4+ 4+    Lt IR Shoulder MMT 4+ 4+    Rt ER Shoulder 90 90 90   Lt ER Shoulder 90 90 90   Rt ER Shoulder MMT 4+ 4+    Lt ER Shoulder MMT 4+ 4+     Elbow       4/10/2025 4/15/2025 5/6/2025   Elbow ROM/MMT   Rt Flexion Elbow WFL WFL WFL   Rt Extension Elbow WFL WFL WFL      Assessment  Several cavittions noted with improved R shoulder ROM noted t end of session.    Goals (to be met in  )           Not Met Progressing Toward Partially Met Met   Decrease swelling R trunk by at least 2 cm to decrease risks of infection      x     Pt to be independent with self MLD, ROM exercises, and donning/doffing compression bandages/garments to facilitate swelling reduction and to be independent at self-management once discharged.      x     Increase R elb ext to 0 and shoulder AROM flex 150, abd 165 and IR to T8 to improve ease of dressing/bathing/and reaching overhead.      x     Increase R UE strength to  at least 4/5 to improve ease of lifting and performing household chores.        x                                                        Plan  Cont w/ stretching, STM, CDT. Use pump daily.    Treatment Last 4 Visits  Treatment Day: 14 4/22/2025 4/24/2025 5/1/2025 5/6/2025   PT Treatment   Insurance Auth # and Certification Dates Certification From: 4/15/2025 to 7/14/2025 Certification From: 4/15/2025 to 7/14/2025 Certification From: 4/15/2025 to 7/14/2025 Certification From: 4/15/2025 to 7/14/2025   # of Visits Used/Approved 11/20 12/20 13/20 14/20   Therapeutic Exercise    MEASUREMENTS TAKEN: BUE AROM   Manual Therapy Palpated and stretching of tissue RUE, no palpable cording.     Contacted oncology office STM and manual stretching of tissue LUE. Several cavitation noted in forearm which afterwards cording became palpable and visible. Encouraged pt to STM these areas    MLD: Neck sequence, abd sequence, left axillary, A-A, R inguinal, R A-I, Right axillary, Right chest, RUE (arm greatest focus)    Compression:  - pt wearing compression sleeve, encouraged her to cont wearing as swelling may worsen due to severity of her cording and the more aggressive treatment to improve her arm mobility.       STM and manual stretching of tissue LUE. Couple cavitation noted in forearm.    Encouraged pt to STM these areas    MLD: Neck sequence, abd sequence, left axillary, A-A, R inguinal, R A-I, Right axillary, Right chest, RUE (arm greatest focus)  Pt now has home compression pump and is using daily.    Compression:  Pt wearing compression sleeve.  Encouraged to continue to wear sleeve to help with cording pain.   STM and manual stretching of tissue LUE. Several R forearm/elbow area cavitation noted.    Encouraged pt to STM these areas  -use home pump daily    MLD: Neck sequence, abd sequence, left axillary, A-A, R inguinal, R A-I, Right axillary, Right chest, RUE (arm greatest focus)  Pt now has home compression pump and  is using daily.    Compression:  Pt wearing compression sleeve.  Encouraged to continue to wear sleeve to help with cording pain.   Therapeutic Activity  Discussed while not ideal for lymphedema, if pain persists she can try cool pack or gently warm pack for pain relief  Continue with self massage.  Discussed importance of being more persistent with self massage and use of home pump when symptoms are greater/more bothersome.  May be able to reduce use of pump depending on symptoms and response.   Manual Therapy Min 25 38 38 38   Therapeutic Activity Min    10   Total of Timed Procedures 25 38 38 48   Total of Service Based 0 0 0 0   Total Treatment Time 25 38 38 48        HEP       Charges     MM3, Ther act1

## 2025-05-08 ENCOUNTER — APPOINTMENT (OUTPATIENT)
Dept: PHYSICAL THERAPY | Facility: HOSPITAL | Age: 40
End: 2025-05-08
Attending: NURSE PRACTITIONER
Payer: COMMERCIAL

## 2025-05-08 ENCOUNTER — APPOINTMENT (OUTPATIENT)
Dept: OBGYN | Age: 40
End: 2025-05-08

## 2025-05-08 VITALS
HEIGHT: 66 IN | TEMPERATURE: 98.2 F | HEART RATE: 82 BPM | BODY MASS INDEX: 25.16 KG/M2 | OXYGEN SATURATION: 100 % | SYSTOLIC BLOOD PRESSURE: 119 MMHG | DIASTOLIC BLOOD PRESSURE: 76 MMHG | WEIGHT: 156.53 LBS

## 2025-05-08 DIAGNOSIS — C50.911 MALIGNANT NEOPLASM OF RIGHT FEMALE BREAST, UNSPECIFIED ESTROGEN RECEPTOR STATUS, UNSPECIFIED SITE OF BREAST (CMD): Primary | ICD-10-CM

## 2025-05-08 PROCEDURE — 99213 OFFICE O/P EST LOW 20 MIN: CPT | Performed by: OBSTETRICS & GYNECOLOGY

## 2025-05-08 RX ORDER — ACETAMINOPHEN AND CODEINE PHOSPHATE 300; 30 MG/1; MG/1
1 TABLET ORAL EVERY 6 HOURS PRN
COMMUNITY
Start: 2024-11-18

## 2025-05-08 RX ORDER — VENLAFAXINE HYDROCHLORIDE 37.5 MG/1
37.5 CAPSULE, EXTENDED RELEASE ORAL
COMMUNITY
Start: 2025-04-24 | End: 2025-10-21

## 2025-05-08 ASSESSMENT — PATIENT HEALTH QUESTIONNAIRE - PHQ9
1. LITTLE INTEREST OR PLEASURE IN DOING THINGS: NOT AT ALL
SUM OF ALL RESPONSES TO PHQ9 QUESTIONS 1 AND 2: 0
2. FEELING DOWN, DEPRESSED OR HOPELESS: NOT AT ALL
CLINICAL INTERPRETATION OF PHQ2 SCORE: NO FURTHER SCREENING NEEDED
SUM OF ALL RESPONSES TO PHQ9 QUESTIONS 1 AND 2: 0

## 2025-05-13 ENCOUNTER — OFFICE VISIT (OUTPATIENT)
Dept: PHYSICAL THERAPY | Facility: HOSPITAL | Age: 40
End: 2025-05-13
Attending: NURSE PRACTITIONER
Payer: COMMERCIAL

## 2025-05-13 PROCEDURE — 97140 MANUAL THERAPY 1/> REGIONS: CPT

## 2025-05-13 PROCEDURE — 97530 THERAPEUTIC ACTIVITIES: CPT

## 2025-05-13 NOTE — PROGRESS NOTES
Patient: Jocelyn Garza (39 year old, female) Referring Provider:  Insurance:   Diagnosis: lymphedema I89.0 Dary Wallis  BCBS OUT OF STATE   Date of Surgery: 4/29/24  N/A   Precautions:  Cancer  Referral Information:    Date of Evaluation: Req: 0, Auth: 0, Exp:     03/06/25 POC Auth Visits:          Today's Date   5/13/2025    Subjective  Pt reports that she thinks she is feeling better.  \"i am still waiting on my new compression sleeves and gloves.\"       Pain:       Objective  Cording RUE extends down to forearm.     Assessment  Several more cavittions noted with improved R wrist and shoulder ROM noted at end of session.    Goals (to be met in 20 visits)           Not Met Progressing Toward Partially Met Met   Decrease swelling R trunk by at least 2 cm to decrease risks of infection      x     Pt to be independent with self MLD, ROM exercises, and donning/doffing compression bandages/garments to facilitate swelling reduction and to be independent at self-management once discharged.      x     Increase R elb ext to 0 and shoulder AROM flex 150, abd 165 and IR to T8 to improve ease of dressing/bathing/and reaching overhead.      x     Increase R UE strength to at least 4/5 to improve ease of lifting and performing household chores.        x                                                            Plan       Treatment Last 4 Visits  Treatment Day: 15       4/24/2025 5/1/2025 5/6/2025 5/13/2025   PT Treatment   Insurance Auth # and Certification Dates Certification From: 4/15/2025 to 7/14/2025 Certification From: 4/15/2025 to 7/14/2025 Certification From: 4/15/2025 to 7/14/2025 Certification From: 4/15/2025 to 7/14/2025   # of Visits Used/Approved 12/20 13/20 14/20 15/20   Therapeutic Exercise   MEASUREMENTS TAKEN: BUE AROM    Manual Therapy STM and manual stretching of tissue LUE. Several cavitation noted in forearm which afterwards cording became palpable and visible. Encouraged pt to STM these  areas    MLD: Neck sequence, abd sequence, left axillary, A-A, R inguinal, R A-I, Right axillary, Right chest, RUE (arm greatest focus)    Compression:  - pt wearing compression sleeve, encouraged her to cont wearing as swelling may worsen due to severity of her cording and the more aggressive treatment to improve her arm mobility.       STM and manual stretching of tissue LUE. Couple cavitation noted in forearm.    Encouraged pt to STM these areas    MLD: Neck sequence, abd sequence, left axillary, A-A, R inguinal, R A-I, Right axillary, Right chest, RUE (arm greatest focus)  Pt now has home compression pump and is using daily.    Compression:  Pt wearing compression sleeve.  Encouraged to continue to wear sleeve to help with cording pain.   STM and manual stretching of tissue LUE. Several R forearm/elbow area cavitation noted.    Encouraged pt to STM these areas  -use home pump daily    MLD: Neck sequence, abd sequence, left axillary, A-A, R inguinal, R A-I, Right axillary, Right chest, RUE (arm greatest focus)  Pt now has home compression pump and is using daily.    Compression:  Pt wearing compression sleeve.  Encouraged to continue to wear sleeve to help with cording pain. STM and manual stretching of tissue LUE. Several R forearm/elbow area cavitation noted again today with some relief noted.   Encouraged pt to STM these areas  -use home pump daily    MLD: Neck sequence, abd sequence, left axillary, A-A, R inguinal, R A-I, Right axillary, Right chest, RUE (arm greatest focus)  Pt now has home compression pump and is using daily.    Compression:  -waiting for new custom sleeves and gloves.   Therapeutic Activity Discussed while not ideal for lymphedema, if pain persists she can try cool pack or gently warm pack for pain relief  Continue with self massage.  Discussed importance of being more persistent with self massage and use of home pump when symptoms are greater/more bothersome.  May be able to reduce use of  pump depending on symptoms and response. Reviewed donning older compression sleeves and try to ensure to jonah sleeve from styloid process to just under axilla.  Pt having some swelling at wrist but has been wearing the sleeve above styloid.   Manual Therapy Min 38 38 38 35   Therapeutic Activity Min   10 10   Total of Timed Procedures 38 38 48 45   Total of Service Based 0 0 0 0   Total Treatment Time 38 38 48 45        HEP       Charges     MM2, There Act1

## 2025-05-14 ENCOUNTER — OFFICE VISIT (OUTPATIENT)
Dept: NEUROLOGY | Facility: CLINIC | Age: 40
End: 2025-05-14
Payer: COMMERCIAL

## 2025-05-14 ENCOUNTER — LAB ENCOUNTER (OUTPATIENT)
Dept: LAB | Facility: HOSPITAL | Age: 40
End: 2025-05-14
Attending: Other
Payer: COMMERCIAL

## 2025-05-14 DIAGNOSIS — R41.3 MEMORY LOSS: ICD-10-CM

## 2025-05-14 DIAGNOSIS — R25.1 TREMOR: Primary | ICD-10-CM

## 2025-05-14 DIAGNOSIS — G44.53 THUNDERCLAP HEADACHE: ICD-10-CM

## 2025-05-14 DIAGNOSIS — R25.1 TREMOR: ICD-10-CM

## 2025-05-14 LAB
TSI SER-ACNC: 2.1 UIU/ML (ref 0.55–4.78)
VIT B12 SERPL-MCNC: 462 PG/ML (ref 211–911)

## 2025-05-14 PROCEDURE — 84207 ASSAY OF VITAMIN B-6: CPT | Performed by: OTHER

## 2025-05-14 PROCEDURE — 82390 ASSAY OF CERULOPLASMIN: CPT | Performed by: OTHER

## 2025-05-14 PROCEDURE — 84446 ASSAY OF VITAMIN E: CPT | Performed by: OTHER

## 2025-05-14 PROCEDURE — 99214 OFFICE O/P EST MOD 30 MIN: CPT | Performed by: OTHER

## 2025-05-14 PROCEDURE — 82607 VITAMIN B-12: CPT | Performed by: OTHER

## 2025-05-14 PROCEDURE — 84443 ASSAY THYROID STIM HORMONE: CPT | Performed by: OTHER

## 2025-05-14 NOTE — PROGRESS NOTES
[facility] [address], [address]  [facility] [address]  [phone]            Neurology follow-up visit     Referred By: Dr. Bearden ref. provider found    Chief Complaint:   Chief Complaint   Patient presents with    Neurologic Problem     LOV: 2/3/25 Patient is present today to follow up on Thunderclap headache. Patient having 10 headaches a month. Patient completed CTA BRAIN and labs work and would like to review. New sx of tremors in right hand started about 5 months.   Patient gives verbal consent to use [employer].          HPI:     Jocelyn Garza is a 39 year old female, who presents for headaches and memory problems.  39-year-old female with breast cancer undergoing treatment presents with a history of severe headaches. In November and December, she experienced intense headaches while using the bathroom, lasting about 2 hours, leading to two ER visits. These were thought to be thunderclap headaches. She was given medication, and the pain subsided. Currently, she experiences occasional pulsating headaches. 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 She also reports dizziness, feeling car sick at times.  She denied any other neurological symptoms.    The patient complains of memory issues, noting she forgets things easily, which is unusual for her. She manages well at work as a  but struggles with memory at home. She had a brain MRI last year in August due to headaches during radiation. The patient has a history of high blood pressure.    The patient reports diarrhea as a side effect of her medication. She is currently on hormonal-repressant medication for her breast cancer treatment.    Medical History  - Breast cancer  - Hypertension  - History of severe headaches (November-December, resulting in ER visits)  - Brain MRI in August of previous year    Social History  - Occupation: employed as a   - Stress management: sees a  therapist    CT angiogram of the head was done.  No aneurysms or occlusions or significant vascular malformations.  Patient was placed on indomethacin for the thunderclap headaches.  Patient came back for follow up in May 2025.    History of Present Illness  Jocelyn Garza is a 39 year old female with a history of breast cancer who presents with tremors and lymphedema in the right arm.    She experiences tremors in her right hand, described as a feeling of wanting to shake, accompanied by occasional tingling and numbness. The tremors involve the whole hand. No dropping objects or difficulty with tasks such as eating or writing.    She has a history of breast cancer, which led to radiation treatment and subsequent lymphedema in her right arm. She is concerned that the lymphedema may be contributing to her symptoms, particularly if there is swelling affecting the median nerve.    There is a family history of Parkinson's disease, with her father and uncle being diagnosed in their fifties. She is worried about the possibility of Parkinson's disease due to her symptoms and family history.    She has recently started venlafaxine, which she associates with recent constipation. She also reports acting out dreams at night, a behavior she has experienced since childhood but notes has become more frequent recently. No recent changes in her sense of smell, although she experienced changes during chemotherapy for breast cancer.    Patient also reports occasional constipation.        Past Medical History:    Breast cancer in female (HCC)    Right breast lymph node cancer    High blood pressure    Hx of motion sickness    Hypertension    Solitary kidney, congenital       Past Surgical History:   Procedure Laterality Date    Appendectomy  01/01/2007    Needle biopsy right Right 10/09/2023    Treat ectopic preg,rmv tube/ovary Left 01/01/2014    Treat ectopic preg,rmv tube/ovary Left 01/01/2015       Social history:  History    Smoking Status    Never   Smokeless Tobacco    Never     History   Alcohol Use Not Currently     Comment: 2 -3 drinks per week     History   Drug Use No       Family History   Problem Relation Age of Onset    Other (Parkinson's Disease) Father     Prostate Cancer Maternal Grandfather 70    Pancreatic Cancer Paternal Grandmother 80    Cancer Paternal Aunt 80        breast cancer    Pancreatic Cancer Maternal Uncle 60    Pancreatic Cancer Paternal Great-Grandfather     Cancer Maternal Great-Grandmother         gastric ca    Cancer Paternal Uncle 60        brain cancer         Current Outpatient Medications:     venlafaxine ER 37.5 MG Oral Capsule SR 24 Hr, Take 1 capsule (37.5 mg total) by mouth daily., Disp: 30 capsule, Rfl: 5    prochlorperazine (COMPAZINE) 10 mg tablet, Take 1 tablet (10 mg total) by mouth every 6 (six) hours as needed for Nausea., Disp: 15 tablet, Rfl: 0    camizestrant 75 mg Oral Tab (OTIS-2 STUDY DRUG), Take 1 tablet (75 mg total) by mouth daily. Take at the same time each day., Disp: 96 tablet, Rfl: 0    loperamide 2 MG Oral Cap, Take 1 capsule (2 mg total) by mouth as needed in the morning and 1 capsule (2 mg total) as needed at noon and 1 capsule (2 mg total) as needed in the evening and 1 capsule (2 mg total) as needed before bedtime for Diarrhea., Disp: , Rfl:     GOSERELIN ACETATE SC, Inject into the skin. Once a month, Disp: , Rfl:     Abemaciclib 100 MG Oral Tab, Take 1 tablet (100 mg total) by mouth 2 (two) times daily. may take with or without food, Disp: 60 tablet, Rfl: 11    lidocaine-prilocaine 2.5-2.5 % External Cream, Apply to site 1 hour prior to port a cath needle insertion, Disp: 1 each, Rfl: 1    Allergies[1]    ROS:   As in HPI, the rest of the 14 system review was done and was negative      Physical Exam:  There were no vitals filed for this visit.      General: No apparent distress, well nourished, well groomed.  Head- Normocephalic, atraumatic  Eyes- No redness or  swelling  ENT- Hearing intake, normal glutition  Neck- No masses or adenopathy  Cv: pulses were palpable and normal, no cyanosis or edema     Neurological:     Mental Status- Alert and oriented x3.  Normal attention span and concentration  Thought process intact  Memory intact- recent and remote  Mood intact  Fund of knowledge appropriate for education and age    Language intact including: comprehension, naming, repetition, vocabulary    Cranial Nerves:    VII. Face symmetric, no facial weakness  VIII. Hearing intact to whisper.  IX. Pallet elevates symmetrically.  XI. Shoulder shrug is intact  XII. Tongue is midline    Motor Exam:  Muscle tone normal, but some very mild possible bradykinesia noted in the right hand.  No atrophy or fasciculations  Strength- upper extremities 5/5 proximally and distally                  - lower  extremities 5/5 proximally and distally    Sensory Exam:  Light touch sensation- intact in all 4 extremities      Coordination:  Finger to nose intact  Rapid alternating movements intact    Gait:  Normal posture  Normal physiologic, decreased arm swing on the right side specifically.    Labs:    Lab Results   Component Value Date    TSH 3.562 02/03/2025     Lab Results   Component Value Date    HDL 81 (H) 10/12/2024    LDL 79 10/12/2024    TRIG 67 10/12/2024     Lab Results   Component Value Date    HGB 10.6 (L) 04/23/2025    HCT 31.2 (L) 04/23/2025    MCV 97.8 04/23/2025    WBC 3.3 (L) 04/23/2025    .0 04/23/2025      Lab Results   Component Value Date    GLUCOSE 85 03/07/2013    BUN 15 04/23/2025    CA 9.6 04/23/2025    ALT 13 04/23/2025    AST 21 04/23/2025    ALKPHOS 50 07/02/2016    ALB 4.5 04/23/2025     04/23/2025    K 3.7 04/23/2025     04/23/2025    CO2 25.0 04/23/2025      I have reviewed labs.    Imaging Studies:  I have independently reviewed imaging.  MRI of the brain and cervical spine was independent reviewed as above.  We discussed nonspecific finding in the  left cerebellar peduncle        Assessment   1. Thunderclap headache  - Has resolved, not able to use indomethacin due to her kidney problems, has not required any other analgesics.    2.  Tremors    Assessment & Plan  Tremor of right hand  Intermittent tremor possibly related to lymphedema or carpal tunnel syndrome. Differential includes early Parkinson's disease due to family history and symptoms like dream enactment and stiffness. Parkinson's not confirmed. Discussed nuclear scan for dopamine uptake. Emphasized focusing on symptom management. Recommended exercise to slow progression.  - Refer to neurologist specializing in movement disorders.  - Consider skin biopsy for synuclein deposits, pending insurance approval.  - Encourage regular exercise, including Rocksteady boxing, jogging, or dancing.    Carpal tunnel syndrome  Possible carpal tunnel syndrome in right hand, potentially worsened by lymphedema. Symptoms include tingling and tremor without significant functional impairment.  - Consider imaging for carpal tunnel syndrome if symptoms persist or worsen.    Lymphedema of right arm  Lymphedema secondary to breast cancer treatment, possibly contributing to tremors and tingling in right hand.    Constipation.                 Education and counseling provided to patient. Instructed patient to call my office or seek medical attention immediately if symptoms worsen.  Patient verbalized understanding of information given. All questions were answered. All side effects of drugs were discussed.     Return to clinic in: No follow-ups on file.    Sergey Farmer MD           [1] No Known Allergies

## 2025-05-15 ENCOUNTER — OFFICE VISIT (OUTPATIENT)
Dept: NEUROLOGY | Facility: CLINIC | Age: 40
End: 2025-05-15
Payer: COMMERCIAL

## 2025-05-15 VITALS
WEIGHT: 158.38 LBS | BODY MASS INDEX: 26 KG/M2 | DIASTOLIC BLOOD PRESSURE: 76 MMHG | SYSTOLIC BLOOD PRESSURE: 124 MMHG | RESPIRATION RATE: 16 BRPM | HEART RATE: 58 BPM

## 2025-05-15 DIAGNOSIS — R29.898 RIGIDITY: Primary | ICD-10-CM

## 2025-05-15 LAB — CERULOPLASMIN SERPL-MCNC: 32 MG/DL (ref 20–60)

## 2025-05-15 PROCEDURE — 3078F DIAST BP <80 MM HG: CPT | Performed by: OTHER

## 2025-05-15 PROCEDURE — 99215 OFFICE O/P EST HI 40 MIN: CPT | Performed by: OTHER

## 2025-05-15 PROCEDURE — 3074F SYST BP LT 130 MM HG: CPT | Performed by: OTHER

## 2025-05-15 RX ORDER — ACETAMINOPHEN AND CODEINE PHOSPHATE 300; 30 MG/1; MG/1
1 TABLET ORAL EVERY 6 HOURS PRN
COMMUNITY
Start: 2024-11-18

## 2025-05-15 NOTE — PROGRESS NOTES
Patient states right hand tremors and some stiffness. Patient states body stiffness, mostly in the right knee. Denies recent falls. Denies changes in gait in regards to dragging her feet or veering when walking. Patient states difficulty with swallowing. Patient states symptoms started about 5-6 months with tremors and stiffness. 11 months for difficulty with swallowing.

## 2025-05-15 NOTE — PATIENT INSTRUCTIONS
1- I am not sure you have Parkinson's Disease  2- Symptoms/signs are minimal  3- Return in 6 months        Refill policies:    Allow 2-3 business days for refills; controlled substances may take longer.  Contact your pharmacy at least 5 days prior to running out of medication and have them send an electronic request or submit request through the “request refill” option in your [employer] account.  Refills are not addressed on weekends; covering physicians do not authorize routine medications on weekends.  No narcotics or controlled substances are refilled after noon on Fridays or by on call physicians.  By law, narcotics must be electronically prescribed.  A 30 day supply with no refills is the maximum allowed.  If your prescription is due for a refill, you may be due for a follow up appointment.  To best provide you care, patients receiving routine medications need to be seen at least once a year.  Patients receiving narcotic/controlled substance medications need to be seen at least once every 3 months.  In the event that your preferred pharmacy does not have the requested medication in stock (e.g. Backordered), it is your responsibility to find another pharmacy that has the requested medication available.  We will gladly send a new prescription to that pharmacy at your request.    Scheduling Tests:    If your physician has ordered radiology tests such as MRI or CT scans, please contact Central Scheduling at [phone] right away to schedule the test.  Once scheduled, the [facility] Centralized Referral Team will work with your insurance carrier to obtain pre-certification or prior authorization.  Depending on your insurance carrier, approval may take 3-10 days.  It is highly recommended patients assure they have received an authorization before having a test performed.  If test is done without insurance authorization, patient may be responsible for the entire amount billed.      Precertification and Prior  Authorizations:  If your physician has recommended that you have a procedure or additional testing performed the [facility] Centralized Referral Team will contact your insurance carrier to obtain pre-certification or prior authorization.    You are strongly encouraged to contact your insurance carrier to verify that your procedure/test has been approved and is a COVERED benefit.  Although the [facility] Centralized Referral Team does its due diligence, the insurance carrier gives the disclaimer that \"Although the procedure is authorized, this does not guarantee payment.\"    Ultimately the patient is responsible for payment.   Thank you for your understanding in this matter.  Paperwork Completion:  If you require FMLA or disability paperwork for your recovery, please make sure to either drop it off or have it faxed to our office at [phone]. Be sure the form has your name and date of birth on it.  The form will be faxed to our Forms Department and they will complete it for you.  There is a 25$ fee for all forms that need to be filled out.  Please be aware there is a 10-14 day turnaround time.  You will need to sign a release of information (EZEKIEL) form if your paperwork does not come with one.  You may call the Forms Department with any questions at [phone].  Their fax number is [phone].

## 2025-05-16 ENCOUNTER — OFFICE VISIT (OUTPATIENT)
Dept: PHYSICAL THERAPY | Facility: HOSPITAL | Age: 40
End: 2025-05-16
Attending: NURSE PRACTITIONER
Payer: COMMERCIAL

## 2025-05-16 PROCEDURE — 97140 MANUAL THERAPY 1/> REGIONS: CPT | Performed by: PHYSICAL THERAPIST

## 2025-05-16 NOTE — PROGRESS NOTES
Patient: Jocelyn Garza (39 year old, female) Referring Provider:  Insurance:   Diagnosis: lymphedema I89.0 Dary Wallis  BC[employer] OUT OF STATE   Date of Surgery: 4/29/24  N/A   Precautions:  Cancer  Referral Information:    Date of Evaluation: Req: 0, Auth: 0, Exp:     03/06/25 POC Auth Visits:          Today's Date   5/16/2025    Subjective  \"I think there is 1-2 more cords. I have been having swelling and the compression sleeve does help that except by the wrist\"       Pain: 4/10     Objective  Cording RUE, swelling RUE   Shoulder       4/15/2025 5/6/2025 5/16/2025   Shoulder ROM/MMT   Rt Flexion Shoulder 145 150/160 at end of session 160   Lt Flexion Shoulder 155 155 160   Rt Flexion Shoulder MMT 4+  4+   Lt Flexion Shoulder MMT 4+  4+   Rt Abduction Shoulder 160 160/165 at end of session 170   Lt Abduction Shoulder 170 170 170   Rt Abduction Shoulder MMT (C5) 4+  4+   Lt Abduction Shoulder MMT (C5) 4+  4+   Rt IR Shoulder T12 T12 T10   Lt IR Shoulder T7 T7 T7   Rt IR Shoulder MMT 4+  4+   Lt IR Shoulder MMT 4+  4+   Rt ER Shoulder 90 90 90   Lt ER Shoulder 90 90 90   Rt ER Shoulder MMT 4+  4+   Lt ER Shoulder MMT 4+  4+       Assessment  ROM improved    Goals (to be met in 20 visits)       Not Met Progressing Toward Partially Met Met   Decrease swelling R trunk by at least 2 cm to decrease risks of infection    X (but still has swelling)   Pt to be independent with self MLD, ROM exercises, and donning/doffing compression bandages/garments to facilitate swelling reduction and to be independent at self-management once discharged.       Increase R elb ext to 0 and shoulder AROM flex 150, abd 165 and IR to T8 to improve ease of dressing/bathing/and reaching overhead.   x    Increase R UE strength to at least 4/5 to improve ease of lifting and performing household chores.                                                              Plan       Treatment Last 4 Visits  Treatment Day: 16 5/1/2025 5/6/2025  5/13/2025 5/16/2025   PT Treatment   Insurance Auth # and Certification Dates Certification From: 4/15/2025 to 7/14/2025 Certification From: 4/15/2025 to 7/14/2025 Certification From: 4/15/2025 to 7/14/2025 Certification From: 4/15/2025 to 7/14/2025   # of Visits Used/Approved 13/20 14/20 15/20 16/20   Therapeutic Exercise  MEASUREMENTS TAKEN: BUE AROM     Manual Therapy STM and manual stretching of tissue LUE. Couple cavitation noted in forearm.    Encouraged pt to STM these areas    MLD: Neck sequence, abd sequence, left axillary, A-A, R inguinal, R A-I, Right axillary, Right chest, RUE (arm greatest focus)  Pt now has home compression pump and is using daily.    Compression:  Pt wearing compression sleeve.  Encouraged to continue to wear sleeve to help with cording pain.   STM and manual stretching of tissue LUE. Several R forearm/elbow area cavitation noted.    Encouraged pt to STM these areas  -use home pump daily    MLD: Neck sequence, abd sequence, left axillary, A-A, R inguinal, R A-I, Right axillary, Right chest, RUE (arm greatest focus)  Pt now has home compression pump and is using daily.    Compression:  Pt wearing compression sleeve.  Encouraged to continue to wear sleeve to help with cording pain. STM and manual stretching of tissue LUE. Several R forearm/elbow area cavitation noted again today with some relief noted.   Encouraged pt to STM these areas  -use home pump daily    MLD: Neck sequence, abd sequence, left axillary, A-A, R inguinal, R A-I, Right axillary, Right chest, RUE (arm greatest focus)  Pt now has home compression pump and is using daily.    Compression:  -waiting for new custom sleeves and gloves. STM and manual stretching R axillary cording, STM R UE/fascia    MLD: RUE    Compression:  - encouraged pt to wear compression gauntlet when wearing the compression sleeve   Therapeutic Activity  Continue with self massage.  Discussed importance of being more persistent with self massage and  use of home pump when symptoms are greater/more bothersome.  May be able to reduce use of pump depending on symptoms and response. Reviewed donning older compression sleeves and try to ensure to jonah sleeve from styloid process to just under axilla.  Pt having some swelling at wrist but has been wearing the sleeve above styloid.    Manual Therapy Min 38 38 35 40   Therapeutic Activity Min  10 10    Total of Timed Procedures 38 48 45 40   Total of Service Based 0 0 0 0   Total Treatment Time 38 48 45 40        HEP       Charges     mm3

## 2025-05-17 LAB — VITAMIN B6: 5.5 UG/L

## 2025-05-17 NOTE — PROGRESS NOTES
HPI:    Patient ID: Jocelyn Garza is a 39 year old female.    HPI    I am seeing her for the first time today for question of Parkinson's disease.   She is right-handed, she was recently diagnosed with breast cancer stage IIa, status post bilateral mastectomies had lymph node dissection from the right axillary region, status post radiation and chemotherapy.  She developed lymphedema in her right upper extremity as a result. She had noticed an intermittent tremor in her right hand (before the chemoradiation) that would occur mostly in action, she mentioned she had the tremor as she was driving and holding the steering wheel with her right hand, she also feels stiff in her right hand and stiff around the muscles of her right knee, she has family history of Parkinson's disease, her father was diagnosed with PD. Her uncle was also diagnosed with PD.  She is not aware of other family members diagnosed with PD.   No falls, gait and balance are not affected. With her recent diagnosis and chemotherapy and her body has changed quite a bit and it has been hard for her to discern symptoms related to PD. Her speech has not changed. Handwriting has not changed, speech not hypophonic. Occasional swallowing difficulty.     She was previously seen and followed by Dr. GOLD .  He saw her yesterday for follow-up on her headaches.  In November and December 2024 she experienced intense headaches while using the bathroom lasting for couple of hours each that lead to emergency department visit on both occasions.  CT scan of the head rule out intracranial hemorrhages, MRI of the cervical spine revealed low lying right cerebellar tonsil which descends 3mm below foramen magnum.  CTA head without aneurysms or dissection. Headaches have improved. Dr GOLD referred her for further evaluation and management of her tremors. Of note I did not observe the trmoe on her exam today. See exam below     She has been forgetting things easily, gets  distracted    HISTORY:  Past Medical History[1]   Past Surgical History[2]   Family History[3]   Short Social Hx on File[4]     Review of Systems  Negative except as in Hpi     Current Medications[5]  Allergies:Allergies[6]  PHYSICAL EXAM:   Physical Exam    General Appearance: Well nourished, well developed, no apparent distress.     HEENT: Normocephalic and atraumatic. Normal sclera.  Neck: Normal range of motion. Neck supple.  Extremities: wrapped right upper extremity    Mental Status Exam: Patient is awake, alert and oriented to person, place and time with normal memory, fund of knowledge, attention/concentration and language.    Cranial Nerves: funduscopic exam is normal  II: Visual fields: normal to confrontation test  III: Pupils: equal, round, reactive to light, NO APD  III,IV,VI: Normal EOM. Saccades  V: Facial sensation: intact  VII: Facial strength: Normal  VIII: Hearing: intact  IX: Palate elevates symmetrically  XI: Shoulder shrug: symmetric  XII: Tongue protrudes in midline    Motor Exam: Normal tone. Strength is  5 out of 5 in proximal and distal muscles of upper and lower extremities  Movement Disorders exam:  I observed no tremors at rest or an action on her exam, she had grade 1 rigidity in her right upper right lower extremity, she also had mild bradykinesia on finger tapping on the right side, there was no bradykinesia on hand movements or alternating hand motion, there was no bradykinesia on leg agility or toe tapping test.  No dystonia or dystonic posturing  DTR: 3+ and symmetric. Downgoing toes bilaterally,    Sensory: Normal sensation to superficial touch,  in upper and lower extremities.  Mildly decreased sensation to vibration in her right hand compared to the left    Coordination: Normal finger-nose-finger test    Gait: Stands up and walk unassisted.  Normal gait    TESTS/IMAGING:         ASSESSMENT/PLAN:   Rule out Parkinson's disease:    I told her I am not sure if she has Parkinson's  disease.  She has minimal bradykinesia and rigidity on the right side but also was recently diagnosed with breast cancer status post bilateral mastectomy and chemoradiation complicated by lymphedema in her right upper extremity.  I observed no tremors on her exam today.  She has a father and an uncle who are diagnosed with Parkinson's disease.    I plan to see her back in 6 months for a repeat exam, I told her if it is Parkinson's disease it is probably very mild and early.     I counseled her about PD, diagnosis and potential diagnostic workup that we can order    Thank you for allowing us to participate in your patient's care.      Please do not hesitate to call if you have any questions.   I will continue to follow with you and will make further recommendations based on their progress.     60 total minutes spent with patient >50% of visit was spent in counseling and coordination of care      Meds This Visit:  Requested Prescriptions      No prescriptions requested or ordered in this encounter       Imaging & Referrals:  None     ID#[id number]         [1]   Past Medical History:   Breast cancer in female (HCC)    Right breast lymph node cancer    High blood pressure    Hx of motion sickness    Hypertension    Solitary kidney, congenital   [2]   Past Surgical History:  Procedure Laterality Date    Appendectomy  01/01/2007    Mastectomy Bilateral 04/2024    Needle biopsy right Right 10/09/2023    Treat ectopic preg,rmv tube/ovary Left 01/01/2014    Treat ectopic preg,rmv tube/ovary Left 01/01/2015   [3]   Family History  Problem Relation Age of Onset    Other (Parkinson's Disease) Father     Prostate Cancer Maternal Grandfather 70    Pancreatic Cancer Paternal Grandmother 80    Cancer Paternal Aunt 80        breast cancer    Pancreatic Cancer Maternal Uncle 60    Pancreatic Cancer Paternal Great-Grandfather     Cancer Maternal Great-Grandmother         gastric ca    Cancer Paternal Uncle 60        brain cancer   [4]    Social History  Socioeconomic History    Marital status:    Tobacco Use    Smoking status: Never    Smokeless tobacco: Never   Vaping Use    Vaping status: Never Used   Substance and Sexual Activity    Alcohol use: Not Currently     Comment: 2 -3 drink    Drug use: No   Other Topics Concern    Caffeine Concern Yes    Exercise Yes     Social Drivers of Health     Food Insecurity: Low Risk  (1/15/2025)    Received from [facility]    Food Insecurity     Within the past 12 months, you worried that your food would run out before you got money to buy more.  : Never true     Within the past 12 months, the food you bought just didn't last and you didn't have money to get more. : Never true   Transportation Needs: Not At Risk (1/15/2025)    Received from [facility]    Transportation Needs     In the past 12 months, has lack of reliable transportation kept you from medical appointments, meetings, work or from getting things needed for daily living? : No   Housing Stability: Low Risk  (4/30/2024)    Housing Stability     Housing Instability: No   [5]   Current Outpatient Medications   Medication Sig Dispense Refill    acetaminophen-codeine 300-30 MG Oral Tab Take 1 tablet by mouth every 6 (six) hours as needed.      venlafaxine ER 37.5 MG Oral Capsule SR 24 Hr Take 1 capsule (37.5 mg total) by mouth daily. 30 capsule 5    prochlorperazine (COMPAZINE) 10 mg tablet Take 1 tablet (10 mg total) by mouth every 6 (six) hours as needed for Nausea. 15 tablet 0    camizestrant 75 mg Oral Tab (OTIS-2 STUDY DRUG) Take 1 tablet (75 mg total) by mouth daily. Take at the same time each day. 96 tablet 0    loperamide 2 MG Oral Cap Take 1 capsule (2 mg total) by mouth as needed in the morning and 1 capsule (2 mg total) as needed at noon and 1 capsule (2 mg total) as needed in the evening and 1 capsule (2 mg total) as needed before bedtime for Diarrhea.      GOSERELIN ACETATE SC Inject into the skin. Once a  month      Abemaciclib 100 MG Oral Tab Take 1 tablet (100 mg total) by mouth 2 (two) times daily. may take with or without food 60 tablet 11    lidocaine-prilocaine 2.5-2.5 % External Cream Apply to site 1 hour prior to port a cath needle insertion 1 each 1   [6] No Known Allergies

## 2025-05-19 ENCOUNTER — E-ADVICE (OUTPATIENT)
Dept: OBGYN | Age: 40
End: 2025-05-19

## 2025-05-20 ENCOUNTER — APPOINTMENT (OUTPATIENT)
Dept: PHYSICAL THERAPY | Facility: HOSPITAL | Age: 40
End: 2025-05-20
Attending: NURSE PRACTITIONER
Payer: COMMERCIAL

## 2025-05-21 ENCOUNTER — OFFICE VISIT (OUTPATIENT)
Age: 40
End: 2025-05-21
Attending: NURSE PRACTITIONER
Payer: COMMERCIAL

## 2025-05-21 ENCOUNTER — NURSE ONLY (OUTPATIENT)
Age: 40
End: 2025-05-21
Attending: NURSE PRACTITIONER
Payer: COMMERCIAL

## 2025-05-21 ENCOUNTER — E-ADVICE (OUTPATIENT)
Dept: OBGYN | Age: 40
End: 2025-05-21

## 2025-05-21 VITALS
WEIGHT: 158 LBS | BODY MASS INDEX: 26.33 KG/M2 | RESPIRATION RATE: 16 BRPM | DIASTOLIC BLOOD PRESSURE: 78 MMHG | HEIGHT: 65 IN | TEMPERATURE: 98 F | HEART RATE: 67 BPM | OXYGEN SATURATION: 100 % | SYSTOLIC BLOOD PRESSURE: 119 MMHG

## 2025-05-21 DIAGNOSIS — C50.411 MALIGNANT NEOPLASM OF UPPER-OUTER QUADRANT OF RIGHT BREAST IN FEMALE, ESTROGEN RECEPTOR POSITIVE (HCC): Primary | ICD-10-CM

## 2025-05-21 DIAGNOSIS — Z51.81 MEDICATION MONITORING ENCOUNTER: ICD-10-CM

## 2025-05-21 DIAGNOSIS — Z17.0 MALIGNANT NEOPLASM OF UPPER-OUTER QUADRANT OF RIGHT BREAST IN FEMALE, ESTROGEN RECEPTOR POSITIVE (HCC): Primary | ICD-10-CM

## 2025-05-21 LAB
ALBUMIN SERPL-MCNC: 4.5 G/DL (ref 3.2–4.8)
ALBUMIN/GLOB SERPL: 2 {RATIO} (ref 1–2)
ALP LIVER SERPL-CCNC: 57 U/L (ref 37–98)
ALT SERPL-CCNC: 12 U/L (ref 10–49)
ANION GAP SERPL CALC-SCNC: 13 MMOL/L (ref 0–18)
AST SERPL-CCNC: 19 U/L (ref ?–34)
BASOPHILS # BLD AUTO: 0.07 X10(3) UL (ref 0–0.2)
BASOPHILS NFR BLD AUTO: 2.3 %
BILIRUB SERPL-MCNC: 0.4 MG/DL (ref 0.3–1.2)
BUN BLD-MCNC: 17 MG/DL (ref 9–23)
BUN/CREAT SERPL: 12.6 (ref 10–20)
CALCIUM BLD-MCNC: 9.3 MG/DL (ref 8.7–10.4)
CHLORIDE SERPL-SCNC: 106 MMOL/L (ref 98–112)
CO2 SERPL-SCNC: 20 MMOL/L (ref 21–32)
CREAT BLD-MCNC: 1.35 MG/DL (ref 0.55–1.02)
DEPRECATED RDW RBC AUTO: 45.1 FL (ref 35.1–46.3)
EGFRCR SERPLBLD CKD-EPI 2021: 51 ML/MIN/1.73M2 (ref 60–?)
EOSINOPHIL # BLD AUTO: 0.09 X10(3) UL (ref 0–0.7)
EOSINOPHIL NFR BLD AUTO: 3 %
ERYTHROCYTE [DISTWIDTH] IN BLOOD BY AUTOMATED COUNT: 12.8 % (ref 11–15)
GLOBULIN PLAS-MCNC: 2.3 G/DL (ref 2–3.5)
GLUCOSE BLD-MCNC: 124 MG/DL (ref 70–99)
HCT VFR BLD AUTO: 29.4 % (ref 35–48)
HGB BLD-MCNC: 10.2 G/DL (ref 12–16)
IMM GRANULOCYTES # BLD AUTO: 0 X10(3) UL (ref 0–1)
IMM GRANULOCYTES NFR BLD: 0 %
LYMPHOCYTES # BLD AUTO: 0.73 X10(3) UL (ref 1–4)
LYMPHOCYTES NFR BLD AUTO: 24.4 %
MAGNESIUM SERPL-MCNC: 1.6 MG/DL (ref 1.6–2.6)
MCH RBC QN AUTO: 33.9 PG (ref 26–34)
MCHC RBC AUTO-ENTMCNC: 34.7 G/DL (ref 31–37)
MCV RBC AUTO: 97.7 FL (ref 80–100)
MONOCYTES # BLD AUTO: 0.21 X10(3) UL (ref 0.1–1)
MONOCYTES NFR BLD AUTO: 7 %
NEUTROPHILS # BLD AUTO: 1.89 X10 (3) UL (ref 1.5–7.7)
NEUTROPHILS # BLD AUTO: 1.89 X10(3) UL (ref 1.5–7.7)
NEUTROPHILS NFR BLD AUTO: 63.3 %
OSMOLALITY SERPL CALC.SUM OF ELEC: 291 MOSM/KG (ref 275–295)
PLATELET # BLD AUTO: 190 10(3)UL (ref 150–450)
POTASSIUM SERPL-SCNC: 3.7 MMOL/L (ref 3.5–5.1)
PROT SERPL-MCNC: 6.8 G/DL (ref 5.7–8.2)
RBC # BLD AUTO: 3.01 X10(6)UL (ref 3.8–5.3)
SODIUM SERPL-SCNC: 139 MMOL/L (ref 136–145)
VIT E ALPHA TOCO: 10.8 MG/L
VIT E GAMMA TOCO: 1.7 MG/L
WBC # BLD AUTO: 3 X10(3) UL (ref 4–11)

## 2025-05-21 RX ORDER — SODIUM CHLORIDE 9 MG/ML
10 INJECTION, SOLUTION INTRAMUSCULAR; INTRAVENOUS; SUBCUTANEOUS ONCE
OUTPATIENT
Start: 2025-06-18

## 2025-05-21 NOTE — PROGRESS NOTES
HPI   Jocelyn Garza is a 39 year old female for evaluation of   Encounter Diagnoses   Name Primary?    Malignant neoplasm of upper-outer quadrant of right breast in female, estrogen receptor positive (HCC) Yes    Medication monitoring encounter        Patient completed course of neoadjuvant dose dense Adriamycin Cytoxan followed by weekly Taxol.  Last dose of treatment on 3/22/2024.    Patient then underwent bilateral mastectomies on 4/29/2024 with a right axillary lymph node dissection.    Patient completed radiation on 8/7/2024. Having lymphedema therapy.  Feels hardness on the R arm.    LMP November of 2023.    On goserelin since 6/3/24. Having hot flashes.  States a couple of minutes and then improves.  A few times a day, not as much end of the month.      Has gone back to work.      Pt screened for OTIS-2 trial.  Randomized to Arm B with Camizestrant 75 mg daily, started 8/30/24.  She is currently taking study drug camizestrant at hs. Taking daily.     Started C1D1 8/24/24 abemaciclib.    Dose adjustment to 100 mg daily on 9/22/24.    Freq formed BM after eating. Sometimes liquid and takes imodium.  Does get nausea at times at night  that she attributes to imodium.     States that when sleeping at night and sleeping on the R side gets dizzy.  She has been keeping hydrated but more dizzy this month. Has not checked BP or pulse during these episodes.      Completed cycle 9 abemaciclib 100 mg.   Soft formed stool, with urgency.  Not all the time.  Much improved with lower dose. If eats the wrong things like grease, spicy and dairy, gets diarrhea. C/o some stomach bloating - Gas X seems to help, stools loose and formed   Since starting venlafaxine stools are more firm.     Following with neurology for headaches. Had CTA of the brain on 3/24/2025 no issues identified. Takes tylenol  500 mg for headaches with good results. She is following with nephrology.    States feeling well.      L sided pain resolved.      Continues right arm sleeve, increased lymphedema following with therapist. US yesterday to r/o DVT- negative. Has a new lymphedema pump. Seeing Lymphedema therapy. Swelling has improved with pump use.     C/o Hot flashes.  Improved with venlafaxine.    Pt had c/o intermittent tremor to R hand. Pre existing before chemotherapy. Discussed with neurology - referred to Parkinson's specialist. No current intervention. R evaluate in 6 months.             ECOG PS  0    Review of Systems:   Review of Systems   Constitutional:  Positive for appetite change (taste changes.). Negative for fatigue (has been tired this month) and unexpected weight change.   HENT:   Negative for mouth sores.    Eyes:  Positive for eye problems (issues with adjusting at night with transitioning from light to dark, intermittent mild.  no new complaints. Improved.  Recent eye exam, told was good.).   Respiratory:  Negative for cough and shortness of breath.    Cardiovascular:  Negative for chest pain, leg swelling and palpitations.   Gastrointestinal:  Positive for abdominal pain (at times has cramps) and diarrhea (soft not diarrhea- improved). Negative for constipation, nausea and vomiting.   Endocrine: Positive for hot flashes (less with venlafaxine).   Genitourinary:  Negative for frequency.         Urgency, states bubbles.     Musculoskeletal:  Positive for arthralgias (R knee and hips, harder to get up from the floor and first thing in am.  Better with activity.). Negative for back pain and neck pain.        R arm pain    Skin:  Positive for itching (in the chest at times from lymphedema). Negative for rash.   Neurological:  Positive for headaches (intermittent mild) and numbness (at hands states numbness and cold.). Negative for dizziness and extremity weakness.        Tremor R hand at times for a couple of weeks. Not numb, feels \"electrified\"   Hematological:  Negative for adenopathy. Does not bruise/bleed easily.    Psychiatric/Behavioral:  Positive for sleep disturbance (improved).          Current Outpatient Medications   Medication Sig Dispense Refill    acetaminophen-codeine 300-30 MG Oral Tab Take 1 tablet by mouth every 6 (six) hours as needed.      venlafaxine ER 37.5 MG Oral Capsule SR 24 Hr Take 1 capsule (37.5 mg total) by mouth daily. 30 capsule 5    prochlorperazine (COMPAZINE) 10 mg tablet Take 1 tablet (10 mg total) by mouth every 6 (six) hours as needed for Nausea. 15 tablet 0    camizestrant 75 mg Oral Tab (OTIS-2 STUDY DRUG) Take 1 tablet (75 mg total) by mouth daily. Take at the same time each day. 96 tablet 0    loperamide 2 MG Oral Cap Take 1 capsule (2 mg total) by mouth as needed in the morning and 1 capsule (2 mg total) as needed at noon and 1 capsule (2 mg total) as needed in the evening and 1 capsule (2 mg total) as needed before bedtime for Diarrhea.      GOSERELIN ACETATE SC Inject into the skin. Once a month      Abemaciclib 100 MG Oral Tab Take 1 tablet (100 mg total) by mouth 2 (two) times daily. may take with or without food 60 tablet 11    lidocaine-prilocaine 2.5-2.5 % External Cream Apply to site 1 hour prior to port a cath needle insertion 1 each 1     Allergies:   No Known Allergies    Past Medical History:    Breast cancer in female (HCC)    Right breast lymph node cancer    High blood pressure    Hx of motion sickness    Hypertension    Solitary kidney, congenital     Past Surgical History:   Procedure Laterality Date    Appendectomy  01/01/2007    Mastectomy Bilateral 04/2024    Needle biopsy right Right 10/09/2023    Treat ectopic preg,rmv tube/ovary Left 01/01/2014    Treat ectopic preg,rmv tube/ovary Left 01/01/2015     Social History     Socioeconomic History    Marital status:    Tobacco Use    Smoking status: Never    Smokeless tobacco: Never   Vaping Use    Vaping status: Never Used   Substance and Sexual Activity    Alcohol use: Not Currently     Comment: 2 -3 drink     Drug use: No   Other Topics Concern    Caffeine Concern Yes    Exercise Yes     Social Drivers of Health     Food Insecurity: Low Risk  (1/15/2025)    Received from [facility]    Food Insecurity     Within the past 12 months, you worried that your food would run out before you got money to buy more.  : Never true     Within the past 12 months, the food you bought just didn't last and you didn't have money to get more. : Never true   Transportation Needs: Not At Risk (1/15/2025)    Received from [facility]    Transportation Needs     In the past 12 months, has lack of reliable transportation kept you from medical appointments, meetings, work or from getting things needed for daily living? : No   Housing Stability: Low Risk  (4/30/2024)    Housing Stability     Housing Instability: No       Family History   Problem Relation Age of Onset    Other (Parkinson's Disease) Father     Prostate Cancer Maternal Grandfather 70    Pancreatic Cancer Paternal Grandmother 80    Cancer Paternal Aunt 80        breast cancer    Pancreatic Cancer Maternal Uncle 60    Pancreatic Cancer Paternal Great-Grandfather     Cancer Maternal Great-Grandmother         gastric ca    Cancer Paternal Uncle 60        brain cancer         PHYSICAL EXAM:    /78 (BP Location: Left arm, Patient Position: Sitting, Cuff Size: adult)   Pulse 67   Temp 98 °F (36.7 °C) (Tympanic)   Resp 16   Ht 1.651 m (5' 5\")   Wt 71.7 kg (158 lb)   SpO2 100%   BMI 26.29 kg/m²   Wt Readings from Last 6 Encounters:   05/15/25 71.8 kg (158 lb 6.4 oz)   04/23/25 73 kg (161 lb)   03/26/25 71.8 kg (158 lb 3.2 oz)   02/26/25 72.4 kg (159 lb 9.6 oz)   02/21/25 71.4 kg (157 lb 6.4 oz)   02/03/25 70.3 kg (155 lb)     Physical Exam  General: Patient is alert, not in acute distress.   HEENT: EOMs intact. PERRL. Oropharynx is clear.   Neck: No JVD. No palpable lymphadenopathy. Neck is supple.  Chest: Clear to auscultation. B mastectomies well healed.   Lymphedema to R arm and upper back improved, wearing sleeve.    Heart: Regular rate and rhythm.   Abdomen: Soft, non tender with good bowel sounds.  Extremities: No edema.  Neurological: Grossly intact.   Lymphatics: There is no palpable lymphadenopathy throughout in the cervical, supraclavicular, axillary, or inguinal regions.  Psych/Depression: nl  Neck, supple      ASSESSMENT/PLAN:     1. Malignant neoplasm of upper-outer quadrant of right breast in female, estrogen receptor positive (HCC)    2. Medication monitoring encounter         Cancer Staging   Malignant neoplasm of upper-outer quadrant of right breast in female, estrogen receptor positive (HCC)  Staging form: Breast, AJCC 8th Edition  - Clinical stage from 10/12/2023: Stage IIA (cT3, cN1(f), cM0, G2, ER+, FL+, HER2-) - Signed by Halie Calle MD on 5/8/2024  - Pathologic stage from 5/8/2024: ypT3, ypN2a, cM0, G2, ER+, FL+, HER2- - Signed by Halie Calle MD on 5/8/2024    Patient has completed staging work-up.  Thus far no evidence of metastatic disease.  Findings in the liver evaluated with ultrasound, and the one side is a hemangioma, the other likely a cyst, but a question of metastatic disease and PET scan has been ordered.  I discussed with the patient that most likely this is going to be a cyst.    Discussed that she still has early stage disease, and that her staging has not changed despite the size of the primary tumor.    Given extensive disease in the breast and komal involvement, she will benefit from neoadjuvant chemotherapy, which may help with surgical outcomes by shrinking some of the primary tumor and of the lymph nodes.  I discussed with the patient that the primary tumor will not likely decrease in size significantly to make her a candidate for breast conservation.  I did discuss with the patient that she will need a mastectomy and likely axillary lymph node dissection.  Given the size of the primary tumor, she is a candidate for  postmastectomy radiation therapy plus minus radiation to the komal basins pending on surgical management of the axilla.  I discussed with the patient that once she completes treatment with radiation, she will then be initiated on adjuvant endocrine therapy, given the size of the tumor and number of positive lymph nodes, she also will be a candidate for 2 years of endovascular in the initial 2 years of adjuvant endocrine therapy.  Discussed that adjuvant endocrine therapy total duration will be no less than 5 years but given her high risk features, likely up to 8 to 10 years.  We will also discuss options for ovarian function suppression after completion of chemotherapy.    Patient inquired regarding contralateral prophylactic mastectomy.  I discussed with the patient that we should await results of her genetic testing.  If the genetic testing is positive for a deleterious mutation which will increase her lifetime second primary breast cancer, then she should proceed with prophylactic contralateral mastectomy.  However, if this is negative, discussed with the patient that there is no benefit for proceeding with contralateral mastectomy, as it will not change her disease-free or overall survival.    Discussed with the patient that while she is receiving neoadjuvant treatment, she can have the evaluation by plastic surgery so that when she completes her neoadjuvant course of therapy, she will be able to have her surgery coordinated with Dr. Hutson and a plastic surgeon    She completed course of neoadjuvant DD AC x 4 cycles followed by weekly Taxol x12 weeks    Status post bilateral mastectomy with right-sided axillary lymph node dissection on 4/29/2024.    She completed adjuvant radiation therapy on 8/7/2024.    Discussed with the patient that it is not uncommon for patients with ER/SD positive breast cancer that have neoadjuvant treatment not to have a robust response from therapy.  I did discuss that for her  treatment, in terms of systemic disease, after completion of radiation therapy, we will proceed with ovarian function suppression, which can be initiated at this point, this will then be followed by adjuvant endocrine therapy with 2 years of Abemaciclib.  In addition, patient is also a candidate for the [place] 2 clinical trial which is comparing standard of care adjuvant endocrine therapy with Abemaciclib versus camizestrant which is an oral SERD.    Patient received first dose of goserelin 3.6 mg on 6/3/2024 and last on 7/30/2024.  Considering BSO during the summer.    Arthralgias:  secondary to goserelin.  Continue to increase activity.    -pt report minimal arthralgias    OTIS-2  Randomized to Arm B with Camizestrant started 8/30/24. No Eye issues   On adjuvant hormonal therapy and abemaciclib.       Follow up per trial protocol.     Continue camizestrant 75 mg daily. No new complaints     Abemaciclib had been held due to GI symptoms requiring IV hydration and IV nausea meds.   Dose reduced to 100 mg   Currently Cycle 10 abemaciclib 100 mg dose twice daily -- continue tolerating lower dose better      Cytopenias:  secondary to abemaciclib and stable.  Neutropenia resolved    Headaches: Recommend tylenol prn.  Avoid NSAIDS due to renal issues.  Started with goserelin, which is reported to cause HA and abemaciclib can also cause HA. Following with neurology. Also following with Parkinson's specialist monitor tremor.       N/V/D:  secondary to abemaciclib.  Discussed imodium if stool watery, gasX from cramps and antiemetic for nausea.     Dizziness:  monitor BP and pulse during those episodes.      Continue to monitor creat levels- following nephrology, pt drinking fluids without issue     Asking re oopherectomy - RPW Dr Sal. June 30th at [facility]. She will need to hold rodney 5 days prior to surgery and 7 days after surgery     Lymphedema:  HEP and compression. Referral placed for therapy. R UE US- negative for  DVT     Venlafaxine- hot flashes and mood improved       Follow up in 4 weeks         MDM high risk.          No orders of the defined types were placed in this encounter.      Results From Past 48 Hours:  Recent Results (from the past 48 hours)   CBC W/DIFF [E]    Collection Time: 05/21/25  1:30 PM   Result Value Ref Range    WBC 3.0 (L) 4.0 - 11.0 x10(3) uL    RBC 3.01 (L) 3.80 - 5.30 x10(6)uL    HGB 10.2 (L) 12.0 - 16.0 g/dL    HCT 29.4 (L) 35.0 - 48.0 %    MCV 97.7 80.0 - 100.0 fL    MCH 33.9 26.0 - 34.0 pg    MCHC 34.7 31.0 - 37.0 g/dL    RDW-SD 45.1 35.1 - 46.3 fL    RDW 12.8 11.0 - 15.0 %    .0 150.0 - 450.0 10(3)uL    Neutrophil Absolute Prelim 1.89 1.50 - 7.70 x10 (3) uL    Neutrophil Absolute 1.89 1.50 - 7.70 x10(3) uL    Lymphocyte Absolute 0.73 (L) 1.00 - 4.00 x10(3) uL    Monocyte Absolute 0.21 0.10 - 1.00 x10(3) uL    Eosinophil Absolute 0.09 0.00 - 0.70 x10(3) uL    Basophil Absolute 0.07 0.00 - 0.20 x10(3) uL    Immature Granulocyte Absolute 0.00 0.00 - 1.00 x10(3) uL    Neutrophil % 63.3 %    Lymphocyte % 24.4 %    Monocyte % 7.0 %    Eosinophil % 3.0 %    Basophil % 2.3 %    Immature Granulocyte % 0.0 %   COMP METABOLIC PANEL [E]    Collection Time: 05/21/25  1:30 PM   Result Value Ref Range    Glucose 124 (H) 70 - 99 mg/dL    Sodium 139 136 - 145 mmol/L    Potassium 3.7 3.5 - 5.1 mmol/L    Chloride 106 98 - 112 mmol/L    CO2 20.0 (L) 21.0 - 32.0 mmol/L    Anion Gap 13 0 - 18 mmol/L    BUN 17 9 - 23 mg/dL    Creatinine 1.35 (H) 0.55 - 1.02 mg/dL    BUN/CREA Ratio 12.6 10.0 - 20.0    Calcium, Total 9.3 8.7 - 10.4 mg/dL    Calculated Osmolality 291 275 - 295 mOsm/kg    eGFR-Cr 51 (L) >=60 mL/min/1.73m2    ALT 12 10 - 49 U/L    AST 19 <34 U/L    Alkaline Phosphatase 57 37 - 98 U/L    Bilirubin, Total 0.4 0.3 - 1.2 mg/dL    Total Protein 6.8 5.7 - 8.2 g/dL    Albumin 4.5 3.2 - 4.8 g/dL    Globulin  2.3 2.0 - 3.5 g/dL    A/G Ratio 2.0 1.0 - 2.0    Patient Fasting for CMP? Patient not present     MAGNESIUM [E]    Collection Time: 05/21/25  1:30 PM   Result Value Ref Range    Magnesium 1.6 1.6 - 2.6 mg/dL             Imaging & Referrals:  None

## 2025-05-22 ENCOUNTER — APPOINTMENT (OUTPATIENT)
Dept: PHYSICAL THERAPY | Facility: HOSPITAL | Age: 40
End: 2025-05-22
Attending: NURSE PRACTITIONER
Payer: COMMERCIAL

## 2025-05-22 DIAGNOSIS — Z01.818 PRE-OP TESTING: Primary | ICD-10-CM

## 2025-06-04 ENCOUNTER — LAB ENCOUNTER (OUTPATIENT)
Dept: LAB | Facility: HOSPITAL | Age: 40
End: 2025-06-04
Attending: OBSTETRICS & GYNECOLOGY
Payer: COMMERCIAL

## 2025-06-04 ENCOUNTER — OFFICE VISIT (OUTPATIENT)
Dept: PHYSICAL THERAPY | Facility: HOSPITAL | Age: 40
End: 2025-06-04
Attending: NURSE PRACTITIONER
Payer: COMMERCIAL

## 2025-06-04 DIAGNOSIS — Z01.818 PRE-OP TESTING: Primary | ICD-10-CM

## 2025-06-04 PROCEDURE — 93010 ELECTROCARDIOGRAM REPORT: CPT | Performed by: INTERNAL MEDICINE

## 2025-06-04 PROCEDURE — 97530 THERAPEUTIC ACTIVITIES: CPT

## 2025-06-04 PROCEDURE — 93005 ELECTROCARDIOGRAM TRACING: CPT

## 2025-06-04 PROCEDURE — 97140 MANUAL THERAPY 1/> REGIONS: CPT

## 2025-06-04 NOTE — PROGRESS NOTES
Patient: Jocelyn Garza (39 year old, female) Referring Provider:  Insurance:   Diagnosis: lymphedema I89.0 Dary Wallis  BCBS OUT OF STATE   Date of Surgery: 4/29/24  N/A   Precautions:  Cancer  Referral Information:    Date of Evaluation: Req: 0, Auth: 0, Exp:     03/06/25 POC Auth Visits:          Today's Date   6/4/2025  Discharge Summary    Assessment:  Pt has attended 17 visits in PT and now has completed her course of PT for lymphedema of RUE, chest and trunk.  Pt now has home pneumatic compression pump that she uses daily and new compression sleeve for RUE.  She is wearing the compression daily.  Her RUE arm volume has reduced and almost back to her presurgical volume measurement.  Pt has been instructed on specific exercises to help decrease fascial tightness in her R lateral trunk, chest and back.  Pt is resuming all pre-surgery activities and is caring for her 2 small children with less difficulty.  Discussed with patient that when compression vendor contacts her for \"re-order\" it may be best to have the \"re-measure\" her RUE to ensure it is the same size.  Pt agreed.    Plan: Discharge from Physical Therapy    LLIS Score Current: 13 % impaired was 82% at initial evaluation.      Subjective  Pt reports that she is feeling good.  There may be one cord in my arm but it is not giving me pain or limiting my motion.       Pain: 1/10     Objective  MEASUREMENTS TAKEN: BUE volume, ROM/MMT BUE:  Cording present (one cord) but not limiting.   Trunk Measurements       4/10/2025 4/15/2025 6/4/2025   Trunk Measurements   Trunk Measurement 1 13 cm inf to sternal notch: 98.5 (IE: 99.7 cm) 13 cm inf to sternal notch: 98.5 (IE: 99.7 cm) 13 cm inf to sternal notch: 98.5 (IE: 99.7 cm)   Trunk Measurement 2 16 cm inf to sternal notch: 94.7. (IE: 95.7) 16 cm inf to sternal notch: 94.7. (IE: 95.7) 16 cm inf to sternal notch: 94.7. (IE: 95.7)   Trunk Measurement 3 19 cm inf to sternal notch: 88.5 (IE 89.5) 19 cm inf to  sternal notch: 88.5 (IE 89.5) 19 cm inf to sternal notch: 88.5 (IE 89.5)    Self Care       3/17/2025 4/15/2025 6/4/2025   Self Care   Self MLD daily self MLD since 10/30/2024 daily self MLD since 10/30/2024 daily self MLD since 10/30/2024 and now has advanced home pump since 5/2025   Compression Wears custom compression sleeve CCL2 mmHg daily since 10/30/2024 Wears custom compression sleeve CCL2 mmHg daily since 10/30/2024 Wears custom compression sleeve CCL2 mmHg daily since 10/30/2024   Elevation Elevates arm nightly since 10/30/2024 Elevates arm nightly since 10/30/2024 Elevates arm nightly since 10/30/2024   Exercise Does HEP from therapy daily since 10/30/2024 Does HEP from therapy daily since 10/30/2024 Does HEP from therapy daily since 10/30/2024   Recommended compression cont wearing custom compression sleeve and trunk compression. Recommended pt to get replacement arm sleeves- recommend to be re-measured cont wearing custom compression sleeve and trunk compression. Recommended pt to get replacement arm sleeves- recommend to be re-measured cont wearing custom compression sleeve and trunk compression. Recommended pt to get replacement arm sleeves- recommend to be re-measured   Recommended home pump pt would benefit from advanced home pump pt would benefit from advanced home pump Advanced home pump received 5/2025   Clinical rationale for recommendations Pt has been self managing her swelling since last seen in Oct 2024 but swelling has returned. Patient diagnosed with I89.0 lymphedema secondary to cancer. Pt has lymphedema of RUE and extends into the right chest and trunk, Patient should not use a basic pump due to swelling since the swelling includes the chest and trunk. I am recommending an advanced pump (Flexitouch) as it treats with therapeutic pressures, mimics MLD, and provides proximal clearance. The basic pump treats the limb only leaving the chest/breast/abdomen area prone to additional fluid  buildup. The basic pump treats with high, static pressure that may cause pain to sensitive skin and damage poor skin integrity. The basic pump is not appropriate for this patient as they present with chest and trunk lymphedema.       Vendor messages Home pump demo [facility] for next week NOTES SENT TO [facility] TODAY (Eval and today's PN note)     Edema/Tissue       4/10/2025 4/15/2025 6/4/2025   Edema/Tissue Observations   RUE Locations    Right Axilla;Right Upper Arm;Right Medial Upper Arm;Right Forearm;Right Anterior Forearm;Right Antecubital;Right Wrist   Edema/Tissue Observations: Rt Axilla   swelling;hyperpigmentation   Edema/Tissue Observations: Right upper arm   swelling   Edema/Tissue Observations: Right medial upper arm   swelling;cording   Edema/Tissue Observations: Right antecubital   swelling;cording   Edema/Tissue Observations: Right forearm   swelling   Edema/Tissue Observations: Right anterior forearm   swelling;cording   Edema/Tissue Observations: Right wrist   cording   Trunk Locations Right Upper Back;Right Upper Lateral Trunk;Right Lateral Chest;Right Medial Chest     Edema/Tissue Observations: Right upper lateral trunk swelling;pitting;hyperpigmentation     Edema/Tissue Observations: Right lateral chest swelling;hyperpigmentation;pitting     Edema/Tissue Observations: Right medial chest hyperpigmentation;swelling;pitting     Edema/Tissue Observations: Right upper back swelling     # of Trunk Measurements 3 3 3   Trunk Measurement 1 13 cm inf to sternal notch: 98.5 (IE: 99.7 cm) 13 cm inf to sternal notch: 98.5 (IE: 99.7 cm) 13 cm inf to sternal notch: 98.5 (IE: 99.7 cm)   Trunk Measurement 2 16 cm inf to sternal notch: 94.7. (IE: 95.7) 16 cm inf to sternal notch: 94.7. (IE: 95.7) 16 cm inf to sternal notch: 94.7. (IE: 95.7)   Trunk Measurement 3 19 cm inf to sternal notch: 88.5 (IE 89.5) 19 cm inf to sternal notch: 88.5 (IE 89.5) 19 cm inf to sternal notch: 88.5 (IE 89.5)       Shoulder        5/6/2025 5/16/2025 6/4/2025   Shoulder ROM/MMT   Rt Flexion Shoulder 150/160 at end of session 160 160   Lt Flexion Shoulder 155 160 160   Rt Flexion Shoulder MMT  4+ 4+   Lt Flexion Shoulder MMT  4+ 4+   Rt Abduction Shoulder 160/165 at end of session 170 170   Lt Abduction Shoulder 170 170 170   Rt Abduction Shoulder MMT (C5)  4+ 4+   Lt Abduction Shoulder MMT (C5)  4+ 4+   Rt IR Shoulder T12 T10 T10   Lt IR Shoulder T7 T7 T7   Rt IR Shoulder MMT  4+ 4+   Lt IR Shoulder MMT  4+ 4+   Rt ER Shoulder 90 90 90   Lt ER Shoulder 90 90 90   Rt ER Shoulder MMT  4+ 4+   Lt ER Shoulder MMT  4+ 4+          6/4/2025     9:00 AM 4/15/2025     5:00 PM 3/6/2025     3:00 PM 10/30/2024     2:00 PM 8/28/2024     7:00 AM 5/28/2024     8:00 PM   Lymphedema Calculations   DATE MEASURED 6/4/2025 3/6/2025 3/6/2025 10/30/2024 8/28/2024 5/28/2024   LOCATION/MEASUREMENTS RUE RUKOMAL BOX RUKOMAL   PATIENT POSITION  supine 1 pillow supine 1 pillow supine 1 pillow supine 1 pillow supine 1 pillow supine 1 pillow   LIMB POSITION 75 deg abduction 75 deg abduction 75 deg abduction 75 deg abduction 75 deg abduction 75 deg abduction   STARTING POINT 17cm from 3rd cuticle 17cm from 3rd cuticle 17cm from 3rd cuticle 17cm from 3rd cuticle 17cm from 3rd cuticle 17cm from 3rd cuticle   RIGHT HAND VOLUME 19.7cm across palm 19.7cm across palm 19.7cm across palm 19.7cm across palm 19.7cm across palm 19.7cm across palm   LEFT HAND VOLUME 19.5cm across palm 19.5cm across palm 19.5cm across palm 19.5cm across palm 19.5cm across palm 19.5cm across palm   MEASUREMENT A 15.5 15.1 15.1 15.8 15.8 15.5   MEASUREMENT B 16.8 16.8 16.8 16.5 16.7 16.6   MEASUREMENT C 19.5 19.7 19.7 19.2 19.4 19.1   MEASUREMENT D 22.9 23.3 23.3 22.5 22.5 23   MEASUREMENT E 24.6 24.9 24.9 25.2 25.2 25   MEASUREMENT F 24.5 24.6 24.6 24.8 24.7 24.5   MEASUREMENT G 26 26.6 26.6 26 26 25.5   MEASUREMENT H 27.4 27.3 27.3 27.8 27.8 27.5   MEASUREMENT I 30.2 30.3 30.3 29.8 29.7 29.6    TOTAL  VOLUME  [id number] 1773.18062 1773.98085 1754.67465 1756.93057 1732.57352   DATE MEASURED 6/4/2025 3/6/2025 3/6/2025 10/30/2024 8/28/2024 4/8/2024   LOCATION/MEASUREMENTS LUE LUE LUE LUE LUE LUE   MEASUREMENT A 15.5 15.3 15.3 15.3 15.3 15.4   MEASUREMENT B 17 17 17 16.9 17 17.1   MEASUREMENT C 19.9 19.8 19.8 20 20 19.6   MEASUREMENT D 22.8 22.9 22.9 22.7 22.8 22.7   MEASUREMENT E 25 25 25 25.3 25.3 25.2   MEASUREMENT F 25.5 25.6 25.6 26.1 26 24.8   MEASUREMENT G 26.5 26.5 26.5 27 27.1 26.9   MEASUREMENT H 28.7 28.2 28.2 28.9 28.9 28.6   MEASUREMENT I 30.2 29.6 29.6 31.2 31.2 33.5    TOTAL VOLUME  [id number] 1803.02153 1803.79428 [id number] [phone]   % DIFFERENCE -4.84164 -1.79684 -1.99533 -5.88765 -5.37564 -[phone]          Assessment  See Discharge assessment above.    Goals (to be met in 20 visits)           Not Met Progressing Toward Partially Met Met   Decrease swelling R trunk by at least 2 cm to decrease risks of infection        x   Pt to be independent with self MLD, ROM exercises, and donning/doffing compression bandages/garments to facilitate swelling reduction and to be independent at self-management once discharged.        x   Increase R elb ext to 0 and shoulder AROM flex 150, abd 165 and IR to T8 to improve ease of dressing/bathing/and reaching overhead.        x   Increase R UE strength to at least 4/5 to improve ease of lifting and performing household chores.        x                                                                Plan  Discharge from PT at this time.    Treatment Last 4 Visits  Treatment Day: 17 5/6/2025 5/13/2025 5/16/2025 6/4/2025   PT Treatment   Insurance Auth # and Certification Dates Certification From: 4/15/2025 to 7/14/2025 Certification From: 4/15/2025 to 7/14/2025 Certification From: 4/15/2025 to 7/14/2025 Certification From: 4/15/2025 to 7/14/2025   # of Visits Used/Approved 14/20 15/20 16/20 17/20   Therapeutic Exercise MEASUREMENTS TAKEN: BUE AROM       Manual Therapy STM and manual stretching of tissue LUE. Several R forearm/elbow area cavitation noted.    Encouraged pt to STM these areas  -use home pump daily    MLD: Neck sequence, abd sequence, left axillary, A-A, R inguinal, R A-I, Right axillary, Right chest, RUE (arm greatest focus)  Pt now has home compression pump and is using daily.    Compression:  Pt wearing compression sleeve.  Encouraged to continue to wear sleeve to help with cording pain. STM and manual stretching of tissue LUE. Several R forearm/elbow area cavitation noted again today with some relief noted.   Encouraged pt to STM these areas  -use home pump daily    MLD: Neck sequence, abd sequence, left axillary, A-A, R inguinal, R A-I, Right axillary, Right chest, RUE (arm greatest focus)  Pt now has home compression pump and is using daily.    Compression:  -waiting for new custom sleeves and gloves. STM and manual stretching R axillary cording, STM R UE/fascia    MLD: RUE    Compression:  - encouraged pt to wear compression gauntlet when wearing the compression sleeve MEASUREMENTS TAKEN TODAY    STM and manual stretching R axillary cording, STM R UE/fascia    MLD: RUE    *encourage use of home pump daily.  Compression:  - encouraged pt to wear compression gauntlet when wearing the compression sleeve   Therapeutic Activity Continue with self massage.  Discussed importance of being more persistent with self massage and use of home pump when symptoms are greater/more bothersome.  May be able to reduce use of pump depending on symptoms and response. Reviewed donning older compression sleeves and try to ensure to jonah sleeve from styloid process to just under axilla.  Pt having some swelling at wrist but has been wearing the sleeve above styloid.  Discussed with patient that she should continue to use her home advanced pump daily, wear compression daily and do her exercises daily.  Pt will resume some other exercises now like riding her bike and  weights.  Discussed with patient that if her symptoms begin to ramp up she should be more persistent with self care and give it 1-2 weeks of trying using all tools before reaching out to MD.  Pt verbalized understanding.   Manual Therapy Min 38 35 40 35   Therapeutic Activity Min 10 10  15   Total of Timed Procedures 48 45 40 50   Total of Service Based 0 0 0 0   Total Treatment Time 48 45 40 50        HEP       Charges     MM2, There Act1

## 2025-06-05 LAB
ATRIAL RATE: 68 BPM
P AXIS: -14 DEGREES
P-R INTERVAL: 136 MS
Q-T INTERVAL: 414 MS
QRS DURATION: 94 MS
QTC CALCULATION (BEZET): 440 MS
R AXIS: -3 DEGREES
T AXIS: 12 DEGREES
VENTRICULAR RATE: 68 BPM

## 2025-06-12 ENCOUNTER — HOSPITAL ENCOUNTER (EMERGENCY)
Facility: HOSPITAL | Age: 40
Discharge: HOME | End: 2025-06-12
Attending: STUDENT IN AN ORGANIZED HEALTH CARE EDUCATION/TRAINING PROGRAM
Payer: COMMERCIAL

## 2025-06-12 VITALS
HEIGHT: 66 IN | OXYGEN SATURATION: 99 % | BODY MASS INDEX: 25.71 KG/M2 | RESPIRATION RATE: 20 BRPM | TEMPERATURE: 98 F | HEART RATE: 81 BPM | SYSTOLIC BLOOD PRESSURE: 137 MMHG | DIASTOLIC BLOOD PRESSURE: 98 MMHG | WEIGHT: 160 LBS

## 2025-06-12 DIAGNOSIS — S61.451D DOG BITE OF RIGHT HAND, SUBSEQUENT ENCOUNTER: Primary | ICD-10-CM
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
## 2025-06-12 PROCEDURE — 99283 EMERGENCY DEPT VISIT LOW MDM: CPT

## 2025-06-13 NOTE — ED INITIAL ASSESSMENT (HPI)
[facility] pt to ED with c/o wound check. Pt was treated for a dog bite and was prescribed 2 antibiotics x4 days ago. Pt states she did not start Abx due to insurance difficulties. Pt states concern due to enlarged lymph node to neck noted today.

## 2025-06-13 NOTE — ED PROVIDER NOTES
[facility] Emergency Department Note  Patient: Jocelyn Garza Age: 39 year old Sex: female      MRN: [id number]  : 1985    Patient Seen in: [facility] Emergency Department    History     Chief Complaint   Patient presents with    Wound Recheck     Stated Complaint: wound check    History obtained from: Patient    39-year-old female with past medical history of breast cancer currently on a trial chemotherapy medication, hypertension presenting today for wound check.  She states that 5 days ago, she was bit by a friend's dog.  Was seen in urgent care facility at that time and had 1 suture placed.  Was prescribed a course of cephalexin for antibiotics which she states that she was not able to fill due to insurance difficulties.  She states that she was also provided a prescription for Bactrim which she has also not filled.  States that today, she thought she felt an enlarged lymph node in her neck prompting evaluation in the emergency department.  She denies any associated fevers.  Denies any worsening redness, pain or swelling of the bite wound    Review of Systems:  Review of Systems  Positive for stated complaint: wound check. Constitutional and vital signs reviewed. All other systems reviewed and negative except as noted above.    Patient History:  Past Medical History[1]    Past Surgical History[2]     Family History[3]    Specific Social Determinants of Health:   Short Social Hx on File[4]        PSFH elements reviewed from today and agreed except as otherwise stated in HPI.    Physical Exam     ED Triage Vitals [25]   BP (!) 137/98   Pulse 81   Resp 20   Temp 98.1 °F (36.7 °C)   Temp src Temporal   SpO2 99 %   O2 Device None (Room air)       Current:BP (!) 137/98   Pulse 81   Temp 98.1 °F (36.7 °C) (Temporal)   Resp 20   Ht 167.6 cm (5' 6\")   Wt 72.6 kg   SpO2 99%   BMI 25.82 kg/m²         Physical Exam  Constitutional:       General: She is not in acute distress.  HENT:       Head: Normocephalic and atraumatic.      Mouth/Throat:      Mouth: Mucous membranes are moist.   Eyes:      Extraocular Movements: Extraocular movements intact.   Cardiovascular:      Rate and Rhythm: Normal rate and regular rhythm.      Heart sounds: Normal heart sounds.   Pulmonary:      Effort: Pulmonary effort is normal. No respiratory distress.      Breath sounds: Normal breath sounds.   Abdominal:      Palpations: Abdomen is soft.      Tenderness: There is no abdominal tenderness.   Lymphadenopathy:      Head:      Right side of head: No submental or submandibular adenopathy.      Left side of head: No submental or submandibular adenopathy.      Cervical: Cervical adenopathy present.      Right cervical: Posterior cervical adenopathy present.      Comments: Subcentimeter palpable posterior cervical lymph node on the right side but no overlying erythema.  No significant tenderness   Skin:     General: Skin is warm and dry.      Capillary Refill: Capillary refill takes less than 2 seconds.      Findings: No rash.      Comments: 1 cm laceration over the dorsal right wrist with suture in place.  Appears to be healing appropriately.  Very subtle erythema around wound edges but no surrounding erythema, warmth to touch, or tenderness.   Neurological:      General: No focal deficit present.      Mental Status: She is alert and oriented to person, place, and time.   Psychiatric:         Mood and Affect: Mood normal.         Behavior: Behavior normal.         ED Course   Labs:   Labs Reviewed - No data to display  Radiology findings:  I personally reviewed the images.   No results found.        MDM   39-year-old female with past medical history of breast cancer currently on trial chemotherapy medication, hypertension presenting today for wound check of a right wrist dog bite that occurred 4 days ago.  Has not yet been able to initiate prophylactic antibiotics.  No fevers.  On exam, there is no evidence of wound  infection.  She has a small subcentimeter nontender posterior cervical lymph node that is palpable on exam but otherwise no lymphadenopathy.  No other evidence of infection.  Discussed continued wound care and suture removal as previously recommended.  Provided with prescription for Augmentin for better antibiotic prophylactic coverage for dog bite as patient has not yet started antibiotics.  Return precautions including any development of redness, pain, swelling, or development of fevers were discussed all questions answered.  Patient expressed understanding and agreement with this plan    Differential diagnoses considered includes, but is not limited to: Wound, wound infection, cellulitis      Disposition and Plan     Clinical Impression:  1. Dog bite of right hand, subsequent encounter        Disposition:  Discharge    Follow-up:  Adolfo Campbell,   [address] [address]  [phone]    Schedule an appointment as soon as possible for a visit in 2 day(s)  For wound re-check, As needed      Medications Prescribed:  Discharge Medication List as of 6/12/2025 10:13 PM        START taking these medications    Details   amoxicillin clavulanate 875-125 MG Oral Tab Take 1 tablet by mouth 2 (two) times daily for 5 days., Normal, Disp-10 tablet, R-0               This note may have been created using voice dictation technology and may include inadvertent errors.      Mariluz Ferris MD  Emergency Medicine             [1]   Past Medical History:   Breast cancer in female (HCC)    Right breast lymph node cancer    High blood pressure    Hx of motion sickness    Hypertension    Solitary kidney, congenital   [2]   Past Surgical History:  Procedure Laterality Date    Appendectomy  01/01/2007    Mastectomy Bilateral 04/2024    Needle biopsy right Right 10/09/2023    Treat ectopic preg,rmv tube/ovary Left 01/01/2014    Treat ectopic preg,rmv tube/ovary Left 01/01/2015   [3]   Family History  Problem Relation Age of  Onset    Other (Parkinson's Disease) Father     Prostate Cancer Maternal Grandfather 70    Pancreatic Cancer Paternal Grandmother 80    Cancer Paternal Aunt 80        breast cancer    Pancreatic Cancer Maternal Uncle 60    Pancreatic Cancer Paternal Great-Grandfather     Cancer Maternal Great-Grandmother         gastric ca    Cancer Paternal Uncle 60        brain cancer   [4]   Social History  Socioeconomic History    Marital status:    Tobacco Use    Smoking status: Never    Smokeless tobacco: Never   Vaping Use    Vaping status: Never Used   Substance and Sexual Activity    Alcohol use: Not Currently     Comment: 2 -3 drink    Drug use: No   Other Topics Concern    Caffeine Concern Yes    Exercise Yes     Social Drivers of Health     Food Insecurity: Low Risk  (1/15/2025)    Received from [facility]    Food Insecurity     Within the past 12 months, you worried that your food would run out before you got money to buy more.  : Never true     Within the past 12 months, the food you bought just didn't last and you didn't have money to get more. : Never true   Transportation Needs: Not At Risk (1/15/2025)    Received from [facility]    Transportation Needs     In the past 12 months, has lack of reliable transportation kept you from medical appointments, meetings, work or from getting things needed for daily living? : No   Housing Stability: Low Risk  (4/30/2024)    Housing Stability     Housing Instability: No

## 2025-06-13 NOTE — DISCHARGE INSTRUCTIONS
Return to the ER if you develop worsening redness, pain or swelling at the area of the bite or if you develop any fevers this is to be signs of an infection.

## 2025-06-18 ENCOUNTER — APPOINTMENT (OUTPATIENT)
Age: 40
End: 2025-06-18
Attending: NURSE PRACTITIONER
Payer: COMMERCIAL

## 2025-06-18 ENCOUNTER — OFFICE VISIT (OUTPATIENT)
Age: 40
End: 2025-06-18
Attending: NURSE PRACTITIONER
Payer: COMMERCIAL

## 2025-06-18 ENCOUNTER — NURSE ONLY (OUTPATIENT)
Age: 40
End: 2025-06-18
Attending: NURSE PRACTITIONER
Payer: COMMERCIAL

## 2025-06-18 VITALS
BODY MASS INDEX: 26.49 KG/M2 | SYSTOLIC BLOOD PRESSURE: 136 MMHG | DIASTOLIC BLOOD PRESSURE: 88 MMHG | OXYGEN SATURATION: 100 % | WEIGHT: 159 LBS | HEART RATE: 79 BPM | TEMPERATURE: 98 F | RESPIRATION RATE: 16 BRPM | HEIGHT: 65 IN

## 2025-06-18 DIAGNOSIS — Z08 ENCOUNTER FOR FOLLOW-UP SURVEILLANCE OF BREAST CANCER: ICD-10-CM

## 2025-06-18 DIAGNOSIS — Z51.81 MEDICATION MONITORING ENCOUNTER: ICD-10-CM

## 2025-06-18 DIAGNOSIS — I89.0 LYMPHEDEMA OF RIGHT ARM: ICD-10-CM

## 2025-06-18 DIAGNOSIS — Z17.0 MALIGNANT NEOPLASM OF UPPER-OUTER QUADRANT OF RIGHT BREAST IN FEMALE, ESTROGEN RECEPTOR POSITIVE (HCC): Primary | ICD-10-CM

## 2025-06-18 DIAGNOSIS — Q60.0 SOLITARY KIDNEY, CONGENITAL: ICD-10-CM

## 2025-06-18 DIAGNOSIS — D64.9 ANEMIA, UNSPECIFIED TYPE: ICD-10-CM

## 2025-06-18 DIAGNOSIS — N17.9 AKI (ACUTE KIDNEY INJURY): ICD-10-CM

## 2025-06-18 DIAGNOSIS — D72.810 LYMPHOPENIA: ICD-10-CM

## 2025-06-18 DIAGNOSIS — Z79.899 HIGH RISK MEDICATIONS (NOT ANTICOAGULANTS) LONG-TERM USE: ICD-10-CM

## 2025-06-18 DIAGNOSIS — Z85.3 ENCOUNTER FOR FOLLOW-UP SURVEILLANCE OF BREAST CANCER: ICD-10-CM

## 2025-06-18 DIAGNOSIS — C50.411 MALIGNANT NEOPLASM OF UPPER-OUTER QUADRANT OF RIGHT BREAST IN FEMALE, ESTROGEN RECEPTOR POSITIVE (HCC): Primary | ICD-10-CM

## 2025-06-18 LAB
ALBUMIN SERPL-MCNC: 4.6 G/DL (ref 3.2–4.8)
ALBUMIN/GLOB SERPL: 1.9 {RATIO} (ref 1–2)
ALP LIVER SERPL-CCNC: 74 U/L (ref 37–98)
ALT SERPL-CCNC: 17 U/L (ref 10–49)
ANION GAP SERPL CALC-SCNC: 10 MMOL/L (ref 0–18)
AST SERPL-CCNC: 20 U/L (ref ?–34)
BASOPHILS # BLD AUTO: 0.08 X10(3) UL (ref 0–0.2)
BASOPHILS NFR BLD AUTO: 2.4 %
BILIRUB SERPL-MCNC: 0.3 MG/DL (ref 0.3–1.2)
BUN BLD-MCNC: 14 MG/DL (ref 9–23)
BUN/CREAT SERPL: 10.5 (ref 10–20)
CALCIUM BLD-MCNC: 9.5 MG/DL (ref 8.7–10.4)
CHLORIDE SERPL-SCNC: 107 MMOL/L (ref 98–112)
CO2 SERPL-SCNC: 23 MMOL/L (ref 21–32)
CREAT BLD-MCNC: 1.33 MG/DL (ref 0.55–1.02)
DEPRECATED RDW RBC AUTO: 45.3 FL (ref 35.1–46.3)
EGFRCR SERPLBLD CKD-EPI 2021: 52 ML/MIN/1.73M2 (ref 60–?)
EOSINOPHIL # BLD AUTO: 0.08 X10(3) UL (ref 0–0.7)
EOSINOPHIL NFR BLD AUTO: 2.4 %
ERYTHROCYTE [DISTWIDTH] IN BLOOD BY AUTOMATED COUNT: 12.4 % (ref 11–15)
FASTING STATUS PATIENT QL REPORTED: NO
GLOBULIN PLAS-MCNC: 2.4 G/DL (ref 2–3.5)
GLUCOSE BLD-MCNC: 102 MG/DL (ref 70–99)
HCT VFR BLD AUTO: 31.6 % (ref 35–48)
HGB BLD-MCNC: 10.9 G/DL (ref 12–16)
IMM GRANULOCYTES # BLD AUTO: 0.01 X10(3) UL (ref 0–1)
IMM GRANULOCYTES NFR BLD: 0.3 %
LYMPHOCYTES # BLD AUTO: 0.87 X10(3) UL (ref 1–4)
LYMPHOCYTES NFR BLD AUTO: 26.1 %
MAGNESIUM SERPL-MCNC: 2 MG/DL (ref 1.6–2.6)
MCH RBC QN AUTO: 34.9 PG (ref 26–34)
MCHC RBC AUTO-ENTMCNC: 34.5 G/DL (ref 31–37)
MCV RBC AUTO: 101.3 FL (ref 80–100)
MONOCYTES # BLD AUTO: 0.28 X10(3) UL (ref 0.1–1)
MONOCYTES NFR BLD AUTO: 8.4 %
NEUTROPHILS # BLD AUTO: 2.01 X10 (3) UL (ref 1.5–7.7)
NEUTROPHILS # BLD AUTO: 2.01 X10(3) UL (ref 1.5–7.7)
NEUTROPHILS NFR BLD AUTO: 60.4 %
OSMOLALITY SERPL CALC.SUM OF ELEC: 291 MOSM/KG (ref 275–295)
PLATELET # BLD AUTO: 197 10(3)UL (ref 150–450)
POTASSIUM SERPL-SCNC: 3.6 MMOL/L (ref 3.5–5.1)
PROT SERPL-MCNC: 7 G/DL (ref 5.7–8.2)
RBC # BLD AUTO: 3.12 X10(6)UL (ref 3.8–5.3)
SODIUM SERPL-SCNC: 140 MMOL/L (ref 136–145)
WBC # BLD AUTO: 3.3 X10(3) UL (ref 4–11)

## 2025-06-18 RX ORDER — SODIUM CHLORIDE 9 MG/ML
10 INJECTION, SOLUTION INTRAMUSCULAR; INTRAVENOUS; SUBCUTANEOUS ONCE
OUTPATIENT
Start: 2025-07-16

## 2025-06-18 NOTE — PATIENT INSTRUCTIONS
Hold both camizestrant and abemaciclib 5 days prior to surgery (6/25/25) and then hold both 7 days post surgery and resume on (7/7/25)

## 2025-06-18 NOTE — PROGRESS NOTES
HPI   Jocelyn Garza is a 39 year old female for evaluation of   Encounter Diagnoses   Name Primary?    Malignant neoplasm of upper-outer quadrant of right breast in female, estrogen receptor positive (HCC) Yes    High risk medications (not anticoagulants) long-term use     Medication monitoring encounter     Encounter for follow-up surveillance of breast cancer     Lymphedema of right arm     Anemia, unspecified type     Lymphopenia        Patient completed course of neoadjuvant dose dense Adriamycin Cytoxan followed by weekly Taxol.  Last dose of treatment on 3/22/2024.    Patient then underwent bilateral mastectomies on 4/29/2024 with a right axillary lymph node dissection.    Patient completed radiation on 8/7/2024. Having lymphedema therapy.  Feels hardness on the R arm.    LMP November of 2023.    On goserelin since 6/3/24. Having hot flashes.  States a couple of minutes and then improves.  A few times a day, not as much end of the month.      Has gone back to work.      Pt screened for OTIS-2 trial.  Randomized to Arm B with Camizestrant 75 mg daily, started 8/30/24.  She is currently taking study drug camizestrant at hs. Taking daily.     Started C1D1 8/24/24 abemaciclib.      Dose adjustment to 100 mg daily on 9/22/24.    Completed cycle 10 abemaciclib 100 mg.     She was bitten by her dog on 6/8/25, she was started on antibiotics, states family worried about infection spreading and she had a LN on the R neck and went to the ED on 6/12/25 and was started on amoxicillin.  States LN smaller.    Has been itching more in the R chest.      Hot flashes are worse. Venlafaxine was helping but now worse.  Mood is improved.      More hair thinning.      Having more post nasal gtt and has to clear her throat.      She is having more diarrhea.  She is having 6-7 BM a day,  She is only taking half an imodium a day.  She states that also was having nausea with a full imodium.     Freq formed BM after eating.  Sometimes liquid and takes imodium.  Does get nausea at times at night  that she attributes to imodium.  She is trying to keep hydrated with water mostly and carbonated water.      Continues right arm sleeve, no increased lymphedema following the dog bite.  Using lymphedema pump daily.  She is working out again.      She states she has been following with neurology for HA and tremor in the R hand.  States also difficulty swallowing.  She states that told her gait was slow.  She has family h/o PD, she went to a movement disorder MD and will be following up with him.        ECOG PS  0    Review of Systems:   Review of Systems   Constitutional:  Positive for appetite change (states better with exercise.). Negative for fatigue (feels more energy after working out.) and unexpected weight change.   HENT:   Negative for mouth sores.         Grinding teeth at night and having TMJ on the L jaw.   Eyes:  Positive for eye problems (issues with adjusting at night with transitioning from light to dark and vice versa, states some light floaters like light neon highligher waves. After that when closing her eyes sees blue blobs and then they fade away.).   Respiratory:  Positive for cough (with tickle in throat, post nasal gtt). Negative for shortness of breath.    Cardiovascular:  Negative for chest pain, leg swelling (the other day thougt L ankle was swollen at end of the day.) and palpitations.   Gastrointestinal:  Positive for abdominal pain (at times has cramps with diarrhea but not horrible). Negative for constipation and vomiting.   Endocrine: Positive for hot flashes (as above.).   Genitourinary:  Negative for frequency.         Urgency, states bubbles.     Musculoskeletal:  Positive for arthralgias (R hip stiff when lying on it.) and neck pain (sometimes from how she sleeps.). Negative for back pain.        R arm pain    Skin:  Positive for itching (in the chest at times from lymphedema.  States at both sides of the neck  and chest, but sweating more). Negative for rash.   Neurological:  Positive for headaches (still occasionally.). Negative for dizziness and numbness (at hands states numbness only in winter, not in a while).         reports some issues with recall.     Hematological:  Negative for adenopathy. Does not bruise/bleed easily.   Psychiatric/Behavioral:  Positive for sleep disturbance.          Current Outpatient Medications   Medication Sig Dispense Refill    acetaminophen-codeine 300-30 MG Oral Tab Take 1 tablet by mouth every 6 (six) hours as needed.      venlafaxine ER 37.5 MG Oral Capsule SR 24 Hr Take 1 capsule (37.5 mg total) by mouth daily. 30 capsule 5    prochlorperazine (COMPAZINE) 10 mg tablet Take 1 tablet (10 mg total) by mouth every 6 (six) hours as needed for Nausea. 15 tablet 0    camizestrant 75 mg Oral Tab (OTIS-2 STUDY DRUG) Take 1 tablet (75 mg total) by mouth daily. Take at the same time each day. 96 tablet 0    loperamide 2 MG Oral Cap Take 1 capsule (2 mg total) by mouth as needed in the morning and 1 capsule (2 mg total) as needed at noon and 1 capsule (2 mg total) as needed in the evening and 1 capsule (2 mg total) as needed before bedtime for Diarrhea.      GOSERELIN ACETATE SC Inject into the skin. Once a month      Abemaciclib 100 MG Oral Tab Take 1 tablet (100 mg total) by mouth 2 (two) times daily. may take with or without food 60 tablet 11    lidocaine-prilocaine 2.5-2.5 % External Cream Apply to site 1 hour prior to port a cath needle insertion 1 each 1     Allergies:   No Known Allergies    Past Medical History:    Breast cancer in female (HCC)    Right breast lymph node cancer    High blood pressure    Hx of motion sickness    Hypertension    Solitary kidney, congenital     Past Surgical History:   Procedure Laterality Date    Appendectomy  01/01/2007    Mastectomy Bilateral 04/2024    Needle biopsy right Right 10/09/2023    Treat ectopic preg,rmv tube/ovary Left 01/01/2014     Treat ectopic preg,rmv tube/ovary Left 01/01/2015     Social History     Socioeconomic History    Marital status:    Tobacco Use    Smoking status: Never    Smokeless tobacco: Never   Vaping Use    Vaping status: Never Used   Substance and Sexual Activity    Alcohol use: Not Currently     Comment: 2 -3 drink    Drug use: No   Other Topics Concern    Caffeine Concern Yes    Exercise Yes     Social Drivers of Health     Food Insecurity: Low Risk  (1/15/2025)    Received from [facility]    Food Insecurity     Within the past 12 months, you worried that your food would run out before you got money to buy more.  : Never true     Within the past 12 months, the food you bought just didn't last and you didn't have money to get more. : Never true   Transportation Needs: Not At Risk (1/15/2025)    Received from [facility]    Transportation Needs     In the past 12 months, has lack of reliable transportation kept you from medical appointments, meetings, work or from getting things needed for daily living? : No   Housing Stability: Low Risk  (4/30/2024)    Housing Stability     Housing Instability: No       Family History   Problem Relation Age of Onset    Other (Parkinson's Disease) Father     Prostate Cancer Maternal Grandfather 70    Pancreatic Cancer Paternal Grandmother 80    Cancer Paternal Aunt 80        breast cancer    Pancreatic Cancer Maternal Uncle 60    Pancreatic Cancer Paternal Great-Grandfather     Cancer Maternal Great-Grandmother         gastric ca    Cancer Paternal Uncle 60        brain cancer         PHYSICAL EXAM:    /88 (BP Location: Left arm, Patient Position: Sitting, Cuff Size: adult)   Pulse 79   Temp 97.8 °F (36.6 °C) (Tympanic)   Resp 16   Ht 1.651 m (5' 5\")   Wt 72.1 kg (159 lb)   SpO2 100%   BMI 26.46 kg/m²   Wt Readings from Last 6 Encounters:   06/18/25 72.1 kg (159 lb)   06/12/25 72.6 kg (160 lb)   05/21/25 71.7 kg (158 lb)   05/15/25 71.8 kg (158 lb  6.4 oz)   04/23/25 73 kg (161 lb)   03/26/25 71.8 kg (158 lb 3.2 oz)     Physical Exam  General: Patient is alert, not in acute distress.   HEENT: EOMs intact. PERRL. Oropharynx is clear.   Neck: No JVD. No palpable lymphadenopathy. Neck is supple.  Chest: Clear to auscultation. Lymphedema to R arm and upper back improved, wearing sleeve.    Heart: Regular rate and rhythm.   Abdomen: Soft, non tender with good bowel sounds.  Extremities: No edema.  Neurological: Grossly intact.   Lymphatics: There is no palpable lymphadenopathy throughout in the cervical, supraclavicular, axillary, or inguinal regions.  Only has a reactive LN on the R anterior triangle 1 x 0.5 mm.    Psych/Depression: nl  Neck, supple      ASSESSMENT/PLAN:     1. Malignant neoplasm of upper-outer quadrant of right breast in female, estrogen receptor positive (HCC)    2. High risk medications (not anticoagulants) long-term use    3. Medication monitoring encounter    4. Encounter for follow-up surveillance of breast cancer    5. Lymphedema of right arm    6. Anemia, unspecified type    7. Lymphopenia         Cancer Staging   Malignant neoplasm of upper-outer quadrant of right breast in female, estrogen receptor positive (HCC)  Staging form: Breast, AJCC 8th Edition  - Clinical stage from 10/12/2023: Stage IIA (cT3, cN1(f), cM0, G2, ER+, MT+, HER2-) - Signed by Halie Calle MD on 5/8/2024  - Pathologic stage from 5/8/2024: ypT3, ypN2a, cM0, G2, ER+, MT+, HER2- - Signed by Halie Calle MD on 5/8/2024    Patient has completed staging work-up.  Thus far no evidence of metastatic disease.  Findings in the liver evaluated with ultrasound, and the one side is a hemangioma, the other likely a cyst, but a question of metastatic disease and PET scan has been ordered.  I discussed with the patient that most likely this is going to be a cyst.    Discussed that she still has early stage disease, and that her staging has not changed despite the size of the  primary tumor.    Given extensive disease in the breast and komal involvement, she will benefit from neoadjuvant chemotherapy, which may help with surgical outcomes by shrinking some of the primary tumor and of the lymph nodes.  I discussed with the patient that the primary tumor will not likely decrease in size significantly to make her a candidate for breast conservation.  I did discuss with the patient that she will need a mastectomy and likely axillary lymph node dissection.  Given the size of the primary tumor, she is a candidate for postmastectomy radiation therapy plus minus radiation to the komal basins pending on surgical management of the axilla.  I discussed with the patient that once she completes treatment with radiation, she will then be initiated on adjuvant endocrine therapy, given the size of the tumor and number of positive lymph nodes, she also will be a candidate for 2 years of endovascular in the initial 2 years of adjuvant endocrine therapy.  Discussed that adjuvant endocrine therapy total duration will be no less than 5 years but given her high risk features, likely up to 8 to 10 years.  We will also discuss options for ovarian function suppression after completion of chemotherapy.    Patient inquired regarding contralateral prophylactic mastectomy.  I discussed with the patient that we should await results of her genetic testing.  If the genetic testing is positive for a deleterious mutation which will increase her lifetime second primary breast cancer, then she should proceed with prophylactic contralateral mastectomy.  However, if this is negative, discussed with the patient that there is no benefit for proceeding with contralateral mastectomy, as it will not change her disease-free or overall survival.    Discussed with the patient that while she is receiving neoadjuvant treatment, she can have the evaluation by plastic surgery so that when she completes her neoadjuvant course of therapy,  she will be able to have her surgery coordinated with Dr. Hutson and a plastic surgeon    She completed course of neoadjuvant DD AC x 4 cycles followed by weekly Taxol x12 weeks    Status post bilateral mastectomy with right-sided axillary lymph node dissection on 4/29/2024.    She completed adjuvant radiation therapy on 8/7/2024.    Discussed with the patient that it is not uncommon for patients with ER/TN positive breast cancer that have neoadjuvant treatment not to have a robust response from therapy.  I did discuss that for her treatment, in terms of systemic disease, after completion of radiation therapy, we will proceed with ovarian function suppression, which can be initiated at this point, this will then be followed by adjuvant endocrine therapy with 2 years of Abemaciclib.  In addition, patient is also a candidate for the Otis 2 clinical trial which is comparing standard of care adjuvant endocrine therapy with Abemaciclib versus camizestrant which is an oral SERD.    Patient received first dose of goserelin 3.6 mg on 6/3/2024 and last on 7/30/2024.  .    Arthralgias:  secondary to goserelin.  Continue to increase activity.    -pt report minimal arthralgias    OTIS-2  Randomized to Arm B with Camizestrant started 8/30/24. No Eye issues   On adjuvant hormonal therapy and abemaciclib.       Follow up per trial protocol.     Continue camizestrant 75 mg daily. No new complaints     Abemaciclib had been held due to GI symptoms requiring IV hydration and IV nausea meds.   Dose reduced to 100 mg     Start on Cycle 11 abemaciclib 100 mg dose twice daily -- continue tolerating lower dose better    Having BSO on  6/30/25, she is to hold camizestrant 5 days prior to surgery and 7 days post.  She will hold the abemaciclib as well same interval.   Will discontinue goserelin, last injection prior to surgery today.  She is OK to proceed for surgery from my stand point.     Cytopenias:  secondary to abemaciclib and  stable.  Neutropenia resolved    Headaches: Recommend tylenol prn.  Avoid NSAIDS due to renal issues.  Started with goserelin, which is reported to cause HA and abemaciclib can also cause HA. Following with neurology. Also following with Parkinson's specialist monitor tremor.       N/V/D:  secondary to abemaciclib.  Discussed imodium if stool watery, gasX from cramps and antiemetic for nausea.     Dizziness:  monitor BP and pulse during those episodes.      Continue to monitor creat levels- following nephrology, pt drinking fluids without issue     Lymphedema:  HEP and compression. Referral placed for therapy. R UE US- negative for DVT     Venlafaxine- hot flashes and mood improved    Reactive LN after dog bite:  continue to monitor.     Allergic rhinitis:  may take cetrizine       Follow up in 4 weeks         MDM high risk.  I have a longitudinal and continuous care relationship with this patient for the management of breast cancer, a serious or complex condition.  is applicable because the patient's medical record notes over time support that there is a longitudinal care relationship with me, the care plan reflects the ongoing nature of the continuous relationship of care, and the medical record indicates that there is ongoing treatment of a serious/complex medical condition which I am currently managing.           No orders of the defined types were placed in this encounter.      Results From Past 48 Hours:  Recent Results (from the past 48 hours)   CBC W/DIFF [E]    Collection Time: 06/18/25  2:07 PM   Result Value Ref Range    WBC 3.3 (L) 4.0 - 11.0 x10(3) uL    RBC 3.12 (L) 3.80 - 5.30 x10(6)uL    HGB 10.9 (L) 12.0 - 16.0 g/dL    HCT 31.6 (L) 35.0 - 48.0 %    .3 (H) 80.0 - 100.0 fL    MCH 34.9 (H) 26.0 - 34.0 pg    MCHC 34.5 31.0 - 37.0 g/dL    RDW-SD 45.3 35.1 - 46.3 fL    RDW 12.4 11.0 - 15.0 %    .0 150.0 - 450.0 10(3)uL    Neutrophil Absolute Prelim 2.01 1.50 - 7.70 x10 (3) uL     Neutrophil Absolute 2.01 1.50 - 7.70 x10(3) uL    Lymphocyte Absolute 0.87 (L) 1.00 - 4.00 x10(3) uL    Monocyte Absolute 0.28 0.10 - 1.00 x10(3) uL    Eosinophil Absolute 0.08 0.00 - 0.70 x10(3) uL    Basophil Absolute 0.08 0.00 - 0.20 x10(3) uL    Immature Granulocyte Absolute 0.01 0.00 - 1.00 x10(3) uL    Neutrophil % 60.4 %    Lymphocyte % 26.1 %    Monocyte % 8.4 %    Eosinophil % 2.4 %    Basophil % 2.4 %    Immature Granulocyte % 0.3 %             Imaging & Referrals:  None

## 2025-06-19 ENCOUNTER — E-ADVICE (OUTPATIENT)
Dept: OBGYN | Age: 40
End: 2025-06-19

## 2025-06-27 ASSESSMENT — ACTIVITIES OF DAILY LIVING (ADL)
ADL_BEFORE_ADMISSION: INDEPENDENT
ADL_SHORT_OF_BREATH: NO
RECENT_DECLINE_ADL: NO
NEEDS_ASSIST: NO
ADL_SCORE: 12

## 2025-06-30 ENCOUNTER — ANESTHESIA EVENT (OUTPATIENT)
Dept: SURGERY | Age: 40
End: 2025-06-30

## 2025-06-30 ENCOUNTER — TELEPHONE (OUTPATIENT)
Dept: OBGYN | Age: 40
End: 2025-06-30

## 2025-06-30 ENCOUNTER — HOSPITAL ENCOUNTER (OUTPATIENT)
Age: 40
Discharge: HOME | End: 2025-06-30
Attending: OBSTETRICS & GYNECOLOGY | Admitting: OBSTETRICS & GYNECOLOGY

## 2025-06-30 ENCOUNTER — ANESTHESIA (OUTPATIENT)
Dept: SURGERY | Age: 40
End: 2025-06-30

## 2025-06-30 DIAGNOSIS — Z85.3 PERSONAL HISTORY OF BREAST CANCER: ICD-10-CM

## 2025-06-30 DIAGNOSIS — Z98.890 POSTOPERATIVE STATE: Primary | ICD-10-CM

## 2025-06-30 LAB
B-HCG UR QL: NEGATIVE
INTERNAL PROCEDURAL CONTROLS ACCEPTABLE: YES
TEST LOT EXPIRATION DATE: NORMAL
TEST LOT NUMBER: NORMAL

## 2025-06-30 PROCEDURE — 81025 URINE PREGNANCY TEST: CPT | Performed by: OBSTETRICS & GYNECOLOGY

## 2025-06-30 PROCEDURE — 10004651 HB RX, NO CHARGE ITEM: Performed by: OBSTETRICS & GYNECOLOGY

## 2025-06-30 PROCEDURE — 10002801 HB RX 250 W/O HCPCS: Performed by: NURSE ANESTHETIST, CERTIFIED REGISTERED

## 2025-06-30 PROCEDURE — A9150 MISC/EXPER NON-PRESCRIPT DRU: HCPCS | Performed by: OBSTETRICS & GYNECOLOGY

## 2025-06-30 PROCEDURE — 10002800 HB RX 250 W HCPCS: Performed by: NURSE ANESTHETIST, CERTIFIED REGISTERED

## 2025-06-30 PROCEDURE — 10002807 HB RX 258

## 2025-06-30 PROCEDURE — 13000003 HB ANESTHESIA  GENERAL EA ADD MINUTE: Performed by: OBSTETRICS & GYNECOLOGY

## 2025-06-30 PROCEDURE — 13000036 HB COMPLEX  CASE S/U + 1ST 15 MIN: Performed by: OBSTETRICS & GYNECOLOGY

## 2025-06-30 PROCEDURE — 10002800 HB RX 250 W HCPCS: Performed by: OBSTETRICS & GYNECOLOGY

## 2025-06-30 PROCEDURE — 10004451 HB PACU RECOVERY 1ST 30 MINUTES: Performed by: OBSTETRICS & GYNECOLOGY

## 2025-06-30 PROCEDURE — 13000002 HB ANESTHESIA  GENERAL   S/U + 1ST 15 MIN: Performed by: OBSTETRICS & GYNECOLOGY

## 2025-06-30 PROCEDURE — 10002800 HB RX 250 W HCPCS

## 2025-06-30 PROCEDURE — 13000037 HB COMPLEX CASE EACH ADD MINUTE: Performed by: OBSTETRICS & GYNECOLOGY

## 2025-06-30 PROCEDURE — 10004452 HB PACU ADDL 30 MINUTES: Performed by: OBSTETRICS & GYNECOLOGY

## 2025-06-30 PROCEDURE — 10002803 HB RX 637

## 2025-06-30 PROCEDURE — 88305 TISSUE EXAM BY PATHOLOGIST: CPT | Performed by: OBSTETRICS & GYNECOLOGY

## 2025-06-30 PROCEDURE — 10006023 HB SUPPLY 272: Performed by: OBSTETRICS & GYNECOLOGY

## 2025-06-30 PROCEDURE — 58661 LAPAROSCOPY REMOVE ADNEXA: CPT | Performed by: OBSTETRICS & GYNECOLOGY

## 2025-06-30 PROCEDURE — 10002803 HB RX 637: Performed by: OBSTETRICS & GYNECOLOGY

## 2025-06-30 PROCEDURE — 13000001 HB PHASE II RECOVERY EA 30 MINUTES: Performed by: OBSTETRICS & GYNECOLOGY

## 2025-06-30 RX ORDER — DEXTROSE MONOHYDRATE 50 MG/ML
INJECTION, SOLUTION INTRAVENOUS CONTINUOUS PRN
Status: DISCONTINUED | OUTPATIENT
Start: 2025-06-30 | End: 2025-06-30 | Stop reason: HOSPADM

## 2025-06-30 RX ORDER — PROPOFOL 10 MG/ML
INJECTION, EMULSION INTRAVENOUS PRN
Status: DISCONTINUED | OUTPATIENT
Start: 2025-06-30 | End: 2025-06-30

## 2025-06-30 RX ORDER — ACETAMINOPHEN 500 MG
1000 TABLET ORAL
Status: COMPLETED | OUTPATIENT
Start: 2025-06-30 | End: 2025-06-30

## 2025-06-30 RX ORDER — MIDAZOLAM HYDROCHLORIDE 1 MG/ML
INJECTION, SOLUTION INTRAMUSCULAR; INTRAVENOUS PRN
Status: DISCONTINUED | OUTPATIENT
Start: 2025-06-30 | End: 2025-06-30

## 2025-06-30 RX ORDER — BUPIVACAINE HYDROCHLORIDE 2.5 MG/ML
INJECTION, SOLUTION EPIDURAL; INFILTRATION; INTRACAUDAL; PERINEURAL PRN
Status: DISCONTINUED | OUTPATIENT
Start: 2025-06-30 | End: 2025-06-30 | Stop reason: HOSPADM

## 2025-06-30 RX ORDER — DEXAMETHASONE SODIUM PHOSPHATE 4 MG/ML
INJECTION, SOLUTION INTRA-ARTICULAR; INTRALESIONAL; INTRAMUSCULAR; INTRAVENOUS; SOFT TISSUE PRN
Status: DISCONTINUED | OUTPATIENT
Start: 2025-06-30 | End: 2025-06-30

## 2025-06-30 RX ORDER — LIDOCAINE HYDROCHLORIDE 10 MG/ML
5 INJECTION, SOLUTION EPIDURAL; INFILTRATION; INTRACAUDAL; PERINEURAL PRN
Status: DISCONTINUED | OUTPATIENT
Start: 2025-06-30 | End: 2025-06-30 | Stop reason: HOSPADM

## 2025-06-30 RX ORDER — 0.9 % SODIUM CHLORIDE 0.9 %
2 VIAL (ML) INJECTION EVERY 12 HOURS SCHEDULED
Status: DISCONTINUED | OUTPATIENT
Start: 2025-06-30 | End: 2025-06-30 | Stop reason: HOSPADM

## 2025-06-30 RX ORDER — FAMOTIDINE 20 MG/1
20 TABLET, FILM COATED ORAL
Status: COMPLETED | OUTPATIENT
Start: 2025-06-30 | End: 2025-06-30

## 2025-06-30 RX ORDER — CHLORHEXIDINE GLUCONATE ORAL RINSE 1.2 MG/ML
15 SOLUTION DENTAL
Status: COMPLETED | OUTPATIENT
Start: 2025-06-30 | End: 2025-06-30

## 2025-06-30 RX ORDER — ROCURONIUM BROMIDE 10 MG/ML
INJECTION, SOLUTION INTRAVENOUS PRN
Status: DISCONTINUED | OUTPATIENT
Start: 2025-06-30 | End: 2025-06-30

## 2025-06-30 RX ORDER — 0.9 % SODIUM CHLORIDE 0.9 %
10 VIAL (ML) INJECTION PRN
Status: DISCONTINUED | OUTPATIENT
Start: 2025-06-30 | End: 2025-06-30 | Stop reason: HOSPADM

## 2025-06-30 RX ORDER — OXYCODONE HYDROCHLORIDE 5 MG/1
5 TABLET ORAL
Refills: 0 | Status: DISCONTINUED | OUTPATIENT
Start: 2025-06-30 | End: 2025-06-30 | Stop reason: HOSPADM

## 2025-06-30 RX ORDER — LIDOCAINE HYDROCHLORIDE 20 MG/ML
INJECTION, SOLUTION INFILTRATION; PERINEURAL PRN
Status: DISCONTINUED | OUTPATIENT
Start: 2025-06-30 | End: 2025-06-30

## 2025-06-30 RX ORDER — NICOTINE POLACRILEX 4 MG
30 LOZENGE BUCCAL
Status: DISCONTINUED | OUTPATIENT
Start: 2025-06-30 | End: 2025-06-30 | Stop reason: HOSPADM

## 2025-06-30 RX ORDER — DEXTROSE MONOHYDRATE 25 G/50ML
25 INJECTION, SOLUTION INTRAVENOUS PRN
Status: DISCONTINUED | OUTPATIENT
Start: 2025-06-30 | End: 2025-06-30 | Stop reason: HOSPADM

## 2025-06-30 RX ORDER — ENOXAPARIN SODIUM 100 MG/ML
40 INJECTION SUBCUTANEOUS ONCE
Status: DISCONTINUED | OUTPATIENT
Start: 2025-06-30 | End: 2025-06-30 | Stop reason: HOSPADM

## 2025-06-30 RX ORDER — CELECOXIB 200 MG/1
400 CAPSULE ORAL
Status: COMPLETED | OUTPATIENT
Start: 2025-06-30 | End: 2025-06-30

## 2025-06-30 RX ORDER — GLYCOPYRROLATE 0.2 MG/ML
INJECTION, SOLUTION INTRAMUSCULAR; INTRAVENOUS PRN
Status: DISCONTINUED | OUTPATIENT
Start: 2025-06-30 | End: 2025-06-30

## 2025-06-30 RX ORDER — ACETAMINOPHEN 325 MG/1
650 TABLET ORAL
Status: DISCONTINUED | OUTPATIENT
Start: 2025-06-30 | End: 2025-06-30 | Stop reason: HOSPADM

## 2025-06-30 RX ORDER — SCOPOLAMINE 1 MG/3D
1 PATCH, EXTENDED RELEASE TRANSDERMAL ONCE
Status: DISCONTINUED | OUTPATIENT
Start: 2025-06-30 | End: 2025-06-30 | Stop reason: HOSPADM

## 2025-06-30 RX ORDER — SODIUM CHLORIDE 9 MG/ML
INJECTION, SOLUTION INTRAVENOUS CONTINUOUS
Status: DISCONTINUED | OUTPATIENT
Start: 2025-06-30 | End: 2025-06-30 | Stop reason: HOSPADM

## 2025-06-30 RX ORDER — ACETAMINOPHEN 500 MG
1000 TABLET ORAL EVERY 8 HOURS
Qty: 100 TABLET | Refills: 0 | Status: SHIPPED | OUTPATIENT
Start: 2025-06-30

## 2025-06-30 RX ORDER — OXYCODONE HYDROCHLORIDE 5 MG/1
5 TABLET ORAL EVERY 6 HOURS PRN
Qty: 14 TABLET | Refills: 0 | Status: SHIPPED | OUTPATIENT
Start: 2025-06-30

## 2025-06-30 RX ORDER — SODIUM CHLORIDE, SODIUM LACTATE, POTASSIUM CHLORIDE, CALCIUM CHLORIDE 600; 310; 30; 20 MG/100ML; MG/100ML; MG/100ML; MG/100ML
INJECTION, SOLUTION INTRAVENOUS CONTINUOUS
Status: DISCONTINUED | OUTPATIENT
Start: 2025-06-30 | End: 2025-06-30 | Stop reason: HOSPADM

## 2025-06-30 RX ORDER — NEOSTIGMINE METHYLSULFATE 4 MG/4 ML
SYRINGE (ML) INTRAVENOUS PRN
Status: DISCONTINUED | OUTPATIENT
Start: 2025-06-30 | End: 2025-06-30

## 2025-06-30 RX ADMIN — LIDOCAINE HYDROCHLORIDE 3 ML: 20 INJECTION, SOLUTION INFILTRATION; PERINEURAL at 08:53

## 2025-06-30 RX ADMIN — CHLORHEXIDINE GLUCONATE 15 ML: 1.2 RINSE ORAL at 07:45

## 2025-06-30 RX ADMIN — DEXAMETHASONE SODIUM PHOSPHATE 4 MG: 4 INJECTION INTRA-ARTICULAR; INTRALESIONAL; INTRAMUSCULAR; INTRAVENOUS; SOFT TISSUE at 09:21

## 2025-06-30 RX ADMIN — Medication 3 MG: at 09:46

## 2025-06-30 RX ADMIN — PROPOFOL 150 MG: 10 INJECTION, EMULSION INTRAVENOUS at 08:53

## 2025-06-30 RX ADMIN — GLYCOPYRROLATE 0.4 MG: 0.2 INJECTION, SOLUTION INTRAMUSCULAR; INTRAVENOUS at 09:46

## 2025-06-30 RX ADMIN — FENTANYL CITRATE 100 MCG: 50 INJECTION INTRAMUSCULAR; INTRAVENOUS at 08:53

## 2025-06-30 RX ADMIN — ROCURONIUM BROMIDE 40 MG: 10 INJECTION INTRAVENOUS at 08:53

## 2025-06-30 RX ADMIN — FAMOTIDINE 20 MG: 20 TABLET, FILM COATED ORAL at 08:43

## 2025-06-30 RX ADMIN — SCOPOLAMINE 1 PATCH: 1.5 PATCH, EXTENDED RELEASE TRANSDERMAL at 08:43

## 2025-06-30 RX ADMIN — ACETAMINOPHEN 1000 MG: 500 TABLET ORAL at 07:45

## 2025-06-30 RX ADMIN — CELECOXIB 400 MG: 200 CAPSULE ORAL at 07:45

## 2025-06-30 RX ADMIN — FENTANYL CITRATE 50 MCG: 50 INJECTION INTRAMUSCULAR; INTRAVENOUS at 10:31

## 2025-06-30 RX ADMIN — SODIUM CHLORIDE, POTASSIUM CHLORIDE, SODIUM LACTATE AND CALCIUM CHLORIDE: 600; 310; 30; 20 INJECTION, SOLUTION INTRAVENOUS at 08:00

## 2025-06-30 RX ADMIN — MIDAZOLAM HYDROCHLORIDE 2 MG: 1 INJECTION, SOLUTION INTRAMUSCULAR; INTRAVENOUS at 08:48

## 2025-06-30 ASSESSMENT — PAIN SCALES - GENERAL
PAINLEVEL_OUTOF10: 5
PAINLEVEL_OUTOF10: 5
PAINLEVEL_OUTOF10: 0
PAINLEVEL_OUTOF10: 4
PAINLEVEL_OUTOF10: 4
PAINLEVEL_OUTOF10: 5
PAINLEVEL_OUTOF10: 3
PAINLEVEL_OUTOF10: 3
PAINLEVEL_OUTOF10: 7
PAINLEVEL_OUTOF10: 3
PAINLEVEL_OUTOF10: 5

## 2025-06-30 ASSESSMENT — PAIN DESCRIPTION - PAIN TYPE: TYPE: ACUTE PAIN

## 2025-07-01 VITALS
TEMPERATURE: 97.3 F | BODY MASS INDEX: 26.25 KG/M2 | WEIGHT: 163.36 LBS | HEART RATE: 68 BPM | OXYGEN SATURATION: 100 % | DIASTOLIC BLOOD PRESSURE: 78 MMHG | RESPIRATION RATE: 18 BRPM | SYSTOLIC BLOOD PRESSURE: 114 MMHG | HEIGHT: 66 IN

## 2025-07-07 ENCOUNTER — RESULTS FOLLOW-UP (OUTPATIENT)
Dept: OBGYN | Age: 40
End: 2025-07-07

## 2025-07-07 LAB
ASR DISCLAIMER: NORMAL
CASE RPRT: NORMAL
CLINICAL INFO: NORMAL
PATH REPORT.FINAL DX SPEC: NORMAL
PATH REPORT.GROSS SPEC: NORMAL

## 2025-07-15 ENCOUNTER — APPOINTMENT (OUTPATIENT)
Dept: OBGYN | Age: 40
End: 2025-07-15

## 2025-07-15 VITALS
TEMPERATURE: 97.8 F | HEART RATE: 71 BPM | SYSTOLIC BLOOD PRESSURE: 113 MMHG | OXYGEN SATURATION: 100 % | DIASTOLIC BLOOD PRESSURE: 79 MMHG

## 2025-07-15 DIAGNOSIS — Z09 POSTOP CHECK: Primary | ICD-10-CM

## 2025-07-15 PROCEDURE — 99024 POSTOP FOLLOW-UP VISIT: CPT | Performed by: OBSTETRICS & GYNECOLOGY

## 2025-07-15 RX ORDER — SODIUM CHLORIDE 9 MG/ML
10 INJECTION, SOLUTION INTRAMUSCULAR; INTRAVENOUS; SUBCUTANEOUS ONCE
Status: CANCELLED | OUTPATIENT
Start: 2025-07-15

## 2025-07-16 ENCOUNTER — NURSE ONLY (OUTPATIENT)
Age: 40
End: 2025-07-16
Attending: NURSE PRACTITIONER
Payer: COMMERCIAL

## 2025-07-16 ENCOUNTER — OFFICE VISIT (OUTPATIENT)
Age: 40
End: 2025-07-16
Attending: NURSE PRACTITIONER
Payer: COMMERCIAL

## 2025-07-16 VITALS
HEART RATE: 79 BPM | TEMPERATURE: 99 F | WEIGHT: 162.38 LBS | SYSTOLIC BLOOD PRESSURE: 120 MMHG | OXYGEN SATURATION: 100 % | RESPIRATION RATE: 16 BRPM | DIASTOLIC BLOOD PRESSURE: 82 MMHG | BODY MASS INDEX: 27.05 KG/M2 | HEIGHT: 65 IN

## 2025-07-16 DIAGNOSIS — Z17.0 MALIGNANT NEOPLASM OF UPPER-OUTER QUADRANT OF RIGHT BREAST IN FEMALE, ESTROGEN RECEPTOR POSITIVE (HCC): ICD-10-CM

## 2025-07-16 DIAGNOSIS — C50.411 MALIGNANT NEOPLASM OF UPPER-OUTER QUADRANT OF RIGHT BREAST IN FEMALE, ESTROGEN RECEPTOR POSITIVE (HCC): Primary | ICD-10-CM

## 2025-07-16 DIAGNOSIS — Z17.0 MALIGNANT NEOPLASM OF UPPER-OUTER QUADRANT OF RIGHT BREAST IN FEMALE, ESTROGEN RECEPTOR POSITIVE (HCC): Primary | ICD-10-CM

## 2025-07-16 DIAGNOSIS — Z09 CHEMOTHERAPY FOLLOW-UP EXAMINATION: ICD-10-CM

## 2025-07-16 DIAGNOSIS — C50.411 MALIGNANT NEOPLASM OF UPPER-OUTER QUADRANT OF RIGHT BREAST IN FEMALE, ESTROGEN RECEPTOR POSITIVE (HCC): ICD-10-CM

## 2025-07-16 DIAGNOSIS — Z51.81 MEDICATION MONITORING ENCOUNTER: ICD-10-CM

## 2025-07-16 LAB
ALBUMIN SERPL-MCNC: 4.4 G/DL (ref 3.2–4.8)
ALBUMIN/GLOB SERPL: 1.8 {RATIO} (ref 1–2)
ALP LIVER SERPL-CCNC: 72 U/L (ref 37–98)
ALT SERPL-CCNC: 21 U/L (ref 10–49)
ANION GAP SERPL CALC-SCNC: 9 MMOL/L (ref 0–18)
AST SERPL-CCNC: 19 U/L (ref ?–34)
BASOPHILS # BLD AUTO: 0.07 X10(3) UL (ref 0–0.2)
BASOPHILS NFR BLD AUTO: 1.6 %
BILIRUB SERPL-MCNC: 0.3 MG/DL (ref 0.3–1.2)
BUN BLD-MCNC: 17 MG/DL (ref 9–23)
BUN/CREAT SERPL: 12.9 (ref 10–20)
CALCIUM BLD-MCNC: 9.3 MG/DL (ref 8.7–10.4)
CHLORIDE SERPL-SCNC: 107 MMOL/L (ref 98–112)
CO2 SERPL-SCNC: 24 MMOL/L (ref 21–32)
CREAT BLD-MCNC: 1.32 MG/DL (ref 0.55–1.02)
DEPRECATED RDW RBC AUTO: 40.7 FL (ref 35.1–46.3)
EGFRCR SERPLBLD CKD-EPI 2021: 52 ML/MIN/1.73M2 (ref 60–?)
EOSINOPHIL # BLD AUTO: 0.13 X10(3) UL (ref 0–0.7)
EOSINOPHIL NFR BLD AUTO: 2.9 %
ERYTHROCYTE [DISTWIDTH] IN BLOOD BY AUTOMATED COUNT: 11.1 % (ref 11–15)
GLOBULIN PLAS-MCNC: 2.4 G/DL (ref 2–3.5)
GLUCOSE BLD-MCNC: 104 MG/DL (ref 70–99)
HCT VFR BLD AUTO: 32.3 % (ref 35–48)
HGB BLD-MCNC: 11 G/DL (ref 12–16)
IMM GRANULOCYTES # BLD AUTO: 0.03 X10(3) UL (ref 0–1)
IMM GRANULOCYTES NFR BLD: 0.7 %
LYMPHOCYTES # BLD AUTO: 1.06 X10(3) UL (ref 1–4)
LYMPHOCYTES NFR BLD AUTO: 24 %
MAGNESIUM SERPL-MCNC: 1.8 MG/DL (ref 1.6–2.6)
MCH RBC QN AUTO: 34.1 PG (ref 26–34)
MCHC RBC AUTO-ENTMCNC: 34.1 G/DL (ref 31–37)
MCV RBC AUTO: 100 FL (ref 80–100)
MONOCYTES # BLD AUTO: 0.27 X10(3) UL (ref 0.1–1)
MONOCYTES NFR BLD AUTO: 6.1 %
NEUTROPHILS # BLD AUTO: 2.86 X10 (3) UL (ref 1.5–7.7)
NEUTROPHILS # BLD AUTO: 2.86 X10(3) UL (ref 1.5–7.7)
NEUTROPHILS NFR BLD AUTO: 64.7 %
OSMOLALITY SERPL CALC.SUM OF ELEC: 292 MOSM/KG (ref 275–295)
PLATELET # BLD AUTO: 286 10(3)UL (ref 150–450)
POTASSIUM SERPL-SCNC: 3.9 MMOL/L (ref 3.5–5.1)
PROT SERPL-MCNC: 6.8 G/DL (ref 5.7–8.2)
RBC # BLD AUTO: 3.23 X10(6)UL (ref 3.8–5.3)
SODIUM SERPL-SCNC: 140 MMOL/L (ref 136–145)
WBC # BLD AUTO: 4.4 X10(3) UL (ref 4–11)

## 2025-07-16 NOTE — PROGRESS NOTES
BLAKE   Jocelyn Garza is a 39 year old female for evaluation of   Encounter Diagnoses   Name Primary?    Malignant neoplasm of upper-outer quadrant of right breast in female, estrogen receptor positive (HCC) Yes    Chemotherapy follow-up examination        Patient completed course of neoadjuvant dose dense Adriamycin Cytoxan followed by weekly Taxol.  Last dose of treatment on 3/22/2024.    Patient then underwent bilateral mastectomies on 4/29/2024 with a right axillary lymph node dissection.    Patient completed radiation on 8/7/2024. Having lymphedema therapy.  Feels hardness on the R arm.    LMP November of 2023.    On goserelin since 6/3/24. Having hot flashes. S/p 6/30/25 removal of left ovary and left fallopian tube, no right ovary seen. Stopped goserelin.    Has gone back to work, [employer], off for the summer     Pt screened for OTIS-2 trial.  Randomized to Arm B with Camizestrant 75 mg daily, started 8/30/24.  She is currently taking study drug camizestrant at hs. Taking daily.     Started C1D1 8/24/24 abemaciclib.    Dose adjustment to 100 mg daily on 9/22/24.    Completed cycle 10 abemaciclib 100 mg 5/21/25  Completed C11 abemaciclib 100 mg bid 6/18-6/25, 7/7-7/15-missed 12 days held for surgery     (Held 5 days pre and 7 days post 6/30/25 surgery removal of left ovary and left fallopian tube)    Cleared for C12 7/16-8/12  To start C13 on 8/13 pending labs    She was bitten by her dog on 6/8/25, she was started on antibiotics, states family worried about infection spreading and she had a LN on the R neck and went to the ED on 6/12/25 and was started on amoxicillin.  States LN smaller, barely visible.      S/p     Venlafaxine was helping, reports less hot flash after BSO.  Mood is improved.      More hair thinning.      She denies any diarrhea.  No taking imodium     Continues right arm sleeve, no increased lymphedema following the dog bite.  Using lymphedema pump daily.  She is working out again.       She states she has been following with neurology for HA and tremor in the R hand, right hand has decreased and now slightly in left hand not seen today.  She states that told her gait was slow.  She has family h/o PD, she went to a movement disorder MD and will be following up with him in August.      Denies fever, chills, rash, N/V/C/D, cp, sob, eye issues, new pain or bleeding    ECOG PS  0    Review of Systems:   Review of Systems   Constitutional:  Negative for appetite change (states better with exercise.), fatigue (feels more energy after working out.) and unexpected weight change.   HENT:   Negative for mouth sores.         Grinding teeth at night and having TMJ on the L jaw.   Eyes:  Positive for eye problems (issues with adjusting at night with transitioning from light to dark and vice versa, states some light floaters like light neon highligher waves. After that when closing her eyes sees blue blobs and then they fade away.).   Respiratory:  Negative for cough (with tickle in throat, post nasal gtt) and shortness of breath.    Cardiovascular:  Negative for chest pain, leg swelling (the other day thgt L ankle was swollen at end of the day.) and palpitations.   Gastrointestinal:  Negative for abdominal pain (at times has cramps with diarrhea but not horrible), constipation and vomiting.   Endocrine: Positive for hot flashes (as above.).   Genitourinary:  Negative for frequency.         Urgency, states bubbles.     Musculoskeletal:  Positive for arthralgias (R hip stiff when lying on it.) and neck pain (sometimes from how she sleeps.). Negative for back pain.        R arm pain    Skin:  Negative for itching (in the chest at times from lymphedema.  States at both sides of the neck and chest, but sweating more) and rash.   Neurological:  Positive for headaches (still occasionally.). Negative for dizziness and numbness (at hands states numbness only in winter, not in a while).         reports some  issues with recall.     Hematological:  Negative for adenopathy. Does not bruise/bleed easily.   Psychiatric/Behavioral:  Positive for sleep disturbance.          Current Outpatient Medications   Medication Sig Dispense Refill    acetaminophen-codeine 300-30 MG Oral Tab Take 1 tablet by mouth every 6 (six) hours as needed.      venlafaxine ER 37.5 MG Oral Capsule SR 24 Hr Take 1 capsule (37.5 mg total) by mouth daily. 30 capsule 5    prochlorperazine (COMPAZINE) 10 mg tablet Take 1 tablet (10 mg total) by mouth every 6 (six) hours as needed for Nausea. 15 tablet 0    camizestrant 75 mg Oral Tab (OTIS-2 STUDY DRUG) Take 1 tablet (75 mg total) by mouth daily. Take at the same time each day. 96 tablet 0    loperamide 2 MG Oral Cap Take 1 capsule (2 mg total) by mouth as needed in the morning and 1 capsule (2 mg total) as needed at noon and 1 capsule (2 mg total) as needed in the evening and 1 capsule (2 mg total) as needed before bedtime for Diarrhea.      Abemaciclib 100 MG Oral Tab Take 1 tablet (100 mg total) by mouth 2 (two) times daily. may take with or without food 60 tablet 11    lidocaine-prilocaine 2.5-2.5 % External Cream Apply to site 1 hour prior to port a cath needle insertion 1 each 1    GOSERELIN ACETATE SC Inject into the skin. Once a month (Patient not taking: Reported on 7/16/2025)       Allergies:   No Known Allergies    Past Medical History:    Breast cancer in female (HCC)    Right breast lymph node cancer    High blood pressure    Hx of motion sickness    Hypertension    Solitary kidney, congenital     Past Surgical History:   Procedure Laterality Date    Appendectomy  01/01/2007    Mastectomy Bilateral 04/2024    Needle biopsy right Right 10/09/2023    Treat ectopic preg,rmv tube/ovary Left 01/01/2014    Treat ectopic preg,rmv tube/ovary Left 01/01/2015     Social History     Socioeconomic History    Marital status:    Tobacco Use    Smoking status: Never    Smokeless tobacco: Never    Vaping Use    Vaping status: Never Used   Substance and Sexual Activity    Alcohol use: Not Currently     Comment: 2 -3 drink    Drug use: No   Other Topics Concern    Caffeine Concern Yes    Exercise Yes     Social Drivers of Health     Food Insecurity: Low Risk  (1/15/2025)    Received from [facility]    Food Insecurity     Within the past 12 months, you worried that your food would run out before you got money to buy more.  : Never true     Within the past 12 months, the food you bought just didn't last and you didn't have money to get more. : Never true   Transportation Needs: Not At Risk (1/15/2025)    Received from [facility]    Transportation Needs     In the past 12 months, has lack of reliable transportation kept you from medical appointments, meetings, work or from getting things needed for daily living? : No   Housing Stability: Low Risk  (4/30/2024)    Housing Stability     Housing Instability: No       Family History   Problem Relation Age of Onset    Other (Parkinson's Disease) Father     Prostate Cancer Maternal Grandfather 70    Pancreatic Cancer Paternal Grandmother 80    Cancer Paternal Aunt 80        breast cancer    Pancreatic Cancer Maternal Uncle 60    Pancreatic Cancer Paternal Great-Grandfather     Cancer Maternal Great-Grandmother         gastric ca    Cancer Paternal Uncle 60        brain cancer         PHYSICAL EXAM:    /82 (BP Location: Left arm, Patient Position: Sitting, Cuff Size: adult)   Pulse 79   Temp 98.8 °F (37.1 °C) (Oral)   Resp 16   Ht 1.651 m (5' 5\")   Wt 73.7 kg (162 lb 6.4 oz)   SpO2 100%   BMI 27.02 kg/m²   Wt Readings from Last 6 Encounters:   07/16/25 73.7 kg (162 lb 6.4 oz)   06/18/25 72.1 kg (159 lb)   06/12/25 72.6 kg (160 lb)   05/21/25 71.7 kg (158 lb)   05/15/25 71.8 kg (158 lb 6.4 oz)   04/23/25 73 kg (161 lb)     Physical Exam  General: Patient is alert, not in acute distress.   HEENT: EOMs intact. PERRL. Oropharynx is  clear.   Neck: No JVD. No palpable lymphadenopathy. Neck is supple.  Chest: Clear to auscultation. Lymphedema to R arm and upper back improved, wearing sleeve.    -jonathan mastectomy well healed, no nodules,  no jonathan axilla lad.  Heart: Regular rate and rhythm.   Abdomen: Soft, non tender with good bowel sounds.  Extremities: No edema.  Neurological: Grossly intact.   Lymphatics: There is no palpable lymphadenopathy throughout in the cervical, supraclavicular, axillary, or inguinal regions.  Diffuse slight enlarged LN on the R anterior neck.   Psych/Depression: nl  Neck, supple        ASSESSMENT/PLAN:     1. Malignant neoplasm of upper-outer quadrant of right breast in female, estrogen receptor positive (HCC)    2. Chemotherapy follow-up examination         Cancer Staging   Malignant neoplasm of upper-outer quadrant of right breast in female, estrogen receptor positive (HCC)  Staging form: Breast, AJCC 8th Edition  - Clinical stage from 10/12/2023: Stage IIA (cT3, cN1(f), cM0, G2, ER+, MN+, HER2-) - Signed by Halie Calle MD on 5/8/2024  - Pathologic stage from 5/8/2024: ypT3, ypN2a, cM0, G2, ER+, MN+, HER2- - Signed by Halie Calle MD on 5/8/2024    Patient has completed staging work-up.  Thus far no evidence of metastatic disease.  Findings in the liver evaluated with ultrasound, and the one side is a hemangioma, the other likely a cyst, but a question of metastatic disease and PET scan has been ordered.  I discussed with the patient that most likely this is going to be a cyst.    Discussed that she still has early stage disease, and that her staging has not changed despite the size of the primary tumor.    Given extensive disease in the breast and komal involvement, she will benefit from neoadjuvant chemotherapy, which may help with surgical outcomes by shrinking some of the primary tumor and of the lymph nodes.  I discussed with the patient that the primary tumor will not likely decrease in size significantly  to make her a candidate for breast conservation.  I did discuss with the patient that she will need a mastectomy and likely axillary lymph node dissection.  Given the size of the primary tumor, she is a candidate for postmastectomy radiation therapy plus minus radiation to the komal basins pending on surgical management of the axilla.  I discussed with the patient that once she completes treatment with radiation, she will then be initiated on adjuvant endocrine therapy, given the size of the tumor and number of positive lymph nodes, she also will be a candidate for 2 years of endovascular in the initial 2 years of adjuvant endocrine therapy.  Discussed that adjuvant endocrine therapy total duration will be no less than 5 years but given her high risk features, likely up to 8 to 10 years.  We will also discuss options for ovarian function suppression after completion of chemotherapy.    Patient inquired regarding contralateral prophylactic mastectomy.  I discussed with the patient that we should await results of her genetic testing.  If the genetic testing is positive for a deleterious mutation which will increase her lifetime second primary breast cancer, then she should proceed with prophylactic contralateral mastectomy.  However, if this is negative, discussed with the patient that there is no benefit for proceeding with contralateral mastectomy, as it will not change her disease-free or overall survival.    Discussed with the patient that while she is receiving neoadjuvant treatment, she can have the evaluation by plastic surgery so that when she completes her neoadjuvant course of therapy, she will be able to have her surgery coordinated with Dr. Hutson and a plastic surgeon    She completed course of neoadjuvant DD AC x 4 cycles followed by weekly Taxol x12 weeks    Status post bilateral mastectomy with right-sided axillary lymph node dissection on 4/29/2024.    She completed adjuvant radiation therapy on  8/7/2024.    Discussed with the patient that it is not uncommon for patients with ER/UT positive breast cancer that have neoadjuvant treatment not to have a robust response from therapy.  I did discuss that for her treatment, in terms of systemic disease, after completion of radiation therapy, we will proceed with ovarian function suppression, which can be initiated at this point, this will then be followed by adjuvant endocrine therapy with 2 years of Abemaciclib.  In addition, patient is also a candidate for the Otis 2 clinical trial which is comparing standard of care adjuvant endocrine therapy with Abemaciclib versus camizestrant which is an oral SERD.    Patient received first dose of goserelin 3.6 mg on 6/3/2024 and last on 7/30/2024.  .    Arthralgias:  secondary to goserelin.  Continue to increase activity.    -pt report minimal arthralgias, goserelin stopped, s/p 6/30/25 SBO    OTIS-2  Randomized to Arm B with Camizestrant started 8/30/24. No Eye issues   On adjuvant hormonal therapy and abemaciclib.       Follow up per trial protocol.     Continue camizestrant 75 mg daily. No new complaints     Started C1D1 8/24/24 abemaciclib, 2 years 8/2026 9/2025 Abemaciclib had been held due to GI symptoms requiring IV hydration and IV nausea meds.   Dose reduced to 100 mg, tolerating well    Completed C11 abemaciclib 100 mg bid 6/18-6/25, 7/7-7/15-missed 12 days held for surgery     (Held 5 days pre and 7 days post 6/30/25 surgery removal of left ovary and left fallopian tube)    Cleared for C12 7/16-8/12 abemaciclib 100 mg bid, denies diarrhea, reports appetite improved when off verzenio  To start C13 on 8/13 pending labs  RTC 4 weeks Dr. Calle , has appt on 8/28/25 with research follow up    Cytopenias:  secondary to abemaciclib and stable.  Neutropenia resolved    Headaches: Recommend tylenol prn.  Avoid NSAIDS due to renal issues.  Started with goserelin, which is reported to cause HA and abemaciclib can  also cause HA. Following with neurology. Also following with Parkinson's specialist monitor tremor. Less headaches after BSO surgery.      N/V/D:  secondary to abemaciclib.  Denies today. Discussed imodium if stool watery, gasX from cramps and antiemetic for nausea.     Dizziness:  monitor BP and pulse during those episodes.  Denies today.    Continue to monitor creat levels- following nephrology, pt drinking fluids without issue     Lymphedema:  HEP and compression. Referral placed for therapy. R UE US- negative for DVT     Venlafaxine- hot flashes and mood improved    Reactive LN after dog bite:  continue to monitor, scant right neck lad, nontender    Allergic rhinitis:  may take cetrizine       Follow up in 4 weeks         MDM high risk.  I have a longitudinal and continuous care relationship with this patient for the management of breast cancer, a serious or complex condition.  is applicable because the patient's medical record notes over time support that there is a longitudinal care relationship with me, the care plan reflects the ongoing nature of the continuous relationship of care, and the medical record indicates that there is ongoing treatment of a serious/complex medical condition which I am currently managing.           No orders of the defined types were placed in this encounter.      Results From Past 48 Hours:  Recent Results (from the past 48 hours)   CBC W/DIFF [E]    Collection Time: 07/16/25  1:42 PM   Result Value Ref Range    WBC 4.4 4.0 - 11.0 x10(3) uL    RBC 3.23 (L) 3.80 - 5.30 x10(6)uL    HGB 11.0 (L) 12.0 - 16.0 g/dL    HCT 32.3 (L) 35.0 - 48.0 %    .0 80.0 - 100.0 fL    MCH 34.1 (H) 26.0 - 34.0 pg    MCHC 34.1 31.0 - 37.0 g/dL    RDW-SD 40.7 35.1 - 46.3 fL    RDW 11.1 11.0 - 15.0 %    .0 150.0 - 450.0 10(3)uL    Neutrophil Absolute Prelim 2.86 1.50 - 7.70 x10 (3) uL    Neutrophil Absolute 2.86 1.50 - 7.70 x10(3) uL    Lymphocyte Absolute 1.06 1.00 - 4.00 x10(3) uL     Monocyte Absolute 0.27 0.10 - 1.00 x10(3) uL    Eosinophil Absolute 0.13 0.00 - 0.70 x10(3) uL    Basophil Absolute 0.07 0.00 - 0.20 x10(3) uL    Immature Granulocyte Absolute 0.03 0.00 - 1.00 x10(3) uL    Neutrophil % 64.7 %    Lymphocyte % 24.0 %    Monocyte % 6.1 %    Eosinophil % 2.9 %    Basophil % 1.6 %    Immature Granulocyte % 0.7 %   COMP METABOLIC PANEL [E]    Collection Time: 07/16/25  1:42 PM   Result Value Ref Range    Glucose 104 (H) 70 - 99 mg/dL    Sodium 140 136 - 145 mmol/L    Potassium 3.9 3.5 - 5.1 mmol/L    Chloride 107 98 - 112 mmol/L    CO2 24.0 21.0 - 32.0 mmol/L    Anion Gap 9 0 - 18 mmol/L    BUN 17 9 - 23 mg/dL    Creatinine 1.32 (H) 0.55 - 1.02 mg/dL    BUN/CREA Ratio 12.9 10.0 - 20.0    Calcium, Total 9.3 8.7 - 10.4 mg/dL    Calculated Osmolality 292 275 - 295 mOsm/kg    eGFR-Cr 52 (L) >=60 mL/min/1.73m2    ALT 21 10 - 49 U/L    AST 19 <34 U/L    Alkaline Phosphatase 72 37 - 98 U/L    Bilirubin, Total 0.3 0.3 - 1.2 mg/dL    Total Protein 6.8 5.7 - 8.2 g/dL    Albumin 4.4 3.2 - 4.8 g/dL    Globulin  2.4 2.0 - 3.5 g/dL    A/G Ratio 1.8 1.0 - 2.0    Patient Fasting for CMP? Patient not present    MAGNESIUM [E]    Collection Time: 07/16/25  1:42 PM   Result Value Ref Range    Magnesium 1.8 1.6 - 2.6 mg/dL             Imaging & Referrals:  None

## 2025-08-20 ENCOUNTER — APPOINTMENT (OUTPATIENT)
Dept: RADIATION ONCOLOGY | Facility: HOSPITAL | Age: 40
End: 2025-08-20
Attending: INTERNAL MEDICINE

## 2025-08-27 ENCOUNTER — OFFICE VISIT (OUTPATIENT)
Dept: RADIATION ONCOLOGY | Facility: HOSPITAL | Age: 40
End: 2025-08-27
Attending: INTERNAL MEDICINE

## 2025-08-27 ENCOUNTER — OFFICE VISIT (OUTPATIENT)
Dept: NEUROLOGY | Facility: CLINIC | Age: 40
End: 2025-08-27

## 2025-08-27 VITALS
DIASTOLIC BLOOD PRESSURE: 84 MMHG | SYSTOLIC BLOOD PRESSURE: 123 MMHG | BODY MASS INDEX: 27 KG/M2 | OXYGEN SATURATION: 100 % | RESPIRATION RATE: 16 BRPM | HEART RATE: 73 BPM | WEIGHT: 164.63 LBS | TEMPERATURE: 98 F

## 2025-08-27 DIAGNOSIS — G51.4 EYELID MYOKYMIA: ICD-10-CM

## 2025-08-27 DIAGNOSIS — R59.1 HEAD AND NECK LYMPHADENOPATHY: Primary | ICD-10-CM

## 2025-08-27 DIAGNOSIS — R25.1 TREMOR: Primary | ICD-10-CM

## 2025-08-27 DIAGNOSIS — Z17.0 MALIGNANT NEOPLASM OF UPPER-OUTER QUADRANT OF RIGHT BREAST IN FEMALE, ESTROGEN RECEPTOR POSITIVE (HCC): ICD-10-CM

## 2025-08-27 DIAGNOSIS — C50.411 MALIGNANT NEOPLASM OF UPPER-OUTER QUADRANT OF RIGHT BREAST IN FEMALE, ESTROGEN RECEPTOR POSITIVE (HCC): ICD-10-CM

## 2025-08-27 PROCEDURE — 99213 OFFICE O/P EST LOW 20 MIN: CPT | Performed by: OTHER

## 2025-08-27 RX ORDER — ACETAMINOPHEN 500 MG
1000 TABLET ORAL EVERY 8 HOURS
COMMUNITY
Start: 2025-06-30

## 2025-08-28 ENCOUNTER — TELEPHONE (OUTPATIENT)
Facility: LOCATION | Age: 40
End: 2025-08-28

## 2025-08-28 DIAGNOSIS — Z51.81 MEDICATION MONITORING ENCOUNTER: Primary | ICD-10-CM

## 2025-08-28 DIAGNOSIS — Z17.0 MALIGNANT NEOPLASM OF UPPER-OUTER QUADRANT OF RIGHT BREAST IN FEMALE, ESTROGEN RECEPTOR POSITIVE (HCC): Primary | ICD-10-CM

## 2025-08-28 DIAGNOSIS — C50.411 MALIGNANT NEOPLASM OF UPPER-OUTER QUADRANT OF RIGHT BREAST IN FEMALE, ESTROGEN RECEPTOR POSITIVE (HCC): ICD-10-CM

## 2025-08-28 DIAGNOSIS — Z17.0 MALIGNANT NEOPLASM OF UPPER-OUTER QUADRANT OF RIGHT BREAST IN FEMALE, ESTROGEN RECEPTOR POSITIVE (HCC): ICD-10-CM

## 2025-08-28 DIAGNOSIS — C50.411 MALIGNANT NEOPLASM OF UPPER-OUTER QUADRANT OF RIGHT BREAST IN FEMALE, ESTROGEN RECEPTOR POSITIVE (HCC): Primary | ICD-10-CM

## 2025-08-29 ENCOUNTER — RESEARCH ENCOUNTER (OUTPATIENT)
Facility: LOCATION | Age: 40
End: 2025-08-29

## 2025-08-29 ENCOUNTER — OFFICE VISIT (OUTPATIENT)
Facility: LOCATION | Age: 40
End: 2025-08-29
Attending: NURSE PRACTITIONER

## 2025-08-29 ENCOUNTER — NURSE ONLY (OUTPATIENT)
Facility: LOCATION | Age: 40
End: 2025-08-29
Attending: NURSE PRACTITIONER

## 2025-08-29 VITALS
SYSTOLIC BLOOD PRESSURE: 122 MMHG | OXYGEN SATURATION: 99 % | HEART RATE: 79 BPM | HEIGHT: 65 IN | TEMPERATURE: 98 F | BODY MASS INDEX: 27.66 KG/M2 | RESPIRATION RATE: 16 BRPM | DIASTOLIC BLOOD PRESSURE: 80 MMHG | WEIGHT: 166 LBS

## 2025-08-29 DIAGNOSIS — Z17.0 MALIGNANT NEOPLASM OF UPPER-OUTER QUADRANT OF RIGHT BREAST IN FEMALE, ESTROGEN RECEPTOR POSITIVE (HCC): ICD-10-CM

## 2025-08-29 DIAGNOSIS — C50.411 MALIGNANT NEOPLASM OF UPPER-OUTER QUADRANT OF RIGHT BREAST IN FEMALE, ESTROGEN RECEPTOR POSITIVE (HCC): ICD-10-CM

## 2025-08-29 DIAGNOSIS — Z51.81 MEDICATION MONITORING ENCOUNTER: ICD-10-CM

## 2025-08-29 DIAGNOSIS — D64.9 ANEMIA, UNSPECIFIED TYPE: ICD-10-CM

## 2025-08-29 DIAGNOSIS — Z17.0 MALIGNANT NEOPLASM OF UPPER-OUTER QUADRANT OF RIGHT BREAST IN FEMALE, ESTROGEN RECEPTOR POSITIVE (HCC): Primary | ICD-10-CM

## 2025-08-29 DIAGNOSIS — Z79.899 HIGH RISK MEDICATIONS (NOT ANTICOAGULANTS) LONG-TERM USE: ICD-10-CM

## 2025-08-29 DIAGNOSIS — C50.411 MALIGNANT NEOPLASM OF UPPER-OUTER QUADRANT OF RIGHT BREAST IN FEMALE, ESTROGEN RECEPTOR POSITIVE (HCC): Primary | ICD-10-CM

## 2025-08-29 LAB
ALBUMIN SERPL-MCNC: 4.3 G/DL (ref 3.2–4.8)
ALBUMIN/GLOB SERPL: 1.9 (ref 1–2)
ALP LIVER SERPL-CCNC: 61 U/L (ref 37–98)
ALT SERPL-CCNC: 12 U/L (ref 10–49)
ANION GAP SERPL CALC-SCNC: 7 MMOL/L (ref 0–18)
AST SERPL-CCNC: 17 U/L (ref ?–34)
BASOPHILS # BLD AUTO: 0.06 X10(3) UL (ref 0–0.2)
BASOPHILS NFR BLD AUTO: 1.8 %
BILIRUB SERPL-MCNC: 0.2 MG/DL (ref 0.3–1.2)
BUN BLD-MCNC: 17 MG/DL (ref 9–23)
BUN/CREAT SERPL: 13 (ref 10–20)
CALCIUM BLD-MCNC: 9.3 MG/DL (ref 8.7–10.4)
CHLORIDE SERPL-SCNC: 106 MMOL/L (ref 98–112)
CO2 SERPL-SCNC: 26 MMOL/L (ref 21–32)
CREAT BLD-MCNC: 1.31 MG/DL (ref 0.55–1.02)
DEPRECATED RDW RBC AUTO: 46.6 FL (ref 35.1–46.3)
EGFRCR SERPLBLD CKD-EPI 2021: 52 ML/MIN/1.73M2 (ref 60–?)
EOSINOPHIL # BLD AUTO: 0.09 X10(3) UL (ref 0–0.7)
EOSINOPHIL NFR BLD AUTO: 2.7 %
ERYTHROCYTE [DISTWIDTH] IN BLOOD BY AUTOMATED COUNT: 12.7 % (ref 11–15)
FASTING STATUS PATIENT QL REPORTED: NO
GLOBULIN PLAS-MCNC: 2.3 G/DL (ref 2–3.5)
GLUCOSE BLD-MCNC: 80 MG/DL (ref 70–99)
HCT VFR BLD AUTO: 30.5 % (ref 35–48)
HGB BLD-MCNC: 10.2 G/DL (ref 12–16)
IMM GRANULOCYTES # BLD AUTO: 0.01 X10(3) UL (ref 0–1)
IMM GRANULOCYTES NFR BLD: 0.3 %
LYMPHOCYTES # BLD AUTO: 0.87 X10(3) UL (ref 1–4)
LYMPHOCYTES NFR BLD AUTO: 25.7 %
MCH RBC QN AUTO: 34.1 PG (ref 26–34)
MCHC RBC AUTO-ENTMCNC: 33.4 G/DL (ref 31–37)
MCV RBC AUTO: 102 FL (ref 80–100)
MONOCYTES # BLD AUTO: 0.27 X10(3) UL (ref 0.1–1)
MONOCYTES NFR BLD AUTO: 8 %
NEUTROPHILS # BLD AUTO: 2.08 X10 (3) UL (ref 1.5–7.7)
NEUTROPHILS # BLD AUTO: 2.08 X10(3) UL (ref 1.5–7.7)
NEUTROPHILS NFR BLD AUTO: 61.5 %
OSMOLALITY SERPL CALC.SUM OF ELEC: 289 MOSM/KG (ref 275–295)
PLATELET # BLD AUTO: 191 10(3)UL (ref 150–450)
POTASSIUM SERPL-SCNC: 3.8 MMOL/L (ref 3.5–5.1)
PROT SERPL-MCNC: 6.6 G/DL (ref 5.7–8.2)
RBC # BLD AUTO: 2.99 X10(6)UL (ref 3.8–5.3)
SODIUM SERPL-SCNC: 139 MMOL/L (ref 136–145)
WBC # BLD AUTO: 3.4 X10(3) UL (ref 4–11)

## 2026-03-09 ENCOUNTER — APPOINTMENT (OUTPATIENT)
Dept: OBGYN | Age: 41
End: 2026-03-09

## (undated) DEVICE — CABLE BPLR L12FT FLYING LD DISP

## (undated) DEVICE — TOWEL L26 IN X W15 IN TIBURON NONABSORBENT DRAPE ADH STRIP

## (undated) DEVICE — ZZDISC - USE 405166-SUT COAT VCRL 3-0 27IN SH ABSRB UD 26MM 1/2

## (undated) DEVICE — NEEDLE BLNT 18GA L1.5IN FILL DISP PRECISGLDE

## (undated) DEVICE — SUTURE VCL+ MTPS 4-0 PS2 27IN BRAID COAT ABS

## (undated) DEVICE — NEEDLE HPO 25GA 1.5IN REG WALL REG BVL LL HUB DEHP-FR STRL

## (undated) DEVICE — SHEET,DRAPE,53X77,STERILE: Brand: MEDLINE

## (undated) DEVICE — GOWN SURG XL L3 NONREINFORCE SET IN SLV STRL LF DISP BLUE

## (undated) DEVICE — Device

## (undated) DEVICE — SPONGE GAUZE 2X2IN CTN 8 PLY WOVEN FOLD EDGE STRL

## (undated) DEVICE — DRAIN SURG W7MMXL20CM SIL HUBLESS FLAT FLL

## (undated) DEVICE — SUT PERMA- 2-0 18IN FS NABSRB BLK 26MM 3/8

## (undated) DEVICE — TRAY CATH CMP CR LUBRI-SIL IC STLK SURESTEP 16FR FOLEY URMTR

## (undated) DEVICE — TUBING INSFL PNEUMOCLEAR SET HFL

## (undated) DEVICE — CLIP SUR SM TI HRT SHP WIRE HORZ LIG SYS

## (undated) DEVICE — AEGIS 1" DISK 4MM HOLE, PEEL OPEN: Brand: MEDLINE

## (undated) DEVICE — SYSTEM SMOKE EVAC UNV PLUME FILTRATION FLOW ADJ VLV LL LOCK

## (undated) DEVICE — GLOVE SURG 6.5 PROTEXIS LF BLUE PF SMTH BEAD CUFF INTLK STRL

## (undated) DEVICE — 3M™ STERI-STRIP™ REINFORCED ADHESIVE SKIN CLOSURES, R1548, 1 IN X 5 IN (25 MM X 125 MM), 4 STRIPS/ENVELOPE: Brand: 3M™ STERI-STRIP™

## (undated) DEVICE — OR TOWEL, 17" X 26" STERILE, BLUE: Brand: PREMIERPRO

## (undated) DEVICE — EVACUATOR SUR 100CC SIL BLB WND

## (undated) DEVICE — SUT PDS II 3-0 18IN ABSRB UD L24MM PS-1

## (undated) DEVICE — COVER PRB 1X11.8IN ENDOCAVITY CLR E BND

## (undated) DEVICE — TROCAR LAPSCP 100MM 5MM KII Z THRD SLV RTNT DISC STRL LF

## (undated) DEVICE — DRAIN SUR W10MMXL20CM SIL FULL PERF HUBLESS

## (undated) DEVICE — SLRDIVR LAPSCP 37CM 5MM LGSR 180D 17.8MM 2 ACT JAW BLUNT

## (undated) DEVICE — 6 ML SYRINGE LUER-LOCK TIP: Brand: MONOJECT

## (undated) DEVICE — SUT CHRM GUT 3-0 27IN SH ABSRB UD 26MM 1/2

## (undated) DEVICE — SYRINGE 10 ML CNTRL CONC TIP PYROGEN FREE DEHP FREE LOK MED

## (undated) DEVICE — PACK SURG BASIC CNVRT

## (undated) DEVICE — BRA MASTECTOMY SM PNK ULT SFT PERF FAB STYL

## (undated) DEVICE — APPLICATOR BNZN TNCT .6ML STRSTRP SKNCLS NONHYPOALLERGENIC

## (undated) DEVICE — ADHESIVE LIQ 2/3ML VI MASTISOL

## (undated) DEVICE — SPONGE GZ 4X4IN COT 12 PLY TYP VII WVN C

## (undated) DEVICE — DRAPE TAPE: Brand: CONVERTORS

## (undated) DEVICE — 2% CHLORHEXIDINE SKIN PREP ORANGE 26ML

## (undated) DEVICE — GLOVE SURG 7 PROTEXIS PI CLASSIC PWDR FREE BEAD CUFF PLISPRN

## (undated) DEVICE — YANKAUER,FLEXIBLE HANDLE,REGLR CAPACITY: Brand: MEDLINE INDUSTRIES, INC.

## (undated) DEVICE — ELECTRODE PT RTN L9 FT VALLEYLAB REM POLYHESIVE ACRYLIC FOAM

## (undated) DEVICE — STRIP SKIN CLSR L4 IN X W12 IN REINFORCE STERI-STRIP POLY

## (undated) DEVICE — DRAPE UNDER BUTT FLTR SCRN FL CNTRL POUCH DRN PORT GRAD

## (undated) DEVICE — DRAPE .75 SHT FNFLD 76X52IN SURG CNVRT STRL LF DISP TIBURON

## (undated) DEVICE — GLOVE SURG 6 PROTEXIS PI CLASSIC PWDR FREE BEAD CUFF PLISPRN

## (undated) DEVICE — GAMMEX® PI HYBRID SIZE 6.5, STERILE POWDER-FREE SURGICAL GLOVE, POLYISOPRENE AND NEOPRENE BLEND: Brand: GAMMEX

## (undated) DEVICE — TRAY SURG VAG

## (undated) DEVICE — SUT MCRYL 4-0 18IN PS-2 ABSRB UD 19MM 3/8 CIR

## (undated) DEVICE — POUCH INST LONG 18X10IN 2 ADH STRIP 3 CMPRT STRL STRDRP

## (undated) DEVICE — MINOR GENERAL: Brand: MEDLINE INDUSTRIES, INC.

## (undated) DEVICE — SOLUTION IRR 0.9% NA CL 1000 ML PLASTIC POUR BTL ISOTONIC

## (undated) DEVICE — CLIP LIG M BLU TI HRT SHP WIRE HORZ

## (undated) DEVICE — SYRINGE 10 ML GRAD NONPYROGENIC DEHP FREE PVC FREE LOK MED

## (undated) DEVICE — ANTIBACTERIAL UNDYED BRAIDED (POLYGLACTIN 910), SYNTHETIC ABSORBABLE SUTURE: Brand: COATED VICRYL

## (undated) DEVICE — SUT MCRYL 4-0 27IN ABSRB UD 19MM PS-2 3/8

## (undated) DEVICE — PACK,UNIVERSAL,SPLIT,II: Brand: MEDLINE

## (undated) DEVICE — DRESSING TRANS 2.75INX2 38IN ADH HPOAL WTPRF TEGADERM PU

## (undated) DEVICE — TROCAR LAPSCP 100MM 5MM KII FIOS Z THRD SLV 1ST ENTRY STRL

## (undated) DEVICE — SOLUTION ANTIFOG 6CC NTOX NABRSV RADOPQ ADH SPNG MDCHC STRL

## (undated) DEVICE — GLOVE SURG 6.5 PROTEXIS LF CRM PF BEAD CUFF STRL PLISPRN

## (undated) DEVICE — STANDARD HYPODERMIC NEEDLE,POLYPROPYLENE HUB: Brand: MONOJECT

## (undated) DEVICE — Device: Brand: JELCO

## (undated) DEVICE — NEEDLE INSFL L120 MM OD14 GA ENDOPATH SPRG LOAD BLUNT STY

## (undated) DEVICE — SOLUTION IRRIG 1000ML 0.9% NACL USP BTL

## (undated) DEVICE — 3M™ TEGADERM™ TRANSPARENT FILM DRESSING FRAME STYLE, 1626W, 4 IN X 4-3/4 IN (10 CM X 12 CM), 50/CT 4CT/CASE: Brand: 3M™ TEGADERM™

## (undated) DEVICE — GLOVE SURG 7 PROTEXIS LF BLUE PF SMTH BEAD CUFF INTLK STRL

## (undated) DEVICE — PAD,ABDOMINAL,8"X7.5",STERILE,LF,1/PK: Brand: MEDLINE

## (undated) DEVICE — GLOVE SURG 7.5 PROTEXIS BLUE PI PWDR FREE SMTH BEAD CUFF INTLK

## (undated) DEVICE — GOWN SURG LG FABRIC ASTOUND BLUE LVL 3 NONREINFORCE SET IN

## (undated) NOTE — LETTER
[address] [address]  [phone]  Authorization for Imaging Procedure    I hereby authorize Dr. Meryl Tello, my physician and his/her assistants (if applicable), which may include medical students, residents, and/or fellows, to perform the following procedure and administer such anesthesia as may be determined necessary by my physician: ULTRASOUND GUIDED BIOPSY RIGHT BREAST AND RIGHT AXILLARY [facility] on [address]. 2.  I recognize that during the procedure, unforeseen conditions may necessitate additional or different procedures than those listed above. I, therefore, further authorize and request that the above-named physician, assistants, or designees perform such procedures as are, in their judgment, necessary and desirable. 3.  My physician has discussed prior to my procedure the potential benefits, risks and side effects of this procedure; the likelihood of achieving goals; and potential problems that might occur during recuperation. They also discussed reasonable alternatives to the procedure, including risks, benefits, and side effects related to the alternatives and risks related to not receiving this procedure. I have had all my questions answered and I acknowledge that no guarantee has been made as to the result that may be obtained. 4.  Should the need arise during my procedure, which includes change of level of care prior to discharge, I also consent to the administration of blood and/or blood products. Further, I understand that despite careful testing and screening of blood or blood products by collecting agencies, I may still be subject to ill effects as a result of receiving a blood transfusion and/or blood products.  The following are some, but not all, of the potential risks that can occur: fever and allergic reactions, hemolytic reactions, transmission of diseases such as Hepatitis, AIDS and Cytomegalovirus (CMV) and fluid overload. In the event that I wish to have an autologous transfusion of my own blood, or a directed donor transfusion, I will discuss this with my physician. Check only if Refusing Blood or Blood Products  I understand refusal of blood or blood products as deemed necessary by my physician may have serious consequences to my condition to include possible death. I hereby assume responsibility for my refusal and release the hospital, its personnel, and my physicians from any responsibility for the consequences of my refusal.   [  ] Patient Refuses Blood      5. I authorize the use of any specimen, organs, tissues, body parts or foreign objects that may be removed from my body during the procedure for diagnosis, research or teaching purposes and their subsequent disposal by hospital authorities. I also authorize the release of specimen test results and/or written reports to my treating physician on the hospital medical staff or other referring or consulting physicians involved in my care, at the discretion of the Pathologist or my treating physician. 6.  I consent to the photographing or videotaping of the procedures to be performed, including appropriate portions of my body for medical, scientific, or educational purposes, provided my identity is not revealed by the pictures or by descriptive texts accompanying them. If the procedure has been photographed/videotaped, the physician will obtain the original picture, image, videotape or CD. The hospital will not be responsible for storage, release or maintenance of the picture, image, tape or CD.   7.  I consent to the presence of a  or observers in the operating room as deemed necessary by my physician or their designees. 8.  I recognize that in the event my procedure results in extended X-Ray/fluoroscopy time, I may develop a skin reaction. 9.   If I have a Do Not Attempt Resuscitation (DNAR) order in place, that status will be suspended while in the operating room, procedural suite, and during the recovery period unless otherwise explicitly stated by me (or a person authorized to consent on my behalf). The performing physician or my attending physician will determine when the applicable recovery period ends for purposes of reinstating the DNAR order. 10.  I acknowledge that my physician has explained sedation/analgesia administration to me including the risk and benefits I consent to the administration of sedation/analgesia as may be necessary or desirable in the judgment of my physician. I CERTIFY THAT I HAVE READ AND FULLY UNDERSTAND THE ABOVE CONSENT FOR THE PROCEDURE. Signature of Patient: _____________________________________________________________  Responsible person in case of minor, unconscious: ____________________________________  Relationship to patient:  __________________________________________________________  Signature of Witness: _______________________________Date: _________Time: __________    Statement of Physician: My signature below affirms that prior to the time of the procedure, I have explained to the patient and/or her guardian, the risks and benefits involved in the proposed treatment and any reasonable alternative to the proposed treatment. I have also explained the risks and benefits involved in the refusal of the proposed treatment and have answered the patient's questions. If I have a significant financial interest in a co-management agreement or a significant financial interest in any product or implant, or other significant relationship used in the procedure/surgery, I have disclosed this and had a discussion with my patient.   Signature of Physician:   _________________________________Date:_____________Time:________    Patient Name: Brendon Zeng : 1985  Printed: 2023   Medical Record #: [id number]

## (undated) NOTE — LETTER
[facility]  [address], [place], [place]  Authorization for Surgical Operation and Procedure                                                                                           I hereby authorize                                                       MD, my physician and his/her assistants (if applicable), which may include medical students, residents, and/or fellows, to perform the following surgical operation/ procedure and administer such anesthesia as may be determined necessary by my physician: Operation/Procedure name (s) Bilateral breast localization on Jocelyn Garza   2.   I recognize that during the surgical operation/procedure, unforeseen conditions may necessitate additional or different procedures than those listed above.  I, therefore, further authorize and request that the above-named surgeon, assistants, or designees perform such procedures as are, in their judgment, necessary and desirable.    3.   My surgeon/physician has discussed prior to my surgery the potential benefits, risks and side effects of this procedure; the likelihood of achieving goals; and potential problems that might occur during recuperation.  They also discussed reasonable alternatives to the procedure, including risks, benefits, and side effects related to the alternatives and risks related to not receiving this procedure.  I have had all my questions answered and I acknowledge that no guarantee has been made as to the result that may be obtained.    4.   Should the need arise during my operation/procedure, which includes change of level of care prior to discharge, I also consent to the administration of blood and/or blood products.  Further, I understand that despite careful testing and screening of blood or blood products by collecting agencies, I may still be subject to ill effects as a result of receiving a blood transfusion and/or blood products.  The following are some, but not all, of the  potential risks that can occur: fever and allergic reactions, hemolytic reactions, transmission of diseases such as Hepatitis, AIDS and Cytomegalovirus (CMV) and fluid overload.  In the event that I wish to have an autologous transfusion of my own blood, or a directed donor transfusion, I will discuss this with my physician.  Check only if Refusing Blood or Blood Products  I understand refusal of blood or blood products as deemed necessary by my physician may have serious consequences to my condition to include possible death. I hereby assume responsibility for my refusal and release the hospital, its personnel, and my physicians from any responsibility for the consequences of my refusal.    o  Refuse   5.   I authorize the use of any specimen, organs, tissues, body parts or foreign objects that may be removed from my body during the operation/procedure for diagnosis, research or teaching purposes and their subsequent disposal by hospital authorities.  I also authorize the release of specimen test results and/or written reports to my treating physician on the hospital medical staff or other referring or consulting physicians involved in my care, at the discretion of the Pathologist or my treating physician.    6.   I consent to the photographing or videotaping of the operations or procedures to be performed, including appropriate portions of my body for medical, scientific, or educational purposes, provided my identity is not revealed by the pictures or by descriptive texts accompanying them.  If the procedure has been photographed/videotaped, the surgeon will obtain the original picture, image, videotape or CD.  The hospital will not be responsible for storage, release or maintenance of the picture, image, tape or CD.    7.   I consent to the presence of a  or observers in the operating room as deemed necessary by my physician or their designees.    8.   I recognize that in the event my procedure  results in extended X-Ray/fluoroscopy time, I may develop a skin reaction.    9. If I have a Do Not Attempt Resuscitation (DNAR) order in place, that status will be suspended while in the operating room, procedural suite, and during the recovery period unless otherwise explicitly stated by me (or a person authorized to consent on my behalf). The surgeon or my attending physician will determine when the applicable recovery period ends for purposes of reinstating the DNAR order.  10. Patients having a sterilization procedure: I understand that if the procedure is successful the results will be permanent and it will therefore be impossible for me to inseminate, conceive, or bear children.  I also understand that the procedure is intended to result in sterility, although the result has not been guaranteed.   11. I acknowledge that my physician has explained sedation/analgesia administration to me including the risk and benefits I consent to the administration of sedation/analgesia as may be necessary or desirable in the judgment of my physician.    I CERTIFY THAT I HAVE READ AND FULLY UNDERSTAND THE ABOVE CONSENT TO OPERATION and/or OTHER PROCEDURE.     _________________________________________ _________________________________     ___________________________________  Signature of Patient     Signature of Responsible Person                   Printed Name of Responsible Person                              _________________________________________ ______________________________        ___________________________________  Signature of Witness         Date  Time         Relationship to Patient    STATEMENT OF PHYSICIAN My signature below affirms that prior to the time of the procedure; I have explained to the patient and/or his/her legal representative, the risks and benefits involved in the proposed treatment and any reasonable alternative to the proposed treatment. I have also explained the risks and benefits involved in  refusal of the proposed treatment and alternatives to the proposed treatment and have answered the patient's questions. If I have a significant financial interest in a co-management agreement or a significant financial interest in any product or implant, or other significant relationship used in this procedure/surgery, I have disclosed this and had a discussion with my patient.     _______________________________________________________________ _____________________________  (Signature of Physician)                                                                                         (Date)                                   (Time)  Patient Name: Jocelyn SAUCEDA Kayla    : 1985   Printed: 2024      Medical Record #: [id number]                                              Page 1 of 1

## (undated) NOTE — LETTER
No referring provider defined for this encounter. 05/04/18        Patient: Home Fernando   YOB: 1985   Date of Visit: 5/4/2018       Dear   [facility], DO,      Thank you for referring Home Kassie to my practice.   Please

## (undated) NOTE — LETTER
No referring provider defined for this encounter. 12/20/23        Patient: Arthur Gauthier   YOB: 1985   Date of Visit: 12/19/2023       Dear  Dr. Kristi Xiao,      Thank you for referring Arthur Gauthier to my practice. Please find my assessment and plan below. 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 Last seen in December 2022. Patient informs me that she was just recently diagnosed with right breast cancer. Currently undergoing neoadjuvant chemotherapy. This will be followed by a right mastectomy and then postoperative radiation. Although she does get some nausea during the first week after chemotherapy her weight has remained stable and she feels she is staying well-hydrated. On physical exam her blood pressure 110/62 with a pulse of 86 and she weighed 155 pounds. Her neck was supple without JVD. Lungs are clear. Heart revealed a regular rate and rhythm without gallops or murmurs. Abdomen was soft, flat, nontender without organomegaly, masses or bruits. Extremities revealed no edema. I reviewed her most recent labs done December 8, 2023. Creatinine remains good at 0.92. Electrolytes were good. Hemoglobin 10.3. Recent urinalysis in November 2023 was within normal limits. She did have a CT scan of the abdomen done October 17, 2023. Right kidney was congenitally absent but there was mild compensatory hypertrophy of the left kidney. I therefore reassured the patient that her renal function remains normal.  No significant proteinuria. Hopefully will do well with her breast cancer treatment. She is currently on lisinopril for its renal protective effects. She will let me know though if her blood pressure should drop too low. Recommended that she see me in 1 year for follow-up or sooner if clinically indicated.     Thank you again for allowing me to participate in the care of your patient. If you have any questions please feel free to call.            Sincerely,   Janine Arias MD   [facility], [address] [place] [address]  [place] [address] [place] [address]  [address] [facility] [id number]    Document electronically generated by:  Janine Arias MD

## (undated) NOTE — LETTER
No referring provider defined for this encounter. 07/27/21        Patient: Lizbet Guerrero   YOB: 1985   Date of Visit: 7/27/2021       Dear  Dr. Thomas Has,      Thank you for referring Lizbet Guerrero to my practice.   Uri [facility]  [facility], [address] [place] [address], [facility]  [address]  [facility] [id number]    Document electronically generated by:  Lux Grubbs

## (undated) NOTE — LETTER
[facility]  [address] [facility] [place] [address]  [phone]  [phone]  Authorization for Imaging Procedure  Date of Procedure:     I hereby authorize Dr. Castellano , my physician and his/her assistants (if applicable), which may include medical students, residents, and/or fellows, to perform the following procedure and administer such anesthesia as may be determined necessary by my physician: ULTRASOUND GUIDED  AXILLARY LYMPH NODE LOCALIZATION ELÍAS RIGHT HYDROMARK COIL CLIP PROVEN METASTATIC CARCINOMA may necessitate additional or different procedures than those listed above. I, therefore, further authorize and request that the above-named physician, assistants, or designees perform such procedures as are, in their judgment, necessary and desirable.    3.  My physician has discussed prior to my procedure the potential benefits, risks and side effects of this procedure; the likelihood of achieving goals; and potential problems that might occur during recuperation. They also discussed reasonable alternatives to the procedure, including risks, benefits, and side effects related to the alternatives and risks related to not receiving this procedure. I have had all my questions answered and I acknowledge that no guarantee has been made as to the result that may be obtained.    4.  Should the need arise during my procedure, which includes change of level of care prior to discharge, I also consent to the administration of blood and/or blood products. Further, I understand that despite careful testing and screening of blood or blood products by collecting agencies, I may still be subject to ill effects as a result of receiving a blood transfusion and/or blood products. The following are some, but not all, of the potential risks that can occur: fever and allergic reactions, hemolytic reactions, transmission of diseases such as Hepatitis, AIDS and Cytomegalovirus  (CMV) and fluid overload. In the event that I wish to have an autologous transfusion of my own blood, or a directed donor transfusion, I will discuss this with my physician.  Check only if Refusing Blood or Blood Products  I understand refusal of blood or blood products as deemed necessary by my physician may have serious consequences to my condition to include possible death. I hereby assume responsibility for my refusal and release the hospital, its personnel, and my physicians from any responsibility for the consequences of my refusal.   [  ] Patient Refuses Blood      5.  I authorize the use of any specimen, organs, tissues, body parts or foreign objects that may be removed from my body during the procedure for diagnosis, research or teaching purposes and their subsequent disposal by hospital authorities. I also authorize the release of specimen test results and/or written reports to my treating physician on the hospital medical staff or other referring or consulting physicians involved in my care, at the discretion of the Pathologist or my treating physician.    6.  I consent to the photographing or videotaping of the procedures to be performed, including appropriate portions of my body for medical, scientific, or educational purposes, provided my identity is not revealed by the pictures or by descriptive texts accompanying them. If the procedure has been photographed/videotaped, the physician will obtain the original picture, image, videotape or CD. The hospital will not be responsible for storage, release or maintenance of the picture, image, tape or CD.   7.  I consent to the presence of a  or observers in the operating room as deemed necessary by my physician or their designees.    8.  I recognize that in the event my procedure results in extended X-Ray/fluoroscopy time, I may develop a skin reaction.    9.  If I have a Do Not Attempt Resuscitation (DNAR) order in place, that status will  be suspended while in the operating room, procedural suite, and during the recovery period unless otherwise explicitly stated by me (or a person authorized to consent on my behalf). The performing physician or my attending physician will determine when the applicable recovery period ends for purposes of reinstating the DNAR order.  10.  I acknowledge that my physician has explained sedation/analgesia administration to me including the risk and benefits I consent to the administration of sedation/analgesia as may be necessary or desirable in the judgment of my physician.      I CERTIFY THAT I HAVE READ AND FULLY UNDERSTAND THE ABOVE CONSENT FOR THE PROCEDURE.   Signature of Patient: _____________________________________________________________  Responsible person in case of minor, unconscious: ____________________________________  Relationship to patient:  __________________________________________________________  Signature of Witness: _______________________________Date: _________Time: __________    Statement of Physician: My signature below affirms that prior to the time of the procedure, I have explained to the patient and/or her guardian, the risks and benefits involved in the proposed treatment and any reasonable alternative to the proposed treatment. I have also explained the risks and benefits involved in the refusal of the proposed treatment and have answered the patient's questions. If I have a significant financial interest in a co-management agreement or a significant financial interest in any product or implant, or other significant relationship used in the procedure/surgery, I have disclosed this and had a discussion with my patient.  Signature of Physician:   _________________________________Date:_____________Time:________    Patient Name: Jocelyn Garza : 1985  Printed: 2024   Medical Record #: [id number]

## (undated) NOTE — LETTER
[facility]  [address], [place], [place]  Authorization for Surgical Operation and Procedure                                                                                           I hereby authorize Janet Mae MD, my physician and his/her assistants (if applicable), which may include medical students, residents, and/or fellows, to perform the following surgical operation/ procedure and administer such anesthesia as may be determined necessary by my physician: Operation/Procedure name (s) Bilateral breast simple mastectomies, Allyson  excision of right axillary lymph node previously positive for metastatic carcinoma, right breast lymphoscintigraphy, right sentinel lymph node biopsy, possible right axillary lymph node dissection on Jocelyn Garza   2.   I recognize that during the surgical operation/procedure, unforeseen conditions may necessitate additional or different procedures than those listed above.  I, therefore, further authorize and request that the above-named surgeon, assistants, or designees perform such procedures as are, in their judgment, necessary and desirable.    3.   My surgeon/physician has discussed prior to my surgery the potential benefits, risks and side effects of this procedure; the likelihood of achieving goals; and potential problems that might occur during recuperation.  They also discussed reasonable alternatives to the procedure, including risks, benefits, and side effects related to the alternatives and risks related to not receiving this procedure.  I have had all my questions answered and I acknowledge that no guarantee has been made as to the result that may be obtained.    4.   Should the need arise during my operation/procedure, which includes change of level of care prior to discharge, I also consent to the administration of blood and/or blood products.  Further, I understand that despite careful testing and screening of blood or blood  products by collecting agencies, I may still be subject to ill effects as a result of receiving a blood transfusion and/or blood products.  The following are some, but not all, of the potential risks that can occur: fever and allergic reactions, hemolytic reactions, transmission of diseases such as Hepatitis, AIDS and Cytomegalovirus (CMV) and fluid overload.  In the event that I wish to have an autologous transfusion of my own blood, or a directed donor transfusion, I will discuss this with my physician.  Check only if Refusing Blood or Blood Products  I understand refusal of blood or blood products as deemed necessary by my physician may have serious consequences to my condition to include possible death. I hereby assume responsibility for my refusal and release the hospital, its personnel, and my physicians from any responsibility for the consequences of my refusal.    o  Refuse   5.   I authorize the use of any specimen, organs, tissues, body parts or foreign objects that may be removed from my body during the operation/procedure for diagnosis, research or teaching purposes and their subsequent disposal by hospital authorities.  I also authorize the release of specimen test results and/or written reports to my treating physician on the hospital medical staff or other referring or consulting physicians involved in my care, at the discretion of the Pathologist or my treating physician.    6.   I consent to the photographing or videotaping of the operations or procedures to be performed, including appropriate portions of my body for medical, scientific, or educational purposes, provided my identity is not revealed by the pictures or by descriptive texts accompanying them.  If the procedure has been photographed/videotaped, the surgeon will obtain the original picture, image, videotape or CD.  The hospital will not be responsible for storage, release or maintenance of the picture, image, tape or CD.    7.   I  consent to the presence of a  or observers in the operating room as deemed necessary by my physician or their designees.    8.   I recognize that in the event my procedure results in extended X-Ray/fluoroscopy time, I may develop a skin reaction.    9. If I have a Do Not Attempt Resuscitation (DNAR) order in place, that status will be suspended while in the operating room, procedural suite, and during the recovery period unless otherwise explicitly stated by me (or a person authorized to consent on my behalf). The surgeon or my attending physician will determine when the applicable recovery period ends for purposes of reinstating the DNAR order.  10. Patients having a sterilization procedure: I understand that if the procedure is successful the results will be permanent and it will therefore be impossible for me to inseminate, conceive, or bear children.  I also understand that the procedure is intended to result in sterility, although the result has not been guaranteed.   11. I acknowledge that my physician has explained sedation/analgesia administration to me including the risk and benefits I consent to the administration of sedation/analgesia as may be necessary or desirable in the judgment of my physician.    I CERTIFY THAT I HAVE READ AND FULLY UNDERSTAND THE ABOVE CONSENT TO OPERATION and/or OTHER PROCEDURE.     _________________________________________ _________________________________     ___________________________________  Signature of Patient     Signature of Responsible Person                   Printed Name of Responsible Person                              _________________________________________ ______________________________        ___________________________________  Signature of Witness         Date  Time         Relationship to Patient    STATEMENT OF PHYSICIAN My signature below affirms that prior to the time of the procedure; I have explained to the patient and/or his/her legal  representative, the risks and benefits involved in the proposed treatment and any reasonable alternative to the proposed treatment. I have also explained the risks and benefits involved in refusal of the proposed treatment and alternatives to the proposed treatment and have answered the patient's questions. If I have a significant financial interest in a co-management agreement or a significant financial interest in any product or implant, or other significant relationship used in this procedure/surgery, I have disclosed this and had a discussion with my patient.     _______________________________________________________________ _____________________________  (Signature of Physician)                                                                                         (Date)                                   (Time)  Patient Name: Jocelyn Garza    : 1985   Printed: 2024      Medical Record #: [id number]                                              Page 1 of 1

## (undated) NOTE — MR AVS SNAPSHOT
After Visit Summary   1/26/2024    Jocelyn Garza   MRN: [id number]           Visit Information     Date & Time  1/26/2024 11:30 AM Provider  [facility] INFRN 6 Department  [facility] Phone  [phone]      Your Vitals Were     LMP   11/21/2023 (Approximate)             Allergies as of 1/26/2024  Review status set to In Progress on 1/26/2024   No Known Allergies     Your Current Medications        Dosage    lisinopril 5 MG Oral Tab Take 1 tablet (5 mg total) by mouth daily.    cefuroxime 500 MG Oral Tab Take 1 tablet (500 mg total) by mouth 2 (two) times daily. For 3 days    prochlorperazine (COMPAZINE) 10 mg tablet Take 1 tablet (10 mg total) by mouth every 8 (eight) hours as needed for Nausea.    ondansetron (ZOFRAN) 8 MG tablet Take 1 tablet (8 mg total) by mouth every 8 (eight) hours as needed for Nausea.      Diagnoses for This Visit    Malignant neoplasm of upper-outer quadrant of right breast in female, estrogen receptor positive   [[id number]]  -  Primary  Encounter for central line care   [[id number]]             Future Appointments        Provider Department    1/30/2024 8:30 AM [facility]    2/2/2024 11:00 AM [facility] LAB[facility]    2/2/2024 12:00 PM [facility] INFRN 3 [facility]    2/9/2024 10:30 AM [facility]    2/9/2024 11:30 AM [facility] INFRN 2 [facility]    2/16/2024 8:15 AM EM [facility] LAB[facility]    2/16/2024 9:30 AM [facility] On[facility]    2/16/2024 10:00 AM EM [facility] INFRN 5 [facility]    2/23/2024 9:30 AM EM [facility] LAB1 [facility]    2/23/2024 10:30 AM EM [facility] INFRN 3 [facility]    3/1/2024 10:30 AM EM CC LAB1 [facility] - Infusion    3/1/2024 11:30 AM EM [facility] INFRN 1 [facility]    3/8/2024 8:15 AM EM CC LAB1 [facility] - Infusion    3/8/2024 9:30 AM [facility] On[facility]    3/8/2024 10:00 AM EM [facility] INFRN 6 [facility] - Infusion    3/15/2024 10:30 AM EM [facility] LAB1 [facility]    3/15/2024 11:30 AM EM [facility] INFRN 6 [facility]    3/22/2024 10:30 AM EM CC LAB2 [facility] - Infusion    3/22/2024 11:30 AM EM CC INFRN 2 [facility] - Infusion                Did you know that [facility] [place] now offer Video Visits through [email or web address] for adult patients for a variety of conditions such as allergies, back pain and cold symptoms? Skip the drive and waiting room and online chat with a doctor face-to-face using your web-cam enabled computer or mobile device wherever you are. Video Visits cost $50 and can be paid hassle-free using a credit, debit, or health savings card.  Not active on [email or web address]? Ask us how to get signed up today!          If you receive a survey from [employer], please take a few minutes to complete it and provide feedback. We strive to deliver the best patient experience and are looking for ways to make improvements. Your feedback will help us do so. For more information on [employer], please visit [email or web address]           No text in SmartText           No text in SmartText

## (undated) NOTE — Clinical Note
Doing well after long tx. Better on reduced Abemaciclib. Sees you 11/26 with bloodwork. She may be interested in a BSO at some point. Orders in bras and prosthetics.

## (undated) NOTE — LETTER
No referring provider defined for this encounter.       11/08/24        Patient: Jocelyn Garza   YOB: 1985   Date of Visit: 11/8/2024       Dear  Dr. Campbell DO,      Thank you for referring Jocelyn Garza to my practice.  Please find my assessment and plan below.    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  When I last saw her in December 2023 she had a creatinine that was 0.92.  However she was just diagnosed with right breast cancer and was undergoing neoadjuvant therapy which included Adriamycin, Cytoxan and Taxol.  She then had radiation therapy.  Underwent bilateral mastectomies in April 2024.  Was then started on Abemaciclib 150 mg twice daily in August 2024.  On August 23, 2024 creatinine was still good at 0.89.  However on September 13, 2024 creatinine increased to 1.65.  The patient was having severe diarrhea along with nausea and vomiting.  Her medication was decreased to 100 mg twice daily.  GI symptoms have improved.  Her creatinine is better but still not back to baseline.  Most recent labs on October 22, 2024 shows creatinine 1.38 with an estimated GFR of 51 cc/min.  She also has develop leukopenia and anemia.  Did lose some weight.    On physical exam her blood pressure was 98/54 with a pulse of 84 and she weighed under 56 pounds.  Her neck was supple without JVD.  Lungs were clear.  Heart revealed a regular rate and rhythm without gallops or murmurs.  Abdomen was soft, flat, nontender without organomegaly, masses or bruits.  Extremities revealed no edema.    I therefore informed the patient that she did develop acute renal failure.  Suspect this is secondary to nausea, vomiting and diarrhea leading to volume depletion.  Blood pressure is borderline low.  Occasional mild orthostatic  dizziness.  Therefore recommended discontinuing lisinopril.  Push p.o. fluids.  Repeat a renal panel and urine studies in 2 weeks.  If creatinine is still up we will repeat a renal ultrasound.  Avoid nonsteroidals.    Thank you again for allowing me to participate in the care of your patient.  If you have any questions please feel free to call.               Sincerely,   Malcolm Patel MD   [facility]  [address]  [facility] [address]    Document electronically generated by:  Malcolm Patel MD

## (undated) NOTE — LETTER
No referring provider defined for this encounter. 12/20/22        Patient: Sade Delaney   YOB: 1985   Date of Visit: 12/20/2022       Dear  Dr. Fransisco Wolf,      Thank you for referring Sade Delaney to my practice. Please find my assessment and plan below. 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 Last seen in July 2021. Overall the patient states has been doing well without any chest pain, shortness of breath, GI or urinary tract symptoms. No edema. On physical exam her blood pressure is 122/74 and she weighed 157 pounds. Her neck was supple without JVD. Lungs were clear. Heart revealed a regular rate and rhythm without gallops, murmurs or rubs. Abdomen was soft, flat, nontender without organomegaly, masses or bruits. Extremities revealed no edema. I reviewed her most recent labs done on November 23, 2022. Her creatinines which have always been less than 1 was now borderline high at 1.03 with an estimated GFR of 71 cc/min. Urinalysis showed 30 mg/dL protein but otherwise was unremarkable. I therefore informed the patient that there has been a slight bump in her creatinine. Reinforced importance of maintaining adequate hydration. Avoid nonsteroidals. Continue low-dose lisinopril for its renal protective effects. We will have her repeat a renal panel and urine for microalbumin in 1 to 2 months to make sure that these numbers remain stable. Otherwise we will see yearly or as needed. Thank you again for allowing me to participate in the care of your patient. If you have any questions please feel free to call.         Sincerely,   Janine Arias MD   [address]  [address] [facility] [place] [facility] [address]  [address] [address]    Document electronically generated by:  Janine Arias MD

## (undated) NOTE — LETTER
[facility]  [address], [place], [place]  Authorization for Surgical Operation and Procedure                                                                                           I hereby authorize Janet Mae MD, my physician and his/her assistants (if applicable), which may include medical students, residents, and/or fellows, to perform the following surgical operation/ procedure and administer such anesthesia as may be determined necessary by my physician: Operation/Procedure name (s) Bilateral breast simple mastectomies, [place]   excision of right axillary lymph node previously positive for metastatic carcinoma, right breast lymphoscintigraphy, right sentinel lymph node biopsy, possible right axillary lymph node dissection on Jocelyn Garza   2.   I recognize that during the surgical operation/procedure, unforeseen conditions may necessitate additional or different procedures than those listed above.  I, therefore, further authorize and request that the above-named surgeon, assistants, or designees perform such procedures as are, in their judgment, necessary and desirable.    3.   My surgeon/physician has discussed prior to my surgery the potential benefits, risks and side effects of this procedure; the likelihood of achieving goals; and potential problems that might occur during recuperation.  They also discussed reasonable alternatives to the procedure, including risks, benefits, and side effects related to the alternatives and risks related to not receiving this procedure.  I have had all my questions answered and I acknowledge that no guarantee has been made as to the result that may be obtained.    4.   Should the need arise during my operation/procedure, which includes change of level of care prior to discharge, I also consent to the administration of blood and/or blood products.  Further, I understand that despite careful testing and screening of blood or blood  products by collecting agencies, I may still be subject to ill effects as a result of receiving a blood transfusion and/or blood products.  The following are some, but not all, of the potential risks that can occur: fever and allergic reactions, hemolytic reactions, transmission of diseases such as Hepatitis, AIDS and Cytomegalovirus (CMV) and fluid overload.  In the event that I wish to have an autologous transfusion of my own blood, or a directed donor transfusion, I will discuss this with my physician.  Check only if Refusing Blood or Blood Products  I understand refusal of blood or blood products as deemed necessary by my physician may have serious consequences to my condition to include possible death. I hereby assume responsibility for my refusal and release the hospital, its personnel, and my physicians from any responsibility for the consequences of my refusal.    o  Refuse   5.   I authorize the use of any specimen, organs, tissues, body parts or foreign objects that may be removed from my body during the operation/procedure for diagnosis, research or teaching purposes and their subsequent disposal by hospital authorities.  I also authorize the release of specimen test results and/or written reports to my treating physician on the hospital medical staff or other referring or consulting physicians involved in my care, at the discretion of the Pathologist or my treating physician.    6.   I consent to the photographing or videotaping of the operations or procedures to be performed, including appropriate portions of my body for medical, scientific, or educational purposes, provided my identity is not revealed by the pictures or by descriptive texts accompanying them.  If the procedure has been photographed/videotaped, the surgeon will obtain the original picture, image, videotape or CD.  The hospital will not be responsible for storage, release or maintenance of the picture, image, tape or CD.    7.   I  consent to the presence of a  or observers in the operating room as deemed necessary by my physician or their designees.    8.   I recognize that in the event my procedure results in extended X-Ray/fluoroscopy time, I may develop a skin reaction.    9. If I have a Do Not Attempt Resuscitation (DNAR) order in place, that status will be suspended while in the operating room, procedural suite, and during the recovery period unless otherwise explicitly stated by me (or a person authorized to consent on my behalf). The surgeon or my attending physician will determine when the applicable recovery period ends for purposes of reinstating the DNAR order.  10. Patients having a sterilization procedure: I understand that if the procedure is successful the results will be permanent and it will therefore be impossible for me to inseminate, conceive, or bear children.  I also understand that the procedure is intended to result in sterility, although the result has not been guaranteed.   11. I acknowledge that my physician has explained sedation/analgesia administration to me including the risk and benefits I consent to the administration of sedation/analgesia as may be necessary or desirable in the judgment of my physician.    I CERTIFY THAT I HAVE READ AND FULLY UNDERSTAND THE ABOVE CONSENT TO OPERATION and/or OTHER PROCEDURE.     _________________________________________ _________________________________     ___________________________________  Signature of Patient     Signature of Responsible Person                   Printed Name of Responsible Person                              _________________________________________ ______________________________        ___________________________________  Signature of Witness         Date  Time         Relationship to Patient    STATEMENT OF PHYSICIAN My signature below affirms that prior to the time of the procedure; I have explained to the patient and/or his/her legal  representative, the risks and benefits involved in the proposed treatment and any reasonable alternative to the proposed treatment. I have also explained the risks and benefits involved in refusal of the proposed treatment and alternatives to the proposed treatment and have answered the patient's questions. If I have a significant financial interest in a co-management agreement or a significant financial interest in any product or implant, or other significant relationship used in this procedure/surgery, I have disclosed this and had a discussion with my patient.     _______________________________________________________________ _____________________________  (Signature of Physician)                                                                                         (Date)                                   (Time)  Patient Name: Jocelyn Garza    : 1985   Printed: 2024      Medical Record #: [id number]                                              Page 1 of 1

## (undated) NOTE — LETTER
No referring provider defined for this encounter. 10/21/19        Patient: Bhavana Pruitt   YOB: 1985   Date of Visit: 10/21/2019       Dear  Dr. Masood Ye, DO,      Thank you for referring Bhavana Pruitt to my practice.   [place] [id number]    Document electronically generated by:  Lyndsay Crawford